# Patient Record
Sex: MALE | Race: WHITE | URBAN - METROPOLITAN AREA
[De-identification: names, ages, dates, MRNs, and addresses within clinical notes are randomized per-mention and may not be internally consistent; named-entity substitution may affect disease eponyms.]

---

## 2021-01-14 DIAGNOSIS — Z23 NEED FOR VACCINATION: ICD-10-CM

## 2024-03-05 ENCOUNTER — HOSPITAL ENCOUNTER (INPATIENT)
Facility: MEDICAL CENTER | Age: 84
LOS: 2 days | DRG: 378 | End: 2024-03-07
Attending: EMERGENCY MEDICINE | Admitting: HOSPITALIST
Payer: MEDICARE

## 2024-03-05 ENCOUNTER — APPOINTMENT (OUTPATIENT)
Dept: RADIOLOGY | Facility: MEDICAL CENTER | Age: 84
DRG: 378 | End: 2024-03-05
Attending: EMERGENCY MEDICINE
Payer: MEDICARE

## 2024-03-05 DIAGNOSIS — R79.89 ELEVATED TROPONIN: ICD-10-CM

## 2024-03-05 DIAGNOSIS — D64.9 SYMPTOMATIC ANEMIA: ICD-10-CM

## 2024-03-05 DIAGNOSIS — I95.1 ORTHOSTASIS: ICD-10-CM

## 2024-03-05 DIAGNOSIS — K92.1 TARRY STOOL: ICD-10-CM

## 2024-03-05 DIAGNOSIS — K92.2 UPPER GI BLEED: ICD-10-CM

## 2024-03-05 DIAGNOSIS — N17.9 AKI (ACUTE KIDNEY INJURY) (HCC): ICD-10-CM

## 2024-03-05 PROBLEM — D62 ACUTE BLOOD LOSS ANEMIA: Status: ACTIVE | Noted: 2024-03-05

## 2024-03-05 LAB
ABO + RH BLD: NORMAL
ABO GROUP BLD: NORMAL
ALBUMIN SERPL BCP-MCNC: 3.8 G/DL (ref 3.2–4.9)
ALBUMIN/GLOB SERPL: 1.8 G/DL
ALP SERPL-CCNC: 66 U/L (ref 30–99)
ALT SERPL-CCNC: 12 U/L (ref 2–50)
ANION GAP SERPL CALC-SCNC: 13 MMOL/L (ref 7–16)
AST SERPL-CCNC: 20 U/L (ref 12–45)
BARCODED ABORH UBTYP: 5100
BARCODED ABORH UBTYP: 9500
BARCODED PRD CODE UBPRD: NORMAL
BARCODED PRD CODE UBPRD: NORMAL
BARCODED UNIT NUM UBUNT: NORMAL
BARCODED UNIT NUM UBUNT: NORMAL
BASOPHILS # BLD AUTO: 0.4 % (ref 0–1.8)
BASOPHILS # BLD: 0.04 K/UL (ref 0–0.12)
BILIRUB SERPL-MCNC: <0.2 MG/DL (ref 0.1–1.5)
BLD GP AB SCN SERPL QL: NORMAL
BUN SERPL-MCNC: 45 MG/DL (ref 8–22)
CALCIUM ALBUM COR SERPL-MCNC: 8.4 MG/DL (ref 8.5–10.5)
CALCIUM SERPL-MCNC: 8.2 MG/DL (ref 8.5–10.5)
CHLORIDE SERPL-SCNC: 111 MMOL/L (ref 96–112)
CO2 SERPL-SCNC: 19 MMOL/L (ref 20–33)
COMPONENT R 8504R: NORMAL
COMPONENT R 8504R: NORMAL
CREAT SERPL-MCNC: 1.59 MG/DL (ref 0.5–1.4)
EKG IMPRESSION: NORMAL
EOSINOPHIL # BLD AUTO: 0.02 K/UL (ref 0–0.51)
EOSINOPHIL NFR BLD: 0.2 % (ref 0–6.9)
ERYTHROCYTE [DISTWIDTH] IN BLOOD BY AUTOMATED COUNT: 51.1 FL (ref 35.9–50)
GFR SERPLBLD CREATININE-BSD FMLA CKD-EPI: 43 ML/MIN/1.73 M 2
GLOBULIN SER CALC-MCNC: 2.1 G/DL (ref 1.9–3.5)
GLUCOSE SERPL-MCNC: 115 MG/DL (ref 65–99)
HCT VFR BLD AUTO: 19 % (ref 42–52)
HGB BLD-MCNC: 5.7 G/DL (ref 14–18)
HGB BLD-MCNC: 6.4 G/DL (ref 14–18)
HGB BLD-MCNC: 8.3 G/DL (ref 14–18)
IMM GRANULOCYTES # BLD AUTO: 0.09 K/UL (ref 0–0.11)
IMM GRANULOCYTES NFR BLD AUTO: 0.9 % (ref 0–0.9)
IRON SATN MFR SERPL: 6 % (ref 15–55)
IRON SERPL-MCNC: 21 UG/DL (ref 50–180)
LYMPHOCYTES # BLD AUTO: 2.19 K/UL (ref 1–4.8)
LYMPHOCYTES NFR BLD: 21.9 % (ref 22–41)
MCH RBC QN AUTO: 26.8 PG (ref 27–33)
MCHC RBC AUTO-ENTMCNC: 30 G/DL (ref 32.3–36.5)
MCV RBC AUTO: 89.2 FL (ref 81.4–97.8)
MONOCYTES # BLD AUTO: 0.65 K/UL (ref 0–0.85)
MONOCYTES NFR BLD AUTO: 6.5 % (ref 0–13.4)
NEUTROPHILS # BLD AUTO: 6.99 K/UL (ref 1.82–7.42)
NEUTROPHILS NFR BLD: 70.1 % (ref 44–72)
NRBC # BLD AUTO: 0.02 K/UL
NRBC BLD-RTO: 0.2 /100 WBC (ref 0–0.2)
PLATELET # BLD AUTO: 221 K/UL (ref 164–446)
PMV BLD AUTO: 10.7 FL (ref 9–12.9)
POTASSIUM SERPL-SCNC: 4 MMOL/L (ref 3.6–5.5)
PRODUCT TYPE UPROD: NORMAL
PRODUCT TYPE UPROD: NORMAL
PROT SERPL-MCNC: 5.9 G/DL (ref 6–8.2)
RBC # BLD AUTO: 2.13 M/UL (ref 4.7–6.1)
RH BLD: NORMAL
SODIUM SERPL-SCNC: 143 MMOL/L (ref 135–145)
TIBC SERPL-MCNC: 335 UG/DL (ref 250–450)
TROPONIN T SERPL-MCNC: 117 NG/L (ref 6–19)
TROPONIN T SERPL-MCNC: 130 NG/L (ref 6–19)
UIBC SERPL-MCNC: 314 UG/DL (ref 110–370)
UNIT STATUS USTAT: NORMAL
UNIT STATUS USTAT: NORMAL
WBC # BLD AUTO: 10 K/UL (ref 4.8–10.8)

## 2024-03-05 PROCEDURE — 80053 COMPREHEN METABOLIC PANEL: CPT

## 2024-03-05 PROCEDURE — 96374 THER/PROPH/DIAG INJ IV PUSH: CPT

## 2024-03-05 PROCEDURE — 700111 HCHG RX REV CODE 636 W/ 250 OVERRIDE (IP): Performed by: HOSPITALIST

## 2024-03-05 PROCEDURE — 700101 HCHG RX REV CODE 250: Performed by: INTERNAL MEDICINE

## 2024-03-05 PROCEDURE — 93005 ELECTROCARDIOGRAM TRACING: CPT | Performed by: EMERGENCY MEDICINE

## 2024-03-05 PROCEDURE — 770020 HCHG ROOM/CARE - TELE (206)

## 2024-03-05 PROCEDURE — 99223 1ST HOSP IP/OBS HIGH 75: CPT | Mod: AI | Performed by: HOSPITALIST

## 2024-03-05 PROCEDURE — 36415 COLL VENOUS BLD VENIPUNCTURE: CPT

## 2024-03-05 PROCEDURE — 70450 CT HEAD/BRAIN W/O DYE: CPT

## 2024-03-05 PROCEDURE — 86901 BLOOD TYPING SEROLOGIC RH(D): CPT

## 2024-03-05 PROCEDURE — 700105 HCHG RX REV CODE 258: Performed by: EMERGENCY MEDICINE

## 2024-03-05 PROCEDURE — 86850 RBC ANTIBODY SCREEN: CPT

## 2024-03-05 PROCEDURE — 71045 X-RAY EXAM CHEST 1 VIEW: CPT

## 2024-03-05 PROCEDURE — P9016 RBC LEUKOCYTES REDUCED: HCPCS

## 2024-03-05 PROCEDURE — 30233N1 TRANSFUSION OF NONAUTOLOGOUS RED BLOOD CELLS INTO PERIPHERAL VEIN, PERCUTANEOUS APPROACH: ICD-10-PCS | Performed by: EMERGENCY MEDICINE

## 2024-03-05 PROCEDURE — 85025 COMPLETE CBC W/AUTO DIFF WBC: CPT

## 2024-03-05 PROCEDURE — C9113 INJ PANTOPRAZOLE SODIUM, VIA: HCPCS | Performed by: EMERGENCY MEDICINE

## 2024-03-05 PROCEDURE — C9113 INJ PANTOPRAZOLE SODIUM, VIA: HCPCS | Performed by: HOSPITALIST

## 2024-03-05 PROCEDURE — 83540 ASSAY OF IRON: CPT

## 2024-03-05 PROCEDURE — 72125 CT NECK SPINE W/O DYE: CPT

## 2024-03-05 PROCEDURE — 700111 HCHG RX REV CODE 636 W/ 250 OVERRIDE (IP): Performed by: EMERGENCY MEDICINE

## 2024-03-05 PROCEDURE — 83550 IRON BINDING TEST: CPT

## 2024-03-05 PROCEDURE — 99222 1ST HOSP IP/OBS MODERATE 55: CPT | Performed by: INTERNAL MEDICINE

## 2024-03-05 PROCEDURE — 86900 BLOOD TYPING SEROLOGIC ABO: CPT

## 2024-03-05 PROCEDURE — 36430 TRANSFUSION BLD/BLD COMPNT: CPT

## 2024-03-05 PROCEDURE — 99285 EMERGENCY DEPT VISIT HI MDM: CPT

## 2024-03-05 PROCEDURE — 85018 HEMOGLOBIN: CPT

## 2024-03-05 PROCEDURE — 86923 COMPATIBILITY TEST ELECTRIC: CPT

## 2024-03-05 PROCEDURE — 84484 ASSAY OF TROPONIN QUANT: CPT | Mod: 91

## 2024-03-05 RX ORDER — ONDANSETRON 2 MG/ML
4 INJECTION INTRAMUSCULAR; INTRAVENOUS EVERY 4 HOURS PRN
Status: DISCONTINUED | OUTPATIENT
Start: 2024-03-05 | End: 2024-03-07 | Stop reason: HOSPADM

## 2024-03-05 RX ORDER — MULTIVITAMIN
1 TABLET ORAL DAILY
COMMUNITY

## 2024-03-05 RX ORDER — PANTOPRAZOLE SODIUM 40 MG/10ML
80 INJECTION, POWDER, LYOPHILIZED, FOR SOLUTION INTRAVENOUS ONCE
Status: COMPLETED | OUTPATIENT
Start: 2024-03-05 | End: 2024-03-05

## 2024-03-05 RX ORDER — PANTOPRAZOLE SODIUM 40 MG/10ML
40 INJECTION, POWDER, LYOPHILIZED, FOR SOLUTION INTRAVENOUS 2 TIMES DAILY
Status: DISCONTINUED | OUTPATIENT
Start: 2024-03-05 | End: 2024-03-07 | Stop reason: HOSPADM

## 2024-03-05 RX ORDER — ACETAMINOPHEN 325 MG/1
650 TABLET ORAL EVERY 6 HOURS PRN
Status: DISCONTINUED | OUTPATIENT
Start: 2024-03-05 | End: 2024-03-07 | Stop reason: HOSPADM

## 2024-03-05 RX ORDER — ONDANSETRON 4 MG/1
4 TABLET, ORALLY DISINTEGRATING ORAL EVERY 4 HOURS PRN
Status: DISCONTINUED | OUTPATIENT
Start: 2024-03-05 | End: 2024-03-07 | Stop reason: HOSPADM

## 2024-03-05 RX ORDER — IBUPROFEN 200 MG
200 TABLET ORAL
Status: ON HOLD | COMMUNITY
End: 2024-03-07

## 2024-03-05 RX ORDER — SODIUM CHLORIDE 9 MG/ML
1000 INJECTION, SOLUTION INTRAVENOUS ONCE
Status: COMPLETED | OUTPATIENT
Start: 2024-03-05 | End: 2024-03-05

## 2024-03-05 RX ADMIN — POLYETHYLENE GLYCOL-3350 AND ELECTROLYTES 4 L: 236; 6.74; 5.86; 2.97; 22.74 POWDER, FOR SOLUTION ORAL at 15:00

## 2024-03-05 RX ADMIN — PANTOPRAZOLE SODIUM 80 MG: 40 INJECTION, POWDER, FOR SOLUTION INTRAVENOUS at 11:01

## 2024-03-05 RX ADMIN — PANTOPRAZOLE SODIUM 40 MG: 40 INJECTION, POWDER, LYOPHILIZED, FOR SOLUTION INTRAVENOUS at 17:26

## 2024-03-05 RX ADMIN — SODIUM CHLORIDE 1000 ML: 9 INJECTION, SOLUTION INTRAVENOUS at 09:35

## 2024-03-05 ASSESSMENT — COGNITIVE AND FUNCTIONAL STATUS - GENERAL
STANDING UP FROM CHAIR USING ARMS: A LITTLE
TOILETING: A LITTLE
TURNING FROM BACK TO SIDE WHILE IN FLAT BAD: A LITTLE
WALKING IN HOSPITAL ROOM: A LITTLE
MOVING FROM LYING ON BACK TO SITTING ON SIDE OF FLAT BED: A LITTLE
CLIMB 3 TO 5 STEPS WITH RAILING: A LITTLE
SUGGESTED CMS G CODE MODIFIER DAILY ACTIVITY: CJ
SUGGESTED CMS G CODE MODIFIER MOBILITY: CK
DAILY ACTIVITIY SCORE: 21
HELP NEEDED FOR BATHING: A LITTLE
MOBILITY SCORE: 18
MOVING TO AND FROM BED TO CHAIR: A LITTLE
DRESSING REGULAR LOWER BODY CLOTHING: A LITTLE

## 2024-03-05 ASSESSMENT — PAIN DESCRIPTION - PAIN TYPE: TYPE: ACUTE PAIN

## 2024-03-05 ASSESSMENT — ENCOUNTER SYMPTOMS
SHORTNESS OF BREATH: 0
DIZZINESS: 1

## 2024-03-05 NOTE — CONSULTS
Date of Consultation:  3/5/2024    Patient: : Dilan Calderón  MRN: [id number]    Referring Physician:  Dr Baldwin     GI:Greyson Serrato M.D.     Reason for Consultation: Iron deficiency anemia    History of Present Illness:   83-year-old male presenting with syncopal symptoms.  Workup revealed significant iron deficiency anemia.  Patient's hemoglobin at 5.  Being transfused PRBCs currently.  Patient endorses no obvious bloody show.  He thinks he had an endoscopy back in his 30s.  He has not had a colonoscopy in the past 30-40 years that he can recall.  Denies recent upper endoscopy.  No hematemesis.  No recent melena. Maybe dark stool or two but nothing tarry. Patient does take nonsteroidal anti-inflammatories.  Denies unintentional weight loss.      No past medical history on file.    No past surgical history on file.    No family history on file.    Social History     Socioeconomic History    Marital status: Single       Current Meds (name, sig, last dose):     Current Facility-Administered Medications:     pantoprazole    acetaminophen    ondansetron    ondansetron    Current Outpatient Medications:     ibuprofen, 200 mg, Oral, QDAY PRN, 3/3/2024 at [facility]    Multivitamin, 1 Tablet, Oral, DAILY, 3/3/2024 at [facility]      ROS  10 systems reviewed and are negative unless otherwise noted in history of present illness.    Physical Exam:  Vitals:    03/05/24 1115 03/05/24 1130 03/05/24 1145 03/05/24 1200   BP: 104/56 119/49 114/58 112/55   Pulse: 79 78 74 72   Resp: 19 19 17 15   Temp: 36.5 °C (97.7 °F) 36.6 °C (97.8 °F)     TempSrc: Temporal Temporal     SpO2: 99% 100% 100% 100%   Weight:       Height:           Physical Exam  Vitals reviewed.   Constitutional:       General: He is not in acute distress.     Appearance: He is ill-appearing. He is not toxic-appearing.   Eyes:      General: No scleral icterus.  Cardiovascular:      Rate and Rhythm: Normal rate and regular rhythm.   Pulmonary:      Breath sounds: Normal breath  sounds.   Abdominal:      Palpations: Abdomen is soft.   Neurological:      General: No focal deficit present.      Mental Status: He is alert.   Psychiatric:         Mood and Affect: Mood normal.         Judgment: Judgment normal.           Labs:  Recent Labs     03/05/24  0846   SODIUM 143   POTASSIUM 4.0   CHLORIDE 111   CO2 19*   BUN 45*   CREATININE 1.59*   CALCIUM 8.2*     Recent Labs     03/05/24  0846   ALTSGPT 12   ASTSGOT 20   ALKPHOSPHAT 66   TBILIRUBIN <0.2   GLUCOSE 115*     Recent Labs     03/05/24  0846   WBC 10.0   NEUTSPOLYS 70.10   LYMPHOCYTES 21.90*   MONOCYTES 6.50   EOSINOPHILS 0.20   BASOPHILS 0.40   ASTSGOT 20   ALTSGPT 12   ALKPHOSPHAT 66   TBILIRUBIN <0.2     Recent Labs     03/05/24  0846   RBC 2.13*   HEMOGLOBIN 5.7*   HEMATOCRIT 19.0*   PLATELETCT 221   IRON 21*   TOTIRONBC 335     Recent Results (from the past 24 hour(s))   CBC with Differential    Collection Time: 03/05/24  8:46 AM   Result Value Ref Range    WBC 10.0 4.8 - 10.8 K/uL    RBC 2.13 (L) 4.70 - 6.10 M/uL    Hemoglobin 5.7 (LL) 14.0 - 18.0 g/dL    Hematocrit 19.0 (L) 42.0 - 52.0 %    MCV 89.2 81.4 - 97.8 fL    MCH 26.8 (L) 27.0 - 33.0 pg    MCHC 30.0 (L) 32.3 - 36.5 g/dL    RDW 51.1 (H) 35.9 - 50.0 fL    Platelet Count 221 164 - 446 K/uL    MPV 10.7 9.0 - 12.9 fL    Neutrophils-Polys 70.10 44.00 - 72.00 %    Lymphocytes 21.90 (L) 22.00 - 41.00 %    Monocytes 6.50 0.00 - 13.40 %    Eosinophils 0.20 0.00 - 6.90 %    Basophils 0.40 0.00 - 1.80 %    Immature Granulocytes 0.90 0.00 - 0.90 %    Nucleated RBC 0.20 0.00 - 0.20 /100 WBC    Neutrophils (Absolute) 6.99 1.82 - 7.42 K/uL    Lymphs (Absolute) 2.19 1.00 - 4.80 K/uL    Monos (Absolute) 0.65 0.00 - 0.85 K/uL    Eos (Absolute) 0.02 0.00 - 0.51 K/uL    Baso (Absolute) 0.04 0.00 - 0.12 K/uL    Immature Granulocytes (abs) 0.09 0.00 - 0.11 K/uL    NRBC (Absolute) 0.02 K/uL   Complete Metabolic Panel (CMP)    Collection Time: 03/05/24  8:46 AM   Result Value Ref Range    Sodium 143  135 - 145 mmol/L    Potassium 4.0 3.6 - 5.5 mmol/L    Chloride 111 96 - 112 mmol/L    Co2 19 (L) 20 - 33 mmol/L    Anion Gap 13.0 7.0 - 16.0    Glucose 115 (H) 65 - 99 mg/dL    Bun 45 (H) 8 - 22 mg/dL    Creatinine 1.59 (H) 0.50 - 1.40 mg/dL    Calcium 8.2 (L) 8.5 - 10.5 mg/dL    Correct Calcium 8.4 (L) 8.5 - 10.5 mg/dL    AST(SGOT) 20 12 - 45 U/L    ALT(SGPT) 12 2 - 50 U/L    Alkaline Phosphatase 66 30 - 99 U/L    Total Bilirubin <0.2 0.1 - 1.5 mg/dL    Albumin 3.8 3.2 - 4.9 g/dL    Total Protein 5.9 (L) 6.0 - 8.2 g/dL    Globulin 2.1 1.9 - 3.5 g/dL    A-G Ratio 1.8 g/dL   Troponins NOW    Collection Time: 24  8:46 AM   Result Value Ref Range    Troponin T 117 (H) 6 - 19 ng/L   ESTIMATED GFR    Collection Time: 24  8:46 AM   Result Value Ref Range    GFR (CKD-EPI) 43 (A) >60 mL/min/1.73 m 2   ABO Rh Confirm    Collection Time: 24  8:46 AM   Result Value Ref Range    ABO Rh Confirm O POS    IRON/TOTAL IRON BIND    Collection Time: 24  8:46 AM   Result Value Ref Range    Iron 21 (L) 50 - 180 ug/dL    Total Iron Binding 335 250 - 450 ug/dL    Unsat Iron Binding 314 110 - 370 ug/dL    % Saturation 6 (L) 15 - 55 %   EKG    Collection Time: 24  9:17 AM   Result Value Ref Range    Report       [facility].    Test Date:  2024  Pt Name:    AMY KERR              Department: ER  MRN:        [id number]                      Room:       RD 09  Gender:     Male                         Technician: [id number]  :        [id number]                   Requested By:ER TRIAGE PROTOCOL  Order #:    [id number]                    Reading MD: MELISSA PACHECO MD    Measurements  Intervals                                Axis  Rate:       75                           P:          65  ME:         192                          QRS:        -2  QRSD:       79                           T:          58  QT:         426  QTc:        476    Interpretive Statements  Sinus  rhythm  Minimal ST depression, anterolateral leads  Borderline prolonged QT interval  No previous ECG available for comparison  Electronically Signed On 03- 09:24:59 PST by MELISSA PACHECO MD     COD - Adult (Type and Screen)    Collection Time: 03/05/24  9:50 AM   Result Value Ref Range    ABO Grouping Only O     Rh Grouping Only POS     Antibody Screen-Cod NEG     Component R       R99                 Red Cells, LR       [id number]   issued       03/05/24   10:58      Product Type R99     Dispense Status issued     Unit Number (Barcoded) [id number]     Product Code (Barcoded) H3108S60     Blood Type (Barcoded) 9500    Troponins in two (2) hours    Collection Time: 03/05/24 10:42 AM   Result Value Ref Range    Troponin T 130 (H) 6 - 19 ng/L         Imaging:  CT-CSPINE WITHOUT PLUS RECONS  Narrative: 3/5/2024 9:58 AM    HISTORY/REASON FOR EXAM: SYNCOPE; CHEST PAIN    TECHNIQUE/EXAM DESCRIPTION:  CT cervical spine without contrast, with reconstructions.    The study was performed on a helical multidetector CT scanner. Thin-section helical scanning was performed from the skull base through T1. Sagittal and coronal multiplanar reconstructions were generated from the axial images.    Low dose optimization technique was utilized for this CT exam including automated exposure control and adjustment of the mA and/or kV according to patient size.    COMPARISON:  None.    FINDINGS:  Prevertebral soft tissues are normal. Straightening of the cervical spine. No acute fracture or dislocation.    There is moderately advanced disc and scattered facet degeneration. Grade 1 degenerative anterolisthesis C3 on C4 and slight T1 on T2. There is multilevel spinal foraminal stenosis.  Impression: Degenerative changes without acute fracture or dislocation of the cervical spine.  CT-HEAD W/O  Narrative: 3/5/2024 9:58 AM    HISTORY/REASON FOR EXAM:  Syncope. R40.20  Unconsciousness/Coma, NOS  Head injury    TECHNIQUE/EXAM  DESCRIPTION AND NUMBER OF VIEWS:  CT of the head without contrast.    The study was performed on a helical multidetector CT scanner. Contiguous 2.5 mm axial sections were obtained from the skull base through the vertex.    Up to date radiation dose reduction adjustments have been utilized to meet ALARA standards for radiation dose reduction.    COMPARISON:  None available    FINDINGS:  Mild/moderate generalized volume loss.  Patchy hypodensities in cerebral white matter most likely represent mild chronic microvascular ischemic type changes.    No acute intracranial hemorrhage, major vascular territory infarct, mass effect, midline shift or hydrocephalus.    Paranasal sinuses and mastoids are clear.  No depressed calvarial fracture.  Impression: 1.  Cerebral atrophy and chronic microvascular ischemic type changes.  2.  No acute intracranial abnormality.  DX-CHEST-PORTABLE (1 VIEW)  Narrative: 3/5/2024 8:45 AM    HISTORY/REASON FOR EXAM:  Chest Pain.    TECHNIQUE/EXAM DESCRIPTION AND NUMBER OF VIEWS:  Single portable view of the chest.    COMPARISON: None    FINDINGS:    Cardiomediastinal silhouette is normal.    No focal consolidation, pleural effusion, pulmonary edema or pneumothorax.    Severe degenerative changes of the glenohumeral joints bilaterally.  Impression: No acute cardiopulmonary abnormality.        MDM Data Review:  -Records reviewed and summarized in current documentation  -I personally reviewed and interpreted the laboratory results  -I personally reviewed the radiology images  -I have personally reviewed medications    Hospital Problem List:  Active Hospital Problems    Diagnosis     Acute blood loss anemia [D62]     MARCEL (acute kidney injury) (HCC) [N17.9]     Upper GI bleed [K92.2]     Troponin level elevated [R79.89]        Impressions:  83-year-old male with iron deficiency anemia and syncopal events.  Questionable melena.He takes NSAIDs.      Peptic ulcer disease and malignancy are high on the  differential diagnosis.  I have recommended upper and lower endoscopy tomorrow.    The risk, benefits, and alternatives were discussed in detail. Risks include bowel perforation, procedure related bleeding event, infection, inability to safely complete the exam, sedation related complications. The patient, understanding the discussion, consents to proceed forward.        Recommendations:  Clear liquid diet  Bowel preparation this afternoon  Bidirectional endoscopy tomorrow a.m.

## 2024-03-05 NOTE — ASSESSMENT & PLAN NOTE
Baseline unknown though here in the emergency room his creatinine is elevated at 1.59  IV fluid hydration  Follow urine output  Basic metabolic panel ordered for the morning

## 2024-03-05 NOTE — ED NOTES
Consent for transfusion signed and placed into chart.   Blood tubing primed and hanging at bedside, ready to infuse.   COD sent to Lab and BB.   Bolus placed on pressure bag.

## 2024-03-05 NOTE — ED NOTES
Rate increased. Pt continues to rest in [facility] with stable vitals on 2L NC. Denies additional needs at this time. Watching TV.

## 2024-03-05 NOTE — PROGRESS NOTES
Med rec is complete per Patient at bedside.     Allergies reviewed.    Has patient had any outside antibiotics in the last 30 days? N    Any Anticoagulants (rivaroxaban, apixaban, edoxaban, dabigatran, warfarin, enoxaparin) taken in the last 14 days? N         Pharmacy/Pharmacies Pt utilizes : [facility] [phone]

## 2024-03-05 NOTE — H&P
Hospital Medicine History & Physical Note    Date of Service  3/5/2024    Primary Care Physician  No primary care provider on file.    Consultants  GI    Specialist Names: Dr. Serrato    Code Status  Full Code    Chief Complaint  Chief Complaint   Patient presents with    Syncope     PT bib ems from home where he lives alone, pt states has been having near syncopal/syncopal episodes for weeks. Can usually catch himself and guide himself to floor however has in the past woken up with new scratches on face from glasses. No new lacerations noted to head at this time. Old scratch notes to L side of head above ear. EMS gave 400NS. States + orthostatics.     Other     PT states mostly bothered by 'weird feeling of nerves from the back of  head down his body' Denies pain. But states feeling has been getting more intense the last few weeks.     Chest Pain     During 'dizzy episodes'       History of Presenting Illness  Dilan Calderón is a 83 y.o. male who presented 3/5/2024 with weakness and lightheadedness.  Mr. Calderón has no known medical conditions was in his usual state of good health until about a month ago he noticed that he was lightheaded in the mornings and had to be careful and then for the past week he has been so lightheaded in the morning he has to crawl around.  He did not make much of it until this morning he elected to come to the emergency room to get this looked into.  In the emergency room, he was found to have a hemoglobin of 5.7 and will be admitted for presumptive upper GI bleed requiring transfusions and GI consultation for EGD.  Upon questioning he has been dieting and therefore has lost a little bit of weight purposely.  He has had a couple episodes of black stools.  He takes ibuprofen every couple days.  He has never had upper GI bleed in the past.  He denies vomiting.  In the emergency room his troponin is elevated at 130 though he does not have any dynamic EKG changes and no chest pain.  The  emergency room physician Dr. Isabel has ordered 1 unit packed red blood cells and another 1 will be transfused afterwards.    I discussed the plan of care with bedside RN.    Review of Systems  Review of Systems   Constitutional:  Positive for malaise/fatigue.   Respiratory:  Negative for shortness of breath.    Cardiovascular:  Negative for chest pain.   Gastrointestinal:  Positive for melena.   Neurological:  Positive for dizziness.   All other systems reviewed and are negative.      Past Medical History  He denies any known medical conditions    Surgical History  Tonsillectomy as a kid and appendectomy 2004    Family History  family history is not on file.   Family history reviewed with patient. There is no family history that is pertinent to the chief complaint.     Social History   Lives alone, does not drink nor smoke    Allergies  No Known Allergies    Medications  Prior to Admission Medications   Prescriptions Last Dose Informant Patient Reported? Taking?   Multiple Vitamin (MULTIVITAMIN) Tab 3/3/2024 at [facility] Patient Yes Yes   Sig: Take 1 Tablet by mouth every day.   ibuprofen (MOTRIN) 200 MG Tab 3/3/2024 at [facility] Patient Yes Yes   Sig: Take 200 mg by mouth 1 time a day as needed for Mild Pain.      Facility-Administered Medications: None       Physical Exam  Temp:  [36.2 °C (97.1 °F)-36.6 °C (97.8 °F)] 36.6 °C (97.8 °F)  Pulse:  [] 78  Resp:  [16-20] 19  BP: (104-139)/(49-62) 119/49  SpO2:  [82 %-100 %] 100 %  Blood Pressure : 119/49   Temperature: 36.6 °C (97.8 °F)   Pulse: 78   Respiration: 19   Pulse Oximetry: 100 %       Physical Exam    Laboratory:  Recent Labs     03/05/24  0846   WBC 10.0   RBC 2.13*   HEMOGLOBIN 5.7*   HEMATOCRIT 19.0*   MCV 89.2   MCH 26.8*   MCHC 30.0*   RDW 51.1*   PLATELETCT 221   MPV 10.7     Recent Labs     03/05/24  0846   SODIUM 143   POTASSIUM 4.0   CHLORIDE 111   CO2 19*   GLUCOSE 115*   BUN 45*   CREATININE 1.59*   CALCIUM 8.2*     Recent Labs     03/05/24  0846  "  ALTSGPT 12   ASTSGOT 20   ALKPHOSPHAT 66   TBILIRUBIN <0.2   GLUCOSE 115*         No results for input(s): \"NTPROBNP\" in the last 72 hours.      Recent Labs     03/05/24  0846 03/05/24  1042   TROPONINT 117* 130*       Imaging:  CT-CSPINE WITHOUT PLUS RECONS   Final Result      Degenerative changes without acute fracture or dislocation of the cervical spine.      CT-HEAD W/O   Final Result      1.  Cerebral atrophy and chronic microvascular ischemic type changes.   2.  No acute intracranial abnormality.         DX-CHEST-PORTABLE (1 VIEW)   Final Result      No acute cardiopulmonary abnormality.          EKG interpreted by me sinus rhythm without dynamic ST or T wave changes    Assessment/Plan:  Justification for Admission Status  I anticipate this patient will require at least two midnights for appropriate medical management, necessitating inpatient admission because EGD and blood transfusions    Patient will need a Telemetry bed on MEDICAL service .  The need is secondary to elevated troponin and severe anemia.    * Upper GI bleed- (present on admission)  Assessment & Plan  With severe anemia in the setting of ibuprofen use  IV Protonix 40 mg twice daily  GI to consult for likely EGD    Acute blood loss anemia- (present on admission)  Assessment & Plan  Likely secondary to upper GI bleed  Hemoglobin in the emergency room is 5.7  He will be transfused 2 units packed red blood cells and will have serial hemoglobins every 6 hours with standing orders to transfuse for hemoglobin less than 7  Gastroenterology has consulted for likely EGD and possibly colonoscopy if indicated  He has been taking ibuprofen  IV Protonix  No indication for octreotide    Troponin level elevated- (present on admission)  Assessment & Plan  Troponin 130  Likely due to severe anemia he denies chest pain  EKG does not reveal any dynamic changes  No further troponins will be trended as this would not change our current management and is " certainly not a candidate for heart catheterization as he would not be able to tolerate dual antiplatelet therapy  Outpatient workup can be done as indicated    MARCEL (acute kidney injury) (HCC)- (present on admission)  Assessment & Plan  Baseline unknown though here in the emergency room his creatinine is elevated at 1.59  He will be given blood and either IV fluids or clear liquids at the discretion of gastroenterology  Follow urine output  Basic metabolic panel ordered for the morning        VTE prophylaxis: SCDs/TEDs

## 2024-03-05 NOTE — ED NOTES
BB contacted once more, station 84 down. Asked to sent to 94 instead.   BB technician states they will send to 94 instead.

## 2024-03-05 NOTE — ED NOTES
Transfusion complete.   Repeat Hgb collected and sent. Pt resting in [facility] watching TV

## 2024-03-05 NOTE — ED NOTES
Additional PIV started. Transfusion rate increased. Pt resting in gurney with stable vitals on 2L NC watching TV.

## 2024-03-05 NOTE — ASSESSMENT & PLAN NOTE
Troponin 130  Likely due to severe anemia     he denies chest pain    EKG does not reveal any dynamic changes    Outpatient workup can be done as indicated    Check an echocardiogram

## 2024-03-05 NOTE — ED NOTES
ERP notified about critical Trop and Hgb result. New orders placed.   ERP at bedside speaking with pt about POC

## 2024-03-05 NOTE — ASSESSMENT & PLAN NOTE
With severe anemia in the setting of ibuprofen use  IV Protonix 40 mg twice daily  GI --> EGD today

## 2024-03-05 NOTE — ED NOTES
Pt continues to rest in [facility] watching TV. Urinal and water at bedside if needed.

## 2024-03-05 NOTE — ED PROVIDER NOTES
ER Provider Note    Scribed for Hamilton Isabel M.D. by Tiffani Kruse. 3/5/2024   8:38 AM    Primary Care Provider: None noted    CHIEF COMPLAINT  Chief Complaint   Patient presents with    Syncope     PT bib ems from home where he lives alone, pt states has been having near syncopal/syncopal episodes for weeks. Can usually catch himself and guide himself to floor however has in the past woken up with new scratches on face from glasses. No new lacerations noted to head at this time. Old scratch notes to L side of head above ear. EMS gave 400NS. States + orthostatics.     Other     PT states mostly bothered by 'weird feeling of nerves from the back of  head down his body' Denies pain. But states feeling has been getting more intense the last few weeks.     Chest Pain     During 'dizzy episodes'     EXTERNAL RECORDS REVIEWED  Inpatient Notes: The patient was last here in 2004 for an appendectomy.    HPI/ROS  LIMITATION TO HISTORY   Select: : None  OUTSIDE HISTORIAN(S):  None noted    Dilan Calderón is a 83 y.o. male who presents to the ED for evaluation of multiple episodes of syncope over the last year. Per patient, he has had frequent episodes where any time he stands up from laying down he begins to have numbness and tingling at the base of head, radiating down the bilateral sides of his body. He notes this sensation causes him to have associated shortness of breath. He presents today as his symptoms have been worse in the past week, occurring every day. He reports two episodes of syncope twice, where he fell and sustained multiple lacerations to his head. He adds he has also had difficulty with his memory and tremors over the last year. He states he has been taking care of himself all all-fours due to his symptoms when standing. He notes he has had some tarry black stools and has been taking laxatives. The patient states he takes 1 Ibuprofen every 2-3 days and Testosterone. The patient lives at home alone. He has  "no significant medical history. He denies any history of bleeding ulcers.     PAST MEDICAL HISTORY  None noted    SURGICAL HISTORY  None noted    FAMILY HISTORY  None noted    SOCIAL HISTORY   None noted    CURRENT MEDICATIONS  None noted    ALLERGIES   None noted    PHYSICAL EXAM  /61   Pulse 79   Temp 36.2 °C (97.1 °F) (Temporal)   Resp 18   Ht 1.676 m (5' 6\")   Wt 66.7 kg (147 lb)   SpO2 98%   BMI 23.73 kg/m²    Nursing note and vitals reviewed.  Constitutional: Well-developed and well-nourished. No distress.   HENT: Head is normocephalic. Oropharynx is clear and moist without exudate or erythema.   Eyes: Pupils are equal, round, and reactive to light. Conjunctiva are normal.   Cardiovascular: Normal rate and regular rhythm. No murmur heard. Normal radial pulses.  Pulmonary/Chest: Breath sounds normal. No wheezes or rales.   Abdominal: Soft and non-tender. No distention    Musculoskeletal: Extremities exhibit normal range of motion without edema or tenderness.   Neurological: Awake, alert and oriented to person, place, and time. No focal deficits noted.  Skin: Skin is warm and dry. No rash. Pale  Psychiatric: Normal mood and affect. Appropriate for clinical situation    DIAGNOSTIC STUDIES    Labs:   Results for orders placed or performed during the hospital encounter of 03/05/24   CBC with Differential   Result Value Ref Range    WBC 10.0 4.8 - 10.8 K/uL    RBC 2.13 (L) 4.70 - 6.10 M/uL    Hemoglobin 5.7 (LL) 14.0 - 18.0 g/dL    Hematocrit 19.0 (L) 42.0 - 52.0 %    MCV 89.2 81.4 - 97.8 fL    MCH 26.8 (L) 27.0 - 33.0 pg    MCHC 30.0 (L) 32.3 - 36.5 g/dL    RDW 51.1 (H) 35.9 - 50.0 fL    Platelet Count 221 164 - 446 K/uL    MPV 10.7 9.0 - 12.9 fL    Neutrophils-Polys 70.10 44.00 - 72.00 %    Lymphocytes 21.90 (L) 22.00 - 41.00 %    Monocytes 6.50 0.00 - 13.40 %    Eosinophils 0.20 0.00 - 6.90 %    Basophils 0.40 0.00 - 1.80 %    Immature Granulocytes 0.90 0.00 - 0.90 %    Nucleated RBC 0.20 0.00 - 0.20 " /100 WBC    Neutrophils (Absolute) 6.99 1.82 - 7.42 K/uL    Lymphs (Absolute) 2.19 1.00 - 4.80 K/uL    Monos (Absolute) 0.65 0.00 - 0.85 K/uL    Eos (Absolute) 0.02 0.00 - 0.51 K/uL    Baso (Absolute) 0.04 0.00 - 0.12 K/uL    Immature Granulocytes (abs) 0.09 0.00 - 0.11 K/uL    NRBC (Absolute) 0.02 K/uL   Complete Metabolic Panel (CMP)   Result Value Ref Range    Sodium 143 135 - 145 mmol/L    Potassium 4.0 3.6 - 5.5 mmol/L    Chloride 111 96 - 112 mmol/L    Co2 19 (L) 20 - 33 mmol/L    Anion Gap 13.0 7.0 - 16.0    Glucose 115 (H) 65 - 99 mg/dL    Bun 45 (H) 8 - 22 mg/dL    Creatinine 1.59 (H) 0.50 - 1.40 mg/dL    Calcium 8.2 (L) 8.5 - 10.5 mg/dL    Correct Calcium 8.4 (L) 8.5 - 10.5 mg/dL    AST(SGOT) 20 12 - 45 U/L    ALT(SGPT) 12 2 - 50 U/L    Alkaline Phosphatase 66 30 - 99 U/L    Total Bilirubin <0.2 0.1 - 1.5 mg/dL    Albumin 3.8 3.2 - 4.9 g/dL    Total Protein 5.9 (L) 6.0 - 8.2 g/dL    Globulin 2.1 1.9 - 3.5 g/dL    A-G Ratio 1.8 g/dL   Troponins NOW   Result Value Ref Range    Troponin T 117 (H) 6 - 19 ng/L   Troponins in two (2) hours   Result Value Ref Range    Troponin T 130 (H) 6 - 19 ng/L   ESTIMATED GFR   Result Value Ref Range    GFR (CKD-EPI) 43 (A) >60 mL/min/1.73 m 2   COD - Adult (Type and Screen)   Result Value Ref Range    ABO Grouping Only O     Rh Grouping Only POS     Antibody Screen-Cod NEG     Component R       R99                 Red Cells, LR       [id number]   issued       03/05/24   10:58      Product Type R99     Dispense Status issued     Unit Number (Barcoded) [id number]     Product Code (Barcoded) F5834F83     Blood Type (Barcoded) 6753    ABO Rh Confirm   Result Value Ref Range    ABO Rh Confirm O POS    IRON/TOTAL IRON BIND   Result Value Ref Range    Iron 21 (L) 50 - 180 ug/dL    Total Iron Binding 335 250 - 450 ug/dL    Unsat Iron Binding 314 110 - 370 ug/dL    % Saturation 6 (L) 15 - 55 %   HGB   Result Value Ref Range    Hemoglobin 6.4 (L) 14.0 - 18.0 g/dL   EKG   Result  Value Ref Range    Report       [facility]    Test Date:  2024  Pt Name:    AMY KERR              Department: ER  MRN:        [id number]                      Room:       RD 09  Gender:     Male                         Technician: [id number]  :        [id number]                   Requested By:ER TRIAGE PROTOCOL  Order #:    [id number]                    Reading MD: MELISSA PACHECO MD    Measurements  Intervals                                Axis  Rate:       75                           P:          65  OK:         192                          QRS:        -2  QRSD:       79                           T:          58  QT:         426  QTc:        476    Interpretive Statements  Sinus rhythm  Minimal ST depression, anterolateral leads  Borderline prolonged QT interval  No previous ECG available for comparison  Electronically Signed On 2024 09:24:59 PST by MELISSA PACHECO MD         EKG:   I have independently interpreted this EKG as detailed above.     Radiology:   This attending emergency physician has independently interpreted the diagnostic imaging associated with this visit and is awaiting the final reading from the radiologist.   Preliminary interpretation is a follows: CT scan demonstrates no evidence of acute traumatic injury.    Radiologist interpretation:   CT-CSPINE WITHOUT PLUS RECONS   Final Result      Degenerative changes without acute fracture or dislocation of the cervical spine.      CT-HEAD W/O   Final Result      1.  Cerebral atrophy and chronic microvascular ischemic type changes.   2.  No acute intracranial abnormality.         DX-CHEST-PORTABLE (1 VIEW)   Final Result      No acute cardiopulmonary abnormality.           INITIAL ASSESSMENT AND PLAN    8:38 AM - Patient was evaluated at bedside for syncope. Ordered for CT-C Spine without, CT-Head without, Dx-chest, CBC with diff, CMP, Troponin and EKG to evaluate. Patient verbalizes understanding and  support with my plan of care.  Differential diagnoses include but not limited to: anemia, electrolyte abnormality, dehydration, intracranial hemarhage, cervical spine stenosis.     ED Observation Status? No; Patient does not meet criteria for ED Observation.      COURSE AND MEDICAL DECISION MAKING    9:40 AM - Hemoglobin is 5.7 and troponin 117. Will order for COD and plan for blood transfusion. Paged GI.    10:39 AM - The patient will be treated with Protonix 80 mg.     10:43 AM - I discussed the patient's case and the above findings with Dr. Serrato (GI) who will consult.    11:40 AM - I discussed the patient's case and the above findings with Dr. Baldwin (hospitalist) who will consult on the patient for hospitalization.      The patient was treated with Zofran for nausea.  Placed on a cardiac monitor to monitor for any arrhythmia associated with Zofran and QT prolongation.    I have explained to the patient the risks and benefits of transfusion of blood products.  This includes, as appropriate, the risk of mild allergic reaction, hemolytic reaction, transfusion-associated lung injury, febrile reactions, circulatory or iron overload, and infection.    We discussed possible alternatives and their risks, including directed donation, autologous transfusion, and no transfusion, including IV or oral iron supplementation, as appropriate.  I believe the patient understands the risks and benefits and was able to express understanding.    CRITICAL CARE  The very real possibilty of a deterioration of this patient's condition required the highest level of my preparedness for sudden, emergent intervention.  I provided critical care services, which included medication orders, frequent reevaluations of the patient's condition and response to treatment, ordering and reviewing test results, and discussing the case with various consultants.  The critical care time associated with the care of the patient was 35 minutes. Review  chart for interventions. This time is exclusive of any other billable procedures.     DISPOSITION AND DISCUSSIONS    I have discussed management of the patient with the following physicians and ADELAIDE's:  Dr. Serrato, GI, who will consult 10:45 AM     10:47 AM Dr. Baldwin, hospitalist, will consult for hospitalization.    DISPOSITION:  Patient will be hospitalized by Dr. Baldwin in critical condition.    FINAL DIAGNOSIS  1. Symptomatic anemia    2. Elevated troponin    3. MARCEL (acute kidney injury) (HCC)    4. Orthostasis    5. Tarry stool         ITiffani (Scribe), am scribing for, and in the presence of, Hamilton Isabel M.D..    Electronically signed by: Tiffani Kruse (Scribe), 3/5/2024    IHamilton M.D. personally performed the services described in this documentation, as scribed by Tiffani Kruse in my presence, and it is both accurate and complete.      The note accurately reflects work and decisions made by me.  Hamilton Isabel M.D.  3/5/2024  3:04 PM

## 2024-03-05 NOTE — ASSESSMENT & PLAN NOTE
Likely secondary to upper GI bleed  Hemoglobin in the emergency room is 5.7  He will be transfused 2 units packed red blood cells and will have serial hemoglobins every 6 hours with standing orders to transfuse for hemoglobin less than 7  Gastroenterology has consulted for likely EGD and possibly colonoscopy if indicated  He has been taking ibuprofen  IV Protonix    Avoid aspirin and NSAIDs

## 2024-03-06 ENCOUNTER — ANESTHESIA EVENT (OUTPATIENT)
Dept: SURGERY | Facility: MEDICAL CENTER | Age: 84
DRG: 378 | End: 2024-03-06
Payer: MEDICARE

## 2024-03-06 ENCOUNTER — ANESTHESIA (OUTPATIENT)
Dept: SURGERY | Facility: MEDICAL CENTER | Age: 84
DRG: 378 | End: 2024-03-06
Payer: MEDICARE

## 2024-03-06 LAB
ANION GAP SERPL CALC-SCNC: 13 MMOL/L (ref 7–16)
BUN SERPL-MCNC: 34 MG/DL (ref 8–22)
CALCIUM SERPL-MCNC: 8.7 MG/DL (ref 8.5–10.5)
CHLORIDE SERPL-SCNC: 109 MMOL/L (ref 96–112)
CO2 SERPL-SCNC: 19 MMOL/L (ref 20–33)
CREAT SERPL-MCNC: 1.19 MG/DL (ref 0.5–1.4)
GFR SERPLBLD CREATININE-BSD FMLA CKD-EPI: 61 ML/MIN/1.73 M 2
GLUCOSE SERPL-MCNC: 107 MG/DL (ref 65–99)
HGB BLD-MCNC: 7.4 G/DL (ref 14–18)
HGB BLD-MCNC: 7.8 G/DL (ref 14–18)
HGB BLD-MCNC: 8.4 G/DL (ref 14–18)
PATHOLOGY CONSULT NOTE: NORMAL
POTASSIUM SERPL-SCNC: 4 MMOL/L (ref 3.6–5.5)
SODIUM SERPL-SCNC: 141 MMOL/L (ref 135–145)

## 2024-03-06 PROCEDURE — 99233 SBSQ HOSP IP/OBS HIGH 50: CPT | Performed by: HOSPITALIST

## 2024-03-06 PROCEDURE — 160009 HCHG ANES TIME/MIN: Performed by: INTERNAL MEDICINE

## 2024-03-06 PROCEDURE — 160002 HCHG RECOVERY MINUTES (STAT): Performed by: INTERNAL MEDICINE

## 2024-03-06 PROCEDURE — 0DB48ZX EXCISION OF ESOPHAGOGASTRIC JUNCTION, VIA NATURAL OR ARTIFICIAL OPENING ENDOSCOPIC, DIAGNOSTIC: ICD-10-PCS | Performed by: INTERNAL MEDICINE

## 2024-03-06 PROCEDURE — 85018 HEMOGLOBIN: CPT | Mod: 91

## 2024-03-06 PROCEDURE — 0D748ZZ DILATION OF ESOPHAGOGASTRIC JUNCTION, VIA NATURAL OR ARTIFICIAL OPENING ENDOSCOPIC: ICD-10-PCS | Performed by: INTERNAL MEDICINE

## 2024-03-06 PROCEDURE — 80048 BASIC METABOLIC PNL TOTAL CA: CPT

## 2024-03-06 PROCEDURE — 88305 TISSUE EXAM BY PATHOLOGIST: CPT

## 2024-03-06 PROCEDURE — C9113 INJ PANTOPRAZOLE SODIUM, VIA: HCPCS | Performed by: HOSPITALIST

## 2024-03-06 PROCEDURE — 700111 HCHG RX REV CODE 636 W/ 250 OVERRIDE (IP): Performed by: ANESTHESIOLOGY

## 2024-03-06 PROCEDURE — C1726 CATH, BAL DIL, NON-VASCULAR: HCPCS | Performed by: INTERNAL MEDICINE

## 2024-03-06 PROCEDURE — 700111 HCHG RX REV CODE 636 W/ 250 OVERRIDE (IP): Performed by: HOSPITALIST

## 2024-03-06 PROCEDURE — 160035 HCHG PACU - 1ST 60 MINS PHASE I: Performed by: INTERNAL MEDICINE

## 2024-03-06 PROCEDURE — 700101 HCHG RX REV CODE 250: Performed by: ANESTHESIOLOGY

## 2024-03-06 PROCEDURE — 160203 HCHG ENDO MINUTES - 1ST 30 MINS LEVEL 4: Performed by: INTERNAL MEDICINE

## 2024-03-06 PROCEDURE — 43249 ESOPH EGD DILATION <30 MM: CPT | Performed by: INTERNAL MEDICINE

## 2024-03-06 PROCEDURE — 700105 HCHG RX REV CODE 258: Performed by: INTERNAL MEDICINE

## 2024-03-06 PROCEDURE — 700105 HCHG RX REV CODE 258: Performed by: HOSPITALIST

## 2024-03-06 PROCEDURE — 45378 DIAGNOSTIC COLONOSCOPY: CPT | Performed by: INTERNAL MEDICINE

## 2024-03-06 PROCEDURE — 770020 HCHG ROOM/CARE - TELE (206)

## 2024-03-06 PROCEDURE — 36415 COLL VENOUS BLD VENIPUNCTURE: CPT

## 2024-03-06 PROCEDURE — 160208 HCHG ENDO MINUTES - EA ADDL 1 MIN LEVEL 4: Performed by: INTERNAL MEDICINE

## 2024-03-06 PROCEDURE — 160048 HCHG OR STATISTICAL LEVEL 1-5: Performed by: INTERNAL MEDICINE

## 2024-03-06 PROCEDURE — 0DJD8ZZ INSPECTION OF LOWER INTESTINAL TRACT, VIA NATURAL OR ARTIFICIAL OPENING ENDOSCOPIC: ICD-10-PCS | Performed by: INTERNAL MEDICINE

## 2024-03-06 RX ORDER — ONDANSETRON 2 MG/ML
4 INJECTION INTRAMUSCULAR; INTRAVENOUS
Status: DISCONTINUED | OUTPATIENT
Start: 2024-03-06 | End: 2024-03-06 | Stop reason: HOSPADM

## 2024-03-06 RX ORDER — OXYCODONE HCL 5 MG/5 ML
10 SOLUTION, ORAL ORAL
Status: DISCONTINUED | OUTPATIENT
Start: 2024-03-06 | End: 2024-03-06 | Stop reason: HOSPADM

## 2024-03-06 RX ORDER — SODIUM CHLORIDE, SODIUM LACTATE, POTASSIUM CHLORIDE, CALCIUM CHLORIDE 600; 310; 30; 20 MG/100ML; MG/100ML; MG/100ML; MG/100ML
INJECTION, SOLUTION INTRAVENOUS CONTINUOUS
Status: DISCONTINUED | OUTPATIENT
Start: 2024-03-06 | End: 2024-03-06 | Stop reason: HOSPADM

## 2024-03-06 RX ORDER — PHENYLEPHRINE HCL IN 0.9% NACL 0.5 MG/5ML
SYRINGE (ML) INTRAVENOUS PRN
Status: DISCONTINUED | OUTPATIENT
Start: 2024-03-06 | End: 2024-03-06 | Stop reason: SURG

## 2024-03-06 RX ORDER — MEPERIDINE HYDROCHLORIDE 25 MG/ML
6.25 INJECTION INTRAMUSCULAR; INTRAVENOUS; SUBCUTANEOUS
Status: DISCONTINUED | OUTPATIENT
Start: 2024-03-06 | End: 2024-03-06 | Stop reason: HOSPADM

## 2024-03-06 RX ORDER — HALOPERIDOL 5 MG/ML
1 INJECTION INTRAMUSCULAR
Status: DISCONTINUED | OUTPATIENT
Start: 2024-03-06 | End: 2024-03-06 | Stop reason: HOSPADM

## 2024-03-06 RX ORDER — LIDOCAINE HYDROCHLORIDE 20 MG/ML
INJECTION, SOLUTION EPIDURAL; INFILTRATION; INTRACAUDAL; PERINEURAL PRN
Status: DISCONTINUED | OUTPATIENT
Start: 2024-03-06 | End: 2024-03-06 | Stop reason: SURG

## 2024-03-06 RX ORDER — DIPHENHYDRAMINE HYDROCHLORIDE 50 MG/ML
12.5 INJECTION INTRAMUSCULAR; INTRAVENOUS
Status: DISCONTINUED | OUTPATIENT
Start: 2024-03-06 | End: 2024-03-06 | Stop reason: HOSPADM

## 2024-03-06 RX ORDER — SODIUM CHLORIDE, SODIUM LACTATE, POTASSIUM CHLORIDE, CALCIUM CHLORIDE 600; 310; 30; 20 MG/100ML; MG/100ML; MG/100ML; MG/100ML
INJECTION, SOLUTION INTRAVENOUS CONTINUOUS
Status: ACTIVE | OUTPATIENT
Start: 2024-03-06 | End: 2024-03-06

## 2024-03-06 RX ORDER — SODIUM CHLORIDE 9 MG/ML
INJECTION, SOLUTION INTRAVENOUS CONTINUOUS
Status: DISCONTINUED | OUTPATIENT
Start: 2024-03-06 | End: 2024-03-07

## 2024-03-06 RX ORDER — DEXMEDETOMIDINE HYDROCHLORIDE 100 UG/ML
INJECTION, SOLUTION INTRAVENOUS PRN
Status: DISCONTINUED | OUTPATIENT
Start: 2024-03-06 | End: 2024-03-06 | Stop reason: SURG

## 2024-03-06 RX ORDER — OXYCODONE HCL 5 MG/5 ML
5 SOLUTION, ORAL ORAL
Status: DISCONTINUED | OUTPATIENT
Start: 2024-03-06 | End: 2024-03-06 | Stop reason: HOSPADM

## 2024-03-06 RX ADMIN — Medication 100 MCG: at 09:22

## 2024-03-06 RX ADMIN — PROPOFOL 100 MCG/KG/MIN: 10 INJECTION, EMULSION INTRAVENOUS at 09:07

## 2024-03-06 RX ADMIN — SODIUM CHLORIDE: 9 INJECTION, SOLUTION INTRAVENOUS at 22:47

## 2024-03-06 RX ADMIN — DEXMEDETOMIDINE HYDROCHLORIDE 20 MCG: 100 INJECTION, SOLUTION INTRAVENOUS at 09:07

## 2024-03-06 RX ADMIN — PANTOPRAZOLE SODIUM 40 MG: 40 INJECTION, POWDER, LYOPHILIZED, FOR SOLUTION INTRAVENOUS at 16:12

## 2024-03-06 RX ADMIN — PANTOPRAZOLE SODIUM 40 MG: 40 INJECTION, POWDER, LYOPHILIZED, FOR SOLUTION INTRAVENOUS at 04:35

## 2024-03-06 RX ADMIN — SODIUM CHLORIDE: 9 INJECTION, SOLUTION INTRAVENOUS at 07:46

## 2024-03-06 RX ADMIN — Medication 100 MCG: at 09:12

## 2024-03-06 RX ADMIN — LIDOCAINE HYDROCHLORIDE 60 MG: 20 INJECTION, SOLUTION EPIDURAL; INFILTRATION; INTRACAUDAL at 09:07

## 2024-03-06 RX ADMIN — SODIUM CHLORIDE, POTASSIUM CHLORIDE, SODIUM LACTATE AND CALCIUM CHLORIDE: 600; 310; 30; 20 INJECTION, SOLUTION INTRAVENOUS at 09:05

## 2024-03-06 ASSESSMENT — LIFESTYLE VARIABLES
ON A TYPICAL DAY WHEN YOU DRINK ALCOHOL HOW MANY DRINKS DO YOU HAVE: 0
EVER HAD A DRINK FIRST THING IN THE MORNING TO STEADY YOUR NERVES TO GET RID OF A HANGOVER: NO
CONSUMPTION TOTAL: NEGATIVE
HAVE PEOPLE ANNOYED YOU BY CRITICIZING YOUR DRINKING: NO
HAVE YOU EVER FELT YOU SHOULD CUT DOWN ON YOUR DRINKING: NO
EVER FELT BAD OR GUILTY ABOUT YOUR DRINKING: NO
AVERAGE NUMBER OF DAYS PER WEEK YOU HAVE A DRINK CONTAINING ALCOHOL: 0
HOW MANY TIMES IN THE PAST YEAR HAVE YOU HAD 5 OR MORE DRINKS IN A DAY: 0
TOTAL SCORE: 0
ALCOHOL_USE: NO
TOTAL SCORE: 0
TOTAL SCORE: 0
DOES PATIENT WANT TO STOP DRINKING: NO

## 2024-03-06 ASSESSMENT — ENCOUNTER SYMPTOMS
WEAKNESS: 1
CARDIOVASCULAR NEGATIVE: 1
EYES NEGATIVE: 1
MUSCULOSKELETAL NEGATIVE: 1
PSYCHIATRIC NEGATIVE: 1
RESPIRATORY NEGATIVE: 1

## 2024-03-06 ASSESSMENT — PATIENT HEALTH QUESTIONNAIRE - PHQ9
1. LITTLE INTEREST OR PLEASURE IN DOING THINGS: NOT AT ALL
2. FEELING DOWN, DEPRESSED, IRRITABLE, OR HOPELESS: NOT AT ALL
SUM OF ALL RESPONSES TO PHQ9 QUESTIONS 1 AND 2: 0

## 2024-03-06 ASSESSMENT — PAIN DESCRIPTION - PAIN TYPE
TYPE: ACUTE PAIN
TYPE: ACUTE PAIN
TYPE: SURGICAL PAIN

## 2024-03-06 ASSESSMENT — PAIN SCALES - GENERAL: PAIN_LEVEL: 0

## 2024-03-06 ASSESSMENT — FIBROSIS 4 INDEX: FIB4 SCORE: 2.12

## 2024-03-06 NOTE — ANESTHESIA PREPROCEDURE EVALUATION
Case: [id number] Date/Time: 03/06/24 0910    Procedures:       GASTROSCOPY (Esophagus)      COLONOSCOPY (Anus)    Anesthesia type: MAC    Pre-op diagnosis: Iron deficiency anemia    Location: [facility] ROOM 26 / SURGERY SAME DAY [facility]    Surgeons: Greyson Serrato M.D.          84yo male for EGD/colonoscopy, currently with GI bleed   Relevant Problems      (positive) MARCEL (acute kidney injury) (HCC)       Physical Exam    Airway   Mallampati: I  TM distance: >3 FB  Neck ROM: full       Cardiovascular - normal exam  Rhythm: regular  Rate: normal  (-) murmur     Dental - normal exam           Pulmonary - normal exam  Breath sounds clear to auscultation     Abdominal    Neurological - normal exam                   Anesthesia Plan    ASA 2       Plan - general       Airway plan will be natural airway          Induction: intravenous    Postoperative Plan: Postoperative administration of opioids is intended.    Pertinent diagnostic labs and testing reviewed    Informed Consent:    Anesthetic plan and risks discussed with patient.    Use of blood products discussed with: patient whom consented to blood products.

## 2024-03-06 NOTE — CARE PLAN
The patient is Watcher - Medium risk of patient condition declining or worsening    Shift Goals  Clinical Goals: Monitor H&H, VSS, NPO at midnight  Patient Goals: Feel better    Progress made toward(s) clinical / shift goals:    Problem: Knowledge Deficit - Standard  Goal: Patient and family/care givers will demonstrate understanding of plan of care, disease process/condition, diagnostic tests and medications  Outcome: Progressing  Note: POC discussed with patient, patient board updated.     Problem: Fall Risk  Goal: Patient will remain free from falls  Outcome: Progressing  Note: Call light and personal belongings within reach. Bed locked in the lowest position with bed alarm on. Patient reminded to call for assistance.       Patient is not progressing towards the following goals:

## 2024-03-06 NOTE — ANESTHESIA POSTPROCEDURE EVALUATION
Patient: Dilan Calderón    Procedure Summary       Date: 03/06/24 Room / Location: [facility] ROOM 26 / SURGERY SAME DAY [facility]    Anesthesia Start: 0905 Anesthesia Stop: 0940    Procedures:       GASTROSCOPY (Esophagus)      COLONOSCOPY (Anus)      GASTROSCOPY, WITH BALLOON DILATION (Esophagus) Diagnosis: (hemorrhoids, scattered diverticulosis)    Surgeons: Greyson Serrato M.D. Responsible Provider: Cindy Wilder M.D.    Anesthesia Type: general ASA Status: 2            Final Anesthesia Type: general  Last vitals  BP   Blood Pressure : 93/60    Temp   36.2 °C (97.2 °F)    Pulse   (!) 57   Resp   19    SpO2   99 %      Anesthesia Post Evaluation    Patient location during evaluation: PACU  Patient participation: complete - patient participated  Level of consciousness: awake and alert  Pain score: 0    Airway patency: patent  Anesthetic complications: no  Cardiovascular status: hemodynamically stable  Respiratory status: acceptable  Hydration status: euvolemic    PONV: none          No notable events documented.     Nurse Pain Score: 0 (NPRS)

## 2024-03-06 NOTE — PROGRESS NOTES
[facility] Medicine Daily Progress Note    Date of Service  3/6/2024    Chief Complaint  Dilan Calderón is a 83 y.o. male admitted 3/5/2024 with weakness    Hospital Course  Dilan Calderón is a 83 y.o. male who presented 3/5/2024 with weakness and lightheadedness.  Mr. Calderón has no known medical conditions was in his usual state of good health until about a month ago he noticed that he was lightheaded in the mornings and had to be careful and then for the past week he has been so lightheaded in the morning he has to crawl around.  He did not make much of it until this morning he elected to come to the emergency room to get this looked into.  In the emergency room, he was found to have a hemoglobin of 5.7 and will be admitted for presumptive upper GI bleed requiring transfusions and GI consultation for EGD.  Upon questioning he has been dieting and therefore has lost a little bit of weight purposely.  He has had a couple episodes of black stools.  He takes ibuprofen every couple days.  He has never had upper GI bleed in the past.  He denies vomiting.  In the emergency room his troponin is elevated at 130 though he does not have any dynamic EKG changes and no chest pain.  The emergency room physician Dr. Isabel has ordered 1 unit packed red blood cells and another 1 will be transfused afterwards.       Interval Problem Update  Patient is status post 2 unit of blood    Patient states that his strength has markedly improved    Patient is currently n.p.o. for EGD today    Patient remains on IV Protonix    Hemoglobin has dropped from 8.4--> 7.4    I have discussed this patient's plan of care and discharge plan at IDT rounds today with Case Management, Nursing, Nursing leadership, and other members of the IDT team.    Consultants/Specialty  GI    Code Status  Full Code    Disposition  The patient is not medically cleared for discharge to home or a post-acute facility.  Anticipate discharge to: home with close outpatient  follow-up    I have placed the appropriate orders for post-discharge needs.    Review of Systems  Review of Systems   Constitutional:  Positive for malaise/fatigue.   HENT: Negative.     Eyes: Negative.    Respiratory: Negative.     Cardiovascular: Negative.    Gastrointestinal:  Positive for melena.   Genitourinary: Negative.    Musculoskeletal: Negative.    Neurological:  Positive for weakness.   Psychiatric/Behavioral: Negative.          Physical Exam  Temp:  [36.3 °C (97.3 °F)-37.1 °C (98.8 °F)] 36.3 °C (97.3 °F)  Pulse:  [60-80] 60  Resp:  [12-20] 19  BP: (104-137)/(49-81) 137/63  SpO2:  [94 %-100 %] 99 %    Physical Exam  Constitutional:       General: He is not in acute distress.     Appearance: He is not ill-appearing, toxic-appearing or diaphoretic.   HENT:      Mouth/Throat:      Mouth: Mucous membranes are dry.   Eyes:      General: No scleral icterus.     Extraocular Movements: Extraocular movements intact.      Pupils: Pupils are equal, round, and reactive to light.   Cardiovascular:      Rate and Rhythm: Normal rate and regular rhythm.      Heart sounds: No murmur heard.  Pulmonary:      Effort: No respiratory distress.      Breath sounds: No stridor. No wheezing or rhonchi.   Abdominal:      General: There is no distension.      Palpations: There is no mass.      Tenderness: There is no abdominal tenderness. There is no guarding.      Hernia: No hernia is present.   Musculoskeletal:      Cervical back: Normal range of motion.      Right lower leg: No edema.      Left lower leg: No edema.   Skin:     Capillary Refill: Capillary refill takes 2 to 3 seconds.      Coloration: Skin is pale.      Findings: No lesion or rash.   Neurological:      General: No focal deficit present.      Mental Status: He is oriented to person, place, and time.      Cranial Nerves: No cranial nerve deficit.      Motor: No weakness.   Psychiatric:         Mood and Affect: Mood normal.         Behavior: Behavior normal.          Fluids    Intake/Output Summary (Last 24 hours) at 3/6/2024 0939  Last data filed at 3/5/2024 1730  Gross per 24 hour   Intake 1654.17 ml   Output 250 ml   Net 1404.17 ml       Laboratory  Recent Labs     03/05/24  0846 03/05/24  1319 03/05/24  1932 03/06/24  0003 03/06/24  0547   WBC 10.0  --   --   --   --    RBC 2.13*  --   --   --   --    HEMOGLOBIN 5.7*   < > 8.3* 8.4* 7.4*   HEMATOCRIT 19.0*  --   --   --   --    MCV 89.2  --   --   --   --    MCH 26.8*  --   --   --   --    MCHC 30.0*  --   --   --   --    RDW 51.1*  --   --   --   --    PLATELETCT 221  --   --   --   --    MPV 10.7  --   --   --   --     < > = values in this interval not displayed.     Recent Labs     03/05/24  0846 03/06/24  0003   SODIUM 143 141   POTASSIUM 4.0 4.0   CHLORIDE 111 109   CO2 19* 19*   GLUCOSE 115* 107*   BUN 45* 34*   CREATININE 1.59* 1.19   CALCIUM 8.2* 8.7                   Imaging  CT-CSPINE WITHOUT PLUS RECONS   Final Result      Degenerative changes without acute fracture or dislocation of the cervical spine.      CT-HEAD W/O   Final Result      1.  Cerebral atrophy and chronic microvascular ischemic type changes.   2.  No acute intracranial abnormality.         DX-CHEST-PORTABLE (1 VIEW)   Final Result      No acute cardiopulmonary abnormality.           Assessment/Plan  * Upper GI bleed- (present on admission)  Assessment & Plan  With severe anemia in the setting of ibuprofen use  IV Protonix 40 mg twice daily  GI --> EGD today    Troponin level elevated- (present on admission)  Assessment & Plan  Troponin 130  Likely due to severe anemia     he denies chest pain    EKG does not reveal any dynamic changes    Outpatient workup can be done as indicated    Check an echocardiogram    MARCEL (acute kidney injury) (HCC)- (present on admission)  Assessment & Plan  Baseline unknown though here in the emergency room his creatinine is elevated at 1.59  IV fluid hydration  Follow urine output  Basic metabolic panel ordered  for the morning    Acute blood loss anemia- (present on admission)  Assessment & Plan  Likely secondary to upper GI bleed  Hemoglobin in the emergency room is 5.7  He will be transfused 2 units packed red blood cells and will have serial hemoglobins every 6 hours with standing orders to transfuse for hemoglobin less than 7  Gastroenterology has consulted for likely EGD and possibly colonoscopy if indicated  He has been taking ibuprofen  IV Protonix    Avoid aspirin and NSAIDs         VTE prophylaxis:   SCDs/TEDs      I have performed a physical exam and reviewed and updated ROS and Plan today (3/6/2024). In review of yesterday's note (3/5/2024), there are no changes except as documented above.      Greater than 51 minutes spent prepping to see patient (e.g. review of tests) obtaining and/or reviewing separately obtained history. Performing a medically appropriate examination and/ evaluation.  Counseling and educating the patient/family/caregiver.  Ordering medications, tests, or procedures.  Referring and communicating with other health care professionals.  Documenting clinical information in EPIC.  Independently interpreting results and communicating results to patient/family/caregiver.  Care coordination.

## 2024-03-06 NOTE — OP REPORT
PreOp Diagnosis: Iron deficiency anemia      PostOp Diagnosis:   #1.  Esophageal stricture at the gastroesophageal junction  #2.  Patchy gastritis  #3.  Scattered sigmoid diverticulosis, minimal  #4.  Internal hemorrhoids      Procedure(s):  GASTROSCOPY - Wound Class: Clean Contaminated  COLONOSCOPY - Wound Class: Clean Contaminated  GASTROSCOPY, WITH BALLOON DILATION - Wound Class: Clean Contaminated    Surgeon(s):  Greyson Serrato M.D.    Anesthesiologist/Type of Anesthesia:  Anesthesiologist: Cindy Wilder M.D./MAC    Surgical Staff:  Endoscopy Technician: Kiran Walker; Adela Peters  Endoscopy Nurse: Mikki Ovalle RGALILEA; Ernst eMndez R.N.    Specimens removed if any:  ID Type Source Tests Collected by Time Destination   A : GE junction Tissue Esophagus PATHOLOGY SPECIMEN Greyson Serrato M.D. 3/6/2024  9:13 AM          CONSENT: The risks, benefits and alternatives of the procedure were discussed in detail. The risks include and are not limited to bleeding, infection, perforation, missed lesions, and sedations risks (cardiopulmonary compromise and allergic reaction to medications).    DESCRIPTION:   The patient presented to the operating room.  A time out was performed prior to beginning the procedure.   The patient was placed in the left lateral recumbent position.   Patient was sedated by anesthesia: Propofol.    OPERATIVE FINDINGS:    Esophagus: Normal throughout with the exception of luminal narrowing at the gastroesophageal junction.  Luminal diameter 15 mm.  Using TTS 18 to 20 mm balloon the stenosis was dilated to 18 mm.  Minimal mucosal disruption.  The stricture was biopsied.  Stomach: Minimal sliding-type hiatal hernia.  Otherwise normal cardia on retroflexion.  Fundus, corpus normal.  Minimal antral gastritis.  Duodenum: Normal to second portion    Patient repositioned.  Digital rectal examination normal.  Endoscope advanced to the cecum.  [place] prep score:   Right colon: 3; Transverse  colon: 3; Left Colon: 3. BPS score: 9.  Careful inspection ensued on withdrawal.  Withdrawal time 6 minutes.  Minimal sigmoid diverticulosis.  Internal hemorrhoids on retroflexion in the rectum.  Colonoscopy otherwise normal.      Blood loss: None    The patient tolerated the procedure well.      There were no immediate complications.    IMPRESSION:  #1.  Esophageal stricture at the gastroesophageal junction-  Dilated, biopsied  #2.  Patchy gastritis, mild  #3.  Scattered sigmoid diverticulosis, minimal  #4.  Internal hemorrhoids      RECOMMENDATIONS:  Await biopsies.  Antisecretory therapy (H2 RA versus low-dose PPI) x 8 weeks.  Advance diet return patient to the [facility] for continued care.  No further colonoscopy recommended given age.  Repeat upper endoscopy based on symptoms.

## 2024-03-06 NOTE — PROGRESS NOTES
Pt received from ED. Tele monitor in place. VSS. Pt oriented to room. Educated on use of the call light. Pt demonstrated use of the call light. Discussed POC. All questions answered. Second unit of PRBC running from ED.

## 2024-03-06 NOTE — CARE PLAN
Problem: Knowledge Deficit - Standard  Goal: Patient and family/care givers will demonstrate understanding of plan of care, disease process/condition, diagnostic tests and medications  Outcome: Progressing     Problem: Fall Risk  Goal: Patient will remain free from falls  Outcome: Progressing   The patient is Stable - Low risk of patient condition declining or worsening    Shift Goals  Clinical Goals: hemoglobin above 7  Patient Goals: rest    Progress made toward(s) clinical / shift goals:  Plan of care discussed with patient.     Patient is not progressing towards the following goals:

## 2024-03-06 NOTE — PROGRESS NOTES
Bedside report received. Patient A/Ox 4. VS -130's. Oxygen requirements 2.5L. Telemetry monitoring SR. No complaints of pain at this time. POC discussed with patient. Pt verbalizes understanding. Call light and belongings within reach. Bed locked and lowest position, alarm and fall precautions in place.    Bedside report received from off going RN/tech: ALBERT Paula assumed care of patient.     Fall Risk Score:    Fall risk interventions in place: Place yellow fall risk ID band on patient, Provide patient/family education based on risk assessment, Educate patient/family to call staff for assistance when getting out of bed, Place fall precaution signage outside patient door, Place patient in room close to nursing station, Utilize bed/chair fall alarm, and Bed alarm connected correctly  Bed type: Regular (Robinson Score less than 17 interventions in place)  Patient on cardiac monitor: Yes  IVF/IV medications: Not Applicable   Oxygen: How many liters 2.5L  Bedside sitter: Not Applicable   Isolation: Not applicable

## 2024-03-06 NOTE — OR NURSING
0945 Pt to PACU from OR. Report from anesthesia and OR RN. Sedated at this time, 4L mask O2, natura airway patent. Respirations even and unlabored. SBP somewhat low, pt laying on side cuff is on, will reassess when pt arouses, all other VSS.     0917 Dr. Serrato at , pt still sedated, will come back between cases if able to speak with pt.     1014 Report called back to Jose Grover RN.     1022 BP trending back up to baseline. Transport requested.     1032 Transfer orders received. Meets transfer criteria at this time. No pain. No nausea. Declines PO at this time. Back to baseline floor oxygen requirements. Pt transferred to T820 via rney with pt transport. O2 tank full. Glasses sent with patient.

## 2024-03-06 NOTE — HOSPITAL COURSE
Dilan Calderón is a 83 y.o. male who presented 3/5/2024 with weakness and lightheadedness.  Mr. Calderón has no known medical conditions was in his usual state of good health until about a month ago he noticed that he was lightheaded in the mornings and had to be careful and then for the past week he has been so lightheaded in the morning he has to crawl around.  He did not make much of it until this morning he elected to come to the emergency room to get this looked into.  In the emergency room, he was found to have a hemoglobin of 5.7 and will be admitted for presumptive upper GI bleed requiring transfusions and GI consultation for EGD.  Upon questioning he has been dieting and therefore has lost a little bit of weight purposely.  He has had a couple episodes of black stools.  He takes ibuprofen every couple days.  He has never had upper GI bleed in the past.  He denies vomiting.  In the emergency room his troponin is elevated at 130 though he does not have any dynamic EKG changes and no chest pain.  The emergency room physician Dr. Isabel has ordered 1 unit packed red blood cells and another 1 will be transfused afterwards.

## 2024-03-06 NOTE — ANESTHESIA TIME REPORT
Anesthesia Start and Stop Event Times       Date Time Event    3/6/2024 0858 Ready for Procedure     0905 Anesthesia Start     0940 Anesthesia Stop          Responsible Staff  03/06/24      Name Role Begin End    Cindy Wilder M.D. Anesth 0905 0940          Overtime Reason:  no overtime (within assigned shift)    Comments:

## 2024-03-06 NOTE — PROGRESS NOTES
Bedside report received from off going RN/tech: Talya, assumed care of patient.     Fall Risk Score: MODERATE RISK  Fall risk interventions in place: Place yellow fall risk ID band on patient, Provide patient/family education based on risk assessment, Educate patient/family to call staff for assistance when getting out of bed, Place fall precaution signage outside patient door, Place patient in room close to nursing station, and Utilize bed/chair fall alarm  Bed type: Regular (Robinson Score less than 17 interventions in place)  Patient on cardiac monitor: Yes  IVF/IV medications: Not Applicable   Oxygen: How many liters 2.5L  Bedside sitter: Not Applicable   Isolation: Not applicable

## 2024-03-07 VITALS
DIASTOLIC BLOOD PRESSURE: 76 MMHG | OXYGEN SATURATION: 93 % | RESPIRATION RATE: 17 BRPM | WEIGHT: 146.16 LBS | HEART RATE: 92 BPM | HEIGHT: 66 IN | BODY MASS INDEX: 23.49 KG/M2 | SYSTOLIC BLOOD PRESSURE: 134 MMHG | TEMPERATURE: 97.9 F

## 2024-03-07 PROBLEM — K92.2 UPPER GI BLEED: Status: RESOLVED | Noted: 2024-03-05 | Resolved: 2024-03-07

## 2024-03-07 PROBLEM — D62 ACUTE BLOOD LOSS ANEMIA: Status: RESOLVED | Noted: 2024-03-05 | Resolved: 2024-03-07

## 2024-03-07 PROBLEM — R79.89 TROPONIN LEVEL ELEVATED: Status: RESOLVED | Noted: 2024-03-05 | Resolved: 2024-03-07

## 2024-03-07 PROBLEM — N17.9 AKI (ACUTE KIDNEY INJURY) (HCC): Status: RESOLVED | Noted: 2024-03-05 | Resolved: 2024-03-07

## 2024-03-07 LAB
ANION GAP SERPL CALC-SCNC: 9 MMOL/L (ref 7–16)
BUN SERPL-MCNC: 19 MG/DL (ref 8–22)
CALCIUM SERPL-MCNC: 8.1 MG/DL (ref 8.5–10.5)
CHLORIDE SERPL-SCNC: 112 MMOL/L (ref 96–112)
CO2 SERPL-SCNC: 20 MMOL/L (ref 20–33)
CREAT SERPL-MCNC: 1.12 MG/DL (ref 0.5–1.4)
GFR SERPLBLD CREATININE-BSD FMLA CKD-EPI: 66 ML/MIN/1.73 M 2
GLUCOSE SERPL-MCNC: 94 MG/DL (ref 65–99)
POTASSIUM SERPL-SCNC: 3.5 MMOL/L (ref 3.6–5.5)
SODIUM SERPL-SCNC: 141 MMOL/L (ref 135–145)

## 2024-03-07 PROCEDURE — 99239 HOSP IP/OBS DSCHRG MGMT >30: CPT | Performed by: HOSPITALIST

## 2024-03-07 PROCEDURE — C9113 INJ PANTOPRAZOLE SODIUM, VIA: HCPCS | Performed by: HOSPITALIST

## 2024-03-07 PROCEDURE — 80048 BASIC METABOLIC PNL TOTAL CA: CPT

## 2024-03-07 PROCEDURE — 700111 HCHG RX REV CODE 636 W/ 250 OVERRIDE (IP): Performed by: HOSPITALIST

## 2024-03-07 PROCEDURE — 700102 HCHG RX REV CODE 250 W/ 637 OVERRIDE(OP): Mod: JZ | Performed by: HOSPITALIST

## 2024-03-07 PROCEDURE — A9270 NON-COVERED ITEM OR SERVICE: HCPCS | Mod: JZ | Performed by: HOSPITALIST

## 2024-03-07 RX ORDER — HYDROCORTISONE ACETATE 25 MG/1
25 SUPPOSITORY RECTAL EVERY 12 HOURS
Qty: 10 SUPPOSITORY | Refills: 0 | Status: SHIPPED | OUTPATIENT
Start: 2024-03-07 | End: 2024-03-12

## 2024-03-07 RX ORDER — OMEPRAZOLE 20 MG/1
20 CAPSULE, DELAYED RELEASE ORAL 2 TIMES DAILY
Qty: 60 CAPSULE | Refills: 2 | Status: SHIPPED | OUTPATIENT
Start: 2024-03-07

## 2024-03-07 RX ORDER — POTASSIUM CHLORIDE 20 MEQ/1
40 TABLET, EXTENDED RELEASE ORAL ONCE
Status: COMPLETED | OUTPATIENT
Start: 2024-03-07 | End: 2024-03-07

## 2024-03-07 RX ADMIN — PANTOPRAZOLE SODIUM 40 MG: 40 INJECTION, POWDER, LYOPHILIZED, FOR SOLUTION INTRAVENOUS at 04:21

## 2024-03-07 RX ADMIN — POTASSIUM CHLORIDE 40 MEQ: 1500 TABLET, EXTENDED RELEASE ORAL at 07:51

## 2024-03-07 ASSESSMENT — PAIN DESCRIPTION - PAIN TYPE: TYPE: ACUTE PAIN

## 2024-03-07 NOTE — CARE PLAN
The patient is Watcher - Medium risk of patient condition declining or worsening    Shift Goals  Clinical Goals: Monitor hgb, VSS and safety  Patient Goals: Go home soon    Progress made toward(s) clinical / shift goals:    Problem: Knowledge Deficit - Standard  Goal: Patient and family/care givers will demonstrate understanding of plan of care, disease process/condition, diagnostic tests and medications  Outcome: Progressing  Note: POC discussed with patient, all questions answered at this time. Patient eager to go home.     Problem: Fall Risk  Goal: Patient will remain free from falls  Outcome: Progressing  Note: Call light and personal belongings within reach. Bed locked in the lowest position with bed alarm on. Patient reminded to call for assistance. Proper positioning ensured when patient is getting out of bed.       Patient is not progressing towards the following goals:

## 2024-03-07 NOTE — DISCHARGE PLANNING
Case Management Discharge Planning    Admission Date: 3/5/2024  GMLOS: 2.6  ALOS: 2    6-Clicks ADL Score: 21  6-Clicks Mobility Score: 18      Anticipated Discharge Dispo: Discharge Disposition: Discharged to home/self care (01)    DME Needed: No    Action(s) Taken: Transport Arranged   Patient discussed in rounds. He has been medically cleared per MD, will need transport home. Home address verified with patient, cab voucher provided.      Escalations Completed: None    Medically Clear: Yes    Next Steps: CM will cont to assist with any discharge barriers as they arise.     Barriers to Discharge: Transportation    Is the patient up for discharge tomorrow: No

## 2024-03-07 NOTE — PROGRESS NOTES
Bedside report received. Patient A/Ox 4. VS SBP 's. Oxygen requirements 0.5L nasal cannula. Telemetry monitoring SB-SR. No complaints of pain at this time. POC discussed with patient. Pt verbalizes understanding. Call light and belongings within reach. Bed locked and lowest position, alarm and fall precautions in place.    Bedside report received from off going RN/tech: ALBERT Herring assumed care of patient.     Fall Risk Score: MODERATE RISK  Fall risk interventions in place: Place yellow fall risk ID band on patient, Provide patient/family education based on risk assessment, Educate patient/family to call staff for assistance when getting out of bed, Place fall precaution signage outside patient door, Place patient in room close to nursing station, Utilize bed/chair fall alarm, and Bed alarm connected correctly  Bed type: Regular (Robinson Score less than 17 interventions in place)  Patient on cardiac monitor: Yes  IVF/IV medications: Infusion per MAR (List Med(s)) NS at 75 ml/hr  Oxygen: How many liters 0.5L  Bedside sitter: Not Applicable   Isolation: Not applicable

## 2024-03-07 NOTE — PROGRESS NOTES
Bedside report received from off going RN/tech: Talya, assumed care of patient.     Fall Risk Score: LOW RISK  Fall risk interventions in place: Place yellow fall risk ID band on patient, Provide patient/family education based on risk assessment, Educate patient/family to call staff for assistance when getting out of bed, Place fall precaution signage outside patient door, Place patient in room close to nursing station, and Utilize bed/chair fall alarm  Bed type: Regular (Robinson Score less than 17 interventions in place)  Patient on cardiac monitor: Yes  IVF/IV medications: Not Applicable   Oxygen: Room Air  Bedside sitter: Not Applicable   Isolation: Not applicable

## 2024-03-08 NOTE — DISCHARGE SUMMARY
Discharge Summary    CHIEF COMPLAINT ON ADMISSION  Chief Complaint   Patient presents with    Syncope     PT bib ems from home where he lives alone, pt states has been having near syncopal/syncopal episodes for weeks. Can usually catch himself and guide himself to floor however has in the past woken up with new scratches on face from glasses. No new lacerations noted to head at this time. Old scratch notes to L side of head above ear. EMS gave 400NS. States + orthostatics.     Other     PT states mostly bothered by 'weird feeling of nerves from the back of  head down his body' Denies pain. But states feeling has been getting more intense the last few weeks.     Chest Pain     During 'dizzy episodes'       Reason for Admission  EMS     Admission Date  3/5/2024    CODE STATUS  Prior    HPI & HOSPITAL COURSE    Dilan Calderón is a 83 y.o. male who presented 3/5/2024 with weakness and lightheadedness.  Mr. Calderón has no known medical conditions was in his usual state of good health until about a month ago he noticed that he was lightheaded in the mornings and had to be careful and then for the past week he has been so lightheaded in the morning he has to crawl around.  He did not make much of it until this morning he elected to come to the emergency room to get this looked into.  In the emergency room, he was found to have a hemoglobin of 5.7 and will be admitted for presumptive upper GI bleed requiring transfusions and GI consultation for EGD.  Upon questioning he has been dieting and therefore has lost a little bit of weight purposely.  He has had a couple episodes of black stools.  He takes ibuprofen every couple days.  He has never had upper GI bleed in the past.  He denies vomiting.  In the emergency room his troponin is elevated at 130 though he does not have any dynamic EKG changes and no chest pain.  The emergency room physician Dr. Isabel has ordered 1 unit packed red blood cells and another 1 will be  transfused afterwards.       After the blood transfusions( 2 )==> patient hemoglobin was monitored and it was stable.  Patient underwent a upper and lower endoscopy did not show any active bleeding.  There is evidence of diverticulosis, internal hemorrhoids, and gastritis.  Patient was cleared for discharge home with follow-up with GI in outpatient setting.    Therefore, he is discharged in good and stable condition to home with close outpatient follow-up.    The patient met 2-midnight criteria for an inpatient stay at the time of discharge.    Discharge Date  3/7/2024    FOLLOW UP ITEMS POST DISCHARGE  Outpatient gi    DISCHARGE DIAGNOSES  Principal Problem (Resolved):    Upper GI bleed (POA: Yes)  Active Problems:    * No active hospital problems. *  Resolved Problems:    Acute blood loss anemia (POA: Yes)    MARCEL (acute kidney injury) (HCC) (POA: Yes)    Troponin level elevated (POA: Yes)      FOLLOW UP  No future appointments.  No follow-up provider specified.    MEDICATIONS ON DISCHARGE     Medication List        START taking these medications        Instructions   hydrocortisone 25 MG Supp  Commonly known as: Anusol-HC   Insert 1 Suppository into the rectum every 12 hours for 5 days.  Dose: 25 mg     omeprazole 20 MG delayed-release capsule  Commonly known as: PriLOSEC   Take 1 Capsule by mouth 2 times a day.  Dose: 20 mg            CONTINUE taking these medications        Instructions   Multivitamin Tabs   Take 1 Tablet by mouth every day.  Dose: 1 Tablet            STOP taking these medications      ibuprofen 200 MG Tabs  Commonly known as: Motrin              Allergies  No Known Allergies    DIET  No orders of the defined types were placed in this encounter.      ACTIVITY  As tolerated.  Weight bearing as tolerated    CONSULTATIONS  [facility]    PROCEDURES  [address] [place] [address]       Dilan Calderón   MRN: [id number], : 1940 , Sex: M   Admit: 3/5/2024, D/C: 3/7/2024          Greyson Serrato  M.D.  Physician  Gastroenterology     OP Report      Signed     Date of Service: 3/6/2024  9:05 AM          Case Time: Procedures: Surgeons:   3/6/2024  9:05 AM GASTROSCOPY   COLONOSCOPY   GASTROSCOPY, WITH BALLOON DILATION    Greyson Serrato M.D.                       Show:Clear all  [x]Written[x]Templated[x]Copied    Added by:  [x]Greyson Serrato M.D.      []Hover for details    PreOp Diagnosis: Iron deficiency anemia        PostOp Diagnosis:   #1.  Esophageal stricture at the gastroesophageal junction  #2.  Patchy gastritis  #3.  Scattered sigmoid diverticulosis, minimal  #4.  Internal hemorrhoids        Procedure(s):  GASTROSCOPY - Wound Class: Clean Contaminated  COLONOSCOPY - Wound Class: Clean Contaminated  GASTROSCOPY, WITH BALLOON DILATION - Wound Class: Clean Contaminated     Surgeon(s):  Greyson Serrato M.D.     Anesthesiologist/Type of Anesthesia:  Anesthesiologist: Cindy Wilder M.D./MAC     Surgical Staff:  Endoscopy Technician: Kiran Walker; Adela Peters  Endoscopy Nurse: Mikki Ovalle RGALILEA; Ernst Mendez RGALILEA     Specimens removed if any:  ID Type Source Tests Collected by Time Destination   A : GE junction Tissue Esophagus PATHOLOGY SPECIMEN Greyson Serrato M.D. 3/6/2024  9:13 AM              CONSENT: The risks, benefits and alternatives of the procedure were discussed in detail. The risks include and are not limited to bleeding, infection, perforation, missed lesions, and sedations risks (cardiopulmonary compromise and allergic reaction to medications).     DESCRIPTION:   The patient presented to the operating room.  A time out was performed prior to beginning the procedure.   The patient was placed in the left lateral recumbent position.   Patient was sedated by anesthesia: Propofol.     OPERATIVE FINDINGS:     Esophagus: Normal throughout with the exception of luminal narrowing at the gastroesophageal junction.  Luminal diameter 15 mm.  Using TTS 18 to 20 mm balloon the stenosis was  dilated to 18 mm.  Minimal mucosal disruption.  The stricture was biopsied.  Stomach: Minimal sliding-type hiatal hernia.  Otherwise normal cardia on retroflexion.  Fundus, corpus normal.  Minimal antral gastritis.  Duodenum: Normal to second portion     Patient repositioned.  Digital rectal examination normal.  Endoscope advanced to the cecum.  [place] prep score:   Right colon: 3; Transverse colon: 3; Left Colon: 3. BPS score: 9.  Careful inspection ensued on withdrawal.  Withdrawal time 6 minutes.  Minimal sigmoid diverticulosis.  Internal hemorrhoids on retroflexion in the rectum.  Colonoscopy otherwise normal.        Blood loss: None     The patient tolerated the procedure well.       There were no immediate complications.     IMPRESSION:  #1.  Esophageal stricture at the gastroesophageal junction-  Dilated, biopsied  #2.  Patchy gastritis, mild  #3.  Scattered sigmoid diverticulosis, minimal  #4.  Internal hemorrhoids        RECOMMENDATIONS:  Await biopsies.  Antisecretory therapy (H2 RA versus low-dose PPI) x 8 weeks.  Advance diet return patient to the hospital [facility] for continued care.  No further colonoscopy recommended given age.  Repeat upper endoscopy based on symptoms.                 LABORATORY  Lab Results   Component Value Date    SODIUM 141 03/07/2024    POTASSIUM 3.5 (L) 03/07/2024    CHLORIDE 112 03/07/2024    CO2 20 03/07/2024    GLUCOSE 94 03/07/2024    BUN 19 03/07/2024    CREATININE 1.12 03/07/2024    CREATININE 1.2 02/11/2008        Lab Results   Component Value Date    WBC 10.0 03/05/2024    HEMOGLOBIN 7.8 (L) 03/06/2024    HEMATOCRIT 19.0 (L) 03/05/2024    PLATELETCT 221 03/05/2024        Total time of the discharge process exceeds 38  minutes.

## 2024-05-02 ENCOUNTER — APPOINTMENT (OUTPATIENT)
Dept: RADIOLOGY | Facility: MEDICAL CENTER | Age: 84
DRG: 323 | End: 2024-05-02
Payer: MEDICARE

## 2024-05-02 ENCOUNTER — HOSPITAL ENCOUNTER (INPATIENT)
Facility: MEDICAL CENTER | Age: 84
LOS: 5 days | DRG: 323 | End: 2024-05-07
Attending: EMERGENCY MEDICINE | Admitting: INTERNAL MEDICINE
Payer: MEDICARE

## 2024-05-02 ENCOUNTER — APPOINTMENT (OUTPATIENT)
Dept: CARDIOLOGY | Facility: MEDICAL CENTER | Age: 84
DRG: 323 | End: 2024-05-02
Attending: INTERNAL MEDICINE
Payer: MEDICARE

## 2024-05-02 DIAGNOSIS — I21.3 ST ELEVATION MYOCARDIAL INFARCTION (STEMI), UNSPECIFIED ARTERY (HCC): ICD-10-CM

## 2024-05-02 DIAGNOSIS — I50.20 HEART FAILURE WITH REDUCED EJECTION FRACTION (HCC): ICD-10-CM

## 2024-05-02 DIAGNOSIS — Z95.5 STATUS POST INSERTION OF DRUG-ELUTING STENT INTO LEFT ANTERIOR DESCENDING ARTERY: ICD-10-CM

## 2024-05-02 DIAGNOSIS — I21.02 ST ELEVATION MYOCARDIAL INFARCTION INVOLVING LEFT ANTERIOR DESCENDING (LAD) CORONARY ARTERY (HCC): ICD-10-CM

## 2024-05-02 PROBLEM — I50.21 ACUTE SYSTOLIC HEART FAILURE (HCC): Status: ACTIVE | Noted: 2024-05-02

## 2024-05-02 PROBLEM — Z87.19 HISTORY OF GI BLEED: Status: ACTIVE | Noted: 2024-05-02

## 2024-05-02 LAB
ACT BLD: 239 SEC (ref 74–137)
ACT BLD: 255 SEC (ref 74–137)
ACT BLD: 287 SEC (ref 74–137)
ALBUMIN SERPL BCP-MCNC: 4.1 G/DL (ref 3.2–4.9)
ALBUMIN/GLOB SERPL: 1.3 G/DL
ALP SERPL-CCNC: 104 U/L (ref 30–99)
ALT SERPL-CCNC: 47 U/L (ref 2–50)
ANION GAP SERPL CALC-SCNC: 13 MMOL/L (ref 7–16)
ANISOCYTOSIS BLD QL SMEAR: ABNORMAL
APTT PPP: 27.9 SEC (ref 24.7–36)
AST SERPL-CCNC: 232 U/L (ref 12–45)
BASOPHILS # BLD AUTO: 0 % (ref 0–1.8)
BASOPHILS # BLD: 0 K/UL (ref 0–0.12)
BILIRUB SERPL-MCNC: 0.4 MG/DL (ref 0.1–1.5)
BUN BLD-MCNC: 22 MG/DL (ref 8–22)
BUN SERPL-MCNC: 21 MG/DL (ref 8–22)
CA-I BLD ISE-SCNC: 1.23 MMOL/L (ref 1.1–1.3)
CALCIUM ALBUM COR SERPL-MCNC: 9.3 MG/DL (ref 8.5–10.5)
CALCIUM SERPL-MCNC: 9.4 MG/DL (ref 8.5–10.5)
CHLORIDE BLD-SCNC: 105 MMOL/L (ref 96–112)
CHLORIDE SERPL-SCNC: 105 MMOL/L (ref 96–112)
CO2 BLD-SCNC: 25 MMOL/L (ref 20–33)
CO2 SERPL-SCNC: 22 MMOL/L (ref 20–33)
CREAT BLD-MCNC: 1.5 MG/DL (ref 0.5–1.4)
CREAT SERPL-MCNC: 1.35 MG/DL (ref 0.5–1.4)
EKG IMPRESSION: NORMAL
EKG IMPRESSION: NORMAL
EOSINOPHIL # BLD AUTO: 0 K/UL (ref 0–0.51)
EOSINOPHIL NFR BLD: 0 % (ref 0–6.9)
ERYTHROCYTE [DISTWIDTH] IN BLOOD BY AUTOMATED COUNT: 55.8 FL (ref 35.9–50)
GFR SERPLBLD CREATININE-BSD FMLA CKD-EPI: 53 ML/MIN/1.73 M 2
GLOBULIN SER CALC-MCNC: 3.2 G/DL (ref 1.9–3.5)
GLUCOSE BLD-MCNC: 138 MG/DL (ref 65–99)
GLUCOSE SERPL-MCNC: 136 MG/DL (ref 65–99)
HCT VFR BLD AUTO: 32.2 % (ref 42–52)
HGB BLD-MCNC: 9.6 G/DL (ref 14–18)
HYPOCHROMIA BLD QL SMEAR: ABNORMAL
INR PPP: 1.03 (ref 0.87–1.13)
LYMPHOCYTES # BLD AUTO: 2.21 K/UL (ref 1–4.8)
LYMPHOCYTES NFR BLD: 18.3 % (ref 22–41)
MANUAL DIFF BLD: NORMAL
MCH RBC QN AUTO: 24.3 PG (ref 27–33)
MCHC RBC AUTO-ENTMCNC: 29.8 G/DL (ref 32.3–36.5)
MCV RBC AUTO: 81.5 FL (ref 81.4–97.8)
MICROCYTES BLD QL SMEAR: ABNORMAL
MONOCYTES # BLD AUTO: 0.42 K/UL (ref 0–0.85)
MONOCYTES NFR BLD AUTO: 3.5 % (ref 0–13.4)
MORPHOLOGY BLD-IMP: NORMAL
NEUTROPHILS # BLD AUTO: 9.46 K/UL (ref 1.82–7.42)
NEUTROPHILS NFR BLD: 78.2 % (ref 44–72)
NRBC # BLD AUTO: 0 K/UL
NRBC BLD-RTO: 0 /100 WBC (ref 0–0.2)
OVALOCYTES BLD QL SMEAR: NORMAL
PLATELET # BLD AUTO: 241 K/UL (ref 164–446)
PLATELET BLD QL SMEAR: NORMAL
PMV BLD AUTO: 10.7 FL (ref 9–12.9)
POTASSIUM BLD-SCNC: 4.3 MMOL/L (ref 3.6–5.5)
POTASSIUM SERPL-SCNC: 4.6 MMOL/L (ref 3.6–5.5)
PROT SERPL-MCNC: 7.3 G/DL (ref 6–8.2)
PROTHROMBIN TIME: 13.7 SEC (ref 12–14.6)
RBC # BLD AUTO: 3.95 M/UL (ref 4.7–6.1)
RBC BLD AUTO: PRESENT
SODIUM BLD-SCNC: 142 MMOL/L (ref 135–145)
SODIUM SERPL-SCNC: 140 MMOL/L (ref 135–145)
TROPONIN T SERPL-MCNC: 1677 NG/L (ref 6–19)
WBC # BLD AUTO: 12.1 K/UL (ref 4.8–10.8)

## 2024-05-02 PROCEDURE — 92978 ENDOLUMINL IVUS OCT C 1ST: CPT | Mod: 26,LD | Performed by: INTERNAL MEDICINE

## 2024-05-02 PROCEDURE — B2151ZZ FLUOROSCOPY OF LEFT HEART USING LOW OSMOLAR CONTRAST: ICD-10-PCS | Performed by: INTERNAL MEDICINE

## 2024-05-02 PROCEDURE — 02F03ZZ FRAGMENTATION IN CORONARY ARTERY, ONE ARTERY, PERCUTANEOUS APPROACH: ICD-10-PCS | Performed by: INTERNAL MEDICINE

## 2024-05-02 PROCEDURE — 4A023N7 MEASUREMENT OF CARDIAC SAMPLING AND PRESSURE, LEFT HEART, PERCUTANEOUS APPROACH: ICD-10-PCS | Performed by: INTERNAL MEDICINE

## 2024-05-02 PROCEDURE — 99152 MOD SED SAME PHYS/QHP 5/>YRS: CPT | Performed by: INTERNAL MEDICINE

## 2024-05-02 PROCEDURE — 027035Z DILATION OF CORONARY ARTERY, ONE ARTERY WITH TWO DRUG-ELUTING INTRALUMINAL DEVICES, PERCUTANEOUS APPROACH: ICD-10-PCS | Performed by: INTERNAL MEDICINE

## 2024-05-02 PROCEDURE — 99291 CRITICAL CARE FIRST HOUR: CPT | Performed by: INTERNAL MEDICINE

## 2024-05-02 PROCEDURE — 92972 PERQ TRLUML CORONRY LITHOTRP: CPT | Mod: LD | Performed by: INTERNAL MEDICINE

## 2024-05-02 PROCEDURE — 92929 PR PRQ TRLUML CORONARY STENT W/ANGIO ADDL ART/BRNCH: CPT | Mod: LD | Performed by: INTERNAL MEDICINE

## 2024-05-02 PROCEDURE — 93010 ELECTROCARDIOGRAM REPORT: CPT | Performed by: INTERNAL MEDICINE

## 2024-05-02 PROCEDURE — 92941 PRQ TRLML REVSC TOT OCCL AMI: CPT | Mod: LD | Performed by: INTERNAL MEDICINE

## 2024-05-02 PROCEDURE — 93458 L HRT ARTERY/VENTRICLE ANGIO: CPT | Mod: 26,59 | Performed by: INTERNAL MEDICINE

## 2024-05-02 RX ORDER — ONDANSETRON 4 MG/1
4 TABLET, ORALLY DISINTEGRATING ORAL EVERY 4 HOURS PRN
Status: DISCONTINUED | OUTPATIENT
Start: 2024-05-02 | End: 2024-05-07 | Stop reason: HOSPADM

## 2024-05-02 RX ORDER — OMEPRAZOLE 20 MG/1
20 CAPSULE, DELAYED RELEASE ORAL 2 TIMES DAILY
Status: DISCONTINUED | OUTPATIENT
Start: 2024-05-02 | End: 2024-05-02

## 2024-05-02 RX ORDER — CLOPIDOGREL BISULFATE 75 MG/1
75 TABLET ORAL DAILY
Status: DISCONTINUED | OUTPATIENT
Start: 2024-05-03 | End: 2024-05-07 | Stop reason: HOSPADM

## 2024-05-02 RX ORDER — SODIUM CHLORIDE 9 MG/ML
1.5 INJECTION, SOLUTION INTRAVENOUS CONTINUOUS
Status: ACTIVE | OUTPATIENT
Start: 2024-05-02 | End: 2024-05-02

## 2024-05-02 RX ORDER — ASPIRIN 81 MG/1
81 TABLET, CHEWABLE ORAL DAILY
Status: DISCONTINUED | OUTPATIENT
Start: 2024-05-03 | End: 2024-05-02

## 2024-05-02 RX ORDER — NITROGLYCERIN 0.4 MG/1
0.4 TABLET SUBLINGUAL
Status: DISCONTINUED | OUTPATIENT
Start: 2024-05-02 | End: 2024-05-07 | Stop reason: HOSPADM

## 2024-05-02 RX ORDER — MIDAZOLAM HYDROCHLORIDE 1 MG/ML
INJECTION INTRAMUSCULAR; INTRAVENOUS
Status: COMPLETED
Start: 2024-05-02 | End: 2024-05-02

## 2024-05-02 RX ORDER — POLYETHYLENE GLYCOL 3350 17 G/17G
1 POWDER, FOR SOLUTION ORAL
Status: DISCONTINUED | OUTPATIENT
Start: 2024-05-02 | End: 2024-05-04

## 2024-05-02 RX ORDER — ATORVASTATIN CALCIUM 80 MG/1
80 TABLET, FILM COATED ORAL EVERY EVENING
Status: DISCONTINUED | OUTPATIENT
Start: 2024-05-02 | End: 2024-05-07 | Stop reason: HOSPADM

## 2024-05-02 RX ORDER — CLOPIDOGREL 300 MG/1
TABLET, FILM COATED ORAL
Status: COMPLETED
Start: 2024-05-02 | End: 2024-05-02

## 2024-05-02 RX ORDER — CARVEDILOL 3.12 MG/1
3.12 TABLET ORAL 2 TIMES DAILY WITH MEALS
Status: DISCONTINUED | OUTPATIENT
Start: 2024-05-02 | End: 2024-05-06

## 2024-05-02 RX ORDER — CLOPIDOGREL 300 MG/1
600 TABLET, FILM COATED ORAL ONCE
Status: DISCONTINUED | OUTPATIENT
Start: 2024-05-02 | End: 2024-05-02

## 2024-05-02 RX ORDER — HEPARIN SODIUM 1000 [USP'U]/ML
INJECTION, SOLUTION INTRAVENOUS; SUBCUTANEOUS
Status: COMPLETED
Start: 2024-05-02 | End: 2024-05-02

## 2024-05-02 RX ORDER — OMEPRAZOLE 20 MG/1
20 CAPSULE, DELAYED RELEASE ORAL 2 TIMES DAILY
Status: DISCONTINUED | OUTPATIENT
Start: 2024-05-02 | End: 2024-05-07 | Stop reason: HOSPADM

## 2024-05-02 RX ORDER — AMOXICILLIN 250 MG
2 CAPSULE ORAL EVERY EVENING
Status: DISCONTINUED | OUTPATIENT
Start: 2024-05-02 | End: 2024-05-04

## 2024-05-02 RX ORDER — LIDOCAINE HYDROCHLORIDE 20 MG/ML
INJECTION, SOLUTION INFILTRATION; PERINEURAL
Status: COMPLETED
Start: 2024-05-02 | End: 2024-05-02

## 2024-05-02 RX ORDER — ONDANSETRON 2 MG/ML
4 INJECTION INTRAMUSCULAR; INTRAVENOUS EVERY 4 HOURS PRN
Status: DISCONTINUED | OUTPATIENT
Start: 2024-05-02 | End: 2024-05-07 | Stop reason: HOSPADM

## 2024-05-02 RX ORDER — PHENYLEPHRINE HCL IN 0.9% NACL 1 MG/10 ML
SYRINGE (ML) INTRAVENOUS
Status: COMPLETED
Start: 2024-05-02 | End: 2024-05-02

## 2024-05-02 RX ORDER — ASPIRIN 81 MG/1
81 TABLET ORAL DAILY
Status: DISCONTINUED | OUTPATIENT
Start: 2024-05-03 | End: 2024-05-07 | Stop reason: HOSPADM

## 2024-05-02 RX ORDER — HEPARIN SODIUM 200 [USP'U]/100ML
INJECTION, SOLUTION INTRAVENOUS
Status: COMPLETED
Start: 2024-05-02 | End: 2024-05-02

## 2024-05-02 RX ORDER — ACETAMINOPHEN 325 MG/1
650 TABLET ORAL EVERY 6 HOURS PRN
Status: DISCONTINUED | OUTPATIENT
Start: 2024-05-02 | End: 2024-05-07 | Stop reason: HOSPADM

## 2024-05-02 RX ORDER — CLOPIDOGREL BISULFATE 75 MG/1
75 TABLET ORAL DAILY
Status: DISCONTINUED | OUTPATIENT
Start: 2024-05-03 | End: 2024-05-02

## 2024-05-02 RX ORDER — VERAPAMIL HYDROCHLORIDE 2.5 MG/ML
INJECTION, SOLUTION INTRAVENOUS
Status: COMPLETED
Start: 2024-05-02 | End: 2024-05-02

## 2024-05-02 RX ADMIN — LIDOCAINE HYDROCHLORIDE: 20 INJECTION, SOLUTION INFILTRATION; PERINEURAL at 09:27

## 2024-05-02 RX ADMIN — NITROGLYCERIN 10 ML: 20 INJECTION INTRAVENOUS at 09:27

## 2024-05-02 RX ADMIN — ATORVASTATIN CALCIUM 80 MG: 80 TABLET, FILM COATED ORAL at 17:30

## 2024-05-02 RX ADMIN — CLOPIDOGREL BISULFATE 600 MG: 300 TABLET, FILM COATED ORAL at 10:01

## 2024-05-02 RX ADMIN — VERAPAMIL HYDROCHLORIDE 2.5 MG: 2.5 INJECTION, SOLUTION INTRAVENOUS at 09:27

## 2024-05-02 RX ADMIN — HEPARIN SODIUM 2000 UNITS: 200 INJECTION, SOLUTION INTRAVENOUS at 08:51

## 2024-05-02 RX ADMIN — CARVEDILOL 3.12 MG: 3.12 TABLET, FILM COATED ORAL at 12:55

## 2024-05-02 RX ADMIN — IOHEXOL 120 ML: 350 INJECTION, SOLUTION INTRAVENOUS at 09:56

## 2024-05-02 RX ADMIN — MIDAZOLAM HYDROCHLORIDE 2 MG: 2 INJECTION, SOLUTION INTRAMUSCULAR; INTRAVENOUS at 09:36

## 2024-05-02 RX ADMIN — HEPARIN SODIUM: 1000 INJECTION INTRAVENOUS; SUBCUTANEOUS at 09:36

## 2024-05-02 RX ADMIN — OMEPRAZOLE 20 MG: 20 CAPSULE, DELAYED RELEASE ORAL at 17:30

## 2024-05-02 RX ADMIN — FENTANYL CITRATE 100 MCG: 50 INJECTION, SOLUTION INTRAMUSCULAR; INTRAVENOUS at 09:36

## 2024-05-02 RX ADMIN — CARVEDILOL 3.12 MG: 3.12 TABLET, FILM COATED ORAL at 17:30

## 2024-05-02 RX ADMIN — HEPARIN SODIUM: 1000 INJECTION INTRAVENOUS; SUBCUTANEOUS at 09:26

## 2024-05-02 ASSESSMENT — ENCOUNTER SYMPTOMS
HEADACHES: 0
NAUSEA: 0
SHORTNESS OF BREATH: 1
CHEST TIGHTNESS: 1
DIZZINESS: 0
ABDOMINAL PAIN: 0
PALPITATIONS: 0

## 2024-05-02 ASSESSMENT — PAIN DESCRIPTION - PAIN TYPE
TYPE: ACUTE PAIN

## 2024-05-02 ASSESSMENT — COPD QUESTIONNAIRES
DURING THE PAST 4 WEEKS HOW MUCH DID YOU FEEL SHORT OF BREATH: NONE/LITTLE OF THE TIME
HAVE YOU SMOKED AT LEAST 100 CIGARETTES IN YOUR ENTIRE LIFE: YES
DO YOU EVER COUGH UP ANY MUCUS OR PHLEGM?: NO/ONLY WITH OCCASIONAL COLDS OR INFECTIONS
COPD SCREENING SCORE: 4

## 2024-05-02 ASSESSMENT — FIBROSIS 4 INDEX: FIB4 SCORE: 1.94

## 2024-05-02 NOTE — ASSESSMENT & PLAN NOTE
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

## 2024-05-02 NOTE — CONSULTS
Cardiology Initial Consultation    Date of Service  5/2/2024    Referring Physician  Hamilton Isabel MD    Reason for Consultation  STEMI    History of Presenting Illness  Dilan Calderón is a 83 y.o. male with a past medical history of hospitalization for GI bleed 3/4/2024 presenting with syncope, near syncope with a Hgb 5.7 requiring transfusions with EGD and colonoscopy showing esophageal stricture, gastritis, diverticulosis and hemorrhoids who was brought in by ambulance today 5/2/2024 with 1 week history of intermittent substernal chest discomfort with left arm radiation and shortness of breath ultimately leading him to contact EMS for evaluation.    In transit the patient was stable.  In the ER /94.  HR 82.  Currently having 2/10 mild chest pain but no shortness of breath.  EKG in the field showed sinus rhythm, rate 81 with anterior ST elevation.  He has no prior history of heart disease.  No hypertension, dyslipidemia, diabetes mellitus and only remote tobacco use.  Takes over-the-counter medications as needed.  Since discharge from the hospital has been fully functional and ambulatory able to drive and do necessary errands and household chores.  No hematochezia or melena which he had prior to his GI bleed.    Review of Systems  Review of Systems   Respiratory:  Positive for chest tightness and shortness of breath.    Cardiovascular:  Negative for palpitations.   Gastrointestinal:  Negative for abdominal pain and nausea.   Neurological:  Negative for dizziness and headaches.       Past Medical History   has no past medical history on file.    Surgical History   has a past surgical history that includes pr upper gi endoscopy,diagnosis (N/A, 3/6/2024); pr colonoscopy,diagnostic (N/A, 3/6/2024); and pr upper gi endoscopy,w/dilat,gastric out (N/A, 3/6/2024).    Family History  family history is not on file.    Social History       Medications  Prior to Admission Medications   Prescriptions Last Dose Informant  Patient Reported? Taking?   Multiple Vitamin (MULTIVITAMIN) Tab  Patient Yes No   Sig: Take 1 Tablet by mouth every day.   omeprazole (PRILOSEC) 20 MG delayed-release capsule   No No   Sig: Take 1 Capsule by mouth 2 times a day.      Facility-Administered Medications: None       Allergies  No Known Allergies    Vital signs in last 24 hours       Physical Exam  Physical Exam  Constitutional:       General: He is not in acute distress.     Comments: Elderly.  Thin.  NAD.   HENT:      Head: Normocephalic.   Eyes:      General: No scleral icterus.     Conjunctiva/sclera: Conjunctivae normal.      Pupils: Pupils are equal, round, and reactive to light.   Neck:      Comments: Normal jugular venous pressure.  Cardiovascular:      Rate and Rhythm: Normal rate and regular rhythm.      Pulses:           Carotid pulses are 2+ on the right side and 2+ on the left side.       Radial pulses are 2+ on the right side and 2+ on the left side.        Posterior tibial pulses are 2+ on the right side and 2+ on the left side.      Heart sounds: S1 normal and S2 normal. No murmur heard.     No friction rub. No gallop.   Pulmonary:      Effort: Pulmonary effort is normal.      Breath sounds: Normal breath sounds. No wheezing, rhonchi or rales.   Abdominal:      General: Bowel sounds are normal.      Palpations: Abdomen is soft.      Tenderness: There is no abdominal tenderness.   Musculoskeletal:      Right lower leg: No edema.      Left lower leg: No edema.   Skin:     General: Skin is warm and dry.      Nails: There is no clubbing.   Neurological:      Mental Status: He is alert and oriented to person, place, and time.   Psychiatric:         Behavior: Behavior normal.         Lab Review  Lab Results   Component Value Date/Time    WBC 10.0 03/05/2024 08:46 AM    RBC 2.13 (L) 03/05/2024 08:46 AM    HEMOGLOBIN 7.8 (L) 03/06/2024 09:20 PM    HEMATOCRIT 19.0 (L) 03/05/2024 08:46 AM    MCV 89.2 03/05/2024 08:46 AM    MCH 26.8 (L) 03/05/2024  "08:46 AM    MCHC 30.0 (L) 03/05/2024 08:46 AM    MPV 10.7 03/05/2024 08:46 AM      Lab Results   Component Value Date/Time    SODIUM 141 03/07/2024 04:38 AM    POTASSIUM 3.5 (L) 03/07/2024 04:38 AM    CHLORIDE 112 03/07/2024 04:38 AM    CO2 20 03/07/2024 04:38 AM    GLUCOSE 94 03/07/2024 04:38 AM    BUN 19 03/07/2024 04:38 AM    CREATININE 1.12 03/07/2024 04:38 AM    CREATININE 1.2 02/11/2008 09:00 AM      Lab Results   Component Value Date/Time    ASTSGOT 20 03/05/2024 08:46 AM    ALTSGPT 12 03/05/2024 08:46 AM     Lab Results   Component Value Date/Time    CHOLSTRLTOT 253 (H) 02/11/2008 09:00 AM     (H) 02/11/2008 09:00 AM    HDL 63 (H) 02/11/2008 09:00 AM    TRIGLYCERIDE 158 (H) 02/11/2008 09:00 AM    TROPONINT 130 (H) 03/05/2024 10:42 AM       No results for input(s): \"NTPROBNP\" in the last 72 hours.    Cardiac Imaging and Procedures Review  EKG:  My personal interpretation of the EKG dated 5/2/2024 is sinus with anterior injury current    EGD and colonoscopy 3/6/2024  #1.  Esophageal stricture at the gastroesophageal junction  #2.  Patchy gastritis  #3.  Scattered sigmoid diverticulosis, minimal  #4.  Internal hemorrhoids     Imaging  Chest X-Ray:  5/2/2024 (personally reviewed images)  Clear without active process  No widened mediastnum     Assessment  Anterior STEMI  H/O GI bleed requiring transfusions    Recommendation Discussion  The patient presents with an acute ST elevation anterior myocardial infarction with recent GI bleed requiring blood transfusions with no identifiable treatable lesion but has had no clinical recurrent bleeding or associated similar symptoms of lightheadedness or dizziness.  Attempted i-STAT Hgb was unsuccessful  Due to the patient's current clinical status we will proceed with emergent coronary angiography  I had a concise comprehensive discussion with the patient concerning his serious cardiac condition, the recommendation for an emergent angiogram explaining the reason for " the procedure, the procedure itself and the potential risks including recurrent bleeding of which he expressed understanding wish to proceed.  Discussed treatment plan with Hamilton Isabel MD, ER physician, bedside RN, Cath Lab staff and interventional cardiologist Napoleon Johns MD    Thank you for allowing me to participate in the care of this patient.    Please contact me with any questions.    Yuri Rodarte M.D.   Cardiologist, [facility]  [phone]    The patient is critically ill requiring my attendance at the bedside directing and coordinating care, consulting with the bedside RN, ER MD, the on-call interventional cardiologist and the pharmacist which included placing STAT orders and medication administration, reviewing data, direct continuing patient evaluation and monitoring,and arranging for an emergent cardiac catheterization; providing 30 minutes of continuous, non-procedural critical care time. If untreated the condition would have a high likelihood of progressing to permanent disability or death.  Date of Service:   CPT: 38294 (30-74 minutes)

## 2024-05-02 NOTE — ED PROVIDER NOTES
"  ER Provider Note    Scribed for Hamilton Isabel M.D. by Tiffani Kruse. 5/2/2024   8:46 AM    Primary Care Provider: None noted    CHIEF COMPLAINT  STEMI    EXTERNAL RECORDS REVIEWED  The patient was last here and admitted for an upper GI bleed 3/5/24.    HPI/ROS  Dilan Calderón is a 83 y.o. male who presents to the ED via as a code STEMI for evaluation of chest pain radiating to his left arm. The patient adds he has had shortness of breath for the last week.  He states his pain is now mild and was more severe previously. He adds he took aspirin at home.     PAST MEDICAL HISTORY  None noted    SURGICAL HISTORY  Past Surgical History:   Procedure Laterality Date    NE UPPER GI ENDOSCOPY,DIAGNOSIS N/A 3/6/2024    Procedure: GASTROSCOPY;  Surgeon: Greyson Serrato M.D.;  Location: SURGERY SAME DAY [facility];  Service: Gastroenterology    NE COLONOSCOPY,DIAGNOSTIC N/A 3/6/2024    Procedure: COLONOSCOPY;  Surgeon: Greyson Serrato M.D.;  Location: SURGERY SAME DAY [facility];  Service: Gastroenterology    NE UPPER GI ENDOSCOPY,W/DILAT,GASTRIC OUT N/A 3/6/2024    Procedure: GASTROSCOPY, WITH BALLOON DILATION;  Surgeon: Greyson Serrato M.D.;  Location: SURGERY SAME DAY [facility];  Service: Gastroenterology       FAMILY HISTORY  None noted    SOCIAL HISTORY   None noted    CURRENT MEDICATIONS  Previous Medications    MULTIPLE VITAMIN (MULTIVITAMIN) TAB    Take 1 Tablet by mouth every day.    OMEPRAZOLE (PRILOSEC) 20 MG DELAYED-RELEASE CAPSULE    Take 1 Capsule by mouth 2 times a day.       ALLERGIES  None noted     PHYSICAL EXAM  Ht 1.676 m (5' 6\")   Wt 72.6 kg (160 lb 0.9 oz)   BMI 25.83 kg/m²    Nursing note and vitals reviewed.  Nursing note and vitals reviewed.  Constitutional: Well-developed and well-nourished. Mild distress.   HENT: Head is normocephalic and atraumatic. Oropharynx is clear and moist without exudate or erythema.   Eyes: Pupils are equal, round, and reactive to light. Conjunctiva are normal. "   Cardiovascular: Normal rate and regular rhythm. No murmur heard. Normal radial pulses.   Pulmonary/Chest: Breath sounds normal. No wheezes or rales. No chest wall tenderness.   Abdominal: Soft and non-tender. No distention   Musculoskeletal: Extremities exhibit normal range of motion without edema or tenderness. No calf tenderness or palpable cords.   Neurological: Awake, alert and oriented to person, place, and time. No focal deficits noted.  Skin: Skin is warm and dry. No rash.   Psychiatric: Normal mood and affect. Appropriate for clinical situation    DIAGNOSTIC STUDIES    Labs:   Results for orders placed or performed during the hospital encounter of 05/02/24   CBC WITH DIFFERENTIAL   Result Value Ref Range    WBC 12.1 (H) 4.8 - 10.8 K/uL    RBC 3.95 (L) 4.70 - 6.10 M/uL    Hemoglobin 9.6 (L) 14.0 - 18.0 g/dL    Hematocrit 32.2 (L) 42.0 - 52.0 %    MCV 81.5 81.4 - 97.8 fL    MCH 24.3 (L) 27.0 - 33.0 pg    MCHC 29.8 (L) 32.3 - 36.5 g/dL    RDW 55.8 (H) 35.9 - 50.0 fL    Platelet Count 241 164 - 446 K/uL    MPV 10.7 9.0 - 12.9 fL    Neutrophils-Polys 78.20 (H) 44.00 - 72.00 %    Lymphocytes 18.30 (L) 22.00 - 41.00 %    Monocytes 3.50 0.00 - 13.40 %    Eosinophils 0.00 0.00 - 6.90 %    Basophils 0.00 0.00 - 1.80 %    Nucleated RBC 0.00 0.00 - 0.20 /100 WBC    Neutrophils (Absolute) 9.46 (H) 1.82 - 7.42 K/uL    Lymphs (Absolute) 2.21 1.00 - 4.80 K/uL    Monos (Absolute) 0.42 0.00 - 0.85 K/uL    Eos (Absolute) 0.00 0.00 - 0.51 K/uL    Baso (Absolute) 0.00 0.00 - 0.12 K/uL    NRBC (Absolute) 0.00 K/uL    Hypochromia 1+     Anisocytosis 1+     Microcytosis 1+    Complete Metabolic Panel (CMP)   Result Value Ref Range    Sodium 140 135 - 145 mmol/L    Potassium 4.6 3.6 - 5.5 mmol/L    Chloride 105 96 - 112 mmol/L    Co2 22 20 - 33 mmol/L    Anion Gap 13.0 7.0 - 16.0    Glucose 136 (H) 65 - 99 mg/dL    Bun 21 8 - 22 mg/dL    Creatinine 1.35 0.50 - 1.40 mg/dL    Calcium 9.4 8.5 - 10.5 mg/dL    Correct Calcium 9.3 8.5 -  10.5 mg/dL    AST(SGOT) 232 (H) 12 - 45 U/L    ALT(SGPT) 47 2 - 50 U/L    Alkaline Phosphatase 104 (H) 30 - 99 U/L    Total Bilirubin 0.4 0.1 - 1.5 mg/dL    Albumin 4.1 3.2 - 4.9 g/dL    Total Protein 7.3 6.0 - 8.2 g/dL    Globulin 3.2 1.9 - 3.5 g/dL    A-G Ratio 1.3 g/dL   Troponin - STAT Once   Result Value Ref Range    Troponin T 1677 (H) 6 - 19 ng/L   APTT   Result Value Ref Range    APTT 27.9 24.7 - 36.0 sec   PT/INR   Result Value Ref Range    PT 13.7 12.0 - 14.6 sec    INR 1.03 0.87 - 1.13   ESTIMATED GFR   Result Value Ref Range    GFR (CKD-EPI) 53 (A) >60 mL/min/1.73 m 2   DIFFERENTIAL MANUAL   Result Value Ref Range    Manual Diff Status PERFORMED    PERIPHERAL SMEAR REVIEW   Result Value Ref Range    Peripheral Smear Review see below    PLATELET ESTIMATE   Result Value Ref Range    Plt Estimation Normal    MORPHOLOGY   Result Value Ref Range    RBC Morphology Present     Ovalocytes 1+    EKG   Result Value Ref Range    Report       [facility].    Test Date:  2024  Pt Name:    AMY KERR              Department: ER  MRN:        [id number]                      Room:       TRAUMA - EXAM 1  Gender:     Male                         Technician:  :        [id number]                   Requested By:MELISSA PACHECO  Order #:    [id number]                    Reading MD:    Measurements  Intervals                                Axis  Rate:       89                           P:          73  DE:         175                          QRS:        -31  QRSD:       82                           T:          89  QT:         368  QTc:        448    Interpretive Statements  Sinus rhythm  Paired ventricular premature complexes  Left axis deviation  Anterior infarct, acute (LAD)  Compared to ECG 2024 09:17:04  Ventricular premature complex(es) now present  Left-axis deviation now present  Myocardial infarct finding now present  ST (T wave) deviation no longer present         EKG:    I have independently interpreted this EKG as detailed above.     Radiology:   This attending emergency physician has independently interpreted the diagnostic imaging associated with this visit and is awaiting the final reading from the radiologist.  Chest x-ray shows no evidence of heart failure  Preliminary interpretation is a follows:     Radiologist interpretation:   DX-CHEST-LIMITED (1 VIEW)   Final Result      No acute cardiopulmonary disease.      CL-LEFT HEART CATHETERIZATION WITH POSSIBLE INTERVENTION    (Results Pending)   EC-ECHOCARDIOGRAM COMPLETE W/O CONT    (Results Pending)        INITIAL ASSESSMENT AND PLAN    8:46 AM - Patient was evaluated in the trauma bay as a code STEMI. Ordered for EKG, PT/INR, APTT, Troponin, CMP and Dx-chest to evaluate. Dr. Rodarte, cardiology is at the bedside and will take him to the cath lab.      COURSE AND MEDICAL DECISION MAKING    Patient presents today with an ST segment elevation myocardial infarction.  He is being taken emergently to the cardiac catheterization suite for percutaneous intervention.  DISPOSITION AND DISCUSSIONS    I have discussed management of the patient with the following physicians and ADELAIDE's:  Dr. Rodarte, cardiology    DISPOSITION:  Patient will be hospitalized by Dr. Rodarte, cardiology in guarded condition.    FINAL DIAGNOSIS  1. ST elevation myocardial infarction (STEMI), unspecified artery (HCC)    2. Status post insertion of drug-eluting stent into left anterior descending artery         Tiffani ROMERO ([place]), am scribing for, and in the presence of, Hamilton Isabel M.D..    Electronically signed by: Tiffani Kruse (Emersonibgeronimo), 5/2/2024    Hamilton ROMERO M.D. personally performed the services described in this documentation, as scribed by Tiffani Kruse in my presence, and it is both accurate and complete.      The note accurately reflects work and decisions made by me.  Hamilton Isabel M.D.  5/2/2024  2:35 PM

## 2024-05-02 NOTE — ASSESSMENT & PLAN NOTE
LVEF 45% in cardiac catheterization laboratory  Echocardiogram  Begin carvedilol, 3.125 mg twice daily

## 2024-05-02 NOTE — H&P
PULMONARY AND CRITICAL CARE MEDICINE HISTORY AND PHYSICAL EXAMINATION    Date of Admission:  5/2/2024    Admitting Physician:  Delmar Oreilly MD    Reason for Admission: Acute anterior STEMI    Chief Complaint: Acute anterior STEMI    History of Present Illness:    I was kindly asked to see and evaluate Dilan Calderón, a 83 y.o. male for evaluation and management of the above problem.    This charming gentleman has a history of recent gastrointestinal bleeding due to patchy gastritis.  He presented to hospital this morning with chest pain and was diagnosed with an acute anterior STEMI.  He was emergently taken to the cardiac catheterization laboratory and underwent PCI with a DOUGIE.    Medications Prior to Admission:    No current facility-administered medications on file prior to encounter.     Current Outpatient Medications on File Prior to Encounter   Medication Sig Dispense Refill    omeprazole (PRILOSEC) 20 MG delayed-release capsule Take 1 Capsule by mouth 2 times a day. 60 Capsule 2    Multiple Vitamin (MULTIVITAMIN) Tab Take 1 Tablet by mouth every day.         Current Medications:      Current Facility-Administered Medications:     NS infusion, 1.5 mL/kg/hr, Intravenous, Continuous, Napoleon Johns M.D.    nitroglycerin (Nitrostat) tablet 0.4 mg, 0.4 mg, Sublingual, Q5 MIN PRN, Napoleon Johns M.D.    [START ON 5/3/2024] aspirin EC tablet 81 mg, 81 mg, Oral, DAILY, Napoleon Johns M.D.    [START ON 5/3/2024] clopidogrel (Plavix) tablet 75 mg, 75 mg, Oral, DAILY, Napoleon Johns M.D.    Respiratory Therapy Consult, , Nebulization, Continuous RT, Delmar Oreilly M.D.    acetaminophen (Tylenol) tablet 650 mg, 650 mg, Oral, Q6HRS PRN, Delmar Oreilly M.D.    senna-docusate (Pericolace Or Senokot S) 8.6-50 MG per tablet 2 Tablet, 2 Tablet, Oral, Q EVENING **AND** polyethylene glycol/lytes (Miralax) Packet 1 Packet, 1 Packet, Oral, QDAY PRN, Delmar Oreilly M.D.    ondansetron (Zofran)  syringe/vial injection 4 mg, 4 mg, Intravenous, Q4HRS PRN, Delmar Oreilly M.D.    ondansetron (Zofran ODT) dispertab 4 mg, 4 mg, Oral, Q4HRS PRN, Delmar Oreilly M.D.    carvedilol (Coreg) tablet 3.125 mg, 3.125 mg, Oral, BID WITH MEALS, Delmar Oreilly M.D.    atorvastatin (Lipitor) tablet 80 mg, 80 mg, Oral, Q EVENING, Delmar Oreilly M.D.    omeprazole (PriLOSEC) capsule 20 mg, 20 mg, Oral, BID, Delmar Oreilly M.D.    Allergies:    Patient has no known allergies.    Past Surgical History:    Past Surgical History:   Procedure Laterality Date    [place] UPPER GI ENDOSCOPY,DIAGNOSIS N/A 3/6/2024    Procedure: GASTROSCOPY;  Surgeon: Greyson Serrato M.D.;  Location: SURGERY SAME DAY [facility];  Service: Gastroenterology    [place] COLONOSCOPY,DIAGNOSTIC N/A 3/6/2024    Procedure: COLONOSCOPY;  Surgeon: Greyson Serrato M.D.;  Location: SURGERY SAME DAY [facility];  Service: Gastroenterology    [place] UPPER GI ENDOSCOPY,W/DILAT,GASTRIC OUT N/A 3/6/2024    Procedure: GASTROSCOPY, WITH BALLOON DILATION;  Surgeon: Greyson Serrato M.D.;  Location: SURGERY SAME DAY [facility];  Service: Gastroenterology       Past Medical History:    No past medical history on file.    Social History:    Social History     Socioeconomic History    Marital status: Single     Spouse name: Not on file    Number of children: Not on file    Years of education: Not on file    Highest education level: Not on file   Occupational History    Not on file   Tobacco Use    Smoking status: Not on file    Smokeless tobacco: Not on file   Substance and Sexual Activity    Alcohol use: Not on file    Drug use: Not on file    Sexual activity: Not on file   Other Topics Concern    Not on file   Social History Narrative    Not on file     Social Determinants of Health     Financial Resource Strain: Not on file   Food Insecurity: Not on file   Transportation Needs: Not on file   Physical Activity: Not on file   Stress: Not on file   Social  "Connections: Not on file   Intimate Partner Violence: Not on file   Housing Stability: Not on file       Family History:    No family history on file.    Review of System:    Review of Systems   Unable to perform ROS: Acuity of condition       Physical Examination:    BP (!) 145/82   Pulse 89   Resp 20   Ht 1.676 m (5' 6\")   Wt 72.6 kg (160 lb 0.9 oz)   SpO2 93%   BMI 25.83 kg/m²   Physical Exam  Constitutional:       Appearance: He is not diaphoretic.   HENT:      Head: Normocephalic.   Cardiovascular:      Comments: Sinus rhythm  Pulmonary:      Breath sounds: No wheezing or rales.   Abdominal:      General: There is no distension.      Tenderness: There is no abdominal tenderness.   Musculoskeletal:      Right lower leg: No edema.      Left lower leg: No edema.   Skin:     General: Skin is warm.      Capillary Refill: Capillary refill takes less than 2 seconds.   Neurological:      General: No focal deficit present.      Mental Status: He is oriented to person, place, and time.      Cranial Nerves: No cranial nerve deficit.         Laboratory Data:        Recent Labs     05/02/24  0842   WBC 12.1*   RBC 3.95*   HEMOGLOBIN 9.6*   HEMATOCRIT 32.2*   MCV 81.5   MCH 24.3*   MCHC 29.8*   RDW 55.8*   PLATELETCT 241   MPV 10.7     Recent Labs     05/02/24  0842   SODIUM 140   POTASSIUM 4.6   CHLORIDE 105   CO2 22   GLUCOSE 136*   BUN 21   CREATININE 1.35   CALCIUM 9.4                   Imaging:    I personally viewed all the available CXR and CT scan images as well as reviewed the radiology interpretation reports.    DX-CHEST-LIMITED (1 VIEW)   Final Result      No acute cardiopulmonary disease.      CL-LEFT HEART CATHETERIZATION WITH POSSIBLE INTERVENTION    (Results Pending)   EC-ECHOCARDIOGRAM COMPLETE W/O CONT    (Results Pending)       Assessment and Plan:    * ST elevation myocardial infarction involving left anterior descending (LAD) coronary artery (HCC)  Assessment & Plan  Acute anterior STEMI  S/P " emergent PCI with DOUGIE to the culprit LAD on 5/2  Significant CAD found involving OM 2, OM 3 and distal RCA - consideration for PCI down the road  Aspirin and clopidogrel  Begin carvedilol, 3.125 mg twice daily  Begin atorvastatin, 80 mg daily  Check of lipid panel and glycohemoglobin    Acute systolic heart failure (HCC)  Assessment & Plan  LVEF 45% in cardiac catheterization laboratory  Echocardiogram  Begin carvedilol, 3.125 mg twice daily    History of GI bleed  Assessment & Plan  History of gastrointestinal bleeding on 3/4/2024  EGD revealed esophageal stricture with patchy gastritis  Continue PPI twice daily        High risk of deterioration and worsening vital organ dysfunction and death without the above critical care interventions.    I have assessed and reassessed his blood pressure, hemodynamics and cardiovascular status.  This gentleman presents with an acute anterior STEMI.  He has multivessel CAD.  He is at high risk for worsening cardiovascular system dysfunction.    The patient is critically ill.  Critical care time = 35 minutes in directly providing and coordinating critical care and extensive data review.  No time overlap and excludes procedures.    Discussed with Dr. Johns, RN    Delmar Oreilly MD  Pulmonary and Critical Care Medicine

## 2024-05-02 NOTE — PROGRESS NOTES
Pt to T 627 by bed with Cath lab RN on [place]. Pt on 2L NC, no complaints of pain. Pt Aox4, all belongings accounted for.

## 2024-05-02 NOTE — CARE PLAN
The patient is Watcher - Medium risk of patient condition declining or worsening    Shift Goals  Clinical Goals: Hemodynamic stability  Patient Goals: Get Better  Family Goals: DHARA    Progress made toward(s) clinical / shift goals:    Problem: Knowledge Deficit - Standard  Goal: Patient and family/care givers will demonstrate understanding of plan of care, disease process/condition, diagnostic tests and medications  Description: Target End Date:  1-3 days or as soon as patient condition allows    Document in Patient Education    1.  Patient and family/caregiver oriented to unit, equipment, visitation policy and means for communicating concern  2.  Complete/review Learning Assessment  3.  Assess knowledge level of disease process/condition, treatment plan, diagnostic tests and medications  4.  Explain disease process/condition, treatment plan, diagnostic tests and medications  Outcome: Progressing     Problem: Pain - Standard  Goal: Alleviation of pain or a reduction in pain to the patient’s comfort goal  Description: Target End Date:  Prior to discharge or change in level of care    Document on Vitals flowsheet    1.  Document pain using the appropriate pain scale per order or unit policy  2.  Educate and implement non-pharmacologic comfort measures (i.e. relaxation, distraction, massage, cold/heat therapy, etc.)  3.  Pain management medications as ordered  4.  Reassess pain after pain med administration per policy  5.  If opiods administered assess patient's response to pain medication is appropriate per POSS sedation scale  6.  Follow pain management plan developed in collaboration with patient and interdisciplinary team (including palliative care or pain specialists if applicable)  Outcome: Progressing       Patient is not progressing towards the following goals:

## 2024-05-02 NOTE — PROGRESS NOTES
4 Eyes Skin Assessment Completed by Beronica RN and ALBERT Ayala.    Head WDL  Ears WDL  Nose WDL  Mouth WDL  Neck WDL  Breast/Chest WDL  Shoulder Blades WDL  Spine WDL  (R) Arm/Elbow/Hand WDL  (L) Arm/Elbow/Hand WDL  Abdomen WDL  Groin WDL  Scrotum/Coccyx/Buttocks WDL  (R) Leg WDL  (L) Leg WDL  (R) Heel/Foot/Toe WDL  (L) Heel/Foot/Toe WDL          Devices In Places ECG, Blood Pressure Cuff, and Pulse Ox      Interventions In Place Q2 Turns, Low Air Loss Mattress, Heels Loaded W/Pillows, and Pressure Redistribution Mattress    Possible Skin Injury No    Pictures Uploaded Into Epic N/A  Wound Consult Placed N/A  RN Wound Prevention Protocol Ordered Yes

## 2024-05-02 NOTE — PROGRESS NOTES
STEMI Pre Alert    TOA 0838    Approx. 1 week of intermittent chest pain with L arm radiation.  Worsened around 7529-3297.     Recent admission in March for GIB     PTA Medications: 324 mg ASA

## 2024-05-02 NOTE — PROCEDURES
Cardiac Catheterization Procedure    DATE: 5/2/2024    : Napoleon Johns MD, MPH    PROCEDURES PERFORMED:  Left heart catheterization  Coronary angiography  Emergent percutaneous coronary intervention of proximal/mid LAD  IVUS  Intravascular lithotripsy-shockwave  Moderate conscious sedation    INDICATIONS:  Anterior STEMI    CONSENT:  The complete alternatives, risks, and benefits of the procedure were explained to the patient.  Emergent procedure.    MEDICATIONS:  Lidocaine  Fentanyl  Midazolam  Nitroglycerin  Verapamil  Heparin  Plavix 600 mg    MODERATE CONSCIOUS SEDATION:  I personally supervised the administration of moderate conscious sedation by the nursing staff for 57 minutes.  Start time: 9:01 AM  End time: 9:58 AM    CONTRAST: Omnipaque 120 cc    RADIATION (Air Kerma): 538 mGy    FLUOROSCOPY TIME: 20.7 minutes    ESTIMATED BLOOD LOSS: < 50 cc    COMPLICATIONS: None apparent    PROCEDURE OVERVIEW:  The patient was brought to the cardiac catheterization laboratory in the fasting state. The skin over the right wrist was prepped and draped in the usual sterile fashion. Lidocaine infiltration was used to anesthetize the tissue over the right radial artery. Using the micropuncture technique, a 6-[place] Glidesheath was inserted in the right radial artery. A 6 [place] JR4 diagnostic catheter was then advanced over a standard J-wire into the left ventricular cavity where it was gently aspirated, flushed, and then withdrawn across the aortic valve with sequential pressures measured. This catheter was then used to engage the ostium of the right coronary artery and cineangiograms were obtained in multiple projections for complete evaluation of the right coronary system. This catheter was then exchanged over J-wire to a 6 [place] EBU 3.5 guide catheter which was used to engage the ostium of the left coronary artery and cineangiograms were obtained in multiple projections for complete evaluation of the left  coronary system. Following completion of coronary angiography, we proceeded with PCI of the occluded LAD. See below for more details.     HEMODYNAMICS:  Aortic pressure: 129 /84 mmHg  LVEDP: 25 mmHg  No significant aortic gradient on pullback    LEFT VENTRICULOGRAPHY   Estimated LVEF= 45%.  Anterior wall akinesis     CORONARY ANGIOGRAPHY:  The left main coronary artery: Short normal-appearing vessel that bifurcates to LAD and left circumflex  The left anterior descending coronary artery: Large-caliber occluded vessel at its proximal portion with right to left collaterals status post successful PCI as detailed below.  Transapical vessel  The left circumflex coronary artery: Large-caliber vessel that gives rise to diminutive OM1, large caliber bifurcating OM 2 that has severe ostial stenosis in both branches, and a large bifurcating OM 3 that has severe proximal stenosis in 1 of its branches.  The right coronary artery: Large-caliber dominant vessel with diffuse nonobstructive disease throughout its proximal and midportion, and subsequent severe 90% stenosis throughout the distal RCA into the ostial/proximal right PDA.    PERCUTANEOUS CORONARY INTERVENTION of proximal/mid LAD:  Pre: 100% stenosis (heavily calcified proximal/mid LAD) and GAMAL 0 flow  Post: 0% residual stenosis and GAMAL III flow.   Guide Catheter(s): 6 [place]an EBU 3.5  Guidewire(s):  50  Anticoagulation:  Heparin to maintain ACT > 250  Antiplatelet(s): Aspirin, Plavix   Pre-dilation balloon(s): 2 x 12 mm, 3 x 15 mm, 3 x 15 mm NC  IVUS or OCT used: IVUS guided vascularization  Intracoronary Atherectomy / Lithotripsy: 3 x 12 mm shockwave balloon catheter given at least 180 degrees of heavily calcified anatomy necessitating plaque modification before optimal PCI.  Stent: Overlapping Synergy 3 x 28 mm and 3 x 32 mm Dereck  Post-dilation balloon(s): 3.5 x 20 mm NC     No dissection, signs of no-reflow, distal embolization or perforation post  PCI.    Closing: At completion of the procedure the relevant equipment was removed from the body and hemostasis achieved by Radial band for the right radial arteriotomy site.    The patient left the cath lab with mild chest pain, hemodynamically stable and neurologically intact.      IMPRESSION:  1.  Occluded proximal LAD (heavily calcified vessel) status post successful IVUS guided intravascular lithotripsy and PCI deploying overlapping Synergy 3 x 28 mm and 3 x 32 mm DOUGIE, postdilated to ~ 3.5-3.7 mm.  2.  Ischemic cardiomyopathy with estimated LVEF 45% with anterior wall akinesis  3.  Significant elevated resting LVEDP 25 mmHg with no significant transaortic gradient on pullback    RECOMMENDATIONS:  Dual antiplatelet therapy for at least 12 months  Weigh risk versus benefits of further PCI to his remaining severe disease in the circumflex and RCA, especially given recent GI bleed with no clear identifiable source necessitating blood transfusions.  HI statin / PCSK9-I: Target LDL < 55 and TG < 150  TTE with echo enhancing agent  GDMT and lifestyle modifications for secondary ASCVD prevention  Cardiac Rehab referral    NOTIFICATION:  No family available for update    ICU and cardiology providers updated.    Napoleon Johns MD, MPH [facility]  Interventional Cardiologist  [facility]   of Clinical Internal Medicine - [facility] [place]

## 2024-05-02 NOTE — ASSESSMENT & PLAN NOTE
History of gastrointestinal bleeding on 3/4/2024  EGD revealed esophageal stricture with patchy gastritis  Continue PPI twice daily

## 2024-05-02 NOTE — DISCHARGE PLANNING
Case Management Discharge Planning    Admission Date: 5/2/2024  GMLOS:    ALOS: 0    6-Clicks ADL Score:    6-Clicks Mobility Score:        Anticipated Discharge Dispo: Discharge Disposition: Discharged to home/self care (01)  Discharge Address: [place] [address]    DME Needed: No    Action(s) Taken: DC Assessment Complete (See below)    LSW met with pt at bedside to complete DC assessment. LSW Introduced self and department roles. Pt A&Ox4 and able to verify the information on the face sheet. Patient was recently admitted to [facility] at the beginning of March and was Dc'd home without any additional needs. Pt lives Alone in a single-story house that has three steps to enter. [place] [address]. Pt stated he does not have a PCP. Prior to this hospitalization pt was independent at home with ADLs and most IADLs. Pt does not use any DME at baseline. Pt reported that he does not have any family or friends for support. Pt denies any SA or MH concerns. Pt does not have an advance directive.     Escalations Completed: None    Medically Clear: No    Next Steps: f/u with pt and medical team to discuss dc needs and barriers.     Barriers to Discharge: Medical clearance    Is the patient up for discharge tomorrow: No      Care Transition Team Assessment    Information Source  Orientation Level: Oriented X4  Information Given By: Patient  Who is responsible for making decisions for patient? : Patient    Readmission Evaluation  Is this a readmission?: No    Elopement Risk  Legal Hold: No  Ambulatory or Self Mobile in Wheelchair: No-Not an Elopement Risk  Elopement Risk: Not at Risk for Elopement    Interdisciplinary Discharge Planning  Lives with - Patient's Self Care Capacity: Alone and Able to Care For Self  Housing / Facility: 1 Story House    Discharge Preparedness  What is your plan after discharge?: Uncertain - pending medical team collaboration  What are your discharge supports?:   (States none)  Prior Functional Level: Ambulatory, Independent with Activities of Daily Living, Independent with Medication Management    Functional Assesment  Prior Functional Level: Ambulatory, Independent with Activities of Daily Living, Independent with Medication Management    Finances  Financial Barriers to Discharge: No  Prescription Coverage: Yes    Vision / Hearing Impairment  Right Eye Vision: Impaired, Wears Glasses  Left Eye Vision: Impaired, Wears Glasses    Advance Directive  Advance Directive?: None    Psychological Assessment  History of Substance Abuse: None  History of Psychiatric Problems: No  Non-compliant with Treatment: No    Discharge Risks or Barriers  Patient risk factors: Lives alone and no community support, Lack of outside supports, No PCP    Anticipated Discharge Information  Discharge Disposition: Discharged to home/self care (01)  Discharge Address: [address] [place] [address] [place] [address]

## 2024-05-03 PROBLEM — R74.01 ELEVATED AST (SGOT): Status: ACTIVE | Noted: 2024-05-03

## 2024-05-03 PROBLEM — D64.9 ANEMIA: Status: ACTIVE | Noted: 2024-05-03

## 2024-05-03 PROBLEM — I21.3 STEMI (ST ELEVATION MYOCARDIAL INFARCTION) (HCC): Status: ACTIVE | Noted: 2024-05-03

## 2024-05-03 LAB
ANION GAP SERPL CALC-SCNC: 15 MMOL/L (ref 7–16)
BUN SERPL-MCNC: 24 MG/DL (ref 8–22)
CALCIUM SERPL-MCNC: 9.6 MG/DL (ref 8.5–10.5)
CHLORIDE SERPL-SCNC: 104 MMOL/L (ref 96–112)
CHOLEST SERPL-MCNC: 172 MG/DL (ref 100–199)
CO2 SERPL-SCNC: 22 MMOL/L (ref 20–33)
CREAT SERPL-MCNC: 1.37 MG/DL (ref 0.5–1.4)
EKG IMPRESSION: NORMAL
ERYTHROCYTE [DISTWIDTH] IN BLOOD BY AUTOMATED COUNT: 54.1 FL (ref 35.9–50)
EST. AVERAGE GLUCOSE BLD GHB EST-MCNC: 120 MG/DL
FERRITIN SERPL-MCNC: 45.4 NG/ML (ref 22–322)
GFR SERPLBLD CREATININE-BSD FMLA CKD-EPI: 52 ML/MIN/1.73 M 2
GLUCOSE SERPL-MCNC: 120 MG/DL (ref 65–99)
HBA1C MFR BLD: 5.8 % (ref 4–5.6)
HCT VFR BLD AUTO: 31.2 % (ref 42–52)
HDLC SERPL-MCNC: 57 MG/DL
HGB BLD-MCNC: 9.4 G/DL (ref 14–18)
HGB RETIC QN AUTO: 27.3 PG/CELL (ref 29–35)
IMM RETICS NFR: 29.3 % (ref 2.6–16.1)
IRON SATN MFR SERPL: 11 % (ref 15–55)
IRON SERPL-MCNC: 35 UG/DL (ref 50–180)
LDLC SERPL CALC-MCNC: 96 MG/DL
MAGNESIUM SERPL-MCNC: 2.1 MG/DL (ref 1.5–2.5)
MCH RBC QN AUTO: 23.7 PG (ref 27–33)
MCHC RBC AUTO-ENTMCNC: 30.1 G/DL (ref 32.3–36.5)
MCV RBC AUTO: 78.6 FL (ref 81.4–97.8)
PHOSPHATE SERPL-MCNC: 3.5 MG/DL (ref 2.5–4.5)
PLATELET # BLD AUTO: 238 K/UL (ref 164–446)
PMV BLD AUTO: 10.8 FL (ref 9–12.9)
POTASSIUM SERPL-SCNC: 4.1 MMOL/L (ref 3.6–5.5)
RBC # BLD AUTO: 3.97 M/UL (ref 4.7–6.1)
RETICS # AUTO: 0.04 M/UL (ref 0.04–0.12)
RETICS/RBC NFR: 1.4 % (ref 0.8–2.6)
SODIUM SERPL-SCNC: 141 MMOL/L (ref 135–145)
TIBC SERPL-MCNC: 329 UG/DL (ref 250–450)
TRIGL SERPL-MCNC: 93 MG/DL (ref 0–149)
TROPONIN T SERPL-MCNC: 5695 NG/L (ref 6–19)
UIBC SERPL-MCNC: 294 UG/DL (ref 110–370)
WBC # BLD AUTO: 12.4 K/UL (ref 4.8–10.8)

## 2024-05-03 PROCEDURE — 99222 1ST HOSP IP/OBS MODERATE 55: CPT | Performed by: HOSPITALIST

## 2024-05-03 PROCEDURE — 93010 ELECTROCARDIOGRAM REPORT: CPT | Performed by: INTERNAL MEDICINE

## 2024-05-03 RX ADMIN — OMEPRAZOLE 20 MG: 20 CAPSULE, DELAYED RELEASE ORAL at 16:26

## 2024-05-03 RX ADMIN — ATORVASTATIN CALCIUM 80 MG: 80 TABLET, FILM COATED ORAL at 16:26

## 2024-05-03 RX ADMIN — OMEPRAZOLE 20 MG: 20 CAPSULE, DELAYED RELEASE ORAL at 06:30

## 2024-05-03 RX ADMIN — ASPIRIN 81 MG: 81 TABLET, COATED ORAL at 06:30

## 2024-05-03 RX ADMIN — CLOPIDOGREL BISULFATE 75 MG: 75 TABLET ORAL at 06:30

## 2024-05-03 RX ADMIN — CARVEDILOL 3.12 MG: 3.12 TABLET, FILM COATED ORAL at 16:26

## 2024-05-03 ASSESSMENT — COGNITIVE AND FUNCTIONAL STATUS - GENERAL
PERSONAL GROOMING: A LITTLE
HELP NEEDED FOR BATHING: A LOT
STANDING UP FROM CHAIR USING ARMS: A LITTLE
CLIMB 3 TO 5 STEPS WITH RAILING: A LITTLE
SUGGESTED CMS G CODE MODIFIER MOBILITY: CK
EATING MEALS: A LITTLE
SUGGESTED CMS G CODE MODIFIER DAILY ACTIVITY: CK
MOVING FROM LYING ON BACK TO SITTING ON SIDE OF FLAT BED: A LITTLE
DAILY ACTIVITIY SCORE: 16
CLIMB 3 TO 5 STEPS WITH RAILING: A LITTLE
SUGGESTED CMS G CODE MODIFIER MOBILITY: CK
STANDING UP FROM CHAIR USING ARMS: A LOT
MOBILITY SCORE: 17
DRESSING REGULAR LOWER BODY CLOTHING: A LITTLE
MOBILITY SCORE: 19
WALKING IN HOSPITAL ROOM: A LITTLE
MOVING TO AND FROM BED TO CHAIR: A LOT
TOILETING: A LOT
MOVING FROM LYING ON BACK TO SITTING ON SIDE OF FLAT BED: A LITTLE
WALKING IN HOSPITAL ROOM: A LITTLE
DRESSING REGULAR UPPER BODY CLOTHING: A LITTLE
MOVING TO AND FROM BED TO CHAIR: A LITTLE

## 2024-05-03 ASSESSMENT — ENCOUNTER SYMPTOMS
DIZZINESS: 0
SHORTNESS OF BREATH: 0
CHILLS: 0
NAUSEA: 0
FEVER: 0
PALPITATIONS: 0
COUGH: 0
BACK PAIN: 0
DIARRHEA: 0
CHEST TIGHTNESS: 0
HEADACHES: 0
ABDOMINAL PAIN: 0
VOMITING: 0
LOSS OF CONSCIOUSNESS: 0

## 2024-05-03 ASSESSMENT — PAIN DESCRIPTION - PAIN TYPE
TYPE: ACUTE PAIN

## 2024-05-03 ASSESSMENT — GAIT ASSESSMENTS
GAIT LEVEL OF ASSIST: STANDBY ASSIST
DISTANCE (FEET): 30

## 2024-05-03 ASSESSMENT — FIBROSIS 4 INDEX: FIB4 SCORE: 11.52

## 2024-05-03 NOTE — PROGRESS NOTES
Talked to Dr. Chow regarding pts critical Troponin levels no new orders at this time.  Continue to monitor.

## 2024-05-03 NOTE — PROGRESS NOTES
Updated MD Jaeger regarding patient falling in bathroom and hitting head. Patient is alert and oriented x 4 after the event, pupils are equal, reactive, brisk and 3mm. Bilateral strength and movement noted in all extremities.     Intervention: Head CT w/o

## 2024-05-03 NOTE — DIETARY
Nutrition Services: Heart Healthy Diet Education Consult   Day 1 of admit.  Dilan Calderón is a 83 y.o. male with admitting DX of STEMI (ST elevation myocardial infarction) (HCC) [I21.3]    RD able to visit pt at bedside to provide heart healthy diet education. RD discussed saturated fat vs unsaturated fat, increasing soluble fiber intake, and lowering sodium intake. RD provided handout reinforcing topics discussed. Pt demonstrated readiness and evidence of learning. RD able to answer all questions to patient's satisfaction.     No other education needs identified at this time. Consider referral to outpatient nutrition services for continuation of education as indicated or per pt preferences.     Please re-consult RD as indicated.

## 2024-05-03 NOTE — ASSESSMENT & PLAN NOTE
S/p DOUGIE x 2 to LAD  BB  DAPT: ASA and plavix  Statin  Cards following case discussed   TTE pending  LDL 96  A1c 5.8  Tele

## 2024-05-03 NOTE — ASSESSMENT & PLAN NOTE
ICM  TTE pending  GDMT titration limited by soft pressures  -coreg 3.125  -add ARB as pressures allow

## 2024-05-03 NOTE — PROGRESS NOTES
Report given to T7 RN, patient transferred in wheelchair to T711 with CCT. All belongings with patient, patient has no complaints or questions at this time.

## 2024-05-03 NOTE — THERAPY
"Physical Therapy   Initial Evaluation     Patient Name: Dilan Calderón  Age:  83 y.o., Sex:  male  Medical Record #: [id number]  Today's Date: 5/3/2024     Precautions  Precautions: Fall Risk;Cardiac Precautions (See Comments)  Comments: Acute anterior STEMI s/p L heart catheterization, PCI with DOUGIE prox/mid LAD. LVEF: 45%. not yet steady on BB (Coreg)    Assessment    Pt is an 83-y.o. male who present to acute with complaints of SOB and medical dx of acute anterior STEMI s/p L heart catheterization, PCI with DOUGIE to proximal-mid LAD, and IVUS by Dr. Mathew. Pt has PMHx of GI bleeding due to patchy gastritis.     Pt presents to acute physical therapy for cardiac rehab education with decreased activity tolerance and balance deficits which are limiting the pt from mobilizing at his prior level of function. Pt mobilized as detailed below. Additional time needed for patient education on activity modification, return to activity guidelines, RPE scale, Talk Test, EF meaning, BP significance, and cardiac risk factors. Pt was receptive to education and demonstrated understanding but would likely benefit from reinforcement. Pt's BP dropped from sitting to standing but was asymptomatic. After walking, pt reported SOB and feeling \"a little unsteady\". Deferred further ambulation due to BP response and SOB symptoms. Recommend outpatient cardiac rehab. Anticipate pt will progress well with further mobility and may be discharged home when pt can safely ascend/descend 3 steps. Will follow.      Plan    Physical Therapy Initial Treatment Plan   Treatment Plan : Bed Mobility, Equipment, Gait Training, Neuro Re-Education / Balance, Self Care / Home Evaluation, Stair Training, Therapeutic Activities, Therapeutic Exercise  Treatment Frequency: 4 Times per Week  Duration: Until Therapy Goals Met    DC Equipment Recommendations: Unable to determine at this time  Discharge Recommendations: Other - (Cardiac rehab. Anticipate pt will progress " "well while in acute with further mobility. Pt may progress to discharge home if pt is able to safely ascend/descend 3 steps.)       Subjective    RN cleared pt for visit. Pt received in bed. Pt agreeable to PT.      Objective       05/03/24 0845   Cosignature   Documentation Review Approved with modifications made by preceptor in flowsheet   Charge Group   PT Evaluation PT Evaluation Mod   PT Self Care / Home Evaluation (Units) 1   Total Time Spent   PT Total Time Yes   PT Evaluation Time Spent (Mins) 25   PT Self Care/Home Evaluation Time Spent (Mins) 10   PT Total Time Spent (Calculated) 35    Services   Is patient using  services for this encounter? No   Initial Contact Note    Initial Contact Note Order Received and Verified, Physical Therapy Evaluation in Progress with Full Report to Follow.   Precautions   Precautions Fall Risk;Cardiac Precautions (See Comments)   Comments Acute anterior STEMI s/p L heart catheterization, PCI with DOUGIE prox/mid LAD. LVEF: 45%. not yet steady on BB (Coreg)   Vitals   Pulse 78   Patient BP Position Supine   Blood Pressure  112/59   Pulse Oximetry 94 %   O2 (LPM) 0   O2 Delivery Device Room air w/o2 available   Vitals Comments BP, HR, SpO2 respectively: 112/59mmHg, 78bpm, 94% supine at rest; 117/57mmHg, 86 bpm, 93% EOB at rest; 94/54mmHg, 103bpm, 90% standing at rest; 90/55mmHg, 93bpm, 90% standing after walking in room. Pt asymptomatic initially upon standing. Pt reported mild SOB after walking.   Pain 0 - 10 Group   Therapist Pain Assessment Post Activity Pain Same as Prior to Activity;Nurse Notified   Prior Living Situation   Prior Services None   Housing / Facility 1 Story House   Steps Into Home 3   Steps In Home 0   Rail None   Equipment Owned None   Lives with - Patient's Self Care Capacity Alone and Able to Care For Self   Comments Pt reports that he still drives but \"only when he needs to\". Pt is retired and lives alone. Pt reports independence with " "IADLs incluing grocery shopping and yard work.   Prior Level of Functional Mobility   Bed Mobility Independent   Transfer Status Independent   Ambulation Independent   Ambulation Distance community   Assistive Devices Used None   Stairs Independent   History of Falls   History of Falls Yes   Date of Last Fall 05/02/24  (Pt had a fall in the bathroom while admitted. Pt reported having 2 prior falls in past year due to \"losing balance\".)   Cognition    Cognition / Consciousness WDL   Level of Consciousness Alert   Comments pleasant, cooperative, receptive to education   Gait Analysis   Gait Level Of Assist Standby Assist   Assistive Device None   Distance (Feet) 30   # of Times Distance was Traveled 2   Deviation   (Decreased ryann; forward flexed posture)   # of Stairs Climbed 0   Weight Bearing Status no restrictions   Vision Deficits Impacting Mobility NT   Comments Pt reported SOB after ambulation within room. Deferred further ambulation due to BP response and pt symptomatic for SOB.   Functional Mobility   Sit to Stand Supervised   Bed, Chair, Wheelchair Transfer Supervised   Toilet Transfers Supervised   Transfer Method Stand Step   Mobility supine > EOB > stand > ambulation > toilet > ambulation within room > chair   Comments No AD used.   6 Clicks Assessment - How much HELP from from another person do you currently need... (If the patient hasn't done an activity recently, how much help from another person do you think he/she would need if he/she tried?)   Turning from your back to your side while in a flat bed without using bedrails? 4   Moving from lying on your back to sitting on the side of a flat bed without using bedrails? 3   Moving to and from a bed to a chair (including a wheelchair)? 3   Standing up from a chair using your arms (e.g., wheelchair, or bedside chair)? 3   Walking in hospital room? 3   Climbing 3-5 steps with a railing? 3   6 clicks Mobility Score 19   Short Term Goals    Short Term " Goal # 1 Pt will ambulate 450 feet without SOB or AD with supervision within 6 visits to progress return to community ambulator.   Short Term Goal # 2 Pt will ascend/descend 3 steps with SBA within 6 visits to progress return to prior level of living.   Short Term Goal # 3 Pt will verbalize understanding of cardiac rehab precautions via teach-back without cues within 6 visits to progress understanding and comprehension of cardiac precautions.   Education Group   Education Provided Cardiac Precautions;Role of Physical Therapist   Cardiac Precautions Patient Response Patient;Acceptance;Explanation;Handout;Verbal Demonstration   Role of Physical Therapist Patient Response Patient;Acceptance;Explanation;Verbal Demonstration   Additional Comments Pt educated on activity modification, return to activity guidelines, cardiac risk factors, EF, BP significance, RPE scale, and Talk Test.   Physical Therapy Initial Treatment Plan    Treatment Plan  Bed Mobility;Equipment;Gait Training;Neuro Re-Education / Balance;Self Care / Home Evaluation;Stair Training;Therapeutic Activities;Therapeutic Exercise   Treatment Frequency 4 Times per Week   Duration Until Therapy Goals Met   Problem List    Problems Impaired Ambulation;Impaired Balance;Decreased Activity Tolerance   Anticipated Discharge Equipment and Recommendations   DC Equipment Recommendations Unable to determine at this time   Discharge Recommendations Other -  (Cardiac rehab. Anticipate pt will progress well while in acute with further mobility. Pt may progress to discharge home if pt is able to safely ascend/descend 3 steps.)   Interdisciplinary Plan of Care Collaboration   IDT Collaboration with  Nursing   Patient Position at End of Therapy Seated;Chair Alarm On;Call Light within Reach;Tray Table within Reach;Phone within Reach   Collaboration Comments RN updated.   Session Information   Date / Session Number  5/3 - 1(1/4, 5/9)

## 2024-05-03 NOTE — CARE PLAN
The patient is Watcher - Medium risk of patient condition declining or worsening    Shift Goals  Clinical Goals: hemodynamic stability  Patient Goals: comfort  Family Goals: DHARA    Progress made toward(s) clinical / shift goals:  VS being monitored and MAP above 65. Patient has appropriate safety measures in place.       Problem: Knowledge Deficit - Standard  Goal: Patient and family/care givers will demonstrate understanding of plan of care, disease process/condition, diagnostic tests and medications  5/3/2024 0237 by Sandra Galvan, R.N.  Outcome: Progressing  5/3/2024 0235 by Sandra Galvan R.N.  Outcome: Progressing     Problem: Pain - Standard  Goal: Alleviation of pain or a reduction in pain to the patient’s comfort goal  5/3/2024 0237 by Sandra Galvan, R.N.  Outcome: Progressing  5/3/2024 0235 by Sandra Galvan, R.N.  Outcome: Progressing     Problem: Fall Risk  Goal: Patient will remain free from falls  5/3/2024 0237 by Sandra Galvan, R.N.  Outcome: Progressing  5/3/2024 0235 by Sandra Galvan, R.N.  Outcome: Progressing

## 2024-05-03 NOTE — PROGRESS NOTES
Cardiology Follow Up Progress Note    Date of Service  5/3/2024    Attending Physician  Cole Marrero M.D.    NITZA Calderón is a 83 y.o. male with a past medical history of hospitalization for GI bleed 3/4/2024 presenting with syncope, near syncope with a Hgb 5.7 requiring transfusions with EGD and colonoscopy showing esophageal stricture, gastritis, diverticulosis and hemorrhoids who was brought in by ambulance today 5/2/2024 with 1 week history of intermittent substernal chest discomfort with left arm radiation and shortness of breath ultimately leading him to contact EMS for evaluation.  Presented with anterior STEMI underwent successful LAD PCI.    Interim Events  5/3: /63.  HR 71.  Sinus.  No further chest pain.  No shortness of breath.  Tolerated PCI yesterday.    Review of Systems  Review of Systems   Respiratory:  Negative for chest tightness and shortness of breath.    Cardiovascular:  Negative for palpitations.   Gastrointestinal:  Negative for abdominal pain and nausea.   Neurological:  Negative for dizziness and headaches.       Vital signs in last 24 hours  Pulse:  [] 83  Resp:  [18-45] 20  BP: ()/(54-99) 111/62  SpO2:  [86 %-99 %] 96 %    Physical Exam  Physical Exam  Constitutional:       General: He is not in acute distress.     Comments: Elderly.  Thin.  NAD.   Neck:      Comments: Normal jugular venous pressure.  Cardiovascular:      Rate and Rhythm: Normal rate and regular rhythm.      Pulses:           Radial pulses are 2+ on the right side.      Heart sounds: S1 normal and S2 normal. No murmur heard.     No friction rub. No gallop.   Pulmonary:      Effort: Pulmonary effort is normal.      Breath sounds: Normal breath sounds. No wheezing, rhonchi or rales.   Musculoskeletal:      Right lower leg: No edema.      Left lower leg: No edema.   Skin:     General: Skin is warm and dry.      Nails: There is no clubbing.   Neurological:      Mental Status: He is alert and  "oriented to person, place, and time.   Psychiatric:         Behavior: Behavior normal.         Lab Review  Lab Results   Component Value Date/Time    WBC 12.4 (H) 05/03/2024 04:00 AM    RBC 3.97 (L) 05/03/2024 04:00 AM    HEMOGLOBIN 9.4 (L) 05/03/2024 04:00 AM    HEMATOCRIT 31.2 (L) 05/03/2024 04:00 AM    MCV 78.6 (L) 05/03/2024 04:00 AM    MCH 23.7 (L) 05/03/2024 04:00 AM    MCHC 30.1 (L) 05/03/2024 04:00 AM    MPV 10.8 05/03/2024 04:00 AM      Lab Results   Component Value Date/Time    SODIUM 141 05/03/2024 04:00 AM    POTASSIUM 4.1 05/03/2024 04:00 AM    CHLORIDE 104 05/03/2024 04:00 AM    CO2 22 05/03/2024 04:00 AM    GLUCOSE 120 (H) 05/03/2024 04:00 AM    BUN 24 (H) 05/03/2024 04:00 AM    CREATININE 1.37 05/03/2024 04:00 AM    CREATININE 1.2 02/11/2008 09:00 AM      Lab Results   Component Value Date/Time    ASTSGOT 232 (H) 05/02/2024 08:42 AM    ALTSGPT 47 05/02/2024 08:42 AM     Lab Results   Component Value Date/Time    CHOLSTRLTOT 172 05/03/2024 04:00 AM     (H) 02/11/2008 09:00 AM    HDL 63 (H) 02/11/2008 09:00 AM    TRIGLYCERIDE 93 05/03/2024 04:00 AM    TROPONINT 1677 (H) 05/02/2024 08:42 AM       No results for input(s): \"NTPROBNP\" in the last 72 hours.    Cardiac Imaging and Procedures Review  EKG:  My personal interpretation of the EKG dated 5/2/2024 is sinus with anterior injury current    Rhythm: My personal interpretation the rhythm dated 5/3/2024 is sinus rhythm     EGD and colonoscopy 3/6/2024  #1.  Esophageal stricture at the gastroesophageal junction  #2.  Patchy gastritis  #3.  Scattered sigmoid diverticulosis, minimal  #4.  Internal hemorrhoids    CARDIAC CATHETERIZATION 5/2/2024  LEFT VENTRICULOGRAPHY   Estimated LVEF= 45%.  Anterior wall akinesis   CORONARY ANGIOGRAPHY:  The left main coronary artery: Short normal-appearing vessel that bifurcates to LAD and left circumflex  The left anterior descending coronary artery: Large-caliber occluded vessel at its proximal portion with right " to left collaterals status post successful PCI as detailed below.  Transapical vessel  The left circumflex coronary artery: Large-caliber vessel that gives rise to diminutive OM1, large caliber bifurcating OM 2 that has severe ostial stenosis in both branches, and a large bifurcating OM 3 that has severe proximal stenosis in 1 of its branches.  The right coronary artery: Large-caliber dominant vessel with diffuse nonobstructive disease throughout its proximal and midportion, and subsequent severe 90% stenosis throughout the distal RCA into the ostial/proximal right PDA.  PCI proximal LAD overlapping Synergy 3 x 28 mm and 3 x 32 mm Dereck    Imaging  Chest X-Ray:  5/2/2024 (personally reviewed images)  Clear without active process  No widened mediastnum      Assessment  Anterior STEMI, delayed presentation  H/O GI bleed requiring transfusions     Recommendation Discussion  Clinically stable post anterior STEMI, post PCI  No post MI complications with recurrent chest pain, heart failure or arrhythmias  Echocardiogram pending  Continue DAPT, aspirin, atorvastatin, carvedilol  Ambulate and shower as needed  Continue telemetry monitoring  Social situation with limited support, lives alone, limited transportation options; should get  to assess for any support notes available  Follow-up EKG, troponin level.  Reviewed cardiac condition with patient and treatment plan     Thank you for allowing me to participate in the care of this patient.     Please contact me with any questions.     Yuri Rodarte M.D.   Cardiologist, [facility]  [phone]

## 2024-05-03 NOTE — CONSULTS
Hospital Medicine Consultation    Date of Service  5/3/2024    Referring Physician  Parish Baldwin M.D.    Consulting Physician  Adrian Beyer D.O.    Reason for Consultation  STEMI    History of Presenting Illness  83 y.o. male who presented 5/2/2024 with a histor of recent GI bleed in March of this year.  Presented with Hgb of 5.7.  was transfused and had an EGD and colonoscopy done showing esophageal stricture and gastritis from above and hemorrhoids from below.  Presented back with chest pain and EKG changes consistent with an ant STEMI.  Taken to the cath lab on presentation where he was found to have an occluded LAD and 2 Dereck were placed.    Review of Systems  Review of Systems   Constitutional:  Negative for chills and fever.   Respiratory:  Negative for cough and shortness of breath.    Cardiovascular:  Negative for chest pain, palpitations and leg swelling.   Gastrointestinal:  Negative for abdominal pain, diarrhea, nausea and vomiting.   Genitourinary:  Negative for dysuria and urgency.   Musculoskeletal:  Negative for back pain.   Skin:  Negative for rash.   Neurological:  Negative for dizziness, loss of consciousness and headaches.       Past Medical History   has no past medical history on file.    Surgical History   has a past surgical history that includes pr upper gi endoscopy,diagnosis (N/A, 3/6/2024); pr colonoscopy,diagnostic (N/A, 3/6/2024); and pr upper gi endoscopy,w/dilat,gastric out (N/A, 3/6/2024).    Family History  family history is not on file.    Social History       Medications  Prior to Admission Medications   Prescriptions Last Dose Informant Patient Reported? Taking?   Multiple Vitamin (MULTIVITAMIN) Tab  Patient Yes No   Sig: Take 1 Tablet by mouth every day.   omeprazole (PRILOSEC) 20 MG delayed-release capsule   No No   Sig: Take 1 Capsule by mouth 2 times a day.      Facility-Administered Medications: None       Allergies  No Known Allergies    Physical Exam  Pulse:   [] 72  Resp:  [18-45] 20  BP: ()/(54-99) 97/58  SpO2:  [86 %-99 %] 98 %    Physical Exam  Constitutional:       General: He is not in acute distress.     Appearance: He is well-developed. He is not diaphoretic.   HENT:      Head: Normocephalic and atraumatic.   Eyes:      Conjunctiva/sclera: Conjunctivae normal.   Neck:      Vascular: No JVD.   Cardiovascular:      Rate and Rhythm: Normal rate.      Heart sounds: Murmur heard.      No gallop.   Pulmonary:      Effort: Pulmonary effort is normal. No respiratory distress.      Breath sounds: No stridor. No wheezing or rales.   Abdominal:      Palpations: Abdomen is soft.      Tenderness: There is no abdominal tenderness. There is no guarding or rebound.   Musculoskeletal:      Right lower leg: No edema.      Left lower leg: No edema.   Skin:     General: Skin is warm and dry.      Findings: No rash.   Neurological:      Mental Status: He is oriented to person, place, and time.   Psychiatric:         Thought Content: Thought content normal.         Fluids      Laboratory  Recent Labs     05/02/24  0842 05/03/24  0400   WBC 12.1* 12.4*   RBC 3.95* 3.97*   HEMOGLOBIN 9.6* 9.4*   HEMATOCRIT 32.2* 31.2*   MCV 81.5 78.6*   MCH 24.3* 23.7*   MCHC 29.8* 30.1*   RDW 55.8* 54.1*   PLATELETCT 241 238   MPV 10.7 10.8     Recent Labs     05/02/24  0842 05/03/24  0400   SODIUM 140 141   POTASSIUM 4.6 4.1   CHLORIDE 105 104   CO2 22 22   GLUCOSE 136* 120*   BUN 21 24*   CREATININE 1.35 1.37   CALCIUM 9.4 9.6     Recent Labs     05/02/24  0842   APTT 27.9   INR 1.03          Recent Labs     05/03/24  0400   TRIGLYCERIDE 93   HDL 57   LDL 96        Imaging  CT-HEAD W/O   Final Result         1.  No acute intracranial abnormality is identified, there are nonspecific white matter changes, commonly associated with small vessel ischemic disease.  Associated mild cerebral atrophy is noted.   2.  Atherosclerosis.         DX-CHEST-LIMITED (1 VIEW)   Final Result      No acute  cardiopulmonary disease.      CL-LEFT HEART CATHETERIZATION WITH POSSIBLE INTERVENTION    (Results Pending)   EC-ECHOCARDIOGRAM COMPLETE W/O CONT    (Results Pending)       Assessment/Plan  * ST elevation myocardial infarction involving left anterior descending (LAD) coronary artery (HCC)  Assessment & Plan  S/p DOUGIE x 2 to LAD  BB  DAPT: ASA and plavix  Statin  Cards following  TTE pending  LDL 96  A1c 5.8  Tele    Anemia  Assessment & Plan  Microcytic/hypochromic  Fe deficient on lab in March but never replaced  Check Fe, TIBC, %sat, Ferritin  Replace IV if appropriate    Acute systolic heart failure (HCC)  Assessment & Plan  ICM  TTE pending  GDMT titration limited by soft pressures  -coreg 3.125  -add ARB as pressures allow      History of GI bleed  Assessment & Plan  Here in March  Required 4 units PRBCs  EGD showing esophagitis  No issues since  PPI  Follow H&H

## 2024-05-03 NOTE — ASSESSMENT & PLAN NOTE
his hemoglobin down to 7.6 from  9.4 this AM, has history of G bleed in the past since he has ischemic heart disease will give 1 unit of RBC will cont to monitor H&H every 8 hrs  Denies any blood in the stool

## 2024-05-03 NOTE — PROGRESS NOTES
Report received from day shift RN, assumed patient care. Patient is calmly resting in bed, no signs of distress, even and unlabored breathing noted. Pt on room air. A&Ox4. Patient has call light within reach, fall precautions in place. Patient states no needs at this time. Hourly rounding initiated.

## 2024-05-04 ENCOUNTER — APPOINTMENT (OUTPATIENT)
Dept: CARDIOLOGY | Facility: MEDICAL CENTER | Age: 84
DRG: 323 | End: 2024-05-04
Attending: INTERNAL MEDICINE
Payer: MEDICARE

## 2024-05-04 PROBLEM — N17.9 AKI (ACUTE KIDNEY INJURY) (HCC): Status: ACTIVE | Noted: 2024-05-04

## 2024-05-04 LAB
ALBUMIN SERPL BCP-MCNC: 3.9 G/DL (ref 3.2–4.9)
ALBUMIN/GLOB SERPL: 1.3 G/DL
ALP SERPL-CCNC: 87 U/L (ref 30–99)
ALT SERPL-CCNC: 49 U/L (ref 2–50)
ANION GAP SERPL CALC-SCNC: 13 MMOL/L (ref 7–16)
AST SERPL-CCNC: 169 U/L (ref 12–45)
BILIRUB SERPL-MCNC: 0.5 MG/DL (ref 0.1–1.5)
BUN SERPL-MCNC: 38 MG/DL (ref 8–22)
CALCIUM ALBUM COR SERPL-MCNC: 9.3 MG/DL (ref 8.5–10.5)
CALCIUM SERPL-MCNC: 9.2 MG/DL (ref 8.5–10.5)
CHLORIDE SERPL-SCNC: 104 MMOL/L (ref 96–112)
CO2 SERPL-SCNC: 22 MMOL/L (ref 20–33)
CREAT SERPL-MCNC: 1.53 MG/DL (ref 0.5–1.4)
GFR SERPLBLD CREATININE-BSD FMLA CKD-EPI: 45 ML/MIN/1.73 M 2
GLOBULIN SER CALC-MCNC: 3 G/DL (ref 1.9–3.5)
GLUCOSE SERPL-MCNC: 108 MG/DL (ref 65–99)
LV EJECT FRACT  99904: 38
LV EJECT FRACT MOD 2C 99903: 39.48
LV EJECT FRACT MOD 4C 99902: 37.53
LV EJECT FRACT MOD BP 99901: 36.67
MAGNESIUM SERPL-MCNC: 2.2 MG/DL (ref 1.5–2.5)
PHOSPHATE SERPL-MCNC: 3.6 MG/DL (ref 2.5–4.5)
POTASSIUM SERPL-SCNC: 4 MMOL/L (ref 3.6–5.5)
PROT SERPL-MCNC: 6.9 G/DL (ref 6–8.2)
SODIUM SERPL-SCNC: 139 MMOL/L (ref 135–145)

## 2024-05-04 PROCEDURE — 99233 SBSQ HOSP IP/OBS HIGH 50: CPT | Performed by: INTERNAL MEDICINE

## 2024-05-04 PROCEDURE — 93306 TTE W/DOPPLER COMPLETE: CPT | Mod: 26 | Performed by: INTERNAL MEDICINE

## 2024-05-04 RX ORDER — SODIUM CHLORIDE 9 MG/ML
INJECTION, SOLUTION INTRAVENOUS CONTINUOUS
Status: DISCONTINUED | OUTPATIENT
Start: 2024-05-04 | End: 2024-05-05

## 2024-05-04 RX ORDER — LOPERAMIDE HYDROCHLORIDE 2 MG/1
2 CAPSULE ORAL 4 TIMES DAILY PRN
Status: DISCONTINUED | OUTPATIENT
Start: 2024-05-04 | End: 2024-05-04

## 2024-05-04 RX ADMIN — CLOPIDOGREL BISULFATE 75 MG: 75 TABLET ORAL at 04:41

## 2024-05-04 RX ADMIN — ASPIRIN 81 MG: 81 TABLET, COATED ORAL at 04:41

## 2024-05-04 RX ADMIN — CARVEDILOL 3.12 MG: 3.12 TABLET, FILM COATED ORAL at 09:10

## 2024-05-04 RX ADMIN — LOPERAMIDE HYDROCHLORIDE 2 MG: 2 CAPSULE ORAL at 09:10

## 2024-05-04 RX ADMIN — SODIUM CHLORIDE: 9 INJECTION, SOLUTION INTRAVENOUS at 20:35

## 2024-05-04 RX ADMIN — OMEPRAZOLE 20 MG: 20 CAPSULE, DELAYED RELEASE ORAL at 04:41

## 2024-05-04 RX ADMIN — SODIUM CHLORIDE: 9 INJECTION, SOLUTION INTRAVENOUS at 12:12

## 2024-05-04 RX ADMIN — OMEPRAZOLE 20 MG: 20 CAPSULE, DELAYED RELEASE ORAL at 16:56

## 2024-05-04 RX ADMIN — ATORVASTATIN CALCIUM 80 MG: 80 TABLET, FILM COATED ORAL at 16:56

## 2024-05-04 ASSESSMENT — ENCOUNTER SYMPTOMS
CHILLS: 0
DIZZINESS: 0
BACK PAIN: 0
CHEST TIGHTNESS: 0
COUGH: 0
SHORTNESS OF BREATH: 0
LOSS OF CONSCIOUSNESS: 0
VOMITING: 0
ABDOMINAL PAIN: 0
FEVER: 0
DIARRHEA: 0
NAUSEA: 0
HEADACHES: 0
PALPITATIONS: 0

## 2024-05-04 ASSESSMENT — PAIN DESCRIPTION - PAIN TYPE: TYPE: CHRONIC PAIN

## 2024-05-04 ASSESSMENT — FIBROSIS 4 INDEX: FIB4 SCORE: 8.22

## 2024-05-04 NOTE — PROGRESS NOTES
Received bedside report from RN, pt care assumed, VSS, pt assessment complete. Pt AAOx4, with no complaint of pain at this time. No signs of acute distress noted at this time. Plan of care discussed with pt and verbalizes no questions. Pt denies any additional needs at this time. Bed locked/in lowest position, bed alarm on, pt educated on fall risk and verbalized understanding, call light within reach, hourly rounding initiated.

## 2024-05-04 NOTE — PROGRESS NOTES
Cardiology Follow Up Progress Note    Date of Service  5/4/2024    Attending Physician  Cole Marrero M.D.    NITZA Calderón is a 83 y.o. male with a past medical history of hospitalization for GI bleed 3/4/2024 presenting with syncope, near syncope with a Hgb 5.7 requiring transfusions with EGD and colonoscopy showing esophageal stricture, gastritis, diverticulosis and hemorrhoids who was brought in by ambulance today 5/2/2024 with 1 week history of intermittent substernal chest discomfort with left arm radiation and shortness of breath ultimately leading him to contact EMS for evaluation.  Presented with anterior STEMI underwent successful LAD PCI.    Interim Events  5/3: /63.  HR 71.  Sinus.  No further chest pain.  No shortness of breath.  Tolerated PCI yesterday.  5/4: /66.  HR 86.  Sinus.  No chest pain or shortness of breath.    Review of Systems  Review of Systems   Respiratory:  Negative for chest tightness and shortness of breath.    Cardiovascular:  Negative for palpitations.   Gastrointestinal:  Negative for abdominal pain and nausea.   Neurological:  Negative for dizziness and headaches.       Vital signs in last 24 hours  Temp:  [36.3 °C (97.3 °F)-36.7 °C (98.1 °F)] 36.7 °C (98.1 °F)  Pulse:  [61-90] 86  Resp:  [16-18] 16  BP: ()/(50-71) 107/66  SpO2:  [93 %-100 %] 95 %    Physical Exam  Physical Exam  Constitutional:       General: He is not in acute distress.     Comments: Elderly.  Thin.  NAD.   Neck:      Comments: Normal jugular venous pressure.  Cardiovascular:      Rate and Rhythm: Normal rate and regular rhythm.      Pulses:           Radial pulses are 2+ on the right side.      Heart sounds: S1 normal and S2 normal. No murmur heard.     No friction rub. No gallop.   Pulmonary:      Effort: Pulmonary effort is normal.      Breath sounds: Normal breath sounds. No wheezing, rhonchi or rales.   Musculoskeletal:      Right lower leg: No edema.      Left lower leg: No  "edema.   Skin:     General: Skin is warm and dry.      Nails: There is no clubbing.   Neurological:      Mental Status: He is alert and oriented to person, place, and time.   Psychiatric:         Behavior: Behavior normal.         Lab Review  Lab Results   Component Value Date/Time    WBC 12.4 (H) 05/03/2024 04:00 AM    RBC 3.97 (L) 05/03/2024 04:00 AM    HEMOGLOBIN 9.4 (L) 05/03/2024 04:00 AM    HEMATOCRIT 31.2 (L) 05/03/2024 04:00 AM    MCV 78.6 (L) 05/03/2024 04:00 AM    MCH 23.7 (L) 05/03/2024 04:00 AM    MCHC 30.1 (L) 05/03/2024 04:00 AM    MPV 10.8 05/03/2024 04:00 AM      Lab Results   Component Value Date/Time    SODIUM 139 05/04/2024 12:12 AM    POTASSIUM 4.0 05/04/2024 12:12 AM    CHLORIDE 104 05/04/2024 12:12 AM    CO2 22 05/04/2024 12:12 AM    GLUCOSE 108 (H) 05/04/2024 12:12 AM    BUN 38 (H) 05/04/2024 12:12 AM    CREATININE 1.53 (H) 05/04/2024 12:12 AM    CREATININE 1.2 02/11/2008 09:00 AM      Lab Results   Component Value Date/Time    ASTSGOT 169 (H) 05/04/2024 12:12 AM    ALTSGPT 49 05/04/2024 12:12 AM     Lab Results   Component Value Date/Time    CHOLSTRLTOT 172 05/03/2024 04:00 AM    LDL 96 05/03/2024 04:00 AM    HDL 57 05/03/2024 04:00 AM    TRIGLYCERIDE 93 05/03/2024 04:00 AM    TROPONINT 5695 (H) 05/03/2024 02:17 PM       No results for input(s): \"NTPROBNP\" in the last 72 hours.    Cardiac Imaging and Procedures Review  EKG:  My personal interpretation of the EKG dated 5/2/2024 is sinus with anterior injury current    Rhythm: My personal interpretation the rhythm dated 5/3/2024 is sinus rhythm    Rhythm: My personal interpretation the rhythm dated 5/4/2024 sinus rhythm.     EGD and colonoscopy 3/6/2024  #1.  Esophageal stricture at the gastroesophageal junction  #2.  Patchy gastritis  #3.  Scattered sigmoid diverticulosis, minimal  #4.  Internal hemorrhoids    CARDIAC CATHETERIZATION 5/2/2024  LEFT VENTRICULOGRAPHY   Estimated LVEF= 45%.  Anterior wall akinesis   CORONARY ANGIOGRAPHY:  The " left main coronary artery: Short normal-appearing vessel that bifurcates to LAD and left circumflex  The left anterior descending coronary artery: Large-caliber occluded vessel at its proximal portion with right to left collaterals status post successful PCI as detailed below.  Transapical vessel  The left circumflex coronary artery: Large-caliber vessel that gives rise to diminutive OM1, large caliber bifurcating OM 2 that has severe ostial stenosis in both branches, and a large bifurcating OM 3 that has severe proximal stenosis in 1 of its branches.  The right coronary artery: Large-caliber dominant vessel with diffuse nonobstructive disease throughout its proximal and midportion, and subsequent severe 90% stenosis throughout the distal RCA into the ostial/proximal right PDA.  PCI proximal LAD overlapping Synergy 3 x 28 mm and 3 x 32 mm Dereck    Imaging  Chest X-Ray:  5/2/2024 (personally reviewed images)  Clear without active process  No widened mediastnum      Assessment  Anterior STEMI, delayed presentation  PCI proximal LAD 3.0 528 mm, 3.0 x 32 mm Dereck 5/2/2024  H/O GI bleed requiring transfusions     Recommendation Discussion  Clinically stable post anterior STEMI, post PCI  No post MI complications with recurrent chest pain, heart failure or arrhythmias  Echocardiogram still pending, done this morning.  Renal function worse probably due to JUSTINA superimposed on CKD, will start IV fluids and follow-up BMP in the a.m.  Continue DAPT, aspirin, atorvastatin.  Hold carvedilol due to marginal, low blood pressure and avoid hypotension that may worsen renal function.  Ambulate and shower as needed  Reviewed follow-up EKG which shows persistent ST elevation and troponin levels increasing to 5600 suggesting incomplete reperfusion despite successful PCI  Reviewed cardiac condition with patient and treatment plan     Thank you for allowing me to participate in the care of this patient.     Please contact me with any  questions.     Yuri Rodarte M.D.   Cardiologist, [facility]  [phone]

## 2024-05-04 NOTE — CARE PLAN
The patient is Stable - Low risk of patient condition declining or worsening    Shift Goals  Clinical Goals: mobility, fall safety  Patient Goals: comfort, rest  Family Goals: DHARA    Progress made toward(s) clinical / shift goals:    Problem: Knowledge Deficit - Standard  Goal: Patient and family/care givers will demonstrate understanding of plan of care, disease process/condition, diagnostic tests and medications  5/3/2024 2211 by Ada Campos R.N.  Outcome: Progressing  Note: Pt updated on POC at HS. No questions or concerns.  5/3/2024 2211 by Ada Campos R.N.  Outcome: Progressing     Problem: Fall Risk  Goal: Patient will remain free from falls  Outcome: Progressing  Note: Fall interventions in place. Pt calls for assistance appropriately.           Patient is not progressing towards the following goals:

## 2024-05-04 NOTE — PROGRESS NOTES
Hospital Medicine Daily Progress Note    Date of Service  5/4/2024    Chief Complaint  Dilan Calderón is a 83 y.o. male admitted 5/2/2024 with STEMI    Hospital Course  This is a 83 y.o. male who presented 5/2/2024 with a histor of recent GI bleed in March of this year.  Presented with Hgb of 5.7.  was transfused and had an EGD and colonoscopy done showing esophageal stricture and gastritis from above and hemorrhoids from below.  Presented back with chest pain and EKG changes consistent with an ant STEMI.  Taken to the cath lab on presentation where he was found to have an occluded LAD and 2 Dereck were placed.   He was transferred out of ICU overnight.    Interval Problem Update  Patient seen and examined, afebrile no nausea or vomiting, cardiology following, and case discussed appreciate rec.  MARCEL started on gentle IV fluid     I have discussed this patient's plan of care and discharge plan at IDT rounds today with Case Management, Nursing, Nursing leadership, and other members of the IDT team.    Consultants/Specialty  cardiology    Code Status  Full Code    Disposition  The patient is not medically cleared for discharge to home or a post-acute facility.      I have placed the appropriate orders for post-discharge needs.    Review of Systems  Review of Systems   Constitutional:  Negative for chills and fever.   Respiratory:  Negative for cough and shortness of breath.    Cardiovascular:  Negative for chest pain, palpitations and leg swelling.   Gastrointestinal:  Negative for abdominal pain, diarrhea, nausea and vomiting.   Genitourinary:  Negative for dysuria and urgency.   Musculoskeletal:  Negative for back pain.   Skin:  Negative for rash.   Neurological:  Negative for dizziness, loss of consciousness and headaches.        Physical Exam  Temp:  [36.3 °C (97.3 °F)-36.7 °C (98.1 °F)] 36.6 °C (97.9 °F)  Pulse:  [72-90] 77  Resp:  [16-18] 18  BP: ()/(50-71) 104/64  SpO2:  [93 %-97 %] 96 %    Physical  Exam  Constitutional:       General: He is not in acute distress.     Appearance: He is well-developed. He is not diaphoretic.   HENT:      Head: Normocephalic and atraumatic.   Eyes:      Conjunctiva/sclera: Conjunctivae normal.   Neck:      Vascular: No JVD.   Cardiovascular:      Rate and Rhythm: Normal rate.      Heart sounds: Murmur heard.      No gallop.   Pulmonary:      Effort: Pulmonary effort is normal. No respiratory distress.      Breath sounds: No stridor. No wheezing or rales.   Abdominal:      Palpations: Abdomen is soft.      Tenderness: There is no abdominal tenderness. There is no guarding or rebound.   Musculoskeletal:      Right lower leg: No edema.      Left lower leg: No edema.   Skin:     General: Skin is warm and dry.      Findings: No rash.   Neurological:      Mental Status: He is oriented to person, place, and time.   Psychiatric:         Thought Content: Thought content normal.         Fluids    Intake/Output Summary (Last 24 hours) at 5/4/2024 1334  Last data filed at 5/3/2024 2000  Gross per 24 hour   Intake 240 ml   Output 0 ml   Net 240 ml       Laboratory  Recent Labs     05/02/24  0842 05/03/24  0400   WBC 12.1* 12.4*   RBC 3.95* 3.97*   HEMOGLOBIN 9.6* 9.4*   HEMATOCRIT 32.2* 31.2*   MCV 81.5 78.6*   MCH 24.3* 23.7*   MCHC 29.8* 30.1*   RDW 55.8* 54.1*   PLATELETCT 241 238   MPV 10.7 10.8     Recent Labs     05/02/24  0842 05/03/24  0400 05/04/24  0012   SODIUM 140 141 139   POTASSIUM 4.6 4.1 4.0   CHLORIDE 105 104 104   CO2 22 22 22   GLUCOSE 136* 120* 108*   BUN 21 24* 38*   CREATININE 1.35 1.37 1.53*   CALCIUM 9.4 9.6 9.2     Recent Labs     05/02/24  0842   APTT 27.9   INR 1.03         Recent Labs     05/03/24  0400   TRIGLYCERIDE 93   HDL 57   LDL 96       Imaging  EC-ECHOCARDIOGRAM COMPLETE W/O CONT   Final Result      CT-HEAD W/O   Final Result         1.  No acute intracranial abnormality is identified, there are nonspecific white matter changes, commonly associated with  small vessel ischemic disease.  Associated mild cerebral atrophy is noted.   2.  Atherosclerosis.         DX-CHEST-LIMITED (1 VIEW)   Final Result      No acute cardiopulmonary disease.      CL-LEFT HEART CATHETERIZATION WITH POSSIBLE INTERVENTION    (Results Pending)        Assessment/Plan  * ST elevation myocardial infarction involving left anterior descending (LAD) coronary artery (HCC)  Assessment & Plan  S/p DOUGIE x 2 to LAD  BB  DAPT: ASA and plavix  Statin  Cards following case discussed   TTE pending  LDL 96  A1c 5.8  Tele    MARCEL (acute kidney injury) ([facility])  Assessment & Plan  Started on gentle hydration     Anemia  Assessment & Plan  Microcytic/hypochromic  Fe deficient on lab in March but never replaced  Check Fe, TIBC, %sat, Ferritin  Replace IV if appropriate    Acute systolic heart failure (HCC)  Assessment & Plan  ICM  TTE pending  GDMT titration limited by soft pressures  -coreg 3.125  -add ARB as pressures allow      History of GI bleed  Assessment & Plan  Here in March  Required 4 units PRBCs  EGD showing esophagitis  No issues since  PPI  Follow H&H         VTE prophylaxis: scd    Greater than 51 minutes spent prepping to see patient (e.g. review of tests) obtaining and/or reviewing separately obtained history. Performing a medically appropriate examination and/ evaluation.  Counseling and educating the patient/family/caregiver.  Ordering medications, tests, or procedures.  Referring and communicating with other health care professionals.  Documenting clinical information in EPIC.  Independently interpreting results and communicating results to patient/family/caregiver.  Care coordination.      I have performed a physical exam and reviewed and updated ROS and Plan today (5/4/2024). In review of yesterday's note (5/3/2024), there are no changes except as documented above.

## 2024-05-05 LAB
ANION GAP SERPL CALC-SCNC: 13 MMOL/L (ref 7–16)
BUN SERPL-MCNC: 32 MG/DL (ref 8–22)
CALCIUM SERPL-MCNC: 8.2 MG/DL (ref 8.5–10.5)
CHLORIDE SERPL-SCNC: 108 MMOL/L (ref 96–112)
CO2 SERPL-SCNC: 19 MMOL/L (ref 20–33)
CREAT SERPL-MCNC: 1.24 MG/DL (ref 0.5–1.4)
ERYTHROCYTE [DISTWIDTH] IN BLOOD BY AUTOMATED COUNT: 56.9 FL (ref 35.9–50)
GFR SERPLBLD CREATININE-BSD FMLA CKD-EPI: 58 ML/MIN/1.73 M 2
GLUCOSE SERPL-MCNC: 95 MG/DL (ref 65–99)
HCT VFR BLD AUTO: 25.3 % (ref 42–52)
HGB BLD-MCNC: 7.6 G/DL (ref 14–18)
MAGNESIUM SERPL-MCNC: 2 MG/DL (ref 1.5–2.5)
MCH RBC QN AUTO: 24.8 PG (ref 27–33)
MCHC RBC AUTO-ENTMCNC: 30 G/DL (ref 32.3–36.5)
MCV RBC AUTO: 82.4 FL (ref 81.4–97.8)
PHOSPHATE SERPL-MCNC: 2.7 MG/DL (ref 2.5–4.5)
PLATELET # BLD AUTO: 190 K/UL (ref 164–446)
PMV BLD AUTO: 11.9 FL (ref 9–12.9)
POTASSIUM SERPL-SCNC: 3.9 MMOL/L (ref 3.6–5.5)
RBC # BLD AUTO: 3.07 M/UL (ref 4.7–6.1)
SODIUM SERPL-SCNC: 140 MMOL/L (ref 135–145)
T4 FREE SERPL-MCNC: 0.6 NG/DL (ref 0.93–1.7)
TSH SERPL DL<=0.005 MIU/L-ACNC: 42.6 UIU/ML (ref 0.38–5.33)
WBC # BLD AUTO: 8 K/UL (ref 4.8–10.8)

## 2024-05-05 PROCEDURE — 99233 SBSQ HOSP IP/OBS HIGH 50: CPT | Performed by: INTERNAL MEDICINE

## 2024-05-05 RX ADMIN — ATORVASTATIN CALCIUM 80 MG: 80 TABLET, FILM COATED ORAL at 17:13

## 2024-05-05 RX ADMIN — OMEPRAZOLE 20 MG: 20 CAPSULE, DELAYED RELEASE ORAL at 17:13

## 2024-05-05 RX ADMIN — CLOPIDOGREL BISULFATE 75 MG: 75 TABLET ORAL at 04:50

## 2024-05-05 RX ADMIN — ASPIRIN 81 MG: 81 TABLET, COATED ORAL at 04:50

## 2024-05-05 RX ADMIN — SODIUM CHLORIDE: 9 INJECTION, SOLUTION INTRAVENOUS at 04:52

## 2024-05-05 RX ADMIN — OMEPRAZOLE 20 MG: 20 CAPSULE, DELAYED RELEASE ORAL at 04:50

## 2024-05-05 ASSESSMENT — ENCOUNTER SYMPTOMS
DIZZINESS: 0
PALPITATIONS: 0
CHILLS: 0
SHORTNESS OF BREATH: 0
HEADACHES: 0
CHEST TIGHTNESS: 0
VOMITING: 0
COUGH: 0
ABDOMINAL PAIN: 0
NAUSEA: 0
BACK PAIN: 0
DIARRHEA: 0
FEVER: 0
LOSS OF CONSCIOUSNESS: 0

## 2024-05-05 ASSESSMENT — FIBROSIS 4 INDEX: FIB4 SCORE: 8.22

## 2024-05-05 NOTE — CARE PLAN
The patient is Stable - Low risk of patient condition declining or worsening    Shift Goals  Clinical Goals: maintain safety  Patient Goals: comfort  Family Goals: DHARA    Progress made toward(s) clinical / shift goals:    Problem: Pain - Standard  Goal: Alleviation of pain or a reduction in pain to the patient’s comfort goal  Outcome: Progressing  Note: Patient remains pain free thus far during the shift. Pt agreeable to inform RN if having pain.     Problem: Fall Risk  Goal: Patient will remain free from falls  Outcome: Progressing  Note: Patient remains free of falls. Bed alarm on. Patient using call bell to ask for assistance when mobilizing.       Patient is not progressing towards the following goals:

## 2024-05-05 NOTE — PROGRESS NOTES
Cardiology Follow Up Progress Note    Date of Service  5/5/2024    Attending Physician  Cole Marrero M.D.    NITZA Calderón is a 83 y.o. male with a past medical history of hospitalization for GI bleed 3/4/2024 presenting with syncope, near syncope with a Hgb 5.7 requiring transfusions with EGD and colonoscopy showing esophageal stricture, gastritis, diverticulosis and hemorrhoids who was brought in by ambulance today 5/2/2024 with 1 week history of intermittent substernal chest discomfort with left arm radiation and shortness of breath ultimately leading him to contact EMS for evaluation.  Presented with anterior STEMI underwent successful LAD PCI.    Interim Events  5/3: /63.  HR 71.  Sinus.  No further chest pain.  No shortness of breath.  Tolerated PCI yesterday.  5/4: /66.  HR 86.  Sinus.  No chest pain or shortness of breath.  5/5: /72.  HR 92.  Sinus.  Dyspneic with walking, making bed today.  No chest pain.    Review of Systems  Review of Systems   Respiratory:  Negative for chest tightness and shortness of breath.    Cardiovascular:  Negative for palpitations.   Gastrointestinal:  Negative for abdominal pain and nausea.   Neurological:  Negative for dizziness and headaches.       Vital signs in last 24 hours  Temp:  [36.1 °C (97 °F)-37.1 °C (98.8 °F)] 36.7 °C (98.1 °F)  Pulse:  [77-92] 92  Resp:  [16-20] 18  BP: ()/(64-85) 111/72  SpO2:  [92 %-96 %] 93 %    Physical Exam  Physical Exam  Constitutional:       General: He is not in acute distress.     Comments: Elderly.  Thin.  Slightly tachypneic   Neck:      Comments: Normal jugular venous pressure.  Cardiovascular:      Rate and Rhythm: Normal rate and regular rhythm.      Pulses:           Radial pulses are 2+ on the right side.      Heart sounds: S1 normal and S2 normal. No murmur heard.     No friction rub. No gallop.   Pulmonary:      Effort: Pulmonary effort is normal.      Breath sounds: Normal breath sounds. No  "wheezing, rhonchi or rales.   Musculoskeletal:      Right lower leg: No edema.      Left lower leg: No edema.   Skin:     General: Skin is warm and dry.      Nails: There is no clubbing.   Neurological:      Mental Status: He is alert and oriented to person, place, and time.   Psychiatric:         Behavior: Behavior normal.         Lab Review  Lab Results   Component Value Date/Time    WBC 8.0 05/05/2024 04:41 AM    RBC 3.07 (L) 05/05/2024 04:41 AM    HEMOGLOBIN 7.6 (L) 05/05/2024 04:41 AM    HEMATOCRIT 25.3 (L) 05/05/2024 04:41 AM    MCV 82.4 05/05/2024 04:41 AM    MCH 24.8 (L) 05/05/2024 04:41 AM    MCHC 30.0 (L) 05/05/2024 04:41 AM    MPV 11.9 05/05/2024 04:41 AM      Lab Results   Component Value Date/Time    SODIUM 140 05/05/2024 04:41 AM    POTASSIUM 3.9 05/05/2024 04:41 AM    CHLORIDE 108 05/05/2024 04:41 AM    CO2 19 (L) 05/05/2024 04:41 AM    GLUCOSE 95 05/05/2024 04:41 AM    BUN 32 (H) 05/05/2024 04:41 AM    CREATININE 1.24 05/05/2024 04:41 AM    CREATININE 1.2 02/11/2008 09:00 AM      Lab Results   Component Value Date/Time    ASTSGOT 169 (H) 05/04/2024 12:12 AM    ALTSGPT 49 05/04/2024 12:12 AM     Lab Results   Component Value Date/Time    CHOLSTRLTOT 172 05/03/2024 04:00 AM    LDL 96 05/03/2024 04:00 AM    HDL 57 05/03/2024 04:00 AM    TRIGLYCERIDE 93 05/03/2024 04:00 AM    TROPONINT 5695 (H) 05/03/2024 02:17 PM       No results for input(s): \"NTPROBNP\" in the last 72 hours.    Cardiac Imaging and Procedures Review  EKG:  My personal interpretation of the EKG dated 5/2/2024 is sinus with anterior injury current    Rhythm: My personal interpretation the rhythm dated 5/3/2024 is sinus rhythm    Rhythm: My personal interpretation the rhythm dated 5/4/2024 sinus rhythm.    Rhythm: My personal interpretation the rhythm dated 5/5/2024 is sinus rhythm     EGD and colonoscopy 3/6/2024  #1.  Esophageal stricture at the gastroesophageal junction  #2.  Patchy gastritis  #3.  Scattered sigmoid diverticulosis, " minimal  #4.  Internal hemorrhoids    CARDIAC CATHETERIZATION 5/2/2024  LEFT VENTRICULOGRAPHY   Estimated LVEF= 45%.  Anterior wall akinesis   CORONARY ANGIOGRAPHY:  The left main coronary artery: Short normal-appearing vessel that bifurcates to LAD and left circumflex  The left anterior descending coronary artery: Large-caliber occluded vessel at its proximal portion with right to left collaterals status post successful PCI as detailed below.  Transapical vessel  The left circumflex coronary artery: Large-caliber vessel that gives rise to diminutive OM1, large caliber bifurcating OM 2 that has severe ostial stenosis in both branches, and a large bifurcating OM 3 that has severe proximal stenosis in 1 of its branches.  The right coronary artery: Large-caliber dominant vessel with diffuse nonobstructive disease throughout its proximal and midportion, and subsequent severe 90% stenosis throughout the distal RCA into the ostial/proximal right PDA.  PCI proximal LAD overlapping Synergy 3 x 28 mm and 3 x 32 mm Dereck    Imaging  Chest X-Ray:  5/2/2024 (personally reviewed images)  Clear without active process  No widened mediastnum      Assessment  Anterior STEMI, delayed presentation  PCI proximal LAD 3.0 528 mm, 3.0 x 32 mm Dereck 5/2/2024  H/O GI bleed requiring transfusions 3/2024     Recommendation Discussion  Clinically stable post anterior STEMI, post PCI  Has exertional dyspnea with activity, slightly tachypneic on exam without symptoms at rest  RA O2 sat 93%  Echocardiogram showed LVEF 38%  Renal function improved with IV fluids but possibly a little volume overloaded  Will check H&H to rule out recurrent anemia  Discontinue IV fluids  May need cautious diuresis  Continue DAPT, aspirin, atorvastatin.  Hold carvedilol due to marginal, low blood pressure and avoid hypotension that may worsen renal function.  Reviewed cardiac condition with patient and treatment plan, hold on discharge for now.     Thank you for  allowing me to participate in the care of this patient.     Please contact me with any questions.     Yuri Rodarte M.D.   Cardiologist, [facility]  [phone]

## 2024-05-05 NOTE — PROGRESS NOTES
Bedside report received from NOC RN. Assumed care of pt. Pt awake, laying in bed. A/Ox4, VSS. No concerns, complaints or distress. Pt educated to call before getting out of bed. POC reviewed and white board updated. Tele box on. SR 77 on the monitor. Call light in reach. Bed locked in lowest position with 2 upper bed rails up. Bed alarm on.

## 2024-05-05 NOTE — PROGRESS NOTES
Hospital Medicine Daily Progress Note    Date of Service  5/5/2024    Chief Complaint  Dilan Calderón is a 83 y.o. male admitted 5/2/2024 with STEMI    Hospital Course  This is a 83 y.o. male who presented 5/2/2024 with a histor of recent GI bleed in March of this year.  Presented with Hgb of 5.7.  was transfused and had an EGD and colonoscopy done showing esophageal stricture and gastritis from above and hemorrhoids from below.  Presented back with chest pain and EKG changes consistent with an ant STEMI.  Taken to the cath lab on presentation where he was found to have an occluded LAD and 2 Dereck were placed.   He was transferred out of ICU overnight.    Interval Problem Update  Patient seen and examined, afebrile no nausea or vomiting, cardiology following, and case discussed appreciate rec.  MARCEL started on gentle IV fluid   5/5: Patient resting in bed, afebrile, no nausea or vomiting. Feels SOB. MARCEL has improved.  Cardiology following case discussed appreciate rec.   I have discussed this patient's plan of care and discharge plan at IDT rounds today with Case Management, Nursing, Nursing leadership, and other members of the IDT team.    Consultants/Specialty  cardiology    Code Status  Full Code    Disposition  Medically Cleared  I have placed the appropriate orders for post-discharge needs.    Review of Systems  Review of Systems   Constitutional:  Negative for chills and fever.   Respiratory:  Negative for cough and shortness of breath.    Cardiovascular:  Negative for chest pain, palpitations and leg swelling.   Gastrointestinal:  Negative for abdominal pain, diarrhea, nausea and vomiting.   Genitourinary:  Negative for dysuria and urgency.   Musculoskeletal:  Negative for back pain.   Skin:  Negative for rash.   Neurological:  Negative for dizziness, loss of consciousness and headaches.        Physical Exam  Temp:  [36.1 °C (97 °F)-37.1 °C (98.8 °F)] 36.7 °C (98.1 °F)  Pulse:  [79-92] 79  Resp:  [16-20]  18  BP: ()/(65-85) 101/65  SpO2:  [92 %-95 %] 95 %    Physical Exam  Constitutional:       General: He is not in acute distress.     Appearance: He is well-developed. He is not diaphoretic.   HENT:      Head: Normocephalic and atraumatic.   Eyes:      Conjunctiva/sclera: Conjunctivae normal.   Neck:      Vascular: No JVD.   Cardiovascular:      Rate and Rhythm: Normal rate.      Heart sounds: Murmur heard.      No gallop.   Pulmonary:      Effort: Pulmonary effort is normal. No respiratory distress.      Breath sounds: No stridor. No wheezing or rales.   Abdominal:      Palpations: Abdomen is soft.      Tenderness: There is no abdominal tenderness. There is no guarding or rebound.   Musculoskeletal:      Right lower leg: No edema.      Left lower leg: No edema.   Skin:     General: Skin is warm and dry.      Findings: No rash.   Neurological:      Mental Status: He is oriented to person, place, and time.   Psychiatric:         Thought Content: Thought content normal.         Fluids    Intake/Output Summary (Last 24 hours) at 5/5/2024 1352  Last data filed at 5/5/2024 0800  Gross per 24 hour   Intake 710 ml   Output 0 ml   Net 710 ml       Laboratory  Recent Labs     05/03/24  0400 05/05/24  0441   WBC 12.4* 8.0   RBC 3.97* 3.07*   HEMOGLOBIN 9.4* 7.6*   HEMATOCRIT 31.2* 25.3*   MCV 78.6* 82.4   MCH 23.7* 24.8*   MCHC 30.1* 30.0*   RDW 54.1* 56.9*   PLATELETCT 238 190   MPV 10.8 11.9     Recent Labs     05/03/24  0400 05/04/24  0012 05/05/24  0441   SODIUM 141 139 140   POTASSIUM 4.1 4.0 3.9   CHLORIDE 104 104 108   CO2 22 22 19*   GLUCOSE 120* 108* 95   BUN 24* 38* 32*   CREATININE 1.37 1.53* 1.24   CALCIUM 9.6 9.2 8.2*               Recent Labs     05/03/24  0400   TRIGLYCERIDE 93   HDL 57   LDL 96       Imaging  EC-ECHOCARDIOGRAM COMPLETE W/O CONT   Final Result      CT-HEAD W/O   Final Result         1.  No acute intracranial abnormality is identified, there are nonspecific white matter changes, commonly  associated with small vessel ischemic disease.  Associated mild cerebral atrophy is noted.   2.  Atherosclerosis.         DX-CHEST-LIMITED (1 VIEW)   Final Result      No acute cardiopulmonary disease.      CL-LEFT HEART CATHETERIZATION WITH POSSIBLE INTERVENTION    (Results Pending)        Assessment/Plan  * ST elevation myocardial infarction involving left anterior descending (LAD) coronary artery (HCC)  Assessment & Plan  S/p DOUGIE x 2 to LAD  BB  DAPT: ASA and plavix  Statin  Cards following case discussed   TTE pending  LDL 96  A1c 5.8  Tele    MARCEL (acute kidney injury) ([facility])  Assessment & Plan  Started on gentle hydration     Anemia  Assessment & Plan  Microcytic/hypochromic  Fe deficient on lab in March but never replaced  Check Fe, TIBC, %sat, Ferritin  Replace IV if appropriate    Acute systolic heart failure (HCC)  Assessment & Plan  ICM  TTE pending  GDMT titration limited by soft pressures  -coreg 3.125  -add ARB as pressures allow      History of GI bleed  Assessment & Plan  Here in March  Required 4 units PRBCs  EGD showing esophagitis  No issues since  PPI  Follow H&H       Greater than 51 minutes spent prepping to see patient (e.g. review of tests) obtaining and/or reviewing separately obtained history. Performing a medically appropriate examination and/ evaluation.  Counseling and educating the patient/family/caregiver.  Ordering medications, tests, or procedures.  Referring and communicating with other health care professionals.  Documenting clinical information in EPIC.  Independently interpreting results and communicating results to patient/family/caregiver.  Care coordination.     VTE prophylaxis: scd        I have performed a physical exam and reviewed and updated ROS and Plan today (5/5/2024). In review of yesterday's note (5/4/2024), there are no changes except as documented above.

## 2024-05-06 LAB
ABO GROUP BLD: NORMAL
ANION GAP SERPL CALC-SCNC: 13 MMOL/L (ref 7–16)
BARCODED ABORH UBTYP: 5100
BARCODED PRD CODE UBPRD: NORMAL
BARCODED UNIT NUM UBUNT: NORMAL
BLD GP AB SCN SERPL QL: NORMAL
BUN SERPL-MCNC: 27 MG/DL (ref 8–22)
CALCIUM SERPL-MCNC: 8.3 MG/DL (ref 8.5–10.5)
CHLORIDE SERPL-SCNC: 109 MMOL/L (ref 96–112)
CO2 SERPL-SCNC: 18 MMOL/L (ref 20–33)
COMPONENT R 8504R: NORMAL
CREAT SERPL-MCNC: 1.33 MG/DL (ref 0.5–1.4)
GFR SERPLBLD CREATININE-BSD FMLA CKD-EPI: 54 ML/MIN/1.73 M 2
GLUCOSE SERPL-MCNC: 95 MG/DL (ref 65–99)
HCT VFR BLD AUTO: 26.1 % (ref 42–52)
HCT VFR BLD AUTO: 29.8 % (ref 42–52)
HGB BLD-MCNC: 7.6 G/DL (ref 14–18)
HGB BLD-MCNC: 9.1 G/DL (ref 14–18)
MAGNESIUM SERPL-MCNC: 2.1 MG/DL (ref 1.5–2.5)
NT-PROBNP SERPL IA-MCNC: 7929 PG/ML (ref 0–125)
PHOSPHATE SERPL-MCNC: 2.9 MG/DL (ref 2.5–4.5)
POTASSIUM SERPL-SCNC: 3.8 MMOL/L (ref 3.6–5.5)
PRODUCT TYPE UPROD: NORMAL
RH BLD: NORMAL
SODIUM SERPL-SCNC: 140 MMOL/L (ref 135–145)
UNIT STATUS USTAT: NORMAL

## 2024-05-06 PROCEDURE — 99233 SBSQ HOSP IP/OBS HIGH 50: CPT | Mod: FS | Performed by: INTERNAL MEDICINE

## 2024-05-06 PROCEDURE — 99233 SBSQ HOSP IP/OBS HIGH 50: CPT | Performed by: INTERNAL MEDICINE

## 2024-05-06 PROCEDURE — 30233N1 TRANSFUSION OF NONAUTOLOGOUS RED BLOOD CELLS INTO PERIPHERAL VEIN, PERCUTANEOUS APPROACH: ICD-10-PCS | Performed by: INTERNAL MEDICINE

## 2024-05-06 RX ORDER — FUROSEMIDE 10 MG/ML
40 INJECTION INTRAMUSCULAR; INTRAVENOUS ONCE
Qty: 4 ML | Refills: 0 | Status: COMPLETED | OUTPATIENT
Start: 2024-05-06 | End: 2024-05-06

## 2024-05-06 RX ORDER — MAGNESIUM SULFATE HEPTAHYDRATE 40 MG/ML
2 INJECTION, SOLUTION INTRAVENOUS ONCE
Status: COMPLETED | OUTPATIENT
Start: 2024-05-06 | End: 2024-05-06

## 2024-05-06 RX ORDER — METOPROLOL SUCCINATE 25 MG/1
25 TABLET, EXTENDED RELEASE ORAL
Status: DISCONTINUED | OUTPATIENT
Start: 2024-05-06 | End: 2024-05-07 | Stop reason: HOSPADM

## 2024-05-06 RX ORDER — DAPAGLIFLOZIN 10 MG/1
10 TABLET, FILM COATED ORAL DAILY
Status: DISCONTINUED | OUTPATIENT
Start: 2024-05-06 | End: 2024-05-07 | Stop reason: HOSPADM

## 2024-05-06 RX ADMIN — OMEPRAZOLE 20 MG: 20 CAPSULE, DELAYED RELEASE ORAL at 04:26

## 2024-05-06 RX ADMIN — ASPIRIN 81 MG: 81 TABLET, COATED ORAL at 04:26

## 2024-05-06 RX ADMIN — FUROSEMIDE 40 MG: 10 INJECTION, SOLUTION INTRAMUSCULAR; INTRAVENOUS at 11:58

## 2024-05-06 RX ADMIN — DAPAGLIFLOZIN 10 MG: 10 TABLET, FILM COATED ORAL at 11:58

## 2024-05-06 RX ADMIN — OMEPRAZOLE 20 MG: 20 CAPSULE, DELAYED RELEASE ORAL at 16:08

## 2024-05-06 RX ADMIN — ATORVASTATIN CALCIUM 80 MG: 80 TABLET, FILM COATED ORAL at 16:08

## 2024-05-06 RX ADMIN — CLOPIDOGREL BISULFATE 75 MG: 75 TABLET ORAL at 04:26

## 2024-05-06 RX ADMIN — MAGNESIUM SULFATE HEPTAHYDRATE 2 G: 2 INJECTION, SOLUTION INTRAVENOUS at 04:31

## 2024-05-06 RX ADMIN — METOPROLOL SUCCINATE 25 MG: 25 TABLET, FILM COATED, EXTENDED RELEASE ORAL at 08:58

## 2024-05-06 ASSESSMENT — ENCOUNTER SYMPTOMS
MYALGIAS: 0
EYES NEGATIVE: 1
NAUSEA: 0
COUGH: 0
DIZZINESS: 0
LIGHT-HEADEDNESS: 0
ENDOCRINE NEGATIVE: 1
BACK PAIN: 0
FEVER: 0
FATIGUE: 1
ABDOMINAL DISTENTION: 0
CONSTIPATION: 0
SHORTNESS OF BREATH: 0
DIARRHEA: 0
ABDOMINAL PAIN: 0
SHORTNESS OF BREATH: 1
PALPITATIONS: 0
PSYCHIATRIC NEGATIVE: 1
CHEST TIGHTNESS: 0
HEADACHES: 0
BRUISES/BLEEDS EASILY: 0
CHILLS: 0
VOMITING: 0
DIAPHORESIS: 0
LOSS OF CONSCIOUSNESS: 0
ARTHRALGIAS: 0
COLOR CHANGE: 0

## 2024-05-06 ASSESSMENT — FIBROSIS 4 INDEX: FIB4 SCORE: 10.29

## 2024-05-06 ASSESSMENT — PATIENT HEALTH QUESTIONNAIRE - PHQ9
1. LITTLE INTEREST OR PLEASURE IN DOING THINGS: NOT AT ALL
SUM OF ALL RESPONSES TO PHQ9 QUESTIONS 1 AND 2: 0
2. FEELING DOWN, DEPRESSED, IRRITABLE, OR HOPELESS: NOT AT ALL

## 2024-05-06 NOTE — CARE PLAN
The patient is Stable - Low risk of patient condition declining or worsening    Shift Goals  Clinical Goals: moitor vs, labs  Patient Goals: rest, sleep  Family Goals: DHARA    Progress made toward(s) clinical / shift goals:    Problem: Knowledge Deficit - Standard  Goal: Patient and family/care givers will demonstrate understanding of plan of care, disease process/condition, diagnostic tests and medications  Outcome: Progressing  Note: Patient updated re POC. No questions or concerns.      Problem: Fall Risk  Goal: Patient will remain free from falls  Outcome: Progressing  Note: Patient uses call bell appropriately and remains free from falls.       Patient is not progressing towards the following goals:

## 2024-05-06 NOTE — CARE PLAN
The patient is Stable - Low risk of patient condition declining or worsening    Shift Goals  Clinical Goals: monitor VS, labs  Patient Goals: rest, comfort  Family Goals: DHARA    Progress made toward(s) clinical / shift goals:    Problem: Psychosocial  Goal: Patient's level of anxiety will decrease  Outcome: Progressing   1.  Collaborate with patient and family/caregiver to identify triggers and develop strategies to cope with anxiety  2.  Implement stimuli reduction, calming techniques  3.  Pharmacologic management per provider order  4.  Encourage patient/family/care giver participation  5.  Collaborate with interdisciplinary team including Psychologist or Behavioral Health Team as needed  Problem: Hemodynamics  Goal: Patient's hemodynamics, fluid balance and neurologic status will be stable or improve  Outcome: Progressing   Monitor vital signs, pulse oximetry and cardiac monitor per provider order and/or policy 2. Maintain blood pressure per provider order 3. Hemodynamic monitoring per provider order 4. Manage IV fluids and IV infusions 5. Monitor intake and output 6. Daily weights per unit policy or provider order 7. Assess peripheral pulses and capillary refill 8. Assess color and body temperature 9. Position patient for maximum circulation/cardiac output 10. Monitor for signs/symptoms of excessive bleeding 11. Assess mental status, restlessness and changes in level of consciousness 12. Monitor temperature and report fever or hypothermia to provider immediately. Consideration of targeted temperature management.     Patient is not progressing towards the following goals:

## 2024-05-06 NOTE — PROGRESS NOTES
Monitor Summary    Sinus Rhythm  70-83 bpm  (R) trip PAC, (R) PVC  0.15/0.09/0.36    Had 9 seconds of PSVT @ 0230 up to 160 bpm

## 2024-05-06 NOTE — PROGRESS NOTES
Cardiology Follow Up Progress Note    Date of Service  5/6/2024    Attending Physician  Rose Rodriugez M.D.    Chief Complaint   STEMI    HPI  Dilan Calderón is a 83 y.o. male with a history of GI bleeding 3/4/2024 with prior EGD/colonoscopy that demonstrated esophageal stricture, gastritis, diverticulosis and hemorrhoids admitted 5/2/2024 with STEMI underwent successful PCI DOUGIE to the LAD.    Interim Events  5/6/2024: Sinus rhythm on telemetry with 9 seconds of paroxysmal SVT earlier this morning and was given 2 g of magnesium sulfate.  He was asymptomatic and sleeping during this episode.  No other cardiac events overnight.  Vital signs stable.  SpO2 90 to 97% on room air.  He reports he is doing better today, but does have significant fatigue while exerting himself.  He did attempt to ambulate the hallways yesterday and had some significant shortness of breath.  No chest pain or palpitations.  No orthopnea or PND.  No lower extremity edema.  No dizziness or lightheadedness.  No syncope or presyncope.    Review of Systems  Review of Systems   Constitutional:  Positive for fatigue. Negative for chills, diaphoresis and fever.   HENT: Negative.     Eyes: Negative.    Respiratory:  Positive for shortness of breath. Negative for cough and chest tightness.    Cardiovascular:  Negative for chest pain, palpitations and leg swelling.   Gastrointestinal:  Negative for abdominal distention, abdominal pain, constipation, diarrhea, nausea and vomiting.   Endocrine: Negative.    Genitourinary:  Negative for decreased urine volume, difficulty urinating, dysuria, frequency and urgency.   Musculoskeletal:  Negative for arthralgias and myalgias.   Skin:  Negative for color change.   Neurological:  Negative for dizziness, syncope and light-headedness.   Hematological:  Does not bruise/bleed easily.   Psychiatric/Behavioral: Negative.         Vital signs in last 24 hours  Temp:  [36.5 °C (97.7 °F)-36.7 °C (98.1 °F)] 36.6 °C (97.9  °F)  Pulse:  [70-89] 70  Resp:  [16-18] 16  BP: (105-120)/(64-77) 105/64  SpO2:  [92 %-95 %] 95 %    Physical Exam  Physical Exam  Vitals and nursing note reviewed.   Constitutional:       General: He is not in acute distress.     Appearance: Normal appearance. He is not toxic-appearing.   HENT:      Head: Normocephalic and atraumatic.      Right Ear: External ear normal.      Left Ear: External ear normal.      Nose: Nose normal.      Mouth/Throat:      Mouth: Mucous membranes are moist.      Pharynx: Oropharynx is clear.   Eyes:      General: No scleral icterus.     Extraocular Movements: Extraocular movements intact.      Conjunctiva/sclera: Conjunctivae normal.      Pupils: Pupils are equal, round, and reactive to light.   Neck:      Comments: Mildly elevated JVP.  Cardiovascular:      Rate and Rhythm: Normal rate and regular rhythm.      Pulses: Normal pulses.      Heart sounds: Murmur heard.      No friction rub. No gallop.      Comments: Right radial site is well healed without any signs of oozing, infection or hematoma.   Pulmonary:      Effort: Pulmonary effort is normal.      Breath sounds: Normal breath sounds.   Abdominal:      General: Abdomen is flat. Bowel sounds are normal. There is no distension.      Palpations: Abdomen is soft.      Tenderness: There is no abdominal tenderness.   Musculoskeletal:         General: Normal range of motion.      Cervical back: Normal range of motion and neck supple.      Right lower leg: No edema.      Left lower leg: No edema.   Skin:     General: Skin is warm and dry.      Capillary Refill: Capillary refill takes less than 2 seconds.      Coloration: Skin is not jaundiced.   Neurological:      General: No focal deficit present.      Mental Status: He is alert and oriented to person, place, and time. Mental status is at baseline.   Psychiatric:         Mood and Affect: Mood normal.         Behavior: Behavior normal.         Judgment: Judgment normal.         Lab  "Review  Lab Results   Component Value Date/Time    WBC 8.0 05/05/2024 04:41 AM    RBC 3.07 (L) 05/05/2024 04:41 AM    HEMOGLOBIN 7.6 (L) 05/05/2024 04:41 AM    HEMATOCRIT 25.3 (L) 05/05/2024 04:41 AM    MCV 82.4 05/05/2024 04:41 AM    MCH 24.8 (L) 05/05/2024 04:41 AM    MCHC 30.0 (L) 05/05/2024 04:41 AM    MPV 11.9 05/05/2024 04:41 AM      Lab Results   Component Value Date/Time    SODIUM 140 05/06/2024 02:33 AM    POTASSIUM 3.8 05/06/2024 02:33 AM    CHLORIDE 109 05/06/2024 02:33 AM    CO2 18 (L) 05/06/2024 02:33 AM    GLUCOSE 95 05/06/2024 02:33 AM    BUN 27 (H) 05/06/2024 02:33 AM    CREATININE 1.33 05/06/2024 02:33 AM    CREATININE 1.2 02/11/2008 09:00 AM      Lab Results   Component Value Date/Time    ASTSGOT 169 (H) 05/04/2024 12:12 AM    ALTSGPT 49 05/04/2024 12:12 AM     Lab Results   Component Value Date/Time    CHOLSTRLTOT 172 05/03/2024 04:00 AM    LDL 96 05/03/2024 04:00 AM    HDL 57 05/03/2024 04:00 AM    TRIGLYCERIDE 93 05/03/2024 04:00 AM    TROPONINT 5695 (H) 05/03/2024 02:17 PM       No results for input(s): \"NTPROBNP\" in the last 72 hours.    Cardiac Imaging and Procedures Review  EKG:  My personal interpretation of the EKG dated 5/3/2024 is sinus rhythm with anterior infarct    Echocardiogram: 5/4/2024  CONCLUSIONS  Moderately reduced left ventricular systolic function.  The left ventricular ejection fraction is visually estimated to be 35-  40%.  Hypokinesis anteroapical hypokinesis.  The right ventricle is normal in size and systolic function.  No prior study is available for comparison.     Cardiac Catheterization: 5/2/2024  HEMODYNAMICS:  Aortic pressure: 129 /84 mmHg  LVEDP: 25 mmHg  No significant aortic gradient on pullback     LEFT VENTRICULOGRAPHY   Estimated LVEF= 45%.  Anterior wall akinesis      CORONARY ANGIOGRAPHY:  The left main coronary artery: Short normal-appearing vessel that bifurcates to LAD and left circumflex  The left anterior descending coronary artery: Large-caliber " occluded vessel at its proximal portion with right to left collaterals status post successful PCI as detailed below.  Transapical vessel  The left circumflex coronary artery: Large-caliber vessel that gives rise to diminutive OM1, large caliber bifurcating OM 2 that has severe ostial stenosis in both branches, and a large bifurcating OM 3 that has severe proximal stenosis in 1 of its branches.  The right coronary artery: Large-caliber dominant vessel with diffuse nonobstructive disease throughout its proximal and midportion, and subsequent severe 90% stenosis throughout the distal RCA into the ostial/proximal right PDA.  IMPRESSION:  1.  Occluded proximal LAD (heavily calcified vessel) status post successful IVUS guided intravascular lithotripsy and PCI deploying overlapping Synergy 3 x 28 mm and 3 x 32 mm DOUGIE, postdilated to ~ 3.5-3.7 mm.  2.  Ischemic cardiomyopathy with estimated LVEF 45% with anterior wall akinesis  3.  Significant elevated resting LVEDP 25 mmHg with no significant transaortic gradient on pullback     RECOMMENDATIONS:  Dual antiplatelet therapy for at least 12 months  Weigh risk versus benefits of further PCI to his remaining severe disease in the circumflex and RCA, especially given recent GI bleed with no clear identifiable source necessitating blood transfusions.  HI statin / PCSK9-I: Target LDL < 55 and TG < 150  TTE with echo enhancing agent  GDMT and lifestyle modifications for secondary ASCVD prevention  Cardiac Rehab referral    Imaging  Chest X-Ray: 5/2/2024  FINDINGS:  Lungs:  No pulmonary infiltrates or consolidations are noted.  Pleura:  There is no pleural effusion or pneumothorax.  Heart and mediastinum:  The heart silhouette is within normal limits.  Bones:  Normal  IMPRESSION:  No acute cardiopulmonary disease.    Assessment/Plan  #Anterior STEMI with delayed presentation s/p PCI with overlapping DOUGIE proximal LAD  #History of GI bleed requiring blood transfusions  3/2024  #Ischemic cardiomyopathy LVEF 38%  #MARCEL  #Prediabetes    Recommendations:  -He reports significant fatigue while exerting himself, but this has improved over the past few days.  He also had some shortness of breath while attempting to ambulate the hallways yesterday.  -He appears mildly volume overloaded on exam.  -Start Lasix 20 mg IV daily.  If renal function remains stable, recommend p.o. Lasix 20mg every 48 hours.  -Stop carvedilol and start metoprolol XL 25 mg daily.  -Consider the addition of losartan tomorrow if blood pressure remains stable.  -Start Farxiga 10 mg daily.  Renal function remains stable, will consider starting Aldactone tomorrow.  -Hemoglobin at 7.6 this morning.  Recommend blood transfusion.  Hospitalist is aware.  -Strict I's and O's and daily standing weights.  -Mildly elevated LVEDP on cath and symptoms may be consistent with volume overload.  Will proceed with NT proBNP testing to further assess.  Recommend daily BMP and CMP while inpatient.  -ICD not currently indicated given LVEF of > 35% and less than 3 months of GDMT.  -Pneumonia and flu vaccine per pharmacy.  -Meds to beds on discharge.  -Heart failure education.  -Heart failure follow-up within 7 days of discharge.    Cardiology will continue to follow.    Please contact me with any questions.    Thank you for allowing me to participate in the care of Dilan Calderón .    Karma Bernstein PA-C, Cardiology  [facility], [facility]  [address] [address]  Phone: [phone]  Fax: [phone]    PLEASE NOTE: This note was created using voice recognition software. I have made every reasonable attempt to correct obvious errors, but I expect that there are errors of grammar and possibly content that I did not discover before finalizing the note.     I personally spent a total of 20 minutes which includes face-to-face time and non-face-to-face  time spent on preparing to see the patient, reviewing hospital notes and tests, obtaining history from the patient, performing a medically appropriate exam, counseling and educating the patient, ordering medications/tests/procedures/referrals as clinically indicated, and documenting information in the electronic medical record.

## 2024-05-06 NOTE — PROGRESS NOTES
Hospital Medicine Daily Progress Note    Date of Service  5/6/2024    Chief Complaint  Dilan Calderón is a 83 y.o. male admitted 5/2/2024 with STEMI    Hospital Course  This is a 83 y.o. male who presented 5/2/2024 with a histor of recent GI bleed in March of this year.  Presented with Hgb of 5.7.  was transfused and had an EGD and colonoscopy done showing esophageal stricture and gastritis from above and hemorrhoids from below.  Presented back with chest pain and EKG changes consistent with an ant STEMI.  Taken to the cath lab on presentation where he was found to have an occluded LAD and 2 Dereck were placed.   He was transferred out of ICU overnight.    Interval Problem Update  Patient seen and examined, afebrile no nausea or vomiting, cardiology following, and case discussed appreciate rec.  MARCEL started on gentle IV fluid   5/5: Patient resting in bed, afebrile, no nausea or vomiting. Feels SOB. MARCEL has improved.  Cardiology following case discussed appreciate rec.   5/6: Patient seen and examined, afebrile, his hemoglobin down to 7.6 from  9.4 this AM, has history of G bleed in the past since he has ischemic heart disease will give 1 unit of RBC will cont to monitor H&H every 8 hrs  Denies any blood in the stool   Cardiology following appreciate rec.   I have discussed this patient's plan of care and discharge plan at IDT rounds today with Case Management, Nursing, Nursing leadership, and other members of the IDT team.    Consultants/Specialty  cardiology    Code Status  Full Code    Disposition  The patient is not medically cleared for discharge to home or a post-acute facility.      I have placed the appropriate orders for post-discharge needs.    Review of Systems  Review of Systems   Constitutional:  Negative for chills and fever.   Respiratory:  Negative for cough and shortness of breath.    Cardiovascular:  Negative for chest pain, palpitations and leg swelling.   Gastrointestinal:  Negative for abdominal  pain, diarrhea, nausea and vomiting.   Genitourinary:  Negative for dysuria and urgency.   Musculoskeletal:  Negative for back pain.   Skin:  Negative for rash.   Neurological:  Negative for dizziness, loss of consciousness and headaches.        Physical Exam  Temp:  [36.5 °C (97.7 °F)-36.7 °C (98.1 °F)] 36.7 °C (98.1 °F)  Pulse:  [70-89] 70  Resp:  [16-18] 16  BP: (105-120)/(64-77) 105/64  SpO2:  [92 %-97 %] 97 %    Physical Exam  Constitutional:       General: He is not in acute distress.     Appearance: He is well-developed. He is not diaphoretic.   HENT:      Head: Normocephalic and atraumatic.   Eyes:      Conjunctiva/sclera: Conjunctivae normal.   Neck:      Vascular: No JVD.   Cardiovascular:      Rate and Rhythm: Normal rate.      Heart sounds: Murmur heard.      No gallop.   Pulmonary:      Effort: Pulmonary effort is normal. No respiratory distress.      Breath sounds: No stridor. No wheezing or rales.   Abdominal:      Palpations: Abdomen is soft.      Tenderness: There is no abdominal tenderness. There is no guarding or rebound.   Musculoskeletal:      Right lower leg: No edema.      Left lower leg: No edema.   Skin:     General: Skin is warm and dry.      Findings: No rash.   Neurological:      Mental Status: He is oriented to person, place, and time.   Psychiatric:         Thought Content: Thought content normal.         Fluids    Intake/Output Summary (Last 24 hours) at 5/6/2024 1429  Last data filed at 5/6/2024 1300  Gross per 24 hour   Intake 240 ml   Output 900 ml   Net -660 ml       Laboratory  Recent Labs     05/05/24  0441   WBC 8.0   RBC 3.07*   HEMOGLOBIN 7.6*   HEMATOCRIT 25.3*   MCV 82.4   MCH 24.8*   MCHC 30.0*   RDW 56.9*   PLATELETCT 190   MPV 11.9     Recent Labs     05/04/24  0012 05/05/24  0441 05/06/24  0233   SODIUM 139 140 140   POTASSIUM 4.0 3.9 3.8   CHLORIDE 104 108 109   CO2 22 19* 18*   GLUCOSE 108* 95 95   BUN 38* 32* 27*   CREATININE 1.53* 1.24 1.33   CALCIUM 9.2 8.2* 8.3*                        Imaging  EC-ECHOCARDIOGRAM COMPLETE W/O CONT   Final Result      CT-HEAD W/O   Final Result         1.  No acute intracranial abnormality is identified, there are nonspecific white matter changes, commonly associated with small vessel ischemic disease.  Associated mild cerebral atrophy is noted.   2.  Atherosclerosis.         DX-CHEST-LIMITED (1 VIEW)   Final Result      No acute cardiopulmonary disease.      CL-LEFT HEART CATHETERIZATION WITH POSSIBLE INTERVENTION    (Results Pending)        Assessment/Plan  * ST elevation myocardial infarction involving left anterior descending (LAD) coronary artery ([facility])  Assessment & Plan  S/p DOUGIE x 2 to LAD  BB  DAPT: ASA and plavix  Statin  Cards following case discussed   TTE pending  LDL 96  A1c 5.8  Tele    Anemia  Assessment & Plan   his hemoglobin down to 7.6 from  9.4 this AM, has history of G bleed in the past since he has ischemic heart disease will give 1 unit of RBC will cont to monitor H&H every 8 hrs  Denies any blood in the stool     MARCEL (acute kidney injury) ([facility])  Assessment & Plan  Started on gentle hydration     Acute systolic heart failure ([facility])  Assessment & Plan  ICM  TTE pending  GDMT titration limited by soft pressures  -coreg 3.125  -add ARB as pressures allow      History of GI bleed  Assessment & Plan  Here in March  Required 4 units PRBCs  EGD showing esophagitis  No issues since  PPI  Follow H&H    Greater than 51 minutes spent prepping to see patient (e.g. review of tests) obtaining and/or reviewing separately obtained history. Performing a medically appropriate examination and/ evaluation.  Counseling and educating the patient/family/caregiver.  Ordering medications, tests, or procedures.  Referring and communicating with other health care professionals.  Documenting clinical information in EPIC.  Independently interpreting results and communicating results to patient/family/caregiver.  Care coordination.     VTE prophylaxis:  scd    I have performed a physical exam and reviewed and updated ROS and Plan today (5/6/2024). In review of yesterday's note (5/5/2024), there are no changes except as documented above.

## 2024-05-06 NOTE — CARE PLAN
The patient is Stable - Low risk of patient condition declining or worsening    Shift Goals  Clinical Goals: monitor VS, labs  Patient Goals: rest, comfort  Family Goals: DHARA    Progress made toward(s) clinical / shift goals:    Problem: Psychosocial  Goal: Patient's ability to verbalize feelings about condition will improve  Outcome: Progressing   1. Discuss coping with medical condition and its effects 2. Encourage patient participation in care 3. Encourage acknowledgement of body changes and accompanying emotions.   Problem: Hemodynamics  Goal: Patient's hemodynamics, fluid balance and neurologic status will be stable or improve  Outcome: Progressing   1. Assess and monitor rate, rhythm, depth and effort of respiration 2. Breath sounds assessed qshift and/or as needed 3. Assess O2 saturation, administer/titrate oxygen as ordered 4. Position patient for maximum ventilatory efficiency 5. Turn, cough, and deep breath with splinting to improve effectiveness 6. Collaborate with RT to administer medication/treatments per order 7. Encourage use of incentive spirometer and encourage patient to cough after use and utilize splinting techniques if applicable 8. Airway suctioning 9. Monitor sputum production for changes in color, consistency and frequency 10. Perform frequent oral hygiene 11. Alternate physical activity with rest periods     Problem: Care Map:  Day Before Discharge Optimal Outcome for the Heart Failure Patient  Goal: Day Before Discharge:  Optimal Care of the heart failure patient  Outcome: Progressing       Start Heart Failure education booklet, provide writing utensils and notepad for questions    For patient's with heart failure exacerbation, identify precipitant (diet, med compliance, etc.) and direct education towards lifestyle changes to prevent exacerbations.    Instruct patient to write down weights on symptom tracker daily    Provide and explain the Stoplight Tool    Daily weight documented. Use  stand up scale if patient able. Compare to previous weight    Assess and document 2 hour post diuretic output    Document intake and output per shift. Communicate with care team if volume status is improving, stalled or worsening.    Document ambulation tolerance (functional assessment) in ADL flowsheet daily    Medications transitioned from IV guided therapy to oral therapy (as applicable)    Schedule HF follow up appointment within 7 days of discharge (date, time, location on AVS), unless patient is on hospice or transferring to SNF/LTACH    ACE-I, ARB or ARNI prescribed on discharge if LVSD (EF = 40%). Refer to HF/ AMI Discharge Checklist . Or if contraindicated, provider documentated why neither ACE-I, ARB or ARNI was not prescribed.    Evidence based beta blocker prescribed at discharge if LVSD (EF = 40%)(Metoprolol Succinate or Toprol XL , carvedilol, bisprolol). Refer to HF/ AMI Discharge Checklist. Or if contraindicated, provider documented why not prescribed.    Aldosterone receptor antagonist prescribed at discharge for EF = 35%. Refer to HF/AMI Discharge checklist. Or if contraindicated, provider documented why not prescribed.    Discuss with physician need for restart of home medications and any new medications    Reassess for home care needs (O2, prior auths)    If patient has opted into Meds to Beds Program, communicate with pharmacy on anticipated discharge date    Educate patient on HF topics (Medication, weight, worsening signs and symptoms, low salt diet, activity) and document in Education tab      Patient is not progressing towards the following goals:

## 2024-05-06 NOTE — PROGRESS NOTES
Bedside report received from NOC RN. Assumed care of pt. Pt awake, laying in bed. A/Ox4, VSS. No concerns, complaints or distress. Pt educated to call before getting out of bed. POC reviewed and white board updated. Tele box on. SR80  on the monitor. Call light in reach. Bed locked in lowest position with 2 upper bed rails up. Bed alarm on.

## 2024-05-06 NOTE — THERAPY
Physical Therapy   Daily Treatment     Patient Name: Dilan Calderón  Age:  83 y.o., Sex:  male  Medical Record #: [id number]  Today's Date: 5/6/2024     Precautions  Precautions: Fall Risk;Cardiac Precautions (See Comments)  Comments: 38% EF (5/4/24), [id number], on BB, low H/H, so ed only today, about to get blood    Assessment    Today, pt with low h/h, about to have blood transfusion. Pt reports that his cardiac education handout was lost, so new copy is given and reviewed. Pt with issues with fatigue per nsg and pt. PT will follow with OOB when medically appropriate.     Plan    Treatment Plan Status: Continue Current Treatment Plan  Type of Treatment: Bed Mobility, Equipment, Gait Training, Neuro Re-Education / Balance, Self Care / Home Evaluation, Stair Training, Therapeutic Activities, Therapeutic Exercise  Treatment Frequency: 4 Times per Week  Treatment Duration: Until Therapy Goals Met    DC Equipment Recommendations: Unable to determine at this time  Discharge Recommendations: Recommend post-acute placement for additional physical therapy services prior to discharge home      Objective       05/06/24 1507   Charge Group   Charges  Yes   PT Self Care / Home Evaluation (Units) 1   Total Time Spent   PT Total Time Yes   PT Self Care/Home Evaluation Time Spent (Mins) 15   PT Total Time Spent (Calculated) 15   Precautions   Precautions Fall Risk;Cardiac Precautions (See Comments)   Comments 38% EF (5/4/24), [id number], on BB, low H/H, so ed only today, about to get blood   Short Term Goals    Short Term Goal # 1 Pt will ambulate 450 feet without SOB or AD with supervision within 6 visits to progress return to community ambulator.   Goal Outcome # 1 goal not met   Short Term Goal # 2 Pt will ascend/descend 3 steps with SBA within 6 visits to progress return to prior level of living.   Goal Outcome # 2 Goal not met   Short Term Goal # 3 Pt will verbalize understanding of cardiac rehab precautions via  teach-back without cues within 6 visits to progress understanding and comprehension of cardiac precautions.   Goal Outcome # 3 Goal not met   Education Group   Cardiac Precautions Patient Response Patient;Acceptance;Explanation;Handout;Verbal Demonstration   Additional Comments pt reports that his cardiac handout was lost when he changed rooms. New copy given and reviewed. Pt attentive, asking good questions.   Physical Therapy Treatment Plan   Physical Therapy Treatment Plan Continue Current Treatment Plan   Anticipated Discharge Equipment and Recommendations   DC Equipment Recommendations Unable to determine at this time   Discharge Recommendations Recommend post-acute placement for additional physical therapy services prior to discharge home   Interdisciplinary Plan of Care Collaboration   IDT Collaboration with  Nursing   Patient Position at End of Therapy In Bed;Call Light within Reach;Tray Table within Reach;Phone within Reach;Bed Alarm On   Collaboration Comments nsg updated   Session Information   Date / Session Number  5/6-2 (2/4, 5/9)

## 2024-05-06 NOTE — PROGRESS NOTES
Received bedside report from RN, pt care assumed, VSS, pt assessment complete. Pt AAOx4, with no complaint of pain at this time. No signs of acute distress noted. Plan of care discussed with pt and verbalizes no questions. Pt denies any additional needs at this time. Bed locked/in lowest position, bed alarm on, pt educated on fall risk and verbalized understanding, call light within reach, hourly rounding initiated.

## 2024-05-07 ENCOUNTER — PHARMACY VISIT (OUTPATIENT)
Dept: PHARMACY | Facility: MEDICAL CENTER | Age: 84
End: 2024-05-07
Payer: COMMERCIAL

## 2024-05-07 VITALS
RESPIRATION RATE: 16 BRPM | OXYGEN SATURATION: 96 % | DIASTOLIC BLOOD PRESSURE: 69 MMHG | HEART RATE: 76 BPM | BODY MASS INDEX: 21.58 KG/M2 | HEIGHT: 66 IN | TEMPERATURE: 97.9 F | WEIGHT: 134.26 LBS | SYSTOLIC BLOOD PRESSURE: 122 MMHG

## 2024-05-07 PROBLEM — I25.5 ISCHEMIC CARDIOMYOPATHY: Status: ACTIVE | Noted: 2024-05-07

## 2024-05-07 PROBLEM — N18.30 CKD (CHRONIC KIDNEY DISEASE) STAGE 3, GFR 30-59 ML/MIN: Status: ACTIVE | Noted: 2024-05-04

## 2024-05-07 PROBLEM — I50.20 HEART FAILURE WITH REDUCED EJECTION FRACTION (HCC): Status: ACTIVE | Noted: 2024-05-02

## 2024-05-07 PROBLEM — D63.8 ANEMIA, CHRONIC DISEASE: Status: ACTIVE | Noted: 2024-05-03

## 2024-05-07 LAB
ALBUMIN SERPL BCP-MCNC: 3.5 G/DL (ref 3.2–4.9)
ALBUMIN/GLOB SERPL: 1.5 G/DL
ALP SERPL-CCNC: 81 U/L (ref 30–99)
ALT SERPL-CCNC: 22 U/L (ref 2–50)
ANION GAP SERPL CALC-SCNC: 13 MMOL/L (ref 7–16)
AST SERPL-CCNC: 37 U/L (ref 12–45)
BILIRUB SERPL-MCNC: 0.5 MG/DL (ref 0.1–1.5)
BUN SERPL-MCNC: 26 MG/DL (ref 8–22)
CALCIUM ALBUM COR SERPL-MCNC: 8.7 MG/DL (ref 8.5–10.5)
CALCIUM SERPL-MCNC: 8.3 MG/DL (ref 8.5–10.5)
CHLORIDE SERPL-SCNC: 107 MMOL/L (ref 96–112)
CO2 SERPL-SCNC: 20 MMOL/L (ref 20–33)
CREAT SERPL-MCNC: 1.48 MG/DL (ref 0.5–1.4)
ERYTHROCYTE [DISTWIDTH] IN BLOOD BY AUTOMATED COUNT: 54.1 FL (ref 35.9–50)
GFR SERPLBLD CREATININE-BSD FMLA CKD-EPI: 47 ML/MIN/1.73 M 2
GLOBULIN SER CALC-MCNC: 2.4 G/DL (ref 1.9–3.5)
GLUCOSE SERPL-MCNC: 98 MG/DL (ref 65–99)
HCT VFR BLD AUTO: 28.2 % (ref 42–52)
HCT VFR BLD AUTO: 28.9 % (ref 42–52)
HCT VFR BLD AUTO: 31.3 % (ref 42–52)
HGB BLD-MCNC: 8.8 G/DL (ref 14–18)
HGB BLD-MCNC: 8.9 G/DL (ref 14–18)
HGB BLD-MCNC: 9.3 G/DL (ref 14–18)
MAGNESIUM SERPL-MCNC: 2.4 MG/DL (ref 1.5–2.5)
MCH RBC QN AUTO: 25.3 PG (ref 27–33)
MCHC RBC AUTO-ENTMCNC: 31.6 G/DL (ref 32.3–36.5)
MCV RBC AUTO: 80.1 FL (ref 81.4–97.8)
PHOSPHATE SERPL-MCNC: 2.9 MG/DL (ref 2.5–4.5)
PLATELET # BLD AUTO: 192 K/UL (ref 164–446)
PMV BLD AUTO: 12.4 FL (ref 9–12.9)
POTASSIUM SERPL-SCNC: 3.8 MMOL/L (ref 3.6–5.5)
PROT SERPL-MCNC: 5.9 G/DL (ref 6–8.2)
RBC # BLD AUTO: 3.52 M/UL (ref 4.7–6.1)
SODIUM SERPL-SCNC: 140 MMOL/L (ref 135–145)
WBC # BLD AUTO: 7.9 K/UL (ref 4.8–10.8)

## 2024-05-07 PROCEDURE — 99239 HOSP IP/OBS DSCHRG MGMT >30: CPT | Performed by: FAMILY MEDICINE

## 2024-05-07 PROCEDURE — RXMED WILLOW AMBULATORY MEDICATION CHARGE: Performed by: FAMILY MEDICINE

## 2024-05-07 RX ORDER — CLOPIDOGREL BISULFATE 75 MG/1
75 TABLET ORAL DAILY
Qty: 30 TABLET | Refills: 2 | Status: SHIPPED | OUTPATIENT
Start: 2024-05-08

## 2024-05-07 RX ORDER — METOPROLOL SUCCINATE 25 MG/1
25 TABLET, EXTENDED RELEASE ORAL DAILY
Qty: 30 TABLET | Refills: 2 | Status: SHIPPED | OUTPATIENT
Start: 2024-05-08

## 2024-05-07 RX ORDER — ATORVASTATIN CALCIUM 80 MG/1
80 TABLET, FILM COATED ORAL EVERY EVENING
Qty: 30 TABLET | Refills: 2 | Status: SHIPPED | OUTPATIENT
Start: 2024-05-07

## 2024-05-07 RX ORDER — DAPAGLIFLOZIN 10 MG/1
10 TABLET, FILM COATED ORAL DAILY
Qty: 30 TABLET | Refills: 2 | Status: SHIPPED | OUTPATIENT
Start: 2024-05-08

## 2024-05-07 RX ORDER — ASPIRIN 81 MG/1
81 TABLET ORAL DAILY
Qty: 100 TABLET | Refills: 0 | Status: SHIPPED | OUTPATIENT
Start: 2024-05-08

## 2024-05-07 RX ORDER — FUROSEMIDE 20 MG/1
20 TABLET ORAL
Qty: 30 TABLET | Refills: 2 | Status: SHIPPED | OUTPATIENT
Start: 2024-05-07

## 2024-05-07 RX ADMIN — METOPROLOL SUCCINATE 25 MG: 25 TABLET, FILM COATED, EXTENDED RELEASE ORAL at 04:43

## 2024-05-07 RX ADMIN — ASPIRIN 81 MG: 81 TABLET, COATED ORAL at 04:43

## 2024-05-07 RX ADMIN — CLOPIDOGREL BISULFATE 75 MG: 75 TABLET ORAL at 04:43

## 2024-05-07 RX ADMIN — DAPAGLIFLOZIN 10 MG: 10 TABLET, FILM COATED ORAL at 04:43

## 2024-05-07 RX ADMIN — OMEPRAZOLE 20 MG: 20 CAPSULE, DELAYED RELEASE ORAL at 04:43

## 2024-05-07 ASSESSMENT — ENCOUNTER SYMPTOMS
DIARRHEA: 0
EYES NEGATIVE: 1
NAUSEA: 0
VOMITING: 0
COLOR CHANGE: 0
FATIGUE: 0
CONSTIPATION: 0
LIGHT-HEADEDNESS: 0
DIZZINESS: 0
FEVER: 0
CHEST TIGHTNESS: 0
WEAKNESS: 1
MYALGIAS: 0
ENDOCRINE NEGATIVE: 1
SHORTNESS OF BREATH: 0
ABDOMINAL PAIN: 0
BRUISES/BLEEDS EASILY: 0
COUGH: 0
PALPITATIONS: 0
CHILLS: 0
ABDOMINAL DISTENTION: 0
PSYCHIATRIC NEGATIVE: 1
ARTHRALGIAS: 0
DIAPHORESIS: 0

## 2024-05-07 ASSESSMENT — COGNITIVE AND FUNCTIONAL STATUS - GENERAL
MOBILITY SCORE: 23
DAILY ACTIVITIY SCORE: 24
SUGGESTED CMS G CODE MODIFIER DAILY ACTIVITY: CH
CLIMB 3 TO 5 STEPS WITH RAILING: A LITTLE
MOBILITY SCORE: 24
SUGGESTED CMS G CODE MODIFIER MOBILITY: CH
SUGGESTED CMS G CODE MODIFIER MOBILITY: CI

## 2024-05-07 ASSESSMENT — GAIT ASSESSMENTS
DISTANCE (FEET): 300
GAIT LEVEL OF ASSIST: SUPERVISED

## 2024-05-07 ASSESSMENT — PAIN DESCRIPTION - PAIN TYPE: TYPE: ACUTE PAIN

## 2024-05-07 ASSESSMENT — PATIENT HEALTH QUESTIONNAIRE - PHQ9
2. FEELING DOWN, DEPRESSED, IRRITABLE, OR HOPELESS: NOT AT ALL
SUM OF ALL RESPONSES TO PHQ9 QUESTIONS 1 AND 2: 0
1. LITTLE INTEREST OR PLEASURE IN DOING THINGS: NOT AT ALL

## 2024-05-07 ASSESSMENT — FIBROSIS 4 INDEX: FIB4 SCORE: 3.33

## 2024-05-07 NOTE — PROGRESS NOTES
Cardiology Follow Up Progress Note    Date of Service  5/7/2024    Attending Physician  Rose Rodriguez M.D.    Chief Complaint   STEMI    HPI  Dilan Calderón is a 83 y.o. male with a history of GI bleeding 3/4/2024 with prior EGD/colonoscopy that demonstrated esophageal stricture, gastritis, diverticulosis and hemorrhoids admitted 5/2/2024 with STEMI underwent successful PCI DOUGIE to the LAD.    Interim Events  5/7/2024: No cardiac events overnight.  Sinus rhythm on telemetry.  Vital signs stable.  SpO2 95 to 97% room air.  He reports he is doing better today.  He does have some weakness, but he attributes this to inactivity. No chest pain or palpitations. No shortness of breath, dyspnea on exertion, orthopnea or PND. No lower extremity edema. No dizziness or lightheadedness. No syncope or presyncope.      5/6/2024: Sinus rhythm on telemetry with 9 seconds of paroxysmal SVT earlier this morning and was given 2 g of magnesium sulfate.  He was asymptomatic and sleeping during this episode.  No other cardiac events overnight.  Vital signs stable.  SpO2 90 to 97% on room air.  He reports he is doing better today, but does have significant fatigue while exerting himself.  He did attempt to ambulate the hallways yesterday and had some significant shortness of breath.  No chest pain or palpitations.  No orthopnea or PND.  No lower extremity edema.  No dizziness or lightheadedness.  No syncope or presyncope.    Review of Systems  Review of Systems   Constitutional:  Negative for chills, diaphoresis, fatigue and fever.   HENT: Negative.     Eyes: Negative.    Respiratory:  Negative for cough, chest tightness and shortness of breath.    Cardiovascular:  Negative for chest pain, palpitations and leg swelling.   Gastrointestinal:  Negative for abdominal distention, abdominal pain, constipation, diarrhea, nausea and vomiting.   Endocrine: Negative.    Genitourinary:  Negative for decreased urine volume, difficulty urinating, dysuria,  frequency and urgency.   Musculoskeletal:  Negative for arthralgias and myalgias.   Skin:  Negative for color change.   Neurological:  Positive for weakness. Negative for dizziness, syncope and light-headedness.   Hematological:  Does not bruise/bleed easily.   Psychiatric/Behavioral: Negative.         Vital signs in last 24 hours  Temp:  [36.2 °C (97.2 °F)-37 °C (98.6 °F)] 36.6 °C (97.9 °F)  Pulse:  [67-81] 76  Resp:  [16-20] 16  BP: ()/(57-72) 122/69  SpO2:  [92 %-100 %] 96 %    Physical Exam  Physical Exam  Vitals and nursing note reviewed.   Constitutional:       General: He is not in acute distress.     Appearance: Normal appearance. He is not toxic-appearing.   HENT:      Head: Normocephalic and atraumatic.      Right Ear: External ear normal.      Left Ear: External ear normal.      Nose: Nose normal.      Mouth/Throat:      Mouth: Mucous membranes are moist.      Pharynx: Oropharynx is clear.   Eyes:      General: No scleral icterus.     Extraocular Movements: Extraocular movements intact.      Conjunctiva/sclera: Conjunctivae normal.      Pupils: Pupils are equal, round, and reactive to light.   Neck:      Vascular: No JVD.   Cardiovascular:      Rate and Rhythm: Normal rate and regular rhythm.      Pulses: Normal pulses.      Heart sounds: Murmur heard.      No friction rub. No gallop.      Comments: Right radial site is well healed without any signs of oozing, infection or hematoma.   Pulmonary:      Effort: Pulmonary effort is normal.      Breath sounds: Normal breath sounds.   Abdominal:      General: Abdomen is flat. Bowel sounds are normal. There is no distension.      Palpations: Abdomen is soft.      Tenderness: There is no abdominal tenderness.   Musculoskeletal:         General: Normal range of motion.      Cervical back: Normal range of motion and neck supple.      Right lower leg: No edema.      Left lower leg: No edema.   Skin:     General: Skin is warm and dry.      Capillary Refill:  Capillary refill takes less than 2 seconds.      Coloration: Skin is not jaundiced.   Neurological:      General: No focal deficit present.      Mental Status: He is alert and oriented to person, place, and time. Mental status is at baseline.   Psychiatric:         Mood and Affect: Mood normal.         Behavior: Behavior normal.         Judgment: Judgment normal.         Lab Review  Lab Results   Component Value Date/Time    WBC 7.9 05/07/2024 02:14 AM    RBC 3.52 (L) 05/07/2024 02:14 AM    HEMOGLOBIN 8.8 (L) 05/07/2024 06:36 AM    HEMATOCRIT 28.9 (L) 05/07/2024 06:36 AM    MCV 80.1 (L) 05/07/2024 02:14 AM    MCH 25.3 (L) 05/07/2024 02:14 AM    MCHC 31.6 (L) 05/07/2024 02:14 AM    MPV 12.4 05/07/2024 02:14 AM      Lab Results   Component Value Date/Time    SODIUM 140 05/07/2024 02:14 AM    POTASSIUM 3.8 05/07/2024 02:14 AM    CHLORIDE 107 05/07/2024 02:14 AM    CO2 20 05/07/2024 02:14 AM    GLUCOSE 98 05/07/2024 02:14 AM    BUN 26 (H) 05/07/2024 02:14 AM    CREATININE 1.48 (H) 05/07/2024 02:14 AM    CREATININE 1.2 02/11/2008 09:00 AM      Lab Results   Component Value Date/Time    ASTSGOT 37 05/07/2024 02:14 AM    ALTSGPT 22 05/07/2024 02:14 AM     Lab Results   Component Value Date/Time    CHOLSTRLTOT 172 05/03/2024 04:00 AM    LDL 96 05/03/2024 04:00 AM    HDL 57 05/03/2024 04:00 AM    TRIGLYCERIDE 93 05/03/2024 04:00 AM    TROPONINT 5695 (H) 05/03/2024 02:17 PM       Recent Labs     05/06/24  0233   NTPROBNP 7929*       Cardiac Imaging and Procedures Review  EKG:  My personal interpretation of the EKG dated 5/3/2024 is sinus rhythm with anterior infarct    Echocardiogram: 5/4/2024  CONCLUSIONS  Moderately reduced left ventricular systolic function.  The left ventricular ejection fraction is visually estimated to be 35-  40%.  Hypokinesis anteroapical hypokinesis.  The right ventricle is normal in size and systolic function.  No prior study is available for comparison.     Cardiac Catheterization:  5/2/2024  HEMODYNAMICS:  Aortic pressure: 129 /84 mmHg  LVEDP: 25 mmHg  No significant aortic gradient on pullback     LEFT VENTRICULOGRAPHY   Estimated LVEF= 45%.  Anterior wall akinesis      CORONARY ANGIOGRAPHY:  The left main coronary artery: Short normal-appearing vessel that bifurcates to LAD and left circumflex  The left anterior descending coronary artery: Large-caliber occluded vessel at its proximal portion with right to left collaterals status post successful PCI as detailed below.  Transapical vessel  The left circumflex coronary artery: Large-caliber vessel that gives rise to diminutive OM1, large caliber bifurcating OM 2 that has severe ostial stenosis in both branches, and a large bifurcating OM 3 that has severe proximal stenosis in 1 of its branches.  The right coronary artery: Large-caliber dominant vessel with diffuse nonobstructive disease throughout its proximal and midportion, and subsequent severe 90% stenosis throughout the distal RCA into the ostial/proximal right PDA.  IMPRESSION:  1.  Occluded proximal LAD (heavily calcified vessel) status post successful IVUS guided intravascular lithotripsy and PCI deploying overlapping Synergy 3 x 28 mm and 3 x 32 mm DOUGIE, postdilated to ~ 3.5-3.7 mm.  2.  Ischemic cardiomyopathy with estimated LVEF 45% with anterior wall akinesis  3.  Significant elevated resting LVEDP 25 mmHg with no significant transaortic gradient on pullback     RECOMMENDATIONS:  Dual antiplatelet therapy for at least 12 months  Weigh risk versus benefits of further PCI to his remaining severe disease in the circumflex and RCA, especially given recent GI bleed with no clear identifiable source necessitating blood transfusions.  HI statin / PCSK9-I: Target LDL < 55 and TG < 150  TTE with echo enhancing agent  GDMT and lifestyle modifications for secondary ASCVD prevention  Cardiac Rehab referral    Imaging  Chest X-Ray: 5/2/2024  FINDINGS:  Lungs:  No pulmonary infiltrates or  consolidations are noted.  Pleura:  There is no pleural effusion or pneumothorax.  Heart and mediastinum:  The heart silhouette is within normal limits.  Bones:  Normal  IMPRESSION:  No acute cardiopulmonary disease.    Assessment/Plan  #Anterior STEMI with delayed presentation s/p PCI with overlapping DOUGIE proximal LAD  #History of GI bleed requiring blood transfusions 3/2024  #Ischemic cardiomyopathy LVEF 38%  #MARCEL  #Prediabetes    Recommendations:  -He is doing much better today.  - Is/Os demonstrate a gross urine output of 1640. He appears euvolemic on exam.   -Transition to p.o. Lasix 20mg every 48 hours.  -Continue metoprolol XL 25 mg daily.  -His renal function, weakness, frailty and intermittent hypotension precludes the initiation of ACE/ARB/ARNI and MRA during this hospital admission. Can be reassessed in the outpatient setting.   -Continue Farxiga 10 mg daily.  Renal function remains stable.  -Hemoglobin has remained stable after transfusion.   -Strict I's and O's and daily standing weights.  -ICD not currently indicated given LVEF of > 35% and less than 3 months of GDMT.  -Pneumonia and flu vaccine per pharmacy.  -Meds to beds on discharge.  -Heart failure education.  -Heart failure follow-up within 7 days of discharge.    Cardiology will sign off.     Please contact me with any questions.    Thank you for allowing me to participate in the care of Dilan Calderón .    Karma Bernstein PA-C, Cardiology  [facility], [address]  [address] [address]  Phone: [phone]  Fax: [phone]    PLEASE NOTE: This note was created using voice recognition software. I have made every reasonable attempt to correct obvious errors, but I expect that there are errors of grammar and possibly content that I did not discover before finalizing the note.     I personally spent a total of 15 minutes which includes face-to-face time and  non-face-to-face time spent on preparing to see the patient, reviewing hospital notes and tests, obtaining history from the patient, performing a medically appropriate exam, counseling and educating the patient, ordering medications/tests/procedures/referrals as clinically indicated, and documenting information in the electronic medical record.

## 2024-05-07 NOTE — CARE PLAN
Problem: Knowledge Deficit - Standard  Goal: Patient and family/care givers will demonstrate understanding of plan of care, disease process/condition, diagnostic tests and medications  Description: Target End Date:  1-3 days or as soon as patient condition allows    Document in Patient Education    1.  Patient and family/caregiver oriented to unit, equipment, visitation policy and means for communicating concern  2.  Complete/review Learning Assessment  3.  Assess knowledge level of disease process/condition, treatment plan, diagnostic tests and medications  4.  Explain disease process/condition, treatment plan, diagnostic tests and medications  Outcome: Progressing     Problem: Pain - Standard  Goal: Alleviation of pain or a reduction in pain to the patient’s comfort goal  Description: Target End Date:  Prior to discharge or change in level of care    Document on Vitals flowsheet    1.  Document pain using the appropriate pain scale per order or unit policy  2.  Educate and implement non-pharmacologic comfort measures (i.e. relaxation, distraction, massage, cold/heat therapy, etc.)  3.  Pain management medications as ordered  4.  Reassess pain after pain med administration per policy  5.  If opiods administered assess patient's response to pain medication is appropriate per POSS sedation scale  6.  Follow pain management plan developed in collaboration with patient and interdisciplinary team (including palliative care or pain specialists if applicable)  Outcome: Progressing     Problem: Fall Risk  Goal: Patient will remain free from falls  Description: Target End Date:  Prior to discharge or change in level of care    Document interventions on the Snider Saurav Fall Risk Assessment    1.  Assess for fall risk factors  2.  Implement fall precautions  Outcome: Progressing     Problem: Psychosocial  Goal: Patient's level of anxiety will decrease  Description: Target End Date:  1-3 days or as soon as patient condition  allows    1.  Collaborate with patient and family/caregiver to identify triggers and develop strategies to cope with anxiety  2.  Implement stimuli reduction, calming techniques  3.  Pharmacologic management per provider order  4.  Encourage patient/family/care giver participation  5.  Collaborate with interdisciplinary team including Psychologist or Behavioral Health Team as needed  Outcome: Progressing  Goal: Patient's ability to verbalize feelings about condition will improve  Description: Target End Date:  Prior to discharge or change in level of care    1.  Discuss coping with medical condition and its effects  2.  Encourage patient participation in care  3.  Encourage acknowledgement of body changes and accompanying emotions  4.  Perform depression screening  Outcome: Progressing  Goal: Patient's ability to re-evaluate and adapt role responsibilities will improve  Description: Target End Date:  Prior to discharge or change in level of care    1.  Assess family support  2.  Encourage support system participation in care  3.  Encouraged verbalization of feelings regarding caregiver responsibilities  4.  Discuss changes in role and responsibilities caused by patient's condition  Outcome: Progressing  Goal: Patient and family will demonstrate ability to cope with life altering diagnosis and/or procedure  Description: Target End Date:  1-3 days or as soon as patient condition allows    1. Expect initial shock and disbelief following diagnosis of cancer and traumatizing procedures (disfiguring surgery, colostomy, amputation).  2.  Assess patient and family/caregiver for stage of grief currently being experienced. Explain process as appropriate.  3.  Provide open, nonjudgmental environment. Use therapeutic communication skills of Active-Listening, acknowledgment, and so on.  4.  Encourage verbalization of thoughts or concerns and accept expressions of sadness, anger, rejection. Acknowledge normality of these  feelings  5.  Be aware of mood swings, hostility, and other acting-out behavior. Set limits on inappropriate behavior, redirect negative thinking  6.  Be aware of debilitating depression. Ask patient direct questions about state of mind.  7.  Visit frequently and provide physical contact as appropriate, or provide frequent phone support as appropriate for setting. Arrange for care provider and support person to stay with patient as needed  8.  Reinforce teaching regarding disease process and treatments and provide information as appropriate about dying. Be honest; do not give false hope while providing emotional support  9.  Review past life experiences, role changes, and coping skills. Talk about things that interest the patient  Outcome: Progressing  Goal: Spiritual and cultural needs incorporated into hospitalization  Description: Target End Date:  End of day 1    1.  Encourage verbalization of feelings, concerns, expectations and needs  2.  Collaborate with Case Management/  3.  Collaborate with Pastoral Care to meet spiritual needs  Outcome: Progressing     Problem: Communication  Goal: The ability to communicate needs accurately and effectively will improve  Description: Target End Date:  End of day 1    1.  Assess ability to communicate and understand  2.  Provide augmentative or alternative methods of communication devices  3.  Use /language line as appropriate  4.  Collaborate with Speech Therapy as needed  Outcome: Progressing     Problem: Hemodynamics  Goal: Patient's hemodynamics, fluid balance and neurologic status will be stable or improve  Description: Target End Date:  Prior to discharge or change in level of care    Document on Assessment and I/O flowsheet templates    1.  Monitor vital signs, pulse oximetry and cardiac monitor per provider order and/or policy  2.  Maintain blood pressure per provider order  3.  Hemodynamic monitoring per provider order  4.  Manage IV  fluids and IV infusions  5.  Monitor intake and output  6.  Daily weights per unit policy or provider order  7.  Assess peripheral pulses and capillary refill  8.  Assess color and body temperature  9.  Position patient for maximum circulation/cardiac output  10. Monitor for signs/symptoms of excessive bleeding  11. Assess mental status, restlessness and changes in level of consciousness  12. Monitor temperature and report fever or hypothermia to provider immediately. Consideration of targeted temperature management.  Outcome: Progressing     Problem: Respiratory  Goal: Patient will achieve/maintain optimum respiratory ventilation and gas exchange  Description: Target End Date:  Prior to discharge or change in level of care    Document on Assessment flowsheet    1.  Assess and monitor rate, rhythm, depth and effort of respiration  2.  Breath sounds assessed qshift and/or as needed  3.  Assess O2 saturation, administer/titrate oxygen as ordered  4.  Position patient for maximum ventilatory efficiency  5.  Turn, cough, and deep breath with splinting to improve effectiveness  6.  Collaborate with RT to administer medication/treatments per order  7.  Encourage use of incentive spirometer and encourage patient to cough after use and utilize splinting techniques if applicable  8.  Airway suctioning  9.  Monitor sputum production for changes in color, consistency and frequency  10. Perform frequent oral hygiene  11. Alternate physical activity with rest periods  Outcome: Progressing   The patient is Stable - Low risk of patient condition declining or worsening    Shift Goals  Clinical Goals: monitor VS, labs  Patient Goals: rest, comfort  Family Goals: DHARA    Progress made toward(s) clinical / shift goals:  VSS, safety    Patient is not progressing towards the following goals:

## 2024-05-07 NOTE — DISCHARGE SUMMARY
Discharge Summary    CHIEF COMPLAINT ON ADMISSION  No chief complaint on file.      Reason for Admission  STEMI     Admission Date  5/2/2024    CODE STATUS  Full Code    HPI & HOSPITAL COURSE  This is a 83 y.o. male here with ST elevation myocardial infarction, cardiology was consulted case.  Patient underwent LHC - Occluded proximal LAD (heavily calcified vessel) status post successful IVUS guided intravascular lithotripsy and PCI deploying overlapping Synergy 3 x 28 mm and 3 x 32 mm DOUGIE, postdilated to ~ 3.5-3.7 mm on 5/2/2024.  He was initially admitted by critical care to the ICU.  He was placed on DAPT.  He was also noted to have heart failure with reduced ejection fraction, ischemic cardiomyopathy with echocardiogram showing EF of 38%.  He was placed on Toprol-XL and Farxiga.  He was diuresed with Lasix.  He was unable to be started on ACE I/ARB and Aldactone due to low blood pressures.  Patient was noted to have anemia, his hemoglobin trended down to 7.6, he was transfused 1 unit PRBC.  There were no signs of bleeding.  He was admitted for history of GI bleeding on March 2024, where he had EGD - Esophageal stricture at the gastroesophageal junction, dilated, biopsied, patchy gastritis, mild and Colonoscopy - scattered sigmoid diverticulosis, minimal, Internal hemorrhoids on 3/6/2024.  His hemoglobin was found to be stable after transfusion.  Again there were no signs of bloody stool, melena or dark stools.  Cardiology has cleared the patient for discharge.  Patient has been advised that if he notices any bloody stool, dark or black stools that he needs to come back to the emergency room right away.    Therefore, he is discharged in good and stable condition to home with close outpatient follow-up.    The patient met 2-midnight criteria for an inpatient stay at the time of discharge.    Discharge Date  5/7/2024    FOLLOW UP ITEMS POST DISCHARGE  Follow-up as below    DISCHARGE DIAGNOSES  Principal Problem:     ST elevation myocardial infarction involving left anterior descending (LAD) coronary artery (HCC) (POA: Yes)  Active Problems:    Heart failure with reduced ejection fraction (HCC) (POA: Yes)    History of GI bleed (POA: Yes)    Anemia, chronic disease (POA: Yes)    CKD (chronic kidney disease) stage 3, GFR 30-59 ml/min (POA: Yes)    STEMI (ST elevation myocardial infarction) (HCC) (POA: Yes)    Elevated AST (SGOT) (POA: Unknown)      Overview: ?ETOH       Pt states he is not a regular drinker      Repeat lab in am and work up if not coming down  Resolved Problems:    * No resolved hospital problems. *      FOLLOW UP  Future Appointments   Date Time Provider Department Center   5/13/2024  1:30 PM Minh Contreras M.D. [facility] [[email or web address]]   5/20/2024 10:45 AM CAROLINE Gonzalez CARCABHILASH None     [facility]  [address]  [address]  [facility] [id number] [phone]  Call  Your doctor has referred you for Cardiac Rehab which is important in your recovery. Please call to make an appointment.    [facility] ([facility]) - Primary Care and Family Medicine  [address] [facility] [address]  [phone]  Go in 1 week(s)      [facility]  [address] [facility] [address]  [phone]  Go in 1 week(s)  CHF/ Cardiology follow up      MEDICATIONS ON DISCHARGE     Medication List        START taking these medications        Instructions   Aspirin Low Dose 81 MG EC tablet  Start taking on: May 8, 2024  Generic drug: aspirin   Take 1 Tablet by mouth every day.  Dose: 81 mg     atorvastatin 80 MG tablet  Commonly known as: Lipitor   Take 1 Tablet by mouth every evening.  Dose: 80 mg     clopidogrel 75 MG Tabs  Start taking on: May 8, 2024  Commonly known as: Plavix   Take 1 Tablet by mouth every day.  Dose: 75 mg     Farxiga 10 MG Tabs  Start taking on: May 8, 2024  Generic drug: dapagliflozin propanediol   Take 1 Tablet by mouth every day.  Dose: 10 mg     furosemide 20  MG Tabs  Commonly known as: Lasix   Take 1 Tablet by mouth every 48 hours.  Dose: 20 mg     metoprolol SR 25 MG Tb24  Start taking on: May 8, 2024  Commonly known as: Toprol XL   Take 1 Tablet by mouth every day.  Dose: 25 mg            CONTINUE taking these medications        Instructions   omeprazole 20 MG delayed-release capsule  Commonly known as: PriLOSEC   Take 1 Capsule by mouth 2 times a day.  Dose: 20 mg            STOP taking these medications      Multivitamin Tabs              Allergies  No Known Allergies    DIET  Orders Placed This Encounter   Procedures    Diet Order Diet: Cardiac     Standing Status:   Standing     Number of Occurrences:   1     Order Specific Question:   Diet:     Answer:   Cardiac [6]       ACTIVITY  As tolerated.  Weight bearing as tolerated    CONSULTATIONS  Cardiology    PROCEDURES  LHC - Occluded proximal LAD (heavily calcified vessel) status post successful IVUS guided intravascular lithotripsy and PCI deploying overlapping Synergy 3 x 28 mm and 3 x 32 mm DOUGIE, postdilated to ~ 3.5-3.7 mm on 5/2/2024    LABORATORY  Lab Results   Component Value Date    SODIUM 140 05/07/2024    POTASSIUM 3.8 05/07/2024    CHLORIDE 107 05/07/2024    CO2 20 05/07/2024    GLUCOSE 98 05/07/2024    BUN 26 (H) 05/07/2024    CREATININE 1.48 (H) 05/07/2024    CREATININE 1.2 02/11/2008        Lab Results   Component Value Date    WBC 7.9 05/07/2024    HEMOGLOBIN 9.3 (L) 05/07/2024    HEMATOCRIT 31.3 (L) 05/07/2024    PLATELETCT 192 05/07/2024        Total time of the discharge process exceeds 40 minutes.

## 2024-05-07 NOTE — DISCHARGE PLANNING
Meds-to-Beds: Discharge prescription orders listed below delivered to patient in discharge lounge. RN notified. Patient counseled. Case management authorized approved services.      Current Outpatient Medications   Medication Sig Dispense Refill    [START ON 5/8/2024] aspirin 81 MG EC tablet Take 1 Tablet by mouth every day. 100 Tablet 0    atorvastatin (LIPITOR) 80 MG tablet Take 1 Tablet by mouth every evening. 30 Tablet 2    [START ON 5/8/2024] clopidogrel (PLAVIX) 75 MG Tab Take 1 Tablet by mouth every day. 30 Tablet 2    [START ON 5/8/2024] dapagliflozin propanediol (FARXIGA) 10 MG Tab Take 1 Tablet by mouth every day. 30 Tablet 2    [START ON 5/8/2024] metoprolol SR (TOPROL XL) 25 MG TABLET SR 24 HR Take 1 Tablet by mouth every day. 30 Tablet 2    furosemide (LASIX) 20 MG Tab Take 1 Tablet by mouth every 48 hours. 30 Tablet 2      Brandi Campos, PharmD     Private car

## 2024-05-07 NOTE — THERAPY
Physical Therapy   Daily Treatment     Patient Name: Dilan Calderón  Age:  83 y.o., Sex:  male  Medical Record #: [id number]  Today's Date: 5/7/2024     Precautions  Precautions: Fall Risk;Cardiac Precautions (See Comments)  Comments: 38% EF, 5/4/24    Assessment    Today, pt is alert, motivated, attentive to education. Pt shows SOB with ambulation x 300 feet, with stable pulse ox in 90s, HR increase from 74 at rest to 103 during ambulation. Education done re impact of reduced ejection fraction and importance of gradual progression of home walking program. Phase II CR was also introduced during education. See details below.     Plan    Treatment Plan Status: Continue Current Treatment Plan  Type of Treatment: Bed Mobility, Equipment, Gait Training, Neuro Re-Education / Balance, Self Care / Home Evaluation, Stair Training, Therapeutic Activities, Therapeutic Exercise  Treatment Frequency: 4 Times per Week  Treatment Duration: Until Therapy Goals Met    DC Equipment Recommendations: None  Discharge Recommendations: Other - (phase II CR)           Objective       05/07/24 0944   Charge Group   Charges  Yes   PT Therapeutic Activities (Units) 1   PT Self Care / Home Evaluation (Units) 1   Total Time Spent   PT Therapeutic Activities Time Spent (Mins) 15   PT Self Care/Home Evaluation Time Spent (Mins) 15   PT Total Time Spent (Calculated) 30   Precautions   Precautions Fall Risk;Cardiac Precautions (See Comments)   Comments 38% EF, 5/4/24   Vitals   Pulse 77  (103 during walking)   Blood Pressure  102/64  (105/64 sitting)   Pulse Oximetry 95 %  (during ambulation)   Vitals Comments SOB with ambulation, ed done re pacing self with walking, using RPE scale, talk test to monitor pacing.   Pain 0 - 10 Group   Therapist Pain Assessment 0;During Activity;Nurse Notified   Cognition    Level of Consciousness Alert   Active ROM Lower Body    Active ROM Lower Body  WDL   Strength Lower Body   Lower Body Strength  WDL   Comments  functional for transfers, ambulation   Balance   Sitting Balance (Static) Good   Sitting Balance (Dynamic) Good   Standing Balance (Static) Fair   Standing Balance (Dynamic) Fair   Weight Shift Sitting Good   Weight Shift Standing Good   Skilled Intervention Verbal Cuing   Comments with no AD   Bed Mobility    Supine to Sit Supervised   Sit to Supine Supervised  (pt asks for BTB)   Scooting Supervised   Rolling Supervised   Skilled Intervention Verbal Cuing   Gait Analysis   Gait Level Of Assist Supervised   Assistive Device None   Distance (Feet) 300   # of Times Distance was Traveled 1   Deviation   (SOB with ambulation, Ed done re pacing)   # of Stairs Climbed 5   Level of Assist with Stairs Supervised   Skilled Intervention Verbal Cuing;Sequencing   Functional Mobility   Sit to Stand Supervised   Bed, Chair, Wheelchair Transfer Supervised   Skilled Intervention Verbal Cuing   6 Clicks Assessment - How much HELP from from another person do you currently need... (If the patient hasn't done an activity recently, how much help from another person do you think he/she would need if he/she tried?)   Turning from your back to your side while in a flat bed without using bedrails? 4   Moving from lying on your back to sitting on the side of a flat bed without using bedrails? 4   Moving to and from a bed to a chair (including a wheelchair)? 4   Standing up from a chair using your arms (e.g., wheelchair, or bedside chair)? 4   Walking in hospital room? 4   Climbing 3-5 steps with a railing? 4   6 clicks Mobility Score 24   Activity Tolerance   Standing 7+   Short Term Goals    Short Term Goal # 1 Pt will ambulate 450 feet without SOB or AD with supervision within 6 visits to progress return to community ambulator.   Goal Outcome # 1 goal not met   Short Term Goal # 2 Pt will ascend/descend 3 steps with SBA within 6 visits to progress return to prior level of living.   Goal Outcome # 2 Goal met   Short Term Goal # 3 Pt will  verbalize understanding of cardiac rehab precautions via teach-back without cues within 6 visits to progress understanding and comprehension of cardiac precautions.   Goal Outcome # 3 Goal not met   Education Group   Cardiac Precautions Patient Response Patient;Acceptance;Explanation;Verbal Demonstration;Action Demonstration   Additional Comments Handout reviewed again with details discussed about impact of reduced ejection fraction and activity tolerance, need to progress walking program gradually and phase II CR introduced. Pt is attentive.   Physical Therapy Treatment Plan   Physical Therapy Treatment Plan Continue Current Treatment Plan   Anticipated Discharge Equipment and Recommendations   DC Equipment Recommendations None   Discharge Recommendations Other -  (phase II CR)   Interdisciplinary Plan of Care Collaboration   IDT Collaboration with  Nursing   Patient Position at End of Therapy In Bed;Call Light within Reach;Tray Table within Reach;Phone within Reach  (nsg in to discuss that she has turned off pt's alarm, pt happy)   Collaboration Comments pt is upset with his bed alarm, education done re rational and nsg updated.   Session Information   Date / Session Number  5/7-3 (3/4, 5/9)

## 2024-05-07 NOTE — CARE PLAN
The patient is Stable - Low risk of patient condition declining or worsening    Shift Goals  Clinical Goals: VSS, monitor Hgb, D/C  Patient Goals: comfort, safety  Family Goals: DHARA    Progress made toward(s) clinical / shift goals:      Problem: Knowledge Deficit - Standard  Goal: Patient and family/care givers will demonstrate understanding of plan of care, disease process/condition, diagnostic tests and medications  Outcome: Progressing     Problem: Hemodynamics  Goal: Patient's hemodynamics, fluid balance and neurologic status will be stable or improve  Outcome: Progressing     Problem: Discharge Barriers/Planning  Goal: Patient's continuum of care needs are met  Outcome: Progressing       Patient is not progressing towards the following goals:

## 2024-05-07 NOTE — DISCHARGE PLANNING
Pt has no Medicare D. Provided meds for discharge by [facility]. Pt states he will be able to pay for meds in the future.

## 2024-05-07 NOTE — DISCHARGE PLANNING
Case Management Discharge Planning    Admission Date: 5/2/2024  GMLOS: 4.6  ALOS: 5    6-Clicks ADL Score: 24  6-Clicks Mobility Score: 23      Anticipated Discharge Dispo: Discharge Disposition: Discharged to home/self care (01)  Discharge Address: [facility] [address] [place] [address]    DME Needed: No    Action(s) Taken: Updated Provider/Nurse on Discharge Plan    Escalations Completed: None    Medically Clear: Yes    Next Steps: Has orders for discharge, IMM discussed and pt signed. Has no ride home, provided taxi voucher.    Barriers to Discharge: None    Is the patient up for discharge tomorrow: No

## 2024-05-22 ENCOUNTER — TELEPHONE (OUTPATIENT)
Dept: CARDIOLOGY | Facility: MEDICAL CENTER | Age: 84
End: 2024-05-22
Payer: MEDICARE

## 2024-05-25 ENCOUNTER — TELEPHONE (OUTPATIENT)
Dept: CARDIOLOGY | Facility: MEDICAL CENTER | Age: 84
End: 2024-05-25
Payer: MEDICARE

## 2024-11-10 ENCOUNTER — APPOINTMENT (OUTPATIENT)
Dept: RADIOLOGY | Facility: MEDICAL CENTER | Age: 84
DRG: 228 | End: 2024-11-10
Attending: STUDENT IN AN ORGANIZED HEALTH CARE EDUCATION/TRAINING PROGRAM
Payer: MEDICARE

## 2024-11-10 ENCOUNTER — HOSPITAL ENCOUNTER (INPATIENT)
Facility: MEDICAL CENTER | Age: 84
LOS: 4 days | DRG: 228 | End: 2024-11-14
Attending: STUDENT IN AN ORGANIZED HEALTH CARE EDUCATION/TRAINING PROGRAM | Admitting: HOSPITALIST
Payer: MEDICARE

## 2024-11-10 DIAGNOSIS — I21.4 NSTEMI (NON-ST ELEVATED MYOCARDIAL INFARCTION) (HCC): ICD-10-CM

## 2024-11-10 DIAGNOSIS — I51.3 LV (LEFT VENTRICULAR) MURAL THROMBUS: ICD-10-CM

## 2024-11-10 DIAGNOSIS — I21.02 ST ELEVATION MYOCARDIAL INFARCTION INVOLVING LEFT ANTERIOR DESCENDING (LAD) CORONARY ARTERY (HCC): ICD-10-CM

## 2024-11-10 PROBLEM — I50.23 ACUTE ON CHRONIC SYSTOLIC HEART FAILURE (HCC): Status: ACTIVE | Noted: 2024-11-10

## 2024-11-10 PROBLEM — R55 SYNCOPE: Status: ACTIVE | Noted: 2024-11-10

## 2024-11-10 LAB
ALBUMIN SERPL BCP-MCNC: 4.1 G/DL (ref 3.2–4.9)
ALBUMIN/GLOB SERPL: 1.4 G/DL
ALP SERPL-CCNC: 115 U/L (ref 30–99)
ALT SERPL-CCNC: 56 U/L (ref 2–50)
ANION GAP SERPL CALC-SCNC: 13 MMOL/L (ref 7–16)
APTT PPP: 26.6 SEC (ref 24.7–36)
AST SERPL-CCNC: 61 U/L (ref 12–45)
BASOPHILS # BLD AUTO: 0.5 % (ref 0–1.8)
BASOPHILS # BLD: 0.04 K/UL (ref 0–0.12)
BILIRUB SERPL-MCNC: 0.2 MG/DL (ref 0.1–1.5)
BUN SERPL-MCNC: 26 MG/DL (ref 8–22)
CALCIUM ALBUM COR SERPL-MCNC: 9.5 MG/DL (ref 8.5–10.5)
CALCIUM SERPL-MCNC: 9.6 MG/DL (ref 8.5–10.5)
CHLORIDE SERPL-SCNC: 106 MMOL/L (ref 96–112)
CO2 SERPL-SCNC: 22 MMOL/L (ref 20–33)
CREAT SERPL-MCNC: 1.42 MG/DL (ref 0.5–1.4)
EKG IMPRESSION: NORMAL
EOSINOPHIL # BLD AUTO: 0.17 K/UL (ref 0–0.51)
EOSINOPHIL NFR BLD: 2 % (ref 0–6.9)
ERYTHROCYTE [DISTWIDTH] IN BLOOD BY AUTOMATED COUNT: 52.1 FL (ref 35.9–50)
GFR SERPLBLD CREATININE-BSD FMLA CKD-EPI: 49 ML/MIN/1.73 M 2
GLOBULIN SER CALC-MCNC: 3 G/DL (ref 1.9–3.5)
GLUCOSE SERPL-MCNC: 96 MG/DL (ref 65–99)
HCT VFR BLD AUTO: 39.8 % (ref 42–52)
HGB BLD-MCNC: 13.3 G/DL (ref 14–18)
IMM GRANULOCYTES # BLD AUTO: 0.03 K/UL (ref 0–0.11)
IMM GRANULOCYTES NFR BLD AUTO: 0.3 % (ref 0–0.9)
INR PPP: 0.99 (ref 0.87–1.13)
LYMPHOCYTES # BLD AUTO: 3.14 K/UL (ref 1–4.8)
LYMPHOCYTES NFR BLD: 36.4 % (ref 22–41)
MAGNESIUM SERPL-MCNC: 2.1 MG/DL (ref 1.5–2.5)
MCH RBC QN AUTO: 33.6 PG (ref 27–33)
MCHC RBC AUTO-ENTMCNC: 33.4 G/DL (ref 32.3–36.5)
MCV RBC AUTO: 100.5 FL (ref 81.4–97.8)
MONOCYTES # BLD AUTO: 0.66 K/UL (ref 0–0.85)
MONOCYTES NFR BLD AUTO: 7.6 % (ref 0–13.4)
NEUTROPHILS # BLD AUTO: 4.59 K/UL (ref 1.82–7.42)
NEUTROPHILS NFR BLD: 53.2 % (ref 44–72)
NRBC # BLD AUTO: 0 K/UL
NRBC BLD-RTO: 0 /100 WBC (ref 0–0.2)
PHOSPHATE SERPL-MCNC: 3.1 MG/DL (ref 2.5–4.5)
PLATELET # BLD AUTO: 178 K/UL (ref 164–446)
PMV BLD AUTO: 10.8 FL (ref 9–12.9)
POTASSIUM SERPL-SCNC: 3.9 MMOL/L (ref 3.6–5.5)
PROT SERPL-MCNC: 7.1 G/DL (ref 6–8.2)
PROTHROMBIN TIME: 13.1 SEC (ref 12–14.6)
RBC # BLD AUTO: 3.96 M/UL (ref 4.7–6.1)
SODIUM SERPL-SCNC: 141 MMOL/L (ref 135–145)
TROPONIN T SERPL-MCNC: 102 NG/L (ref 6–19)
TROPONIN T SERPL-MCNC: 78 NG/L (ref 6–19)
TROPONIN T SERPL-MCNC: 93 NG/L (ref 6–19)
UFH PPP CHRO-ACNC: 0.43 IU/ML
UFH PPP CHRO-ACNC: 0.57 IU/ML
UFH PPP CHRO-ACNC: <0.1 IU/ML
VIT B12 SERPL-MCNC: 416 PG/ML (ref 211–911)
WBC # BLD AUTO: 8.6 K/UL (ref 4.8–10.8)

## 2024-11-10 PROCEDURE — 85610 PROTHROMBIN TIME: CPT

## 2024-11-10 PROCEDURE — 72125 CT NECK SPINE W/O DYE: CPT

## 2024-11-10 PROCEDURE — 700102 HCHG RX REV CODE 250 W/ 637 OVERRIDE(OP): Performed by: STUDENT IN AN ORGANIZED HEALTH CARE EDUCATION/TRAINING PROGRAM

## 2024-11-10 PROCEDURE — 93005 ELECTROCARDIOGRAM TRACING: CPT | Performed by: STUDENT IN AN ORGANIZED HEALTH CARE EDUCATION/TRAINING PROGRAM

## 2024-11-10 PROCEDURE — 303747 HCHG EXTRA SUTURE

## 2024-11-10 PROCEDURE — 83735 ASSAY OF MAGNESIUM: CPT

## 2024-11-10 PROCEDURE — 36415 COLL VENOUS BLD VENIPUNCTURE: CPT

## 2024-11-10 PROCEDURE — 0HQ1XZZ REPAIR FACE SKIN, EXTERNAL APPROACH: ICD-10-PCS | Performed by: STUDENT IN AN ORGANIZED HEALTH CARE EDUCATION/TRAINING PROGRAM

## 2024-11-10 PROCEDURE — 93005 ELECTROCARDIOGRAM TRACING: CPT | Performed by: HOSPITALIST

## 2024-11-10 PROCEDURE — 99291 CRITICAL CARE FIRST HOUR: CPT | Performed by: HOSPITALIST

## 2024-11-10 PROCEDURE — 90471 IMMUNIZATION ADMIN: CPT

## 2024-11-10 PROCEDURE — A9270 NON-COVERED ITEM OR SERVICE: HCPCS | Performed by: HOSPITALIST

## 2024-11-10 PROCEDURE — 700111 HCHG RX REV CODE 636 W/ 250 OVERRIDE (IP): Performed by: STUDENT IN AN ORGANIZED HEALTH CARE EDUCATION/TRAINING PROGRAM

## 2024-11-10 PROCEDURE — 90715 TDAP VACCINE 7 YRS/> IM: CPT | Performed by: STUDENT IN AN ORGANIZED HEALTH CARE EDUCATION/TRAINING PROGRAM

## 2024-11-10 PROCEDURE — 700101 HCHG RX REV CODE 250: Performed by: STUDENT IN AN ORGANIZED HEALTH CARE EDUCATION/TRAINING PROGRAM

## 2024-11-10 PROCEDURE — 84100 ASSAY OF PHOSPHORUS: CPT

## 2024-11-10 PROCEDURE — 70450 CT HEAD/BRAIN W/O DYE: CPT

## 2024-11-10 PROCEDURE — 85730 THROMBOPLASTIN TIME PARTIAL: CPT

## 2024-11-10 PROCEDURE — 99285 EMERGENCY DEPT VISIT HI MDM: CPT

## 2024-11-10 PROCEDURE — 99223 1ST HOSP IP/OBS HIGH 75: CPT | Performed by: INTERNAL MEDICINE

## 2024-11-10 PROCEDURE — A9270 NON-COVERED ITEM OR SERVICE: HCPCS | Performed by: STUDENT IN AN ORGANIZED HEALTH CARE EDUCATION/TRAINING PROGRAM

## 2024-11-10 PROCEDURE — 304999 HCHG REPAIR-SIMPLE/INTERMED LEVEL 1

## 2024-11-10 PROCEDURE — 82607 VITAMIN B-12: CPT

## 2024-11-10 PROCEDURE — 770020 HCHG ROOM/CARE - TELE (206)

## 2024-11-10 PROCEDURE — 84484 ASSAY OF TROPONIN QUANT: CPT | Mod: 91

## 2024-11-10 PROCEDURE — 80053 COMPREHEN METABOLIC PANEL: CPT

## 2024-11-10 PROCEDURE — 71045 X-RAY EXAM CHEST 1 VIEW: CPT

## 2024-11-10 PROCEDURE — 85025 COMPLETE CBC W/AUTO DIFF WBC: CPT

## 2024-11-10 PROCEDURE — 304217 HCHG IRRIGATION SYSTEM

## 2024-11-10 PROCEDURE — 85520 HEPARIN ASSAY: CPT | Mod: 91

## 2024-11-10 PROCEDURE — 700102 HCHG RX REV CODE 250 W/ 637 OVERRIDE(OP): Performed by: HOSPITALIST

## 2024-11-10 PROCEDURE — 3E0234Z INTRODUCTION OF SERUM, TOXOID AND VACCINE INTO MUSCLE, PERCUTANEOUS APPROACH: ICD-10-PCS | Performed by: STUDENT IN AN ORGANIZED HEALTH CARE EDUCATION/TRAINING PROGRAM

## 2024-11-10 RX ORDER — ACETAMINOPHEN 500 MG
500-1000 TABLET ORAL 2 TIMES DAILY PRN
COMMUNITY

## 2024-11-10 RX ORDER — NITROGLYCERIN 0.4 MG/1
0.4 TABLET SUBLINGUAL
Status: DISCONTINUED | OUTPATIENT
Start: 2024-11-10 | End: 2024-11-14 | Stop reason: HOSPADM

## 2024-11-10 RX ORDER — HEPARIN SODIUM 1000 [USP'U]/ML
30 INJECTION, SOLUTION INTRAVENOUS; SUBCUTANEOUS PRN
Status: DISCONTINUED | OUTPATIENT
Start: 2024-11-10 | End: 2024-11-13

## 2024-11-10 RX ORDER — DAPAGLIFLOZIN 10 MG/1
10 TABLET, FILM COATED ORAL DAILY
Status: DISCONTINUED | OUTPATIENT
Start: 2024-11-10 | End: 2024-11-10

## 2024-11-10 RX ORDER — ASPIRIN 81 MG/1
81 TABLET, CHEWABLE ORAL DAILY
Status: DISCONTINUED | OUTPATIENT
Start: 2024-11-11 | End: 2024-11-14 | Stop reason: HOSPADM

## 2024-11-10 RX ORDER — LIDOCAINE HYDROCHLORIDE AND EPINEPHRINE 10; 10 MG/ML; UG/ML
20 INJECTION, SOLUTION INFILTRATION; PERINEURAL ONCE
Status: COMPLETED | OUTPATIENT
Start: 2024-11-10 | End: 2024-11-10

## 2024-11-10 RX ORDER — HEPARIN SODIUM 5000 [USP'U]/100ML
0-30 INJECTION, SOLUTION INTRAVENOUS CONTINUOUS
Status: DISCONTINUED | OUTPATIENT
Start: 2024-11-10 | End: 2024-11-13

## 2024-11-10 RX ORDER — CLOPIDOGREL BISULFATE 75 MG/1
75 TABLET ORAL DAILY
Status: DISCONTINUED | OUTPATIENT
Start: 2024-11-10 | End: 2024-11-14 | Stop reason: HOSPADM

## 2024-11-10 RX ORDER — ASPIRIN 81 MG/1
324 TABLET, CHEWABLE ORAL ONCE
Status: ACTIVE | OUTPATIENT
Start: 2024-11-10 | End: 2024-11-11

## 2024-11-10 RX ORDER — HEPARIN SODIUM 1000 [USP'U]/ML
60 INJECTION, SOLUTION INTRAVENOUS; SUBCUTANEOUS ONCE
Status: COMPLETED | OUTPATIENT
Start: 2024-11-10 | End: 2024-11-10

## 2024-11-10 RX ORDER — ACETAMINOPHEN 325 MG/1
650 TABLET ORAL EVERY 6 HOURS PRN
Status: DISCONTINUED | OUTPATIENT
Start: 2024-11-10 | End: 2024-11-14 | Stop reason: HOSPADM

## 2024-11-10 RX ORDER — HYDRALAZINE HYDROCHLORIDE 20 MG/ML
10 INJECTION INTRAMUSCULAR; INTRAVENOUS EVERY 4 HOURS PRN
Status: DISCONTINUED | OUTPATIENT
Start: 2024-11-10 | End: 2024-11-14 | Stop reason: HOSPADM

## 2024-11-10 RX ORDER — ATORVASTATIN CALCIUM 80 MG/1
80 TABLET, FILM COATED ORAL EVERY EVENING
Status: DISCONTINUED | OUTPATIENT
Start: 2024-11-10 | End: 2024-11-14 | Stop reason: HOSPADM

## 2024-11-10 RX ADMIN — HEPARIN SODIUM 3800 UNITS: 1000 INJECTION, SOLUTION INTRAVENOUS; SUBCUTANEOUS at 07:25

## 2024-11-10 RX ADMIN — ACETAMINOPHEN 650 MG: 325 TABLET ORAL at 19:45

## 2024-11-10 RX ADMIN — CLOPIDOGREL BISULFATE 75 MG: 75 TABLET ORAL at 07:21

## 2024-11-10 RX ADMIN — CLOSTRIDIUM TETANI TOXOID ANTIGEN (FORMALDEHYDE INACTIVATED), CORYNEBACTERIUM DIPHTHERIAE TOXOID ANTIGEN (FORMALDEHYDE INACTIVATED), BORDETELLA PERTUSSIS TOXOID ANTIGEN (GLUTARALDEHYDE INACTIVATED), BORDETELLA PERTUSSIS FILAMENTOUS HEMAGGLUTININ ANTIGEN (FORMALDEHYDE INACTIVATED), BORDETELLA PERTUSSIS PERTACTIN ANTIGEN, AND BORDETELLA PERTUSSIS FIMBRIAE 2/3 ANTIGEN 0.5 ML: 5; 2; 2.5; 5; 3; 5 INJECTION, SUSPENSION INTRAMUSCULAR at 05:06

## 2024-11-10 RX ADMIN — ATORVASTATIN CALCIUM 80 MG: 80 TABLET, FILM COATED ORAL at 17:12

## 2024-11-10 RX ADMIN — HEPARIN SODIUM 12 UNITS/KG/HR: 5000 INJECTION, SOLUTION INTRAVENOUS at 07:27

## 2024-11-10 RX ADMIN — LIDOCAINE HYDROCHLORIDE AND EPINEPHRINE 20 ML: 10; 10 INJECTION, SOLUTION INFILTRATION; PERINEURAL at 05:54

## 2024-11-10 SDOH — ECONOMIC STABILITY: TRANSPORTATION INSECURITY
IN THE PAST 12 MONTHS, HAS THE LACK OF TRANSPORTATION KEPT YOU FROM MEDICAL APPOINTMENTS OR FROM GETTING MEDICATIONS?: NO

## 2024-11-10 SDOH — ECONOMIC STABILITY: TRANSPORTATION INSECURITY
IN THE PAST 12 MONTHS, HAS LACK OF RELIABLE TRANSPORTATION KEPT YOU FROM MEDICAL APPOINTMENTS, MEETINGS, WORK OR FROM GETTING THINGS NEEDED FOR DAILY LIVING?: NO

## 2024-11-10 ASSESSMENT — SOCIAL DETERMINANTS OF HEALTH (SDOH)
IN THE PAST 12 MONTHS, HAS THE ELECTRIC, GAS, OIL, OR WATER COMPANY THREATENED TO SHUT OFF SERVICE IN YOUR HOME?: NO
WITHIN THE PAST 12 MONTHS, THE FOOD YOU BOUGHT JUST DIDN'T LAST AND YOU DIDN'T HAVE MONEY TO GET MORE: NEVER TRUE
WITHIN THE LAST YEAR, HAVE YOU BEEN KICKED, HIT, SLAPPED, OR OTHERWISE PHYSICALLY HURT BY YOUR PARTNER OR EX-PARTNER?: NO
WITHIN THE LAST YEAR, HAVE YOU BEEN HUMILIATED OR EMOTIONALLY ABUSED IN OTHER WAYS BY YOUR PARTNER OR EX-PARTNER?: NO
WITHIN THE PAST 12 MONTHS, YOU WORRIED THAT YOUR FOOD WOULD RUN OUT BEFORE YOU GOT THE MONEY TO BUY MORE: NEVER TRUE
WITHIN THE LAST YEAR, HAVE TO BEEN RAPED OR FORCED TO HAVE ANY KIND OF SEXUAL ACTIVITY BY YOUR PARTNER OR EX-PARTNER?: NO
WITHIN THE LAST YEAR, HAVE YOU BEEN AFRAID OF YOUR PARTNER OR EX-PARTNER?: NO

## 2024-11-10 ASSESSMENT — ENCOUNTER SYMPTOMS
POLYDIPSIA: 0
HEMOPTYSIS: 0
TINGLING: 0
SINUS PAIN: 0
DEPRESSION: 0
PALPITATIONS: 0
BLOOD IN STOOL: 0
SORE THROAT: 0
BLURRED VISION: 0
VOMITING: 0
STRIDOR: 0
ABDOMINAL PAIN: 0
NAUSEA: 1
BACK PAIN: 0
BRUISES/BLEEDS EASILY: 0
COUGH: 0
HEADACHES: 0
TREMORS: 0
CONSTIPATION: 0
WHEEZING: 0
FEVER: 0
HALLUCINATIONS: 0
EYE PAIN: 0
SHORTNESS OF BREATH: 1
MYALGIAS: 0
DIAPHORESIS: 0
FALLS: 0
FLANK PAIN: 0
CLAUDICATION: 0
CHILLS: 0
DIZZINESS: 1
PND: 0
SPUTUM PRODUCTION: 0
NECK PAIN: 0
DIARRHEA: 0
ORTHOPNEA: 0
WEAKNESS: 1
DOUBLE VISION: 0
PHOTOPHOBIA: 0
HEARTBURN: 0

## 2024-11-10 ASSESSMENT — PAIN DESCRIPTION - PAIN TYPE
TYPE: ACUTE PAIN

## 2024-11-10 ASSESSMENT — COGNITIVE AND FUNCTIONAL STATUS - GENERAL
DAILY ACTIVITIY SCORE: 24
SUGGESTED CMS G CODE MODIFIER DAILY ACTIVITY: CH
SUGGESTED CMS G CODE MODIFIER MOBILITY: CH
MOBILITY SCORE: 24

## 2024-11-10 ASSESSMENT — HEART SCORE
ECG: NON-SPECIFIC REPOLARIZATION DISTURBANCE
AGE: 65+
RISK FACTORS: >2 RISK FACTORS OR HX OF ATHEROSCLEROTIC DISEASE
TROPONIN: GREATER THAN OR EQUAL TO 3 TIMES NORMAL LIMIT
HEART SCORE: 7
HISTORY: SLIGHTLY SUSPICIOUS

## 2024-11-10 ASSESSMENT — LIFESTYLE VARIABLES
TOTAL SCORE: 0
CONSUMPTION TOTAL: NEGATIVE
TOTAL SCORE: 0
SUBSTANCE_ABUSE: 0
AVERAGE NUMBER OF DAYS PER WEEK YOU HAVE A DRINK CONTAINING ALCOHOL: 0
ON A TYPICAL DAY WHEN YOU DRINK ALCOHOL HOW MANY DRINKS DO YOU HAVE: 0
HOW MANY TIMES IN THE PAST YEAR HAVE YOU HAD 5 OR MORE DRINKS IN A DAY: 0
ALCOHOL_USE: NO
HAVE YOU EVER FELT YOU SHOULD CUT DOWN ON YOUR DRINKING: NO
EVER HAD A DRINK FIRST THING IN THE MORNING TO STEADY YOUR NERVES TO GET RID OF A HANGOVER: NO
HAVE PEOPLE ANNOYED YOU BY CRITICIZING YOUR DRINKING: NO
DOES PATIENT WANT TO STOP DRINKING: NO
TOTAL SCORE: 0
EVER FELT BAD OR GUILTY ABOUT YOUR DRINKING: NO

## 2024-11-10 ASSESSMENT — FIBROSIS 4 INDEX
FIB4 SCORE: 3.76
FIB4 SCORE: 3.37
FIB4 SCORE: 3.76

## 2024-11-10 ASSESSMENT — PATIENT HEALTH QUESTIONNAIRE - PHQ9
SUM OF ALL RESPONSES TO PHQ9 QUESTIONS 1 AND 2: 0
2. FEELING DOWN, DEPRESSED, IRRITABLE, OR HOPELESS: NOT AT ALL
1. LITTLE INTEREST OR PLEASURE IN DOING THINGS: NOT AT ALL

## 2024-11-10 NOTE — DISCHARGE INSTR - DIET
Heart-Healthy Nutrition Therapy    A heart-healthy diet is recommended to reduce your unhealthy blood cholesterol levels, manage high blood pressure, and lower your risk for heart disease.    To follow a heart-healthy diet,    Eat a balanced diet with whole grains, fruits and vegetables, and lean protein sources.  Achieve and maintain a healthy weight.  Choose heart-healthy unsaturated fats. Limit saturated fats, trans fats, and cholesterol intake. Eat more plant-based or vegetarian meals using beans and soy foods for protein.  Eat whole, unprocessed foods to limit the amount of sodium (salt) you eat.  Limit refined carbohydrates especially sugar, sweets and sugar-sweetened beverages.  If you drink alcohol, do so in moderation: one serving per day (women) and two servings per day (men).  One serving is equivalent to 12 ounces beer, 5 ounces wine, or 1.5 ounces distilled spirits    Tips for Choosing Heart-Healthy Fats    Choose lean protein and low-fat dairy foods to reduce saturated fat intake.    Saturated fat is usually found in animal-based protein and is associated with certain health risks. Saturated fat is the biggest contributor to raised low-density lipoprotein (LDL) cholesterol levels in the diet. Research shows that limiting saturated fat lowers unhealthy cholesterol levels. Eat no more than 5-6% of your total calories each day from saturated fat. Ask your registered dietitian nutritionist (RDN) to help you determine how much saturated fat is right for you.  There are many foods that do not contain large amounts of saturated fats. Swapping these foods to replace foods high in saturated fats will help you limit the saturated fat you eat and improve your cholesterol levels. You can also try eating more plant-based or vegetarian meals.        Avoid trans fats    Trans fats increase levels of LDL-cholesterol. Hydrogenated fat in processed foods is the main source of trans fats in foods.   Trans fats can be  found in stick margarine, shortening, processed sweets, baked goods, some fried foods, and packaged foods made with hydrogenated oils. Avoid foods with “partially hydrogenated oil” on the ingredient list such as: cookies, pastries, baked goods, biscuits, crackers, microwave popcorn, and frozen dinners.    Choose foods with heart healthy fats    Polyunsaturated and monounsaturated fat are unsaturated fats that may help lower your blood cholesterol level when used in place of saturated fat in your diet.  Ask your RDN about taking a dietary supplement with plant sterols and stanols to help lower your cholesterol level.  Research shows that substituting saturated fats with unsaturated fats is beneficial to cholesterol levels. Try these easy swaps:      Limit the amount of cholesterol you eat to less than 200 milligrams per day.    Cholesterol is a substance carried through the bloodstream via lipoproteins, which are known as “transporters” of fat. Some body functions need cholesterol to work properly, but too much cholesterol in the bloodstream can damage arteries and build up blood vessel linings (which can lead to heart attack and stroke). You should eat less than 200 milligrams cholesterol per day.  People respond differently to eating cholesterol. There is no test available right now that can figure out which people will respond more to dietary cholesterol and which will respond less. For individuals with high intake of dietary cholesterol, different types of increase (none, small, moderate, large) in LDL-cholesterol levels are all possible.    Food sources of cholesterol include egg yolks and organ meats such as liver, gizzards.  Limit egg yolks to two to four per week and avoid organ meats like liver and gizzards to control cholesterol intake.    Tips for Choosing Heart-Healthy Carbohydrates    Consume foods rich in viscous (soluble) fiber    Viscous, or soluble, fiber is found in the walls of plant cells. Viscous  fiber is found only in plant-based foods--animal-based foods like meat or dairy products do not contain fiber. In the stomach, viscous fibers absorb water and swell to form a thick, jelly-like mass. This helps to lower your unhealthy cholesterol  Rich sources of viscous fiber include asparagus, [place] sprouts, sweet potatoes, turnips, apricots, mangoes, oranges, legumes, barley, oats, and oat bran.  Eat at least 5 to 10 grams of viscous fiber each day. As you increase your fiber intake gradually, also increase the amount of water you drink. This will help prevent constipation.  If you have difficulty achieving this goal, ask your RDN about fiber laxatives. Choose fiber supplements made with viscous fibers such as psyllium seed husks or methylcellulose to help lower unhealthy cholesterol.    Limit refined carbohydrates    There are three types of carbohydrates: starches, sugar, and fiber. Some carbohydrates occur naturally in food, like the starches in rice or corn or the sugars in fruits and milk. Refined carbohydrates--foods with high amounts of simple sugars--can raise triglyceride levels. High triglyceride levels are associated with coronary heart disease.  Some examples of refined carbohydrate foods are table sugar, sweets, and beverages sweetened with added sugar.    Tips for Reducing Sodium (Salt)  Although sodium is important for your body to function, too much sodium can be harmful for people with high blood pressure.  As sodium and fluid buildup in your tissues and bloodstream, your blood pressure increases. High blood pressure may cause damage to other organs and increase your risk for a stroke.    Keep your salt intake to 2300 milligrams or less per day. Even if you take a pill for blood pressure or a water pill (diuretic) to remove fluid, it is still important to have less salt in your diet. Ask your RDN what amount of sodium is right for you.    Avoid processed foods.  Eat more fresh foods.  Fresh  "fruits and vegetables are naturally low in sodium, as well as frozen vegetables and fruits that have no added juices or sauces.  Fresh meats are lower in sodium than processed meats, such as sprague, sausage, and hotdogs.  Read the nutrition label or ask your  to help you find a fresh meat that is low in sodium.  Eat less salt--at the table and when cooking.  A single teaspoon of table salt has 2,300 mg of sodium.  Leave the salt out of recipes for pasta, casseroles, and soups.  Ask your RDN how to cook your favorite recipes without sodium  Be a smart .  Look for food packages that say “salt-free” or “sodium-free.” These items contain less than 5 milligrams of sodium per serving.  “Very low-sodium” products contain less than 35 milligrams of sodium per serving.  “Low-sodium” products contain less than 140 milligrams of sodium per serving.   Beware of “reduced salt” or \"reduced sodium\" products.  These items may still be high in sodium. Check the nutrition label.   Add flavors to your food without adding sodium.  Try lemon juice, lime juice, fruit juice or vinegar.    Dry or fresh herbs add flavor. Try basil, bay leaf, dill, rosemary, parsley, frandy, dry mustard, nutmeg, thyme, and paprika.  Pepper, red pepper flakes, and cayenne pepper can add spice to your meals without adding sodium. Hot sauce contains sodium, but if you use just a drop or two, it will not add up to much.  Buy a sodium-free seasoning blend or make your own at home.     Additional Lifestyle Tips    Achieve and maintain a healthy weight.  Talk with your RDN or your doctor about what is a healthy weight for you.  Set goals to reach and maintain that weight.   To lose weight, reduce your calorie intake along with increasing your physical activity. A weight loss of 10 to 15 pounds could reduce LDL-cholesterol by 5 milligrams per deciliter.  Participate in physical activity.    Talk with your health care team to find out what types of " physical activity are best for you. Set a plan to get about 30 minutes of exercise on most days.          Nutrition Counseling  Our expert team offers:   Medical Nutrition Therapy for Chronic Conditions   Weight Management   Diabetes Education and Management   Wellness Services   Body Composition Measurements   Gastrointestinal Health    [facility] are located at:  [address], [place] [address]  For more information and to schedule a consultation, please call [phone].  A physician referral may be required by your insurance for coverage.

## 2024-11-10 NOTE — CARE PLAN
The patient is Stable - Low risk of patient condition declining or worsening    Shift Goals  Clinical Goals: trend trops, monitor for chest pain  Patient Goals: get better  Family Goals: arlen    Progress made toward(s) clinical / shift goals:    Problem: Knowledge Deficit - Standard  Goal: Patient and family/care givers will demonstrate understanding of plan of care, disease process/condition, diagnostic tests and medications  Outcome: Progressing  Note: Discuss and review POC with patient/family. Re-educate as needed.       Patient is not progressing towards the following goals:

## 2024-11-10 NOTE — H&P
Hospital Medicine History & Physical Note    Date of Service  11/10/2024    Primary Care Physician  Pcp Pt States None    Consultants  Consult cardiology morning    Specialist Names:     Code Status  Full Code    Chief Complaint  Chief Complaint   Patient presents with    Syncope     Pt woke to floor in kitchen in pool of blood with LAC above Rt eyebrow.     Extremity Weakness     X 2 days with occasional chest pain radiating across upper chest to bilateral shoulders. Intermittent. Pt reports it is not the same as cardiac chest pain.        History of Presenting Illness  Dilan Calderón is a 84 y.o. male who presented 11/10/2024 with past medical history of coronary disease, systolic heart failure, history of GI bleed who presents to the hospital for syncopal event.  The patient was in the kitchen when he suddenly lost consciousness.  He does not remember how long he was on the floor but he woke up with a pool of blood and a cut to his eye.  He was feeling lightheaded and dizzy prior to this event.  For the past 10 days he has been complained of chest pain radiating to both arms.  He has also had shortness of breath on exertion.  Patient was evaluated on 5/2024 for a STEMI where he underwent a cardiac cath that found occluded LAD.  A DOUGIE was placed and he was recommended to take DAPT for the next year.  The patient only took those medications for 30 days and has not follow-up with cardiology.  He currently denies any bloody stools or bloody vomit.    EKG interpreted by me found normal sinus rhythm without ST segment changes  Chest x-ray interpreted by me found no acute pulmonary process        I discussed the plan of care with patient.    Review of Systems  Review of Systems   Constitutional:  Negative for chills, diaphoresis, fever and malaise/fatigue.   HENT:  Negative for congestion, ear discharge, ear pain, hearing loss, nosebleeds, sinus pain, sore throat and tinnitus.    Eyes:  Negative for blurred vision,  double vision, photophobia and pain.   Respiratory:  Positive for shortness of breath. Negative for cough, hemoptysis, sputum production, wheezing and stridor.    Cardiovascular:  Positive for chest pain. Negative for palpitations, orthopnea, claudication, leg swelling and PND.   Gastrointestinal:  Positive for nausea. Negative for abdominal pain, blood in stool, constipation, diarrhea, heartburn, melena and vomiting.   Genitourinary:  Negative for dysuria, flank pain, frequency, hematuria and urgency.   Musculoskeletal:  Negative for back pain, falls, joint pain, myalgias and neck pain.   Skin:  Negative for itching and rash.   Neurological:  Positive for dizziness and weakness. Negative for tingling, tremors and headaches.   Endo/Heme/Allergies:  Negative for environmental allergies and polydipsia. Does not bruise/bleed easily.   Psychiatric/Behavioral:  Negative for depression, hallucinations, substance abuse and suicidal ideas.        Past Medical History  Coronary disease    Surgical History   has a past surgical history that includes pr upper gi endoscopy,diagnosis (N/A, 3/6/2024); pr colonoscopy,diagnostic (N/A, 3/6/2024); and pr upper gi endoscopy,w/dilat,gastric out (N/A, 3/6/2024).     Family History  Family history reviewed with patient. There is no family history that is pertinent to the chief complaint.     Social History   reports that he has quit smoking. His smoking use included cigarettes. He has never used smokeless tobacco. He reports that he does not currently use alcohol. He reports that he does not use drugs.    Allergies  No Known Allergies    Medications  Prior to Admission Medications   Prescriptions Last Dose Informant Patient Reported? Taking?   aspirin 81 MG EC tablet   No No   Sig: Take 1 Tablet by mouth every day.   atorvastatin (LIPITOR) 80 MG tablet   No No   Sig: Take 1 Tablet by mouth every evening.   clopidogrel (PLAVIX) 75 MG Tab   No No   Sig: Take 1 Tablet by mouth every day.    dapagliflozin propanediol (FARXIGA) 10 MG Tab   No No   Sig: Take 1 Tablet by mouth every day.   furosemide (LASIX) 20 MG Tab   No No   Sig: Take 1 Tablet by mouth every 48 hours.   metoprolol SR (TOPROL XL) 25 MG TABLET SR 24 HR   No No   Sig: Take 1 Tablet by mouth every day.   omeprazole (PRILOSEC) 20 MG delayed-release capsule   No No   Sig: Take 1 Capsule by mouth 2 times a day.      Facility-Administered Medications: None       Physical Exam  Temp:  [36.6 °C (97.8 °F)] 36.6 °C (97.8 °F)  Pulse:  [62-66] 66  Resp:  [16-18] 18  BP: (119-169)/(66-77) 119/66  SpO2:  [95 %-98 %] 95 %  Blood Pressure : 119/66   Temperature: 36.6 °C (97.8 °F)   Pulse: 66   Respiration: 18   Pulse Oximetry: 95 %       Physical Exam  Vitals and nursing note reviewed.   Constitutional:       General: He is not in acute distress.     Appearance: Normal appearance. He is not ill-appearing, toxic-appearing or diaphoretic.   HENT:      Head: Normocephalic and atraumatic.      Nose: No congestion or rhinorrhea.      Mouth/Throat:      Pharynx: No posterior oropharyngeal erythema.   Eyes:      General: No scleral icterus.        Right eye: No discharge.   Cardiovascular:      Rate and Rhythm: Normal rate and regular rhythm.      Pulses: Normal pulses.      Heart sounds: Normal heart sounds. No murmur heard.     No friction rub. No gallop.   Pulmonary:      Effort: Pulmonary effort is normal. No respiratory distress.      Breath sounds: Normal breath sounds. No stridor. No wheezing, rhonchi or rales.   Abdominal:      General: There is no distension.      Tenderness: There is no abdominal tenderness.   Musculoskeletal:         General: No swelling, tenderness, deformity or signs of injury.      Cervical back: Normal range of motion.      Right lower leg: No edema.      Left lower leg: No edema.   Skin:     Coloration: Skin is not jaundiced or pale.      Findings: No bruising, erythema, lesion or rash.   Neurological:      General: No focal  "deficit present.      Mental Status: He is alert and oriented to person, place, and time.         Laboratory:  Recent Labs     11/10/24  0458   WBC 8.6   RBC 3.96*   HEMOGLOBIN 13.3*   HEMATOCRIT 39.8*   .5*   MCH 33.6*   MCHC 33.4   RDW 52.1*   PLATELETCT 178   MPV 10.8     Recent Labs     11/10/24  0458   SODIUM 141   POTASSIUM 3.9   CHLORIDE 106   CO2 22   GLUCOSE 96   BUN 26*   CREATININE 1.42*   CALCIUM 9.6     Recent Labs     11/10/24  0458   ALTSGPT 56*   ASTSGOT 61*   ALKPHOSPHAT 115*   TBILIRUBIN 0.2   GLUCOSE 96     Recent Labs     11/10/24  0458   INR 0.99     No results for input(s): \"NTPROBNP\" in the last 72 hours.      Recent Labs     11/10/24  0458   TROPONINT 102*       Imaging:  DX-CHEST-PORTABLE (1 VIEW)   Final Result   Impression:      1. No acute cardiopulmonary abnormalities identified.      2. Very severe bilateral shoulder arthritic changes.                     CT-CSPINE WITHOUT PLUS RECONS   Final Result   Impression:      1. No cervical spine fracture evident.      2. Severe degenerative changes.      3. Mild subluxations especially C3-4 unchanged from 3/5/2024 and likely degenerative.                     CT-HEAD W/O   Final Result   Impression:      1. No acute process in the brain evident.                  EC-ECHOCARDIOGRAM COMPLETE W/O CONT    (Results Pending)       X-Ray:  I have personally reviewed the images and compared with prior images.  EKG:  I have personally reviewed the images and compared with prior images.    Assessment/Plan:  Justification for Admission Status  I anticipate this patient will require at least two midnights for appropriate medical management, necessitating inpatient admission because NSTEMI    Patient will need a Telemetry bed on MEDICAL service .  The need is secondary to NSTEMI.    * NSTEMI (non-ST elevated myocardial infarction) (HCC)- (present on admission)  Assessment & Plan  Patient will be admitted to the telemetry unit with close cardiac " monitoring, serial EKG and troponin  Patient has been given full dose of aspirin and is started on IV heparin, monitor Xa  Continue atorvastatin  Hold metoprolol since patient has bradycardia  Check 2D echo, lipid panel, TSH and hemoglobin A1c  Nitro and morphine when necessary for chest pain  Cardiology has been consulted      Acute on chronic systolic heart failure (HCC)  Assessment & Plan  Monitor volume status  Strict ins and outs and daily weights  Hold metoprolol for now, not on ACE or Aldactone  Repeat cardiac echo    Syncope  Assessment & Plan  My risk for arrhythmias due to recent ACS event  Rule out cardiogenic causes  Continuous cardiac monitoring on telemetry  Check 2D echo  Check orthostatics      CKD (chronic kidney disease) stage 3, GFR 30-59 ml/min- (present on admission)  Assessment & Plan  Monitor BMP and assess response  Avoid IV contrast/nephrotoxins/NSAIDs  Dose adjust meds for decreased GFR      History of GI bleed- (present on admission)  Assessment & Plan  Patient's hemoglobin is currently stable and he denies any bloody stools.  Monitor for bloody stools since they can cause syncope        VTE prophylaxis: SCDs/TEDs      Patient is critically ill.   The patient continues to have: NSTEMI  The vital organ system that is affected is the: Cardiac  If untreated there is a high chance of deterioration into: Cardiac arrest, cardiogenic shock  And eventually death.   The critical care that I am providing today is: Heparin and medical management of NSTEMI  The critical that has been undertaken is medically complex.   There has been no overlap in critical care time.   Critical Care Time not including procedures: 60 minutes

## 2024-11-10 NOTE — DIETARY
Nutrition Services: Cardiac Education Consult   Day 0 of admit.  Dilan Calderón is an 84 y.o. male with admitting DX of NSTEMI (non-ST elevated myocardial infarction)    RD received consult for heart healthy diet education. RD included nutrition recommendations and tips in dietary discharge instructions that align with pt's current dx. Outpatient resources included to encourage follow-up with [facility] for continued support after discharge.     No other education needs identified at this time. Please consult RD as needed for supplemental education or at request of pt.

## 2024-11-10 NOTE — PROGRESS NOTES
Bedside report received from off going RN/tech: Galo, assumed care of patient.     Fall Risk Score: LOW RISK  Fall risk interventions in place: Place yellow fall risk ID band on patient, Provide patient/family education based on risk assessment, Educate patient/family to call staff for assistance when getting out of bed, and Place fall precaution signage outside patient door  Bed type: Regular (Robinson Score less than 17 interventions in place)  Patient on cardiac monitor: Yes  IVF/IV medications: Infusion per MAR (List Med(s)) hep started at 12 units/hr  Oxygen: Room Air  Bedside sitter: Not Applicable   Isolation: Not applicable

## 2024-11-10 NOTE — CONSULTS
Reason for Consult:  Asked by Dr Yuri Stuart M.D. to see this patient with syncope    CC:   Chief Complaint   Patient presents with    Syncope     Pt woke to floor in kitchen in pool of blood with LAC above Rt eyebrow.     Extremity Weakness     X 2 days with occasional chest pain radiating across upper chest to bilateral shoulders. Intermittent. Pt reports it is not the same as cardiac chest pain.        HPI:      84 year old man PMH GIB, CAD / MI s/p PCI DOUGIE p-mLAD, ICM, HFmrEF, CKD presents with syncope. Patient recalls recently orthostatic symptoms. Although has attempted to improve water hydration. No cardiac complaints preceding. Syncope two days ago. He woke up in small pool of blood. Suffered trauma to right eyebrow. Denies toxic social habits. Compliant with medications.       Medications / Drug list prior to admission:  No current facility-administered medications on file prior to encounter.     Current Outpatient Medications on File Prior to Encounter   Medication Sig Dispense Refill    Multiple Vitamins-Minerals (CENTRUM SILVER 50+MEN) Tab Take 1 Tablet by mouth every day.      acetaminophen (TYLENOL) 500 MG Tab Take 500-1,000 mg by mouth 2 times a day as needed.      NON SPECIFIED Take 1 Tablet by mouth every day. OTC allergy med         Current list of administered Medications:    Current Facility-Administered Medications:     aspirin (Asa) chewable tab 324 mg, 324 mg, Oral, Once, Eleazar Levi, D.O.    heparin infusion 25,000 units in 500 mL 0.45% NACL, 0-30 Units/kg/hr, Intravenous, Continuous, Eleazar AMarilou Dipshalomzio, D.O., Last Rate: 15.2 mL/hr at 11/10/24 0727, 12 Units/kg/hr at 11/10/24 0727    heparin injection 1,900 Units, 30 Units/kg, Intravenous, PRN, Eleazar Quezadaziusman, D.O.    acetaminophen (Tylenol) tablet 650 mg, 650 mg, Oral, Q6HRS PRN, Reji Correia M.D.    hydrALAZINE (Apresoline) injection 10 mg, 10 mg, Intravenous, Q4HRS PRN, Reji Correia M.D.    atorvastatin (Lipitor)  tablet 80 mg, 80 mg, Oral, Q EVENING, Reji Correia M.D.    clopidogrel (Plavix) tablet 75 mg, 75 mg, Oral, DAILY, Reji Correia M.D., 75 mg at 11/10/24 0721    nitroglycerin (Nitrostat) tablet 0.4 mg, 0.4 mg, Sublingual, Q5 MIN PRN, Reji Correia M.D.    [START ON 11/11/2024] aspirin (Asa) chewable tab 81 mg, 81 mg, Oral, DAILY, Yuri Stuart M.D.    History reviewed. No pertinent past medical history.    Past Surgical History:   Procedure Laterality Date    RI UPPER GI ENDOSCOPY,DIAGNOSIS N/A 3/6/2024    Procedure: GASTROSCOPY;  Surgeon: Greyson Serrato M.D.;  Location: SURGERY SAME DAY [facility];  Service: Gastroenterology    RI COLONOSCOPY,DIAGNOSTIC N/A 3/6/2024    Procedure: COLONOSCOPY;  Surgeon: Greyson Serrato M.D.;  Location: SURGERY SAME DAY [facility];  Service: Gastroenterology    RI UPPER GI ENDOSCOPY,W/DILAT,GASTRIC OUT N/A 3/6/2024    Procedure: GASTROSCOPY, WITH BALLOON DILATION;  Surgeon: Greyson Serrato M.D.;  Location: SURGERY SAME DAY [facility];  Service: Gastroenterology       History reviewed. No pertinent family history.  Patient family history was personally reviewed, no pertinent family history to current presentation    Social History     Socioeconomic History    Marital status: Single     Spouse name: Not on file    Number of children: Not on file    Years of education: Not on file    Highest education level: Not on file   Occupational History    Not on file   Tobacco Use    Smoking status: Former     Types: Cigarettes    Smokeless tobacco: Never   Vaping Use    Vaping status: Never Used   Substance and Sexual Activity    Alcohol use: Not Currently    Drug use: Never    Sexual activity: Not on file   Other Topics Concern    Not on file   Social History Narrative    Not on file     Social Drivers of Health     Financial Resource Strain: Not on file   Food Insecurity: No Food Insecurity (11/10/2024)    Hunger Vital Sign     Worried About Running Out of Food in the Last Year: Never true     Ran  "Out of Food in the Last Year: Never true   Transportation Needs: No Transportation Needs (11/10/2024)    PRAPARE - Transportation     Lack of Transportation (Medical): No     Lack of Transportation (Non-Medical): No   Physical Activity: Not on file   Stress: Not on file   Social Connections: Not on file   Intimate Partner Violence: Not At Risk (11/10/2024)    Humiliation, Afraid, Rape, and Kick questionnaire     Fear of Current or Ex-Partner: No     Emotionally Abused: No     Physically Abused: No     Sexually Abused: No   Housing Stability: Low Risk  (11/10/2024)    Housing Stability Vital Sign     Unable to Pay for Housing in the Last Year: No     Number of Times Moved in the Last Year: 0     Homeless in the Last Year: No       ALLERGIES:  No Known Allergies    Review of systems:  A complete review of symptoms was reviewed with patient. This is reviewed in H&P and PMH. ALL OTHERS reviewed and negative    Physical exam:  Patient Vitals for the past 24 hrs:   BP Temp Temp src Pulse Resp SpO2 Height Weight   11/10/24 0739 134/77 36.6 °C (97.9 °F) Temporal 63 16 96 % -- --   11/10/24 0702 -- -- -- -- -- -- -- 61.3 kg (135 lb 2.3 oz)   11/10/24 0650 134/78 -- Temporal 61 16 97 % -- --   11/10/24 0649 -- -- -- -- -- -- 1.702 m (5' 7\") 61.3 kg (135 lb 2.3 oz)   11/10/24 0607 (!) 142/102 -- -- 63 18 96 % -- --   11/10/24 0556 119/66 -- -- 66 18 95 % -- --   11/10/24 0452 (!) 169/77 -- -- 64 -- 98 % -- --   11/10/24 0451 (!) 169/77 36.6 °C (97.8 °F) Oral 62 16 98 % -- --   11/10/24 0444 -- -- -- -- -- -- 1.702 m (5' 7\") 63.5 kg (140 lb)     General: No acute distress.   EYES: no jaundice  HEENT: OP clear   Neck: No bruits No JVD.   CVS:  RRR. S1 + S2. No M/R/G. No edema.  Resp: CTAB. No wheezing or crackles/rhonchi.  Abdomen: Soft, NT, ND,  Skin: Grossly nothing acute no obvious rashes  Neurological: Alert, Moves all extremities, no cranial nerve defects on limited exam  Extremities: Pulse 2+ in b/l LE. No cyanosis. "     Data:  Laboratory studies personally reviewed by me:  Recent Results (from the past 24 hours)   EKG    Collection Time: 11/10/24  4:49 AM   Result Value Ref Range    Report       [facility] Emergency Dept.    Test Date:  2024-11-10  Pt Name:    AMY KERR              Department: ER  MRN:        [id number]                      Room:        13  Gender:     Male                         Technician: [id number]  :        1940                   Requested By:PETROS SAGASTUME  Order #:    [id number]                    Reading MD:    Measurements  Intervals                                Axis  Rate:       67                           P:          62  AL:         178                          QRS:        -42  QRSD:       119                          T:          80  QT:         441  QTc:        466    Interpretive Statements  Sinus rhythm  Left anterior fascicular block  Anteroseptal infarct, age indeterminate  Compared to ECG 2024 13:48:39  Left anterior fascicular block now present  Left-axis deviation no longer present  Myocardial infarct finding still present     CBC WITH DIFFERENTIAL    Collection Time: 11/10/24  4:58 AM   Result Value Ref Range    WBC 8.6 4.8 - 10.8 K/uL    RBC 3.96 (L) 4.70 - 6.10 M/uL    Hemoglobin 13.3 (L) 14.0 - 18.0 g/dL    Hematocrit 39.8 (L) 42.0 - 52.0 %    .5 (H) 81.4 - 97.8 fL    MCH 33.6 (H) 27.0 - 33.0 pg    MCHC 33.4 32.3 - 36.5 g/dL    RDW 52.1 (H) 35.9 - 50.0 fL    Platelet Count 178 164 - 446 K/uL    MPV 10.8 9.0 - 12.9 fL    Neutrophils-Polys 53.20 44.00 - 72.00 %    Lymphocytes 36.40 22.00 - 41.00 %    Monocytes 7.60 0.00 - 13.40 %    Eosinophils 2.00 0.00 - 6.90 %    Basophils 0.50 0.00 - 1.80 %    Immature Granulocytes 0.30 0.00 - 0.90 %    Nucleated RBC 0.00 0.00 - 0.20 /100 WBC    Neutrophils (Absolute) 4.59 1.82 - 7.42 K/uL    Lymphs (Absolute) 3.14 1.00 - 4.80 K/uL    Monos (Absolute) 0.66 0.00 - 0.85 K/uL    Eos (Absolute) 0.17 0.00 -  0.51 K/uL    Baso (Absolute) 0.04 0.00 - 0.12 K/uL    Immature Granulocytes (abs) 0.03 0.00 - 0.11 K/uL    NRBC (Absolute) 0.00 K/uL   CMP    Collection Time: 11/10/24  4:58 AM   Result Value Ref Range    Sodium 141 135 - 145 mmol/L    Potassium 3.9 3.6 - 5.5 mmol/L    Chloride 106 96 - 112 mmol/L    Co2 22 20 - 33 mmol/L    Anion Gap 13.0 7.0 - 16.0    Glucose 96 65 - 99 mg/dL    Bun 26 (H) 8 - 22 mg/dL    Creatinine 1.42 (H) 0.50 - 1.40 mg/dL    Calcium 9.6 8.5 - 10.5 mg/dL    Correct Calcium 9.5 8.5 - 10.5 mg/dL    AST(SGOT) 61 (H) 12 - 45 U/L    ALT(SGPT) 56 (H) 2 - 50 U/L    Alkaline Phosphatase 115 (H) 30 - 99 U/L    Total Bilirubin 0.2 0.1 - 1.5 mg/dL    Albumin 4.1 3.2 - 4.9 g/dL    Total Protein 7.1 6.0 - 8.2 g/dL    Globulin 3.0 1.9 - 3.5 g/dL    A-G Ratio 1.4 g/dL   TROPONIN    Collection Time: 11/10/24  4:58 AM   Result Value Ref Range    Troponin T 102 (H) 6 - 19 ng/L   PT/INR    Collection Time: 11/10/24  4:58 AM   Result Value Ref Range    PT 13.1 12.0 - 14.6 sec    INR 0.99 0.87 - 1.13   ESTIMATED GFR    Collection Time: 11/10/24  4:58 AM   Result Value Ref Range    GFR (CKD-EPI) 49 (A) >60 mL/min/1.73 m 2   MAGNESIUM    Collection Time: 11/10/24  4:58 AM   Result Value Ref Range    Magnesium 2.1 1.5 - 2.5 mg/dL   PHOSPHORUS    Collection Time: 11/10/24  4:58 AM   Result Value Ref Range    Phosphorus 3.1 2.5 - 4.5 mg/dL   VITAMIN B12    Collection Time: 11/10/24  4:58 AM   Result Value Ref Range    Vitamin B12 -True Cobalamin 416 211 - 911 pg/mL   APTT    Collection Time: 11/10/24  4:58 AM   Result Value Ref Range    APTT 26.6 24.7 - 36.0 sec   Heparin Anti-Xa    Collection Time: 11/10/24  4:58 AM   Result Value Ref Range    Heparin Xa (UFH) <0.10 IU/mL   TROPONIN    Collection Time: 11/10/24  8:58 AM   Result Value Ref Range    Troponin T 93 (H) 6 - 19 ng/L   EKG in four (4) hours    Collection Time: 11/10/24 10:18 AM   Result Value Ref Range    Report       [facility]    Test Date:   2024-11-10  Pt Name:    AMY CALDERÓN              Department: ER  MRN:        [id number]                      Room:       T821  Gender:     Male                         Technician: CASPER  :        1940                   Requested By:CALEB WALDRON  Order #:    [id number]                    Reading MD:    Measurements  Intervals                                Axis  Rate:       62                           P:          62  SD:         179                          QRS:        -50  QRSD:       105                          T:          0  QT:         524  QTc:        533    Interpretive Statements  Sinus rhythm  LAD, consider left anterior fascicular block  Probable anteroseptal infarct, recent  Prolonged QT interval  Artifact in lead(s) I,II,III,aVR,aVF,V1,V2,V3,V4,V6  Compared to ECG 11/10/2024 04:49:29  Prolonged QT interval now present  Myocardial infarct finding still present         EKG : personally reviewed by me  [   ]    All pertinent features of laboratory and imaging reviewed including primary images where applicable      Principal Problem:    NSTEMI (non-ST elevated myocardial infarction) (HCC) (POA: Yes)  Active Problems:    History of GI bleed (POA: Yes)    CKD (chronic kidney disease) stage 3, GFR 30-59 ml/min (POA: Yes)    Syncope (POA: Unknown)    Acute on chronic systolic heart failure (HCC) (POA: Unknown)  Resolved Problems:    * No resolved hospital problems. *      Assessment / Plan:  84 year old man PMH GIB, CAD / MI s/p PCI DOUGIE p-mLAD, ICM, HFmrEF, CKD presents with syncope. Consult for nstemi.    -unclear cause of syncope. Monitor on tele. Check orthostatics. Consider long term rhythm monitoring.   -check TTE  -continue home cardiac medications especially DAPT  -consider LHC for complete revascularization if LVEF not normalized    I personally discussed his case with Dr Stuart    It is my pleasure to participate in the care of Mr. Calderón.  Please do not hesitate to contact me with questions or  concerns.    Jose Alejandro Marroquin MD  Cardiologist [facility]    I spent 66 minutes with Dilanalcira Granadosxton, over fifty percent was spent counseling the patient on their condition, best management practices, reviewing test results, risks and benefits of treatment and coordinating care.

## 2024-11-10 NOTE — ED PROVIDER NOTES
CHIEF COMPLAINT  Chief Complaint   Patient presents with    Syncope     Pt woke to floor in kitchen in pool of blood with LAC above Rt eyebrow.     Extremity Weakness     X 2 days with occasional chest pain radiating across upper chest to bilateral shoulders. Intermittent. Pt reports it is not the same as cardiac chest pain.        LIMITATION TO HISTORY   Select: None   HPI    Dilan Calderón is a 84 y.o. male who presents to the Emergency Department patient presented for evaluation of a syncopal episode.  Patient stated that he was walking last night around 8 PM when he passed out.  Does not recall any antecedent dizziness lightheadedness no recent nausea vomiting or diarrhea.  He did strike his head at that time, initially did not want come to the hospital though had increasing generalized weakness prompting the visit to the ER.  He states occasionally over the past few days he has had chest pain radiating across his upper chest into his shoulders.  No current chest pain shortness of breath abdominal pain.  No headaches or other complaints.    OUTSIDE HISTORIAN(S):  Select: EMS reports giving aspirin and route    EXTERNAL RECORDS REVIEWED  Select: Other reviewed previous admission note patient was admitted for a ST elevation MI      PAST MEDICAL HISTORY  History reviewed. No pertinent past medical history.  .    SURGICAL HISTORY  Past Surgical History:   Procedure Laterality Date    FL UPPER GI ENDOSCOPY,DIAGNOSIS N/A 3/6/2024    Procedure: GASTROSCOPY;  Surgeon: Greyson Serrato M.D.;  Location: SURGERY SAME DAY [facility];  Service: Gastroenterology    FL COLONOSCOPY,DIAGNOSTIC N/A 3/6/2024    Procedure: COLONOSCOPY;  Surgeon: Greyson Serrato M.D.;  Location: SURGERY SAME DAY [facility];  Service: Gastroenterology    FL UPPER GI ENDOSCOPY,W/DILAT,GASTRIC OUT N/A 3/6/2024    Procedure: GASTROSCOPY, WITH BALLOON DILATION;  Surgeon: Greyson Serrato M.D.;  Location: SURGERY SAME DAY [facility];  Service: Gastroenterology  "        FAMILY HISTORY  History reviewed. No pertinent family history.       SOCIAL HISTORY  Social History     Socioeconomic History    Marital status: Single     Spouse name: Not on file    Number of children: Not on file    Years of education: Not on file    Highest education level: Not on file   Occupational History    Not on file   Tobacco Use    Smoking status: Former     Types: Cigarettes    Smokeless tobacco: Never   Vaping Use    Vaping status: Never Used   Substance and Sexual Activity    Alcohol use: Not Currently    Drug use: Never    Sexual activity: Not on file   Other Topics Concern    Not on file   Social History Narrative    Not on file     Social Drivers of Health     Financial Resource Strain: Not on file   Food Insecurity: Not on file   Transportation Needs: Not on file   Physical Activity: Not on file   Stress: Not on file   Social Connections: Not on file   Intimate Partner Violence: Not on file   Housing Stability: Not on file         CURRENT MEDICATIONS  No current facility-administered medications on file prior to encounter.     Current Outpatient Medications on File Prior to Encounter   Medication Sig Dispense Refill    aspirin 81 MG EC tablet Take 1 Tablet by mouth every day. 100 Tablet 0    atorvastatin (LIPITOR) 80 MG tablet Take 1 Tablet by mouth every evening. 30 Tablet 2    clopidogrel (PLAVIX) 75 MG Tab Take 1 Tablet by mouth every day. 30 Tablet 2    dapagliflozin propanediol (FARXIGA) 10 MG Tab Take 1 Tablet by mouth every day. 30 Tablet 2    metoprolol SR (TOPROL XL) 25 MG TABLET SR 24 HR Take 1 Tablet by mouth every day. 30 Tablet 2    furosemide (LASIX) 20 MG Tab Take 1 Tablet by mouth every 48 hours. 30 Tablet 2    omeprazole (PRILOSEC) 20 MG delayed-release capsule Take 1 Capsule by mouth 2 times a day. 60 Capsule 2           ALLERGIES  No Known Allergies    PHYSICAL EXAM  VITAL SIGNS:BP (!) 169/77   Pulse 62   Temp 36.6 °C (97.8 °F) (Oral)   Resp 16   Ht 1.702 m (5' 7\")  "  Wt 63.5 kg (140 lb)   SpO2 98%   BMI 21.93 kg/m²       VITALS - vital signs documented prior to this note have been reviewed and noted,  GENERAL - awake, alert, oriented, GCS 15, no apparent distress, non-toxic  appearing  HEENT -laceration above right eyebrow otherwise normocephalic, atraumatic, pupils equal, sclera anicteric, mucus  membranes moist  NECK - supple, no meningismus, full active range of motion, trachea midline  CARDIOVASCULAR - regular rate/rhythm, no murmurs/gallops/rubs  PULMONARY - no respiratory distress, speaking in full sentences, clear to  auscultation bilaterally, no wheezing/ronchi/rales, no accessory muscle use  GASTROINTESTINAL - soft, non-tender, non-distended, no rebound, guarding,  or peritonitis  GENITOURINARY - Deferred  NEUROLOGIC - Awake alert, normal mental status, speech fluid, cognition  normal, moves all extremities  MUSCULOSKELETAL - no obvious asymmetry or deformities present  EXTREMITIES - warm, well-perfused, no cyanosis or significant edema  DERMATOLOGIC - warm, dry, no rashes, no jaundice  PSYCHIATRIC - normal affect, normal insight, normal concentration    DIAGNOSTIC STUDIES / PROCEDURES  EKG  I have independently interpreted this EKG  Interpreted below by myself as EKG demonstrates a sinus rhythm at a rate of 67 with a left axis deviation, Q waves in V1 through V3, no STEMI pattern no acute ischemic changes interpretation is sinus rhythm with left anterior fascicular block and prior anterior septal infarct     Report   Date Value Ref Range Status   11/10/2024       [facility] Emergency Dept.    Test Date:  2024-11-10  Pt Name:    AMY KERR              Department: ER  MRN:        [id number]                      Room:        13  Gender:     Male                         Technician: [id number]  :        [id number]                   Requested By:PETROS SAGASTUME  Order #:    [id number]                    Reading MD:    Measurements  Intervals                                 Axis  Rate:       67                           P:          62  LA:         178                          QRS:        -42  QRSD:       119                          T:          80  QT:         441  QTc:        466    Interpretive Statements  Sinus rhythm  Left anterior fascicular block  Anteroseptal infarct, age indeterminate  Compared to ECG 05/03/2024 13:48:39  Left anterior fascicular block now present  Left-axis deviation no longer present  Myocardial infarct finding still present                LABS  Labs Reviewed   CBC WITH DIFFERENTIAL   COMP METABOLIC PANEL   TROPONIN   PROTHROMBIN TIME       Troponin is elevated, at 102  RADIOLOGY  I have independently interpreted the diagnostic imaging associated with this visit and am waiting the final reading from the radiologist.   My preliminary interpretation is as follows: CT head negative for bleed      Radiologist interpretation:   CT-HEAD W/O    (Results Pending)   CT-CSPINE WITHOUT PLUS RECONS    (Results Pending)   DX-CHEST-PORTABLE (1 VIEW)    (Results Pending)        COURSE & MEDICAL DECISION MAKING    ED COURSE:        INTERVENTIONS BY ME:  Medications   tetanus-dipth-acell pertussis (Adacel) inj 0.5 mL (has no administration in time range)   lidocaine-epinephrine 1 %-1:817930 1 %-1:973584 injection 20 mL (has no administration in time range)       PERFORMED BY - Eleazar Vargas DO  PROCEDURE - Laceration repair    PROCEDURE IN DETAIL - The skin was prepped and draped. 5 mL of 1%  lidocaine with epinephrine was used in local infiltration with good  anesthetic result. The wound was copiously irrigated with normal saline under  pressure. The wound was inspected for foreign body and for injury to deep  structures. No foreign body and no additional injuries were noted.     The wound was  closed with 4 simple interrupted sutures total wound length repaired 3 cm with good hemostasis and good approximation.  COMPLICATIONS - There were no  complications with the procedure.      CHEST PAIN:   HEART Score for Major Cardiac Events  HEART Score     History: Slightly suspicious  ECG: Non-specific repolarization disturbance  Age: 65+  Risk Factors: >2 risk factors or hx of atherosclerotic disease  Troponin: Greater than or equal to 3 times normal limit    Heart Score: 7    Total Score   0-3 Points = Low Score, risk of MACE 0.9-1.7%.  4-6 Points = Moderate Score, risk of MACE 12-16.6%  7-10 Points = High Score, risk of MACE 50-65%       Critical care    Critical Care Procedure Note    Total critical care time: Approximately 36 minutes    Upon my assess due to a high probability of clinically significant, life threatening deterioration, secondary to NSTEMI requiring heparin a the patient required my direct attention and intervention. This critical care time included obtaining a history; examining the patient; pulse oximetry; ordering and review of studies; arranging urgent treatment with development of a management plan; evaluation of patient's response to treatment; frequent reassessment; and, discussions with other providers.    was exclusive of separately billable procedures and treating other patients and teaching time.    INITIAL ASSESSMENT, COURSE AND PLAN  Care Narrative: Patient presented for evaluation of a syncopal episode as well as intermittent episodes of chest pain.  Differential include was not limited to vasovagal orthostatic hypotension arrhythmia electrolyte abnormality anemia atypical ACS among many other considerations.  Did fall strike his head, he is on antiplatelet therapy this was activated as a code TBI.  Subsequent CT head and cervical spine were negative for acute pathology.  EKG was obtained was without evidence of ischemia.  Troponin is elevated at 102 no current chest pain this evening, and no acute ischemic changes on his EKG.  He did receive aspirin prior to coming to the ER.  Given that the patient did not have any antecedent  symptoms preceding his syncope do believe he would warrant admission for observation.  As well as been having intermittent episodes of chest pain with his prior cardiac history believe he warrants observation for trending of his troponin's.  Patient is noncompliant with his antiplatelet therapy given the elevated troponin intermittent episodes of chest pain will initiate heparin drip.  Spoke with the hospitalist patient was admitted in stable condition.           ADDITIONAL PROBLEM LIST    DISPOSITION AND DISCUSSIONS  I have discussed management of the patient with the following physicians and ADELAIDE's: Hospitalist    Discussion of management with other QHP or appropriate source(s): None     Decision tools and prescription drugs considered including, but not limited to:  Heart score 7 .    FINAL DIAGNOSIS  1 syncope  2.  Chest pain  3.  NSTEMI  4.  Closed head injury  5.  Facial laceration         Electronically signed by: Eleazar Vargas DO ,4:59 AM 11/10/24

## 2024-11-10 NOTE — PROGRESS NOTES
4 Eyes Skin Assessment Completed by ALBERT adrian and Joselyn CARCAMO RN.    Head head laceration  Ears Redness and Non-Blanching at ears from glasses  Nose Redness and Non-Blanching right nostril  Mouth WDL  Neck WDL  Breast/Chest WDL  Shoulder Blades WDL  Spine WDL  (R) Arm/Elbow/Hand WDL  (L) Arm/Elbow/Hand bruise left arm  Abdomen WDL  Groin WDL  Scrotum/Coccyx/Buttocks Redness, Blanching, and Non-Blanching red spot on right side  (R) Leg Abrasion  (L) Leg smal scab anterior shin  (R) Heel/Foot/Toe WDL  (L) Heel/Foot/Toe Scab abrasion right 5th toe          Devices In Places Tele Box, glassess      Interventions In Place Pillows    Possible Skin Injury Yes    Pictures Uploaded Into Epic Yes  Wound Consult Placed Yes  RN Wound Prevention Protocol Ordered Yes

## 2024-11-10 NOTE — PROGRESS NOTES
Hospitalist progress note:  84 y M with hx CAD, stent to LAD 5/2024, HFrEF, presents for syncope.  Patient did not follow up after his STEMI in May, so did not take DAPT after 30 day supply ran out.  Denies chest pain, dyspnea. Reports some upper chest muscle contractures that feels different from prior STEMI pain, this is controlled with deep breathing.  Troponin trend stable.  On heparin drip for possible NSTEMI in setting of medical non compliance and stent placement 6 months ago.  Continue DAPT, statin,.  Echo pending.  Continue telemetry monitoring.  Cardiology following, appreciate their recommendations.

## 2024-11-10 NOTE — ED TRIAGE NOTES
"Chief Complaint   Patient presents with    Syncope     Pt woke to floor in kitchen in pool of blood with LAC above Rt eyebrow.     Extremity Weakness     X 2 days with occasional chest pain radiating across upper chest to bilateral shoulders. Intermittent. Pt reports it is not the same as cardiac chest pain.      BIB EMS. VS: 156/75, HR 84, SPO2 96% RA. 20 GA Lt AC. GCS 15.     TBI activation.   Pt alert, oriented. + CSM All extremities. Left pupil 4mm/ reactive.. Rt 3 mm reactive.     BP (!) 169/77   Pulse 62   Temp 36.6 °C (97.8 °F) (Oral)   Resp 16   Ht 1.702 m (5' 7\")   Wt 63.5 kg (140 lb)   SpO2 98%   BMI 21.93 kg/m²     "

## 2024-11-10 NOTE — ED NOTES
Medication history reviewed with patient.  Med rec is complete  Allergies reviewed.   Patient denies any outpatient antibiotics in the last 30 days  Anticoagulants taken in the last 14 days? None    Sabina Black, JaquanT

## 2024-11-11 ENCOUNTER — APPOINTMENT (OUTPATIENT)
Dept: CARDIOLOGY | Facility: MEDICAL CENTER | Age: 84
DRG: 228 | End: 2024-11-11
Payer: MEDICARE

## 2024-11-11 ENCOUNTER — APPOINTMENT (OUTPATIENT)
Dept: CARDIOLOGY | Facility: MEDICAL CENTER | Age: 84
DRG: 228 | End: 2024-11-11
Attending: STUDENT IN AN ORGANIZED HEALTH CARE EDUCATION/TRAINING PROGRAM
Payer: MEDICARE

## 2024-11-11 PROBLEM — I51.3 LV (LEFT VENTRICULAR) MURAL THROMBUS: Status: ACTIVE | Noted: 2024-11-11

## 2024-11-11 PROBLEM — I50.22 CHRONIC SYSTOLIC HEART FAILURE (HCC): Status: ACTIVE | Noted: 2024-11-10

## 2024-11-11 PROBLEM — I45.5 SINUS PAUSE: Status: ACTIVE | Noted: 2024-11-11

## 2024-11-11 LAB
ALBUMIN SERPL BCP-MCNC: 3.6 G/DL (ref 3.2–4.9)
ALBUMIN SERPL BCP-MCNC: 3.7 G/DL (ref 3.2–4.9)
ALBUMIN/GLOB SERPL: 1.4 G/DL
ALBUMIN/GLOB SERPL: 1.4 G/DL
ALP SERPL-CCNC: 101 U/L (ref 30–99)
ALP SERPL-CCNC: 106 U/L (ref 30–99)
ALT SERPL-CCNC: 35 U/L (ref 2–50)
ALT SERPL-CCNC: 40 U/L (ref 2–50)
ANION GAP SERPL CALC-SCNC: 15 MMOL/L (ref 7–16)
ANION GAP SERPL CALC-SCNC: 8 MMOL/L (ref 7–16)
AST SERPL-CCNC: 40 U/L (ref 12–45)
AST SERPL-CCNC: 42 U/L (ref 12–45)
BASOPHILS # BLD AUTO: 0 % (ref 0–1.8)
BASOPHILS # BLD AUTO: 0.9 % (ref 0–1.8)
BASOPHILS # BLD: 0 K/UL (ref 0–0.12)
BASOPHILS # BLD: 0.05 K/UL (ref 0–0.12)
BILIRUB SERPL-MCNC: 0.4 MG/DL (ref 0.1–1.5)
BILIRUB SERPL-MCNC: 0.4 MG/DL (ref 0.1–1.5)
BUN SERPL-MCNC: 23 MG/DL (ref 8–22)
BUN SERPL-MCNC: 23 MG/DL (ref 8–22)
BURR CELLS BLD QL SMEAR: NORMAL
CALCIUM ALBUM COR SERPL-MCNC: 9.2 MG/DL (ref 8.5–10.5)
CALCIUM ALBUM COR SERPL-MCNC: 9.3 MG/DL (ref 8.5–10.5)
CALCIUM SERPL-MCNC: 9 MG/DL (ref 8.5–10.5)
CALCIUM SERPL-MCNC: 9 MG/DL (ref 8.5–10.5)
CHLORIDE SERPL-SCNC: 107 MMOL/L (ref 96–112)
CHLORIDE SERPL-SCNC: 107 MMOL/L (ref 96–112)
CHOLEST SERPL-MCNC: 197 MG/DL (ref 100–199)
CO2 SERPL-SCNC: 17 MMOL/L (ref 20–33)
CO2 SERPL-SCNC: 24 MMOL/L (ref 20–33)
COMMENT NL1176: NORMAL
CREAT SERPL-MCNC: 1.5 MG/DL (ref 0.5–1.4)
CREAT SERPL-MCNC: 1.55 MG/DL (ref 0.5–1.4)
EKG IMPRESSION: NORMAL
EOSINOPHIL # BLD AUTO: 0.26 K/UL (ref 0–0.51)
EOSINOPHIL # BLD AUTO: 0.28 K/UL (ref 0–0.51)
EOSINOPHIL NFR BLD: 2.7 % (ref 0–6.9)
EOSINOPHIL NFR BLD: 4.5 % (ref 0–6.9)
ERYTHROCYTE [DISTWIDTH] IN BLOOD BY AUTOMATED COUNT: 50.8 FL (ref 35.9–50)
ERYTHROCYTE [DISTWIDTH] IN BLOOD BY AUTOMATED COUNT: 51.6 FL (ref 35.9–50)
GFR SERPLBLD CREATININE-BSD FMLA CKD-EPI: 44 ML/MIN/1.73 M 2
GFR SERPLBLD CREATININE-BSD FMLA CKD-EPI: 46 ML/MIN/1.73 M 2
GLOBULIN SER CALC-MCNC: 2.5 G/DL (ref 1.9–3.5)
GLOBULIN SER CALC-MCNC: 2.7 G/DL (ref 1.9–3.5)
GLUCOSE BLD STRIP.AUTO-MCNC: 104 MG/DL (ref 65–99)
GLUCOSE SERPL-MCNC: 138 MG/DL (ref 65–99)
GLUCOSE SERPL-MCNC: 98 MG/DL (ref 65–99)
HCT VFR BLD AUTO: 38.3 % (ref 42–52)
HCT VFR BLD AUTO: 38.9 % (ref 42–52)
HDLC SERPL-MCNC: 62 MG/DL
HGB BLD-MCNC: 12.6 G/DL (ref 14–18)
HGB BLD-MCNC: 12.9 G/DL (ref 14–18)
IMM GRANULOCYTES # BLD AUTO: 0.03 K/UL (ref 0–0.11)
IMM GRANULOCYTES NFR BLD AUTO: 0.5 % (ref 0–0.9)
LACTATE SERPL-SCNC: 2.8 MMOL/L (ref 0.5–2)
LDLC SERPL CALC-MCNC: 117 MG/DL
LV EJECT FRACT  99904: 45
LV EJECT FRACT MOD 2C 99903: 47.48
LV EJECT FRACT MOD 4C 99902: 50
LV EJECT FRACT MOD BP 99901: 43.78
LYMPHOCYTES # BLD AUTO: 1.89 K/UL (ref 1–4.8)
LYMPHOCYTES # BLD AUTO: 5.38 K/UL (ref 1–4.8)
LYMPHOCYTES NFR BLD: 33 % (ref 22–41)
LYMPHOCYTES NFR BLD: 52.2 % (ref 22–41)
MAGNESIUM SERPL-MCNC: 2.1 MG/DL (ref 1.5–2.5)
MANUAL DIFF BLD: NORMAL
MCH RBC QN AUTO: 32.6 PG (ref 27–33)
MCH RBC QN AUTO: 33.2 PG (ref 27–33)
MCHC RBC AUTO-ENTMCNC: 32.4 G/DL (ref 32.3–36.5)
MCHC RBC AUTO-ENTMCNC: 33.7 G/DL (ref 32.3–36.5)
MCV RBC AUTO: 100.8 FL (ref 81.4–97.8)
MCV RBC AUTO: 98.5 FL (ref 81.4–97.8)
MONOCYTES # BLD AUTO: 0.45 K/UL (ref 0–0.85)
MONOCYTES # BLD AUTO: 0.51 K/UL (ref 0–0.85)
MONOCYTES NFR BLD AUTO: 4.4 % (ref 0–13.4)
MONOCYTES NFR BLD AUTO: 8.9 % (ref 0–13.4)
MORPHOLOGY BLD-IMP: NORMAL
NEUTROPHILS # BLD AUTO: 2.98 K/UL (ref 1.82–7.42)
NEUTROPHILS # BLD AUTO: 4.19 K/UL (ref 1.82–7.42)
NEUTROPHILS NFR BLD: 40.7 % (ref 44–72)
NEUTROPHILS NFR BLD: 52.2 % (ref 44–72)
NRBC # BLD AUTO: 0 K/UL
NRBC # BLD AUTO: 0 K/UL
NRBC BLD-RTO: 0 /100 WBC (ref 0–0.2)
NRBC BLD-RTO: 0 /100 WBC (ref 0–0.2)
NT-PROBNP SERPL IA-MCNC: 3359 PG/ML (ref 0–125)
OVALOCYTES BLD QL SMEAR: NORMAL
PHOSPHATE SERPL-MCNC: 3.6 MG/DL (ref 2.5–4.5)
PLATELET # BLD AUTO: 159 K/UL (ref 164–446)
PLATELET # BLD AUTO: 173 K/UL (ref 164–446)
PLATELET BLD QL SMEAR: NORMAL
PMV BLD AUTO: 10.6 FL (ref 9–12.9)
PMV BLD AUTO: 11.1 FL (ref 9–12.9)
POIKILOCYTOSIS BLD QL SMEAR: NORMAL
POTASSIUM SERPL-SCNC: 4.1 MMOL/L (ref 3.6–5.5)
POTASSIUM SERPL-SCNC: 4.4 MMOL/L (ref 3.6–5.5)
PROT SERPL-MCNC: 6.1 G/DL (ref 6–8.2)
PROT SERPL-MCNC: 6.4 G/DL (ref 6–8.2)
RBC # BLD AUTO: 3.86 M/UL (ref 4.7–6.1)
RBC # BLD AUTO: 3.89 M/UL (ref 4.7–6.1)
RBC BLD AUTO: PRESENT
SMUDGE CELLS BLD QL SMEAR: NORMAL
SODIUM SERPL-SCNC: 139 MMOL/L (ref 135–145)
SODIUM SERPL-SCNC: 139 MMOL/L (ref 135–145)
TRIGL SERPL-MCNC: 88 MG/DL (ref 0–149)
TROPONIN T SERPL-MCNC: 67 NG/L (ref 6–19)
UFH PPP CHRO-ACNC: 0.49 IU/ML
WBC # BLD AUTO: 10.3 K/UL (ref 4.8–10.8)
WBC # BLD AUTO: 5.7 K/UL (ref 4.8–10.8)

## 2024-11-11 PROCEDURE — 770022 HCHG ROOM/CARE - ICU (200)

## 2024-11-11 PROCEDURE — 97535 SELF CARE MNGMENT TRAINING: CPT

## 2024-11-11 PROCEDURE — 99291 CRITICAL CARE FIRST HOUR: CPT | Performed by: INTERNAL MEDICINE

## 2024-11-11 PROCEDURE — 700111 HCHG RX REV CODE 636 W/ 250 OVERRIDE (IP): Performed by: STUDENT IN AN ORGANIZED HEALTH CARE EDUCATION/TRAINING PROGRAM

## 2024-11-11 PROCEDURE — A9270 NON-COVERED ITEM OR SERVICE: HCPCS | Performed by: STUDENT IN AN ORGANIZED HEALTH CARE EDUCATION/TRAINING PROGRAM

## 2024-11-11 PROCEDURE — 93005 ELECTROCARDIOGRAM TRACING: CPT | Performed by: NURSE PRACTITIONER

## 2024-11-11 PROCEDURE — 700111 HCHG RX REV CODE 636 W/ 250 OVERRIDE (IP): Mod: JZ | Performed by: INTERNAL MEDICINE

## 2024-11-11 PROCEDURE — 93306 TTE W/DOPPLER COMPLETE: CPT | Mod: 26 | Performed by: INTERNAL MEDICINE

## 2024-11-11 PROCEDURE — 5A12012 PERFORMANCE OF CARDIAC OUTPUT, SINGLE, MANUAL: ICD-10-PCS | Performed by: INTERNAL MEDICINE

## 2024-11-11 PROCEDURE — 80053 COMPREHEN METABOLIC PANEL: CPT

## 2024-11-11 PROCEDURE — 90471 IMMUNIZATION ADMIN: CPT

## 2024-11-11 PROCEDURE — 83735 ASSAY OF MAGNESIUM: CPT

## 2024-11-11 PROCEDURE — 85027 COMPLETE CBC AUTOMATED: CPT

## 2024-11-11 PROCEDURE — A9270 NON-COVERED ITEM OR SERVICE: HCPCS | Performed by: HOSPITALIST

## 2024-11-11 PROCEDURE — 90662 IIV NO PRSV INCREASED AG IM: CPT | Performed by: STUDENT IN AN ORGANIZED HEALTH CARE EDUCATION/TRAINING PROGRAM

## 2024-11-11 PROCEDURE — 84484 ASSAY OF TROPONIN QUANT: CPT

## 2024-11-11 PROCEDURE — 85007 BL SMEAR W/DIFF WBC COUNT: CPT

## 2024-11-11 PROCEDURE — 700105 HCHG RX REV CODE 258: Performed by: INTERNAL MEDICINE

## 2024-11-11 PROCEDURE — 80061 LIPID PANEL: CPT

## 2024-11-11 PROCEDURE — 85025 COMPLETE CBC W/AUTO DIFF WBC: CPT

## 2024-11-11 PROCEDURE — 3E0234Z INTRODUCTION OF SERUM, TOXOID AND VACCINE INTO MUSCLE, PERCUTANEOUS APPROACH: ICD-10-PCS | Performed by: STUDENT IN AN ORGANIZED HEALTH CARE EDUCATION/TRAINING PROGRAM

## 2024-11-11 PROCEDURE — 700117 HCHG RX CONTRAST REV CODE 255: Performed by: STUDENT IN AN ORGANIZED HEALTH CARE EDUCATION/TRAINING PROGRAM

## 2024-11-11 PROCEDURE — 90677 PCV20 VACCINE IM: CPT | Performed by: STUDENT IN AN ORGANIZED HEALTH CARE EDUCATION/TRAINING PROGRAM

## 2024-11-11 PROCEDURE — 83880 ASSAY OF NATRIURETIC PEPTIDE: CPT

## 2024-11-11 PROCEDURE — 700102 HCHG RX REV CODE 250 W/ 637 OVERRIDE(OP)

## 2024-11-11 PROCEDURE — 93306 TTE W/DOPPLER COMPLETE: CPT

## 2024-11-11 PROCEDURE — 3E02340 INTRODUCTION OF INFLUENZA VACCINE INTO MUSCLE, PERCUTANEOUS APPROACH: ICD-10-PCS | Performed by: STUDENT IN AN ORGANIZED HEALTH CARE EDUCATION/TRAINING PROGRAM

## 2024-11-11 PROCEDURE — 700102 HCHG RX REV CODE 250 W/ 637 OVERRIDE(OP): Performed by: STUDENT IN AN ORGANIZED HEALTH CARE EDUCATION/TRAINING PROGRAM

## 2024-11-11 PROCEDURE — 83605 ASSAY OF LACTIC ACID: CPT

## 2024-11-11 PROCEDURE — 84100 ASSAY OF PHOSPHORUS: CPT

## 2024-11-11 PROCEDURE — 85520 HEPARIN ASSAY: CPT

## 2024-11-11 PROCEDURE — 700105 HCHG RX REV CODE 258

## 2024-11-11 PROCEDURE — 93005 ELECTROCARDIOGRAM TRACING: CPT | Performed by: INTERNAL MEDICINE

## 2024-11-11 PROCEDURE — A9270 NON-COVERED ITEM OR SERVICE: HCPCS

## 2024-11-11 PROCEDURE — 82962 GLUCOSE BLOOD TEST: CPT

## 2024-11-11 PROCEDURE — 99291 CRITICAL CARE FIRST HOUR: CPT | Performed by: STUDENT IN AN ORGANIZED HEALTH CARE EDUCATION/TRAINING PROGRAM

## 2024-11-11 PROCEDURE — 700102 HCHG RX REV CODE 250 W/ 637 OVERRIDE(OP): Performed by: HOSPITALIST

## 2024-11-11 PROCEDURE — 99232 SBSQ HOSP IP/OBS MODERATE 35: CPT | Performed by: INTERNAL MEDICINE

## 2024-11-11 RX ORDER — CALCIUM CHLORIDE 100 MG/ML
1 INJECTION INTRAVENOUS; INTRAVENTRICULAR ONCE
Status: ACTIVE | OUTPATIENT
Start: 2024-11-11 | End: 2024-11-12

## 2024-11-11 RX ORDER — SODIUM CHLORIDE 9 MG/ML
500 INJECTION, SOLUTION INTRAVENOUS ONCE
Status: COMPLETED | OUTPATIENT
Start: 2024-11-11 | End: 2024-11-12

## 2024-11-11 RX ORDER — NOREPINEPHRINE BITARTRATE 0.03 MG/ML
0-1 INJECTION, SOLUTION INTRAVENOUS CONTINUOUS
Status: DISCONTINUED | OUTPATIENT
Start: 2024-11-11 | End: 2024-11-13

## 2024-11-11 RX ORDER — MORPHINE SULFATE 4 MG/ML
1 INJECTION INTRAVENOUS ONCE
Status: COMPLETED | OUTPATIENT
Start: 2024-11-11 | End: 2024-11-11

## 2024-11-11 RX ORDER — METOPROLOL SUCCINATE 25 MG/1
25 TABLET, EXTENDED RELEASE ORAL
Status: DISCONTINUED | OUTPATIENT
Start: 2024-11-11 | End: 2024-11-12

## 2024-11-11 RX ORDER — MAGNESIUM SULFATE HEPTAHYDRATE 40 MG/ML
2 INJECTION, SOLUTION INTRAVENOUS ONCE
Status: COMPLETED | OUTPATIENT
Start: 2024-11-11 | End: 2024-11-11

## 2024-11-11 RX ORDER — SODIUM CHLORIDE 9 MG/ML
INJECTION, SOLUTION INTRAVENOUS CONTINUOUS
Status: DISCONTINUED | OUTPATIENT
Start: 2024-11-11 | End: 2024-11-11

## 2024-11-11 RX ADMIN — MAGNESIUM SULFATE HEPTAHYDRATE 2 G: 2 INJECTION, SOLUTION INTRAVENOUS at 21:45

## 2024-11-11 RX ADMIN — CLOPIDOGREL BISULFATE 75 MG: 75 TABLET ORAL at 06:13

## 2024-11-11 RX ADMIN — METOPROLOL SUCCINATE 25 MG: 25 TABLET, EXTENDED RELEASE ORAL at 12:04

## 2024-11-11 RX ADMIN — PNEUMOCOCCAL 20-VALENT CONJUGATE VACCINE 0.5 ML
2.2; 2.2; 2.2; 2.2; 2.2; 2.2; 2.2; 2.2; 2.2; 2.2; 2.2; 2.2; 2.2; 2.2; 2.2; 2.2; 4.4; 2.2; 2.2; 2.2 INJECTION, SUSPENSION INTRAMUSCULAR at 16:40

## 2024-11-11 RX ADMIN — SODIUM CHLORIDE 500 ML: 9 INJECTION, SOLUTION INTRAVENOUS at 21:45

## 2024-11-11 RX ADMIN — ASPIRIN 81 MG: 81 TABLET, CHEWABLE ORAL at 06:13

## 2024-11-11 RX ADMIN — MORPHINE SULFATE 1 MG: 4 INJECTION, SOLUTION INTRAMUSCULAR; INTRAVENOUS at 21:45

## 2024-11-11 RX ADMIN — OMEPRAZOLE 20 MG: 20 CAPSULE, DELAYED RELEASE ORAL at 13:54

## 2024-11-11 RX ADMIN — HEPARIN SODIUM 12 UNITS/KG/HR: 5000 INJECTION, SOLUTION INTRAVENOUS at 16:32

## 2024-11-11 RX ADMIN — INFLUENZA A VIRUS A/VICTORIA/4897/2022 IVR-238 (H1N1) ANTIGEN (FORMALDEHYDE INACTIVATED), INFLUENZA A VIRUS A/CALIFORNIA/122/2022 SAN-022 (H3N2) ANTIGEN (FORMALDEHYDE INACTIVATED), AND INFLUENZA B VIRUS B/MICHIGAN/01/2021 ANTIGEN (FORMALDEHYDE INACTIVATED) 0.5 ML: 60; 60; 60 INJECTION, SUSPENSION INTRAMUSCULAR at 16:30

## 2024-11-11 RX ADMIN — HUMAN ALBUMIN MICROSPHERES AND PERFLUTREN 3 ML: 10; .22 INJECTION, SOLUTION INTRAVENOUS at 11:30

## 2024-11-11 RX ADMIN — SODIUM CHLORIDE: 9 INJECTION, SOLUTION INTRAVENOUS at 12:04

## 2024-11-11 RX ADMIN — ATORVASTATIN CALCIUM 80 MG: 80 TABLET, FILM COATED ORAL at 16:32

## 2024-11-11 ASSESSMENT — ENCOUNTER SYMPTOMS
NERVOUS/ANXIOUS: 0
SHORTNESS OF BREATH: 0
PALPITATIONS: 0
ABDOMINAL PAIN: 0
PALPITATIONS: 1
CHILLS: 0
DIZZINESS: 0
VOMITING: 0
MEMORY LOSS: 0
NAUSEA: 0
WHEEZING: 0
FEVER: 0
HEADACHES: 0
COUGH: 0
DIARRHEA: 0
BACK PAIN: 0

## 2024-11-11 ASSESSMENT — FIBROSIS 4 INDEX: FIB4 SCORE: 3.76

## 2024-11-11 ASSESSMENT — PAIN DESCRIPTION - PAIN TYPE
TYPE: ACUTE PAIN
TYPE: ACUTE PAIN

## 2024-11-11 ASSESSMENT — LIFESTYLE VARIABLES: SUBSTANCE_ABUSE: 0

## 2024-11-11 NOTE — PROGRESS NOTES
Bedside report received from off going RN/tech: Joellen assumed care of patient.     Fall Risk Score: LOW RISK  Fall risk interventions in place: Provide patient/family education based on risk assessment, Educate patient/family to call staff for assistance when getting out of bed, Place fall precaution signage outside patient door, Place patient in room close to nursing station, Utilize bed/chair fall alarm, and Bed alarm connected correctly  Bed type: Regular (Robinson Score less than 17 interventions in place)  Patient on cardiac monitor: Yes  IVF/IV medications: Infusion per MAR (List Med(s)) Heparin at 12u/kg/hr   Oxygen: Room Air  Bedside sitter: Not Applicable   Isolation: Not applicable

## 2024-11-11 NOTE — CARE PLAN
The patient is Watcher - Medium risk of patient condition declining or worsening    Shift Goals  Clinical Goals: Monitor labs, monitor for chest pain, heparin gtt  Patient Goals: feel better, go home  Family Goals: arlen    Progress made toward(s) clinical / shift goals:    Problem: Knowledge Deficit - Standard  Goal: Patient and family/care givers will demonstrate understanding of plan of care, disease process/condition, diagnostic tests and medications  Outcome: Progressing  Note: Discuss and review POC with patient/family. Re-educate as needed.       Problem: Pain - Standard  Goal: Alleviation of pain or a reduction in pain to the patient’s comfort goal  Outcome: Progressing  Note: Assess and monitor for pain. Provide pharmacological and non-pharmacological interventions as appropriate. Re-evaluate and continue to monitor.

## 2024-11-11 NOTE — DISCHARGE PLANNING
Case Management Discharge Planning    Admission Date: 11/10/2024  GMLOS: 4.1  ALOS: 1    6-Clicks ADL Score: 24  6-Clicks Mobility Score: 24      Anticipated Discharge Dispo: Discharge Disposition: Discharged to home/self care (01)  Discharge Address: [facility] [place] [address]  Discharge Contact Phone Number: [phone]    DME Needed: Pending hospital course     Action(s) Taken: DC Assessment Complete (See below). Chart reviewed and pt discussed in IDT rounds plan for left heart cath tomorrow.     Escalations Completed: None    Medically Clear: No    Next Steps: F/U with HCM needs.    Barriers to Discharge: Medical clearance and Pending Procedures    Is the patient up for discharge tomorrow: No

## 2024-11-11 NOTE — PROGRESS NOTES
[facility] Medicine Daily Progress Note    Date of Service  11/11/2024    Chief Complaint  Dilan Calderón is a 84 y.o. male admitted 11/10/2024 with syncope.    Hospital Course    Dilan Calderón is a 84 y.o. male who presented 11/10/2024 with past medical history of coronary disease, systolic heart failure, history of GI bleed who presents to the hospital for syncopal event.  The patient was in the kitchen when he suddenly lost consciousness.  He does not remember how long he was on the floor but he woke up with a pool of blood and a cut to his eye.  He was feeling lightheaded and dizzy prior to this event. Patient was evaluated 5/2024 for a STEMI where he underwent a cardiac cath that found occluded LAD.  A DOUGIE was placed and he was recommended to take DAPT for the next year.  The patient only took those medications for 30 days and has not followed-up with cardiology since.  Patient found to have elevated troponin, concern for NSTEMI, possible occlusion of prior stent. He is started on heparin drip. Cardiology consulted, plan for left heart cath. Echo shows LV EF 40% as well as new LV apical thrombus.    Interval Problem Update  No acute events overnight.  Patient feels well, denies chest pain. He is understanding of the importance of medical compliance and outpatient follow up.  Echo shows LV EF 40%, new LV thrombus finding.  Continue heparin drip for NSTEMI and LV thrombus.  On aspirin, plavix, statin, metoprolol.   Cr 1.55, slightly above baseline 1.3.  Troponin trend downward.  NPO at midnight, plan for left heart cath tomorrow per cardiology.  Recommend ziopatch on discharge.  Ultimately plan for discharge to home when medically cleared.      I have discussed this patient's plan of care and discharge plan at IDT rounds today with Case Management, Nursing, Nursing leadership, and other members of the IDT team.    Consultants/Specialty  cardiology    Code Status  Full Code    Disposition  Medically Cleared  I  have placed the appropriate orders for post-discharge needs.    Review of Systems  ROS     Physical Exam  Temp:  [36.4 °C (97.5 °F)-36.6 °C (97.9 °F)] 36.4 °C (97.5 °F)  Pulse:  [] 77  Resp:  [16] 16  BP: (117-156)/(77-86) 117/81  SpO2:  [93 %-96 %] 95 %    Physical Exam    Fluids    Intake/Output Summary (Last 24 hours) at 11/11/2024 1350  Last data filed at 11/11/2024 1114  Gross per 24 hour   Intake 720 ml   Output 810 ml   Net -90 ml        Laboratory  Recent Labs     11/10/24  0458 11/11/24  0420   WBC 8.6 5.7   RBC 3.96* 3.89*   HEMOGLOBIN 13.3* 12.9*   HEMATOCRIT 39.8* 38.3*   .5* 98.5*   MCH 33.6* 33.2*   MCHC 33.4 33.7   RDW 52.1* 50.8*   PLATELETCT 178 173   MPV 10.8 11.1     Recent Labs     11/10/24  0458 11/11/24  0420   SODIUM 141 139   POTASSIUM 3.9 4.4   CHLORIDE 106 107   CO2 22 24   GLUCOSE 96 98   BUN 26* 23*   CREATININE 1.42* 1.55*   CALCIUM 9.6 9.0     Recent Labs     11/10/24  0458   APTT 26.6   INR 0.99         Recent Labs     11/11/24 0420   TRIGLYCERIDE 88   HDL 62   *       Imaging  EC-ECHOCARDIOGRAM COMPLETE W/ CONT   Final Result      DX-CHEST-PORTABLE (1 VIEW)   Final Result   Impression:      1. No acute cardiopulmonary abnormalities identified.      2. Very severe bilateral shoulder arthritic changes.                     CT-CSPINE WITHOUT PLUS RECONS   Final Result   Impression:      1. No cervical spine fracture evident.      2. Severe degenerative changes.      3. Mild subluxations especially C3-4 unchanged from 3/5/2024 and likely degenerative.                     CT-HEAD W/O   Final Result   Impression:      1. No acute process in the brain evident.                  CL-LEFT HEART CATHETERIZATION WITH POSSIBLE INTERVENTION    (Results Pending)        Assessment/Plan  * NSTEMI (non-ST elevated myocardial infarction) (HCC)- (present on admission)  Assessment & Plan  High risk for in-stent thrombosis since he was not compliant with DAPT  Patient will be admitted to  the telemetry unit with close cardiac monitoring, serial EKG and troponin  Patient has been given full dose of aspirin and is started on IV heparin, monitor Xa  Continue atorvastatin, aspirin, plavix; hx of LAD stent in 5/2024  Echo shows LV EF 40%, new LV thrombus  Cardiology consulted, plan for LHC tomorrow, NPO at midnight      LV (left ventricular) mural thrombus  Assessment & Plan  Due to cardiomyopathy  Continue heparin drip  Will need anticoagulation    Chronic systolic heart failure (HCC)  Assessment & Plan  Monitor volume status  Strict ins and outs and daily weights  Metoprolol, not on ACE or Aldactone yet  Echo shows LV EF 40%, new LV thrombus  euvolemic    Syncope  Assessment & Plan  My risk for arrhythmias due to recent ACS event  Rule out cardiogenic causes  Continuous cardiac monitoring on telemetry  Check 2D echo  Check orthostatics      CKD (chronic kidney disease) stage 3, GFR 30-59 ml/min- (present on admission)  Assessment & Plan  Monitor BMP and assess response  Avoid IV contrast/nephrotoxins/NSAIDs  Dose adjust meds for decreased GFR      History of GI bleed- (present on admission)  Assessment & Plan  Patient's hemoglobin is currently stable and he denies any bloody stools.  Monitor for bloody stools since they can cause syncope         VTE prophylaxis: heparin drip    I have performed a physical exam and reviewed and updated ROS and Plan today (11/11/2024). In review of yesterday's note (11/10/2024), there are no changes except as documented above.      Patient is critically ill.   The patient continues to have: NSTEMI  The vital organ system that is affected is the: heart  If untreated there is a high chance of deterioration into: worsening NSTEMI, cardiac ischemia and eventually death.   The critical care that I am providing today is: managing NSTEMI with heparin drip requiring close therapeutic and toxicity monitoring  The critical that has been undertaken is medically complex.   There has  been no overlap in critical care time.   Critical Care Time not including procedures: 34 minutes

## 2024-11-11 NOTE — PROGRESS NOTES
Bedside report received from off going RN/tech: Galo, assumed care of patient.     Fall Risk Score: MODERATE RISK  Fall risk interventions in place: Place yellow fall risk ID band on patient, Provide patient/family education based on risk assessment, Educate patient/family to call staff for assistance when getting out of bed, and Place fall precaution signage outside patient door  Bed type: Regular (Robinson Score less than 17 interventions in place)  Patient on cardiac monitor: Yes  IVF/IV medications: Infusion per MAR (List Med(s)) heparin at 12units/kg/hour  Oxygen: Room Air  Bedside sitter: Not Applicable   Isolation: Not applicable

## 2024-11-11 NOTE — DISCHARGE PLANNING
"Care Transition Team Assessment  LMSW met with pt to conduct assessment and verify demographics. LMSW introduced role and assessment, pt agreeable. Pt provided all of the following information. Pt is AOX4. Pt was admitted on 11/10/24 for NSTEMI (non-ST elevated myocardial infarction) (HCC). Please see pt's H&P for prior medical Hx. Pt stated that he does not have a PCP. Pt verified address on file. Pt lives alone in a  house with 4 steps to enter. LMSW pt if there is any family he wanted to add on emergency contacts and pt stated no \"my closes relative is in [place], and I have been doing everything on my own\". Pt stated that he was independent with ADLS and IALDS prior to admission with no use of DME. Pt has Medicare for insurance. Pt stated that he will need assistance with a taxi upon DC.     Information Source  Information Given By: Patient  Who is responsible for making decisions for patient? : Patient    Readmission Evaluation  Is this a readmission?: No    Elopement Risk  Legal Hold: No  Ambulatory or Self Mobile in Wheelchair: Yes  Disoriented: No  Psychiatric Symptoms: None  History of Wandering: No  Elopement this Admit: No  Vocalizing Wanting to Leave: No  Displays Behaviors, Body Language Wanting to Leave: No-Not at Risk for Elopement  Elopement Risk: Not at Risk for Elopement    Interdisciplinary Discharge Planning  Lives with - Patient's Self Care Capacity: Alone and Able to Care For Self  Patient or legal guardian wants to designate a caregiver: No  Support Systems: None  Housing / Facility: [address]    Discharge Preparedness  What is your plan after discharge?: Home with help  Prior Functional Level: Ambulatory, Independent with Activities of Daily Living  Difficulity with ADLs: None  Difficulity with IADLs: None    Functional Assesment  Prior Functional Level: Ambulatory, Independent with Activities of Daily Living    Finances  Financial Barriers to Discharge: No  Prescription Coverage: " Yes    Vision / Hearing Impairment  Vision Impairment : Yes  Right Eye Vision: Impaired, Wears Glasses  Left Eye Vision: Impaired, Wears Glasses  Hearing Impairment : No    Advance Directive  Advance Directive?: None    Domestic Abuse  Have you ever been the victim of abuse or violence?: No  Possible Abuse/Neglect Reported to:: Not Applicable    Psychological Assessment  History of Substance Abuse: None  History of Psychiatric Problems: No  Non-compliant with Treatment: No  Newly Diagnosed Illness: No    Discharge Risks or Barriers  Discharge risks or barriers?: No    Anticipated Discharge Information  Discharge Disposition: Discharged to home/self care (01)  Discharge Address: [address] [place] [address] [place] [address]  Discharge Contact Phone Number: [phone]

## 2024-11-11 NOTE — PROGRESS NOTES
Monitor Summary  Rhythm: Sinus rhythm, sinus nany  Rate:53-73  Ectopy:PVC, PAC  .17/.07/.42

## 2024-11-11 NOTE — CARE PLAN
The patient is Stable - Low risk of patient condition declining or worsening    Shift Goals  Clinical Goals: hep gtt, monitor labs, VSS  Patient Goals: get home  Family Goals: arlen    Progress made toward(s) clinical / shift goals:    Problem: Knowledge Deficit - Standard  Goal: Patient and family/care givers will demonstrate understanding of plan of care, disease process/condition, diagnostic tests and medications  Outcome: Progressing  Note: Discuss and review POC with patient/family. Re-educate as needed. Patient will be NPO at midnight for LHC.      Problem: Pain - Standard  Goal: Alleviation of pain or a reduction in pain to the patient’s comfort goal  Outcome: Progressing  Note: Pt educated to pain scale and pain assessed. Pt medicated per MAR with PRN medication. Non-pharmacological pain management techniques encouraged.      Problem: Fall Risk  Goal: Patient will remain free from falls  Outcome: Progressing  Note: Fall risk assessment complete. Fall precautions implemented; bed alarm on, patient wearing non-slip socks, call light within reach, hourly rounding in progress, and tolieting needs assessed.        Patient is not progressing towards the following goals:

## 2024-11-11 NOTE — PROGRESS NOTES
"     Cardiology Follow-up Note    Name:   Dilan Calderón     YOB: 1940  Age:   84 y.o.  male   MRN:   [id number]        Attending Provider: Dr Yuri Stuart M.D.     Chief Complaint: Syncope (Pt woke to floor in kitchen in pool of blood with LAC above Rt eyebrow. ) and Extremity Weakness (X 2 days with occasional chest pain radiating across upper chest to bilateral shoulders. Intermittent. Pt reports it is not the same as cardiac chest pain. )       Reason for cardiology consult: Syncope    Outpatient Cardiologist: None    History of Present Illness  Dilan Calderón is 84 y.o. male with prior medical history significant for GIB (March 2024), CAD / MI s/p PCI DOUGIE p-mLAD (05/2024), ICM, HFrEF, CKD stage III who was admitted on 11/10/2024 with syncope.  Patient was symptomatic with lightheadedness and dizziness prior to syncope.  Then he woke up on the floor in a small pool of blood due to suffering trauma to right eyebrow.  Patient reports muscle tension like discomfort across both shoulders radiating down to both arms for about 3 to 4 days prior to admission, relieved mostly by deep breathing exercises.  This was associated with dizziness.  Last admission in May 2024 for NSTEMI when he received a cardiac stent to mid LAD.  Patient reports taking prescribed medications for 30 days only after which he decided to stop all his prescribed medications since he \"felt good.\" No follow-ups with cardiology since.    Interim Events 11/11/24   :  - Personal Telemetry interpretation: Sinus rhythm  - Overnight events: No Cardiac events   Patient denies chest pain, shortness of breath, edema, dizziness/lightheadedness, or palpitations.   - Vitals: 156/82, room air, heart rate 65  - Labs reviewed: Creatinine 1.55  - Weight: 134 pounds      Review of Systems   Constitutional:  Positive for malaise/fatigue. Negative for chills and fever.   Respiratory:  Negative for cough, shortness of breath and wheezing.  " "  Cardiovascular:  Negative for chest pain, palpitations and leg swelling.   Neurological:  Negative for dizziness.   Psychiatric/Behavioral:  Negative for memory loss and substance abuse. The patient is not nervous/anxious.         Medical History  History reviewed. No pertinent past medical history.      History reviewed. No pertinent family history.      Social History     Tobacco Use    Smoking status: Former     Types: Cigarettes    Smokeless tobacco: Never   Vaping Use    Vaping status: Never Used   Substance Use Topics    Alcohol use: Not Currently    Drug use: Never         No Known Allergies      Medications   Scheduled Medications   Medication Dose Frequency    atorvastatin  80 mg Q EVENING    clopidogrel  75 mg DAILY    aspirin  81 mg DAILY           Physical Exam    Body mass index is 21.06 kg/m².     BP (!) 156/82   Pulse 65   Temp 36.4 °C (97.5 °F) (Temporal)   Resp 16   Ht 1.702 m (5' 7\")   Wt 61 kg (134 lb 7.7 oz)   SpO2 96%      Vitals:    11/10/24 1951 11/11/24 0124 11/11/24 0424 11/11/24 0732   BP: (!) 148/82 138/81 128/81 (!) 156/82   Pulse: 76 62 67 65   Resp: 16 16 16 16   Temp: 36.5 °C (97.7 °F) 36.6 °C (97.9 °F) 36.4 °C (97.5 °F) 36.4 °C (97.5 °F)   TempSrc: Temporal Temporal Temporal Temporal   SpO2: 93% 96% 93% 96%   Weight:   61 kg (134 lb 7.7 oz)    Height:            Oxygen Therapy:  Pulse Oximetry: 96 %, O2 (LPM): 0, O2 Delivery Device: None - Room Air      Physical Exam  Constitutional:       Appearance: Normal appearance.   Cardiovascular:      Rate and Rhythm: Normal rate and regular rhythm.      Heart sounds: Murmur heard.   Pulmonary:      Breath sounds: No rales.   Abdominal:      General: There is no distension.      Palpations: Abdomen is soft.   Skin:     General: Skin is warm and dry.      Coloration: Skin is pale.      Comments: Ecchymosis right eyebrow   Neurological:      Mental Status: He is alert and oriented to person, place, and time.   Psychiatric:         Mood " "and Affect: Mood normal.         Behavior: Behavior normal.         Thought Content: Thought content normal.         Judgment: Judgment normal.               Labs (personally reviewed):     Estimated Creatinine Clearance: 31.7 mL/min (A) (by C-G formula based on SCr of 1.55 mg/dL (H)).    No results found for: \"BNPBTYPENAT\"  Recent Labs     11/10/24  0458 11/11/24  0420   CREATININE 1.42* 1.55*   BUN 26* 23*   POTASSIUM 3.9 4.4   SODIUM 141 139   CALCIUM 9.6 9.0   MAGNESIUM 2.1  --    CO2 22 24   ALBUMIN 4.1 3.7     Recent Labs     11/10/24  0458 11/11/24  0420   GLUCOSE 96 98     Recent Labs     11/10/24  0458 11/11/24  0420   ASTSGOT 61* 42   ALTSGPT 56* 40   ALKPHOSPHAT 115* 106*   INR 0.99  --      Recent Labs     11/10/24  0458 11/11/24  0420   WBC 8.6 5.7   HEMOGLOBIN 13.3* 12.9*   PLATELETCT 178 173     Recent Labs     11/10/24  0458 11/10/24  0858 11/10/24  1413   TROPONINT 102* 93* 78*     Lab Results   Component Value Date/Time    CHOLSTRLTOT 197 11/11/2024 04:20 AM     (H) 11/11/2024 04:20 AM    HDL 62 11/11/2024 04:20 AM    TRIGLYCERIDE 88 11/11/2024 04:20 AM       Imaging:  DX-CHEST-PORTABLE (1 VIEW)   Final Result   Impression:      1. No acute cardiopulmonary abnormalities identified.      2. Very severe bilateral shoulder arthritic changes.                     CT-CSPINE WITHOUT PLUS RECONS   Final Result   Impression:      1. No cervical spine fracture evident.      2. Severe degenerative changes.      3. Mild subluxations especially C3-4 unchanged from 3/5/2024 and likely degenerative.                     CT-HEAD W/O   Final Result   Impression:      1. No acute process in the brain evident.                  EC-ECHOCARDIOGRAM COMPLETE W/O CONT    (Results Pending)       Cardiac Imaging and Procedures Review      Echocardiogram 5/4/2024  Moderately reduced left ventricular systolic function.  The left ventricular ejection fraction is visually estimated to be 35-  40%.  Hypokinesis anteroapical " hypokinesis.  The right ventricle is normal in size and systolic function.  No prior study is available for comparison.       Cardiac Catheterization 5/2/2024 by Dr. Johns :  IMPRESSION:  1.  Occluded proximal LAD (heavily calcified vessel) status post successful IVUS guided intravascular lithotripsy and PCI deploying overlapping Synergy 3 x 28 mm and 3 x 32 mm DOUGIE, postdilated to ~ 3.5-3.7 mm.  2.  Ischemic cardiomyopathy with estimated LVEF 45% with anterior wall akinesis  3.  Significant elevated resting LVEDP 25 mmHg with no significant transaortic gradient on pullback     RECOMMENDATIONS:  Dual antiplatelet therapy for at least 12 months  Weigh risk versus benefits of further PCI to his remaining severe disease in the circumflex and RCA, especially given recent GI bleed with no clear identifiable source necessitating blood transfusions.  HI statin / PCSK9-I: Target LDL < 55 and TG < 150  TTE with echo enhancing agent  GDMT and lifestyle modifications for secondary ASCVD prevention  Cardiac Rehab referral      Principal Problem:    NSTEMI (non-ST elevated myocardial infarction) (HCC) (POA: Yes)  Active Problems:    History of GI bleed (POA: Yes)    CKD (chronic kidney disease) stage 3, GFR 30-59 ml/min (POA: Yes)    Syncope (POA: Unknown)    Acute on chronic systolic heart failure (HCC) (POA: Unknown)  Resolved Problems:    * No resolved hospital problems. *      Assessment and Medical Decision Making:    #.  Syncope  #.  Elevated troponin due to possible NSTEMI  #.  Coronary artery disease, S/P DOUGIE to p-mLAD (5/2024)  #.  Hypertension  #.  Hyperlipidemia  #.  Anemia  #.  HFrEF, EF 38%  #  Ischemic cardiomyopathy  #.  CKD  #.  Medication noncompliance        Recommendations:    Syncope   Positive orthostatics.  Dry on exam   Start gentle IV fluids    Elevated troponin due to possible NSTEMI  Prior left heart cath on May 2, 2024 showed occluded proximal LAD, s/p Synergy 3 x 28 mm and 3 x 32 mm DOUGIE    -ASA  -Clopidogrel  -Antiocoagulant: Heparin gtt  -HI Statin, atorvastatin 80 mg nightly for goal LDL less than 70.  -BB -start Toprol-XL 25 mg daily  -Recheck echocardiogram - pending, but last EF 38% back in May 2024  -Plan for left heart cath tomorrow morning  -Keep n.p.o. after midnight      HFrEF, ischemic cardiomyopathy  -ACE-I/ARB/ARNI: Hold off due to reduced kidney function  -Evidence Based Beta-blocker: Start metoprolol Toprol-XL 25 mg daily  -Aldosterone Antagonist: Consider starting tomorrow if blood pressure holds with new medication starts  -Diuretic: Appears dry, N/A  -SGLT2i: Hold off today   -Labs: Daily BMP  -Daily weights; Strict I+O’s:         No future appointments.     I personally discussed his case with  Dr Yuri Stuart M.D.    Please contact me with any questions.  Thank you for allowing us to participate in the care of Mr. Calderón.      Please see Dr. Rodarte  attestation for further details and MDM.     I, CAROLINE Adkins performed a portion of the service face-to-face with the same patient on the same date of service independently of Dr. Rodarte FOR 15 minutes preparing to see the patient, reviewing hospital notes and tests, obtaining history from the patient, performing a medically appropriate exam, counseling and educating the patient, ordering medications/tests/procedures/referrals as clinically indicated, and documenting information in the electronic medical record.    Please note this dictation was created using voice recognition software.  I have made every reasonable attempt to correct obvious errors, but there may be errors of grammar and possibly content that I did not discover before finalizing the note.    ISABEL Adkins.  [facility]  [phone]

## 2024-11-11 NOTE — THERAPY
"   11/11/24 0900   Interdisciplinary Plan of Care Collaboration   Collaboration Comments Pt found down after a syncopal event, sustaining a head laceration.  Hx of CAD, HF, GIB.  Found to have acute on chronic HF.  Hx of STEMI/heart cath in May, w/o f/u w/ cardio.  He reports no phase one cardiac rehab.  Hand out  given, phase one cardiac rehab education completed w/ pt expressing good understanding.  Included \"talk test\".  No further PT needs.       "

## 2024-11-12 ENCOUNTER — APPOINTMENT (OUTPATIENT)
Dept: CARDIOLOGY | Facility: MEDICAL CENTER | Age: 84
DRG: 228 | End: 2024-11-12
Attending: NURSE PRACTITIONER
Payer: MEDICARE

## 2024-11-12 ENCOUNTER — APPOINTMENT (OUTPATIENT)
Dept: CARDIOLOGY | Facility: MEDICAL CENTER | Age: 84
DRG: 228 | End: 2024-11-12
Payer: MEDICARE

## 2024-11-12 PROBLEM — S09.90XA CLOSED HEAD INJURY: Status: ACTIVE | Noted: 2024-11-12

## 2024-11-12 PROBLEM — E03.9 HYPOTHYROIDISM: Status: ACTIVE | Noted: 2024-11-12

## 2024-11-12 LAB
ACT BLD: 269 SEC (ref 74–137)
ANION GAP SERPL CALC-SCNC: 12 MMOL/L (ref 7–16)
BUN SERPL-MCNC: 21 MG/DL (ref 8–22)
CALCIUM SERPL-MCNC: 9.2 MG/DL (ref 8.5–10.5)
CHLORIDE SERPL-SCNC: 105 MMOL/L (ref 96–112)
CO2 SERPL-SCNC: 22 MMOL/L (ref 20–33)
CREAT SERPL-MCNC: 1.33 MG/DL (ref 0.5–1.4)
EKG IMPRESSION: NORMAL
ERYTHROCYTE [DISTWIDTH] IN BLOOD BY AUTOMATED COUNT: 51.7 FL (ref 35.9–50)
GFR SERPLBLD CREATININE-BSD FMLA CKD-EPI: 53 ML/MIN/1.73 M 2
GLUCOSE SERPL-MCNC: 102 MG/DL (ref 65–99)
HCT VFR BLD AUTO: 37.1 % (ref 42–52)
HGB BLD-MCNC: 12.7 G/DL (ref 14–18)
MCH RBC QN AUTO: 34 PG (ref 27–33)
MCHC RBC AUTO-ENTMCNC: 34.2 G/DL (ref 32.3–36.5)
MCV RBC AUTO: 99.5 FL (ref 81.4–97.8)
PLATELET # BLD AUTO: 162 K/UL (ref 164–446)
PMV BLD AUTO: 10.8 FL (ref 9–12.9)
POTASSIUM SERPL-SCNC: 4.3 MMOL/L (ref 3.6–5.5)
RBC # BLD AUTO: 3.73 M/UL (ref 4.7–6.1)
SODIUM SERPL-SCNC: 139 MMOL/L (ref 135–145)
T4 FREE SERPL-MCNC: 0.38 NG/DL (ref 0.93–1.7)
TSH SERPL-ACNC: 83.1 UIU/ML (ref 0.35–5.5)
UFH PPP CHRO-ACNC: 0.49 IU/ML
UFH PPP CHRO-ACNC: >1.1 IU/ML
WBC # BLD AUTO: 9.1 K/UL (ref 4.8–10.8)

## 2024-11-12 PROCEDURE — 700102 HCHG RX REV CODE 250 W/ 637 OVERRIDE(OP)

## 2024-11-12 PROCEDURE — A9270 NON-COVERED ITEM OR SERVICE: HCPCS

## 2024-11-12 PROCEDURE — 770022 HCHG ROOM/CARE - ICU (200)

## 2024-11-12 PROCEDURE — B2111ZZ FLUOROSCOPY OF MULTIPLE CORONARY ARTERIES USING LOW OSMOLAR CONTRAST: ICD-10-PCS | Performed by: INTERNAL MEDICINE

## 2024-11-12 PROCEDURE — 97602 WOUND(S) CARE NON-SELECTIVE: CPT

## 2024-11-12 PROCEDURE — 700102 HCHG RX REV CODE 250 W/ 637 OVERRIDE(OP): Performed by: INTERNAL MEDICINE

## 2024-11-12 PROCEDURE — 93005 ELECTROCARDIOGRAM TRACING: CPT | Performed by: INTERNAL MEDICINE

## 2024-11-12 PROCEDURE — 700117 HCHG RX CONTRAST REV CODE 255: Performed by: INTERNAL MEDICINE

## 2024-11-12 PROCEDURE — 85520 HEPARIN ASSAY: CPT

## 2024-11-12 PROCEDURE — 99291 CRITICAL CARE FIRST HOUR: CPT | Performed by: INTERNAL MEDICINE

## 2024-11-12 PROCEDURE — 700101 HCHG RX REV CODE 250

## 2024-11-12 PROCEDURE — A9270 NON-COVERED ITEM OR SERVICE: HCPCS | Performed by: HOSPITALIST

## 2024-11-12 PROCEDURE — 84443 ASSAY THYROID STIM HORMONE: CPT

## 2024-11-12 PROCEDURE — 700102 HCHG RX REV CODE 250 W/ 637 OVERRIDE(OP): Performed by: STUDENT IN AN ORGANIZED HEALTH CARE EDUCATION/TRAINING PROGRAM

## 2024-11-12 PROCEDURE — 99152 MOD SED SAME PHYS/QHP 5/>YRS: CPT | Performed by: INTERNAL MEDICINE

## 2024-11-12 PROCEDURE — 302096 CLEANSER BODY 4IN1 FOAM: Performed by: INTERNAL MEDICINE

## 2024-11-12 PROCEDURE — C1769 GUIDE WIRE: HCPCS

## 2024-11-12 PROCEDURE — 85347 COAGULATION TIME ACTIVATED: CPT

## 2024-11-12 PROCEDURE — A9270 NON-COVERED ITEM OR SERVICE: HCPCS | Performed by: STUDENT IN AN ORGANIZED HEALTH CARE EDUCATION/TRAINING PROGRAM

## 2024-11-12 PROCEDURE — 027034Z DILATION OF CORONARY ARTERY, ONE ARTERY WITH DRUG-ELUTING INTRALUMINAL DEVICE, PERCUTANEOUS APPROACH: ICD-10-PCS | Performed by: INTERNAL MEDICINE

## 2024-11-12 PROCEDURE — 700111 HCHG RX REV CODE 636 W/ 250 OVERRIDE (IP): Mod: JZ,JG

## 2024-11-12 PROCEDURE — 93454 CORONARY ARTERY ANGIO S&I: CPT | Mod: 59 | Performed by: INTERNAL MEDICINE

## 2024-11-12 PROCEDURE — 33274 TCAT INSJ/RPL PERM LDLS PM: CPT | Performed by: INTERNAL MEDICINE

## 2024-11-12 PROCEDURE — A9270 NON-COVERED ITEM OR SERVICE: HCPCS | Performed by: INTERNAL MEDICINE

## 2024-11-12 PROCEDURE — 306591 TRAY SUTURE REMOVAL DISP: Performed by: INTERNAL MEDICINE

## 2024-11-12 PROCEDURE — 02HK3NZ INSERTION OF INTRACARDIAC PACEMAKER INTO RIGHT VENTRICLE, PERCUTANEOUS APPROACH: ICD-10-PCS | Performed by: INTERNAL MEDICINE

## 2024-11-12 PROCEDURE — 85027 COMPLETE CBC AUTOMATED: CPT

## 2024-11-12 PROCEDURE — 84439 ASSAY OF FREE THYROXINE: CPT

## 2024-11-12 PROCEDURE — 92928 PRQ TCAT PLMT NTRAC ST 1 LES: CPT | Performed by: INTERNAL MEDICINE

## 2024-11-12 PROCEDURE — 99152 MOD SED SAME PHYS/QHP 5/>YRS: CPT

## 2024-11-12 PROCEDURE — 99153 MOD SED SAME PHYS/QHP EA: CPT

## 2024-11-12 PROCEDURE — 99232 SBSQ HOSP IP/OBS MODERATE 35: CPT | Performed by: INTERNAL MEDICINE

## 2024-11-12 PROCEDURE — 700102 HCHG RX REV CODE 250 W/ 637 OVERRIDE(OP): Performed by: HOSPITALIST

## 2024-11-12 PROCEDURE — 80048 BASIC METABOLIC PNL TOTAL CA: CPT

## 2024-11-12 RX ORDER — BUPIVACAINE HYDROCHLORIDE 2.5 MG/ML
INJECTION, SOLUTION EPIDURAL; INFILTRATION; INTRACAUDAL
Status: COMPLETED
Start: 2024-11-12 | End: 2024-11-12

## 2024-11-12 RX ORDER — ACETAMINOPHEN 325 MG/1
650 TABLET ORAL EVERY 4 HOURS PRN
Status: DISCONTINUED | OUTPATIENT
Start: 2024-11-12 | End: 2024-11-12

## 2024-11-12 RX ORDER — HEPARIN SODIUM 1000 [USP'U]/ML
INJECTION, SOLUTION INTRAVENOUS; SUBCUTANEOUS
Status: COMPLETED
Start: 2024-11-12 | End: 2024-11-12

## 2024-11-12 RX ORDER — CLOPIDOGREL 300 MG/1
TABLET, FILM COATED ORAL
Status: COMPLETED
Start: 2024-11-12 | End: 2024-11-12

## 2024-11-12 RX ORDER — LIDOCAINE HYDROCHLORIDE 20 MG/ML
INJECTION, SOLUTION INFILTRATION; PERINEURAL
Status: COMPLETED
Start: 2024-11-12 | End: 2024-11-12

## 2024-11-12 RX ORDER — MIDAZOLAM HYDROCHLORIDE 1 MG/ML
INJECTION INTRAMUSCULAR; INTRAVENOUS
Status: COMPLETED
Start: 2024-11-12 | End: 2024-11-12

## 2024-11-12 RX ORDER — ASPIRIN 81 MG/1
81 TABLET ORAL DAILY
Status: DISCONTINUED | OUTPATIENT
Start: 2024-11-12 | End: 2024-11-12

## 2024-11-12 RX ORDER — ONDANSETRON 2 MG/ML
4 INJECTION INTRAMUSCULAR; INTRAVENOUS EVERY 6 HOURS PRN
Status: DISCONTINUED | OUTPATIENT
Start: 2024-11-12 | End: 2024-11-14 | Stop reason: HOSPADM

## 2024-11-12 RX ORDER — VERAPAMIL HYDROCHLORIDE 2.5 MG/ML
INJECTION, SOLUTION INTRAVENOUS
Status: COMPLETED
Start: 2024-11-12 | End: 2024-11-12

## 2024-11-12 RX ORDER — CEFAZOLIN SODIUM 1 G/3ML
INJECTION, POWDER, FOR SOLUTION INTRAMUSCULAR; INTRAVENOUS
Status: COMPLETED
Start: 2024-11-12 | End: 2024-11-12

## 2024-11-12 RX ORDER — HEPARIN SODIUM 200 [USP'U]/100ML
INJECTION, SOLUTION INTRAVENOUS
Status: COMPLETED
Start: 2024-11-12 | End: 2024-11-12

## 2024-11-12 RX ORDER — CLOPIDOGREL BISULFATE 75 MG/1
300 TABLET ORAL ONCE
Status: DISCONTINUED | OUTPATIENT
Start: 2024-11-12 | End: 2024-11-12

## 2024-11-12 RX ORDER — LEVOTHYROXINE SODIUM 50 UG/1
50 TABLET ORAL
Status: DISCONTINUED | OUTPATIENT
Start: 2024-11-12 | End: 2024-11-14 | Stop reason: HOSPADM

## 2024-11-12 RX ADMIN — LEVOTHYROXINE SODIUM 50 MCG: 0.05 TABLET ORAL at 09:42

## 2024-11-12 RX ADMIN — CLOPIDOGREL BISULFATE 75 MG: 75 TABLET ORAL at 05:12

## 2024-11-12 RX ADMIN — HEPARIN SODIUM 2000 UNITS: 200 INJECTION, SOLUTION INTRAVENOUS at 15:23

## 2024-11-12 RX ADMIN — HEPARIN SODIUM: 1000 INJECTION, SOLUTION INTRAVENOUS; SUBCUTANEOUS at 15:22

## 2024-11-12 RX ADMIN — BUPIVACAINE HYDROCHLORIDE: 2.5 INJECTION, SOLUTION EPIDURAL; INFILTRATION; INTRACAUDAL at 13:49

## 2024-11-12 RX ADMIN — MIDAZOLAM HYDROCHLORIDE 2 MG: 1 INJECTION, SOLUTION INTRAMUSCULAR; INTRAVENOUS at 13:59

## 2024-11-12 RX ADMIN — ATORVASTATIN CALCIUM 80 MG: 80 TABLET, FILM COATED ORAL at 18:18

## 2024-11-12 RX ADMIN — VERAPAMIL HYDROCHLORIDE 2.5 MG: 2.5 INJECTION, SOLUTION INTRAVENOUS at 15:20

## 2024-11-12 RX ADMIN — CEFAZOLIN 2000 MG: 1 INJECTION, POWDER, FOR SOLUTION INTRAMUSCULAR; INTRAVENOUS at 13:48

## 2024-11-12 RX ADMIN — FENTANYL CITRATE 100 MCG: 50 INJECTION, SOLUTION INTRAMUSCULAR; INTRAVENOUS at 13:59

## 2024-11-12 RX ADMIN — OMEPRAZOLE 20 MG: 20 CAPSULE, DELAYED RELEASE ORAL at 05:12

## 2024-11-12 RX ADMIN — CLOPIDOGREL BISULFATE 300 MG: 300 TABLET, FILM COATED ORAL at 15:37

## 2024-11-12 RX ADMIN — IOHEXOL 70 ML: 350 INJECTION, SOLUTION INTRAVENOUS at 15:39

## 2024-11-12 RX ADMIN — MIDAZOLAM HYDROCHLORIDE 2 MG: 1 INJECTION, SOLUTION INTRAMUSCULAR; INTRAVENOUS at 14:22

## 2024-11-12 RX ADMIN — ASPIRIN 81 MG: 81 TABLET, CHEWABLE ORAL at 05:12

## 2024-11-12 RX ADMIN — FENTANYL CITRATE 25 MCG: 50 INJECTION, SOLUTION INTRAMUSCULAR; INTRAVENOUS at 15:34

## 2024-11-12 RX ADMIN — LIDOCAINE HYDROCHLORIDE: 20 INJECTION, SOLUTION INFILTRATION; PERINEURAL at 13:49

## 2024-11-12 RX ADMIN — NITROGLYCERIN 10 ML: 20 INJECTION INTRAVENOUS at 15:20

## 2024-11-12 ASSESSMENT — PAIN DESCRIPTION - PAIN TYPE
TYPE: ACUTE PAIN
TYPE: ACUTE PAIN;CHRONIC PAIN
TYPE: ACUTE PAIN

## 2024-11-12 ASSESSMENT — ENCOUNTER SYMPTOMS
DEPRESSION: 0
HEADACHES: 1
WEIGHT LOSS: 0
TROUBLE SWALLOWING: 0
SHORTNESS OF BREATH: 0
FEVER: 0
SENSORY CHANGE: 0
TINGLING: 0
FATIGUE: 0
SPEECH DIFFICULTY: 0
DIZZINESS: 0
SEIZURES: 0
MEMORY LOSS: 0
SORE THROAT: 0
ABDOMINAL PAIN: 0
HEADACHES: 0
WEAKNESS: 0
FOCAL WEAKNESS: 0
SPUTUM PRODUCTION: 0
NUMBNESS: 0
NERVOUS/ANXIOUS: 0
PALPITATIONS: 0
NAUSEA: 0
COUGH: 0
VOMITING: 0
ORTHOPNEA: 0
LIGHT-HEADEDNESS: 0
CLAUDICATION: 0
CHILLS: 0
WHEEZING: 0

## 2024-11-12 ASSESSMENT — FIBROSIS 4 INDEX: FIB4 SCORE: 3.42

## 2024-11-12 ASSESSMENT — LIFESTYLE VARIABLES: SUBSTANCE_ABUSE: 0

## 2024-11-12 NOTE — PROGRESS NOTES
Updated by Dr. Paulie hines recent Code blue / RRT for unresponsiveness and ~ 7.5 sec pause on the monitor.    Reviewed strips uploaded under Media tab, concerns for asystole (p wave present without escape rhythm), and high degree AV block concerns.    Now back to SR and HD stable. Minimal CP no STEMI    Patient with ant STEMI 5/2024 s/p LAD PCI and residual OM and RCA disease PCI deferred given GI bleed needing blood then; and patient stopped his medications 30 days after PCI.    Patient planned to get repeat CA/LHC tomorrow, and it seems that he will also benefit from a PPM.     Held his metoprolol. In CIC now. Keep NPO.    Call us at any time with questions or concerns.

## 2024-11-12 NOTE — PROGRESS NOTES
Pt in SR upon assumption of care. A&Ox4. Satting well on RA. 2057: Monitor check called on patient for subsequent pauses (2.6sec, 7.4 sec). Upon arrival to the room patient was not verbally responsive, but was tracking with his eyes and was very diaphoretic. Patient was also hypotensive. Patient was then placed on zoll. Pads placed on patient. Rapid response called.       Patient then converted to 2nd degree type 2 HB and 3rd degree HB. Patient's pulse became very weak/thready. Pt c/o chest pain and lightheadedness/dizziness. Code blue called.                 IVF and norepinephrine started for hypotension. EKGs performed at bedside and reviewed by MD. Patient transferred to CICU on 6L.

## 2024-11-12 NOTE — PROGRESS NOTES
4 Eyes Skin Assessment Completed by ALBERT Vicente and ALBERT Hall.    Head WDL  Ears Redness, non-blanching bileterally  Nose Redness, Discoloration, and Scab  Mouth WDL  Neck WDL  Breast/Chest WDL  Shoulder Blades WDL  Spine WDL  (R) Arm/Elbow/Hand WDL  (L) Arm/Elbow/Hand WDL  Abdomen WDL  Groin WDL  Scrotum/Coccyx/Buttocks Blanching, redness  (R) Leg WDL  (L) Leg Laceration wound;see pic  (R) Heel/Foot/Toe Redness, Blanching, and Boggy Left foot wound: See Pic  (L) Heel/Foot/Toe Redness, Blanching, and Boggy    Left ear    Right ear    Heel    Heel    Saccrum    Legs    Face Lac    Left foot    Knee        Devices In Places Blood Pressure Cuff, Pulse Ox, and SCD's      Interventions In Place Q2 Turns, Low Air Loss Mattress, Heels Loaded W/Pillows, and Pressure Redistribution Mattress    Possible Skin Injury Yes    Pictures Uploaded Into Epic Yes  Wound Consult Placed Yes  RN Wound Prevention Protocol Ordered Yes

## 2024-11-12 NOTE — PROGRESS NOTES
"[facility] ICU Progress Note      Admit Date: 11/10/2024    Resident(s): Ben Lui M.D.   Attending:  Dr. Cole Marrero    Patient ID:    Name:  Dilan Calderón     YOB: 1940  Age:  84 y.o.  male   MRN:  [id number]    Hospital Course (carried forward and updated):  From Dr. Apodaca's H&P dated 11/11 \"84 y.o. male with hx of CKD, CAD, ICM with LVEF 40-45% with hx of STEMI in 5/2024 s/p PCI DOUGIE to LAD on DAPT, but unfortunately patient only took 30 days of DAPT and decided not to follow-up with cardiology.  Patient now presented with multiple episodes of syncope. ECG without ST-T wave changes. Pt was started on heparin gtt, ASA/plavix and admitted to floor. LCH was planned tomorrow am. Around 9pm this evening, I was reported that patient had a very brief loss of consciousness.  CODE BLUE was called Rapid response team concerned of questionable loss of pulse which prompted them to start CPR right away.  But after few seconds of CPR patient woke up. SBP in 60/40s, rate in 50s to 60s.  Patient was alert oriented.  500 cc fluid bolus was given.  EKG stat showed slow A-fib.  Monitor strip was reviewed during the event which revealed a 7.5-second pause and 2.5-second pause.  After fluid resuscitation, pressure was up to 110/70s.  Patient complained of 3 out of 10 chest pain at the time     Repeat labs showed troponin 67.  Lactate 2.8.  Potassium 4.1 magnesium 2.1 creatinine 1.5 with bicarb of 17 LFTs are normal.      In addition, repeat TTE on 11/10 noted LVEF 40-45% with apical akinesis and new LV apical thrombus\"      Interval Events:  11/11 admitted to ICU started on heparin drip. Cardiology consulted and plan for LHC the following day   11/12 Doing well this morning, NSR on monitor. Plan to [facility]     Consultants:  Critical Care  Cardiology    Vitals Range last 24h:  Temp:  [36 °C (96.8 °F)-36.6 °C (97.9 °F)] 36 °C (96.8 °F)  Pulse:  [51-77] 63  Resp:  [12-20] 12  BP: ()/(43-82) " 144/72  SpO2:  [93 %-97 %] 93 %      Intake/Output Summary (Last 24 hours) at 11/12/2024 0720  Last data filed at 11/12/2024 0600  Gross per 24 hour   Intake 1729.15 ml   Output 1925 ml   Net -195.85 ml        Review of Systems   Constitutional:  Negative for chills and fever.   Respiratory:  Negative for cough and shortness of breath.    Cardiovascular:  Negative for chest pain, palpitations and leg swelling.   Gastrointestinal:  Negative for abdominal pain, nausea and vomiting.   Neurological:  Negative for sensory change, focal weakness and headaches.        PHYSICAL EXAM:  Vitals:    11/12/24 0300 11/12/24 0400 11/12/24 0500 11/12/24 0600   BP: 135/64 130/63 120/64 (!) 144/72   Pulse: (!) 52 (!) 58 (!) 51 63   Resp: 14 12 12 12   Temp:       TempSrc:       SpO2: 95% 94% 97% 93%   Weight:  61.3 kg (135 lb 2.3 oz)     Height:        Body mass index is 21.17 kg/m².    O2 therapy: Pulse Oximetry: 93 %, O2 (LPM): 10, O2 Delivery Device: None - Room Air         Physical Exam  Constitutional:       General: He is not in acute distress.     Appearance: Normal appearance. He is not ill-appearing.   HENT:      Head: Normocephalic.      Comments: Ecchymoses about right orbit with healing laceration right lateral brow ridge     Mouth/Throat:      Mouth: Mucous membranes are moist.      Pharynx: Oropharynx is clear.   Eyes:      Extraocular Movements: Extraocular movements intact.      Pupils: Pupils are equal, round, and reactive to light.   Cardiovascular:      Rate and Rhythm: Normal rate and regular rhythm.      Pulses: Normal pulses.      Heart sounds: No murmur heard.  Pulmonary:      Effort: Pulmonary effort is normal. No respiratory distress.      Breath sounds: Normal breath sounds. No wheezing or rales.   Abdominal:      General: Abdomen is flat. There is no distension.      Palpations: Abdomen is soft.      Tenderness: There is no abdominal tenderness.   Musculoskeletal:      Right lower leg: No edema.      Left  lower leg: No edema.   Skin:     General: Skin is warm and dry.      Capillary Refill: Capillary refill takes less than 2 seconds.   Neurological:      General: No focal deficit present.      Mental Status: He is alert and oriented to person, place, and time.   Psychiatric:         Mood and Affect: Mood normal.         Behavior: Behavior normal.         Thought Content: Thought content normal.         Judgment: Judgment normal.           Recent Labs     11/10/24  0458 11/11/24  0420 11/11/24  2128 11/12/24  0243   SODIUM 141 139 139 139   POTASSIUM 3.9 4.4 4.1 4.3   CHLORIDE 106 107 107 105   CO2 22 24 17* 22   BUN 26* 23* 23* 21   CREATININE 1.42* 1.55* 1.50* 1.33   MAGNESIUM 2.1  --  2.1  --    PHOSPHORUS 3.1  --  3.6  --    CALCIUM 9.6 9.0 9.0 9.2     Recent Labs     11/10/24  0458 11/11/24  0420 11/11/24  2128 11/12/24  0243   ALTSGPT 56* 40 35  --    ASTSGOT 61* 42 40  --    ALKPHOSPHAT 115* 106* 101*  --    TBILIRUBIN 0.2 0.4 0.4  --    GLUCOSE 96 98 138* 102*     Recent Labs     11/10/24  0458 11/11/24  0420 11/11/24  2128 11/12/24  0243   RBC 3.96* 3.89* 3.86* 3.73*   HEMOGLOBIN 13.3* 12.9* 12.6* 12.7*   HEMATOCRIT 39.8* 38.3* 38.9* 37.1*   PLATELETCT 178 173 159* 162*   PROTHROMBTM 13.1  --   --   --    APTT 26.6  --   --   --    INR 0.99  --   --   --      Recent Labs     11/10/24  0458 11/11/24  0420 11/11/24  2128 11/12/24  0243   WBC 8.6 5.7 10.3 9.1   NEUTSPOLYS 53.20 52.20 40.70*  --    LYMPHOCYTES 36.40 33.00 52.20*  --    MONOCYTES 7.60 8.90 4.40  --    EOSINOPHILS 2.00 4.50 2.70  --    BASOPHILS 0.50 0.90 0.00  --    ASTSGOT 61* 42 40  --    ALTSGPT 56* 40 35  --    ALKPHOSPHAT 115* 106* 101*  --    TBILIRUBIN 0.2 0.4 0.4  --        Meds:   omeprazole  20 mg      NORepinephrine  0-1 mcg/kg/min      calcium CHLORIDE  1 g      heparin  0-30 Units/kg/hr 12 Units/kg/hr (11/12/24 0705)    heparin  30 Units/kg      acetaminophen  650 mg      hydrALAZINE  10 mg      atorvastatin  80 mg      clopidogrel   75 mg      nitroglycerin  0.4 mg      aspirin  81 mg         Imaging:  EC-ECHOCARDIOGRAM COMPLETE W/ CONT   Final Result      DX-CHEST-PORTABLE (1 VIEW)   Final Result   Impression:      1. No acute cardiopulmonary abnormalities identified.      2. Very severe bilateral shoulder arthritic changes.                     CT-CSPINE WITHOUT PLUS RECONS   Final Result   Impression:      1. No cervical spine fracture evident.      2. Severe degenerative changes.      3. Mild subluxations especially C3-4 unchanged from 3/5/2024 and likely degenerative.                     CT-HEAD W/O   Final Result   Impression:      1. No acute process in the brain evident.                  CL-LEFT HEART CATHETERIZATION WITH POSSIBLE INTERVENTION    (Results Pending)       ASSESSEMENT and PLAN:    Sinus pause  Assessment & Plan  Multiple pauses leading to loss of consciousness.  Concern regarding his underlying coronary artery disease in the setting of noncompliance of his DAPT. Also with new apical LV thrombus  Initial pt's rhythm was slow afib but now back to NSR. No ST or T-wave changes  Troponin mildly elevated 100s, trending down   Continue heparin gtt  [facility] 11/12  Cardiology following, may need pacemaker evaluation   Keep K >4 and Mg >2    LV (left ventricular) mural thrombus  Assessment & Plan  New this admission  On heparin gtt  Will coordinate with cardiology plan for longer term anticoagulation     Hypothyroidism  Assessment & Plan  TSH 83.1 this admission in the setting of bradycardia/sinus pauses  -Start synthroid 50mcg daily 11/12    Ischemic cardiomyopathy- (present on admission)  Assessment & Plan  LVEF 40-45% with apical akinesis and LV thrombus  There is an immobile 1.1 x 1.2 cm LV apical thrombus    CKD (chronic kidney disease) stage 3, GFR 30-59 ml/min- (present on admission)  Assessment & Plan  Unclear baseline. ?cr 1.2-1.3?  Monitor UOP  Strict I/Os    ST elevation myocardial infarction involving left anterior descending  (LAD) coronary artery (HCC)- (present on admission)  Assessment & Plan  Hx of STEMI in 5/2024 with PCI to DOUGIE  But only on DAPT for 30 days       DISPO: Pending [facility]    CODE STATUS: Full code    Quality Measures:  Feeding: NPO pending procedure  Analgesia: None  Sedation: None  Thromboprophylaxis: Heparin drip  Head of bed: >30 degrees  Ulcer prophylaxis: Omeprazole oral  Glycemic control: NA  Bowel care: bowel regimen  Indwelling lines: PIV  Deescalation of antibiotics: None

## 2024-11-12 NOTE — OP REPORT
Electrophysiology Procedure Note  [facility]    PROCEDURE PERFORMED: Micra pacemaker implantation, moderate sedation administered by RN and supervised by physician    : Erica Olivia MD    ASSISTANT: none    ANESTHESIA: Moderate sedation,  start time 1345, stop time 230 PM  the moderate sedation document has been reviewed, signed and scanned into media     EBL: 30 cc    SPECIMENS: None    INDICATION: Mobitz II AV block    PRE PROCEDURE ECG: Sinus    POST PROCEDURE ECG: Sinus     COMPLICATIONS:  None     DESCRIPTION OF PROCEDURE:  After informed written consent, the patient was brought to the electrophysiology lab in the fasting, unsedated state. The patient was prepped and draped in the usual sterile fashion. The procedure was performed under moderate sedation with local anesthetic.     Using the right femoral vein, a 6 Angolan sheath was placed using modified Seldinger technique.  Next a stiff Amplatz wire was advanced to the high right atrium.  Next the 6 Angolan sheath was removed and a small incision was made over the insertion site using a #11 blade.  The site was dilated sequentially with a 20 Angolan dilator.  Next the Micra introducer system was placed over the Amplatz wire to the mid right atrium.  The Micra introducer was constantly flushed. Next we prepared the Micra delivery sheath and flushed it until all air bubbles were eliminated. The side arm on the Micra delivery sheath was hooked up to constant irrigation. Next the Micra delivery system was placed in the mid right atrium and the Micra-introducer sheath was brought back into the inferior vena cava. Next under fluoroscopy the Micra delivery system was advanced across the tricuspid valve and the device was advanced to the high right ventricular septum using clockwise rotation.  Initially we were very high up the LVOT as cofirmed by contrast and we lowered the position more to mid-septal. Location was confirmed with MAYS and SHELTON  views. Next the Micra pacemaker was delivered to the high right ventricular septum in the normal fashion. Testing showed adequate pacemaker function.  Next using a tug test on the tether unders cine fluoroscopy showed at least 2 of the tines attached to the trabeculae. Next the tether was flossed. Next the tether was cut and removed. Fluoroscopy showed stable location of Micra pacemaker. Following this, the introducer sheath and the delivery sheath were removed in its entirety. Hemostasis was obtained with a figure of 8 suture and Perclose. I personally supervised the administration of moderate sedation by the RN and observed the level of consciousness and physiologic status throughout the procedure.  Following recovery from sedation, the patient was transferred to a monitored bed in good condition.     IMPLANTED DEVICE INFORMATION:  Right ventricular pacemaker is a Medtronic model # [id number] , serial # [id number] ,R wave 5.1 millivolts, threshold 0.25 Volts, pacing impedance 840 Ohms.    DEVICE PROGRAMMING:  VDD     FLUOROSCOPY TIME: 4.1 min    IMPRESSIONS:  1. Successful Micra pacemaker implantation    RECOMMENDATIONS:  1. Bedrest for 6 hours after cath, can resume heparin gtt from my standpoint  2. Device interrogation prior to hospital discharge  3. Followup in device clinic

## 2024-11-12 NOTE — WOUND TEAM
Assisted Wound RN Willi with skin assessment and wound consult evaluation for pressure injury.  Additional staff necessary for turning/standing from chair, repositioning patient, assisting with dressing application and supplies and determining progress, assessment and staging of pressure injuries and/or complicated wound care.

## 2024-11-12 NOTE — WOUND TEAM
[facility]  Inpatient Services  Initial Wound and Skin Care Evaluation    Admission Date: 11/10/2024     Last order of IP CONSULT TO WOUND CARE was found on 11/11/2024 from Hospital Encounter on 11/10/2024     HPI, PMH, SH: Reviewed    Past Surgical History:   Procedure Laterality Date    GA UPPER GI ENDOSCOPY,DIAGNOSIS N/A 3/6/2024    Procedure: GASTROSCOPY;  Surgeon: Greyson Serrato M.D.;  Location: SURGERY SAME DAY [facility];  Service: Gastroenterology    GA COLONOSCOPY,DIAGNOSTIC N/A 3/6/2024    Procedure: COLONOSCOPY;  Surgeon: Greyson Serrato M.D.;  Location: SURGERY SAME DAY [facility];  Service: Gastroenterology    GA UPPER GI ENDOSCOPY,W/DILAT,GASTRIC OUT N/A 3/6/2024    Procedure: GASTROSCOPY, WITH BALLOON DILATION;  Surgeon: Greyson Serrato M.D.;  Location: SURGERY SAME DAY [facility];  Service: Gastroenterology     Social History     Tobacco Use    Smoking status: Former     Types: Cigarettes    Smokeless tobacco: Never   Substance Use Topics    Alcohol use: Not Currently     Chief Complaint   Patient presents with    Syncope     Pt woke to floor in kitchen in pool of blood with LAC above Rt eyebrow.     Extremity Weakness     X 2 days with occasional chest pain radiating across upper chest to bilateral shoulders. Intermittent. Pt reports it is not the same as cardiac chest pain.      Diagnosis: NSTEMI (non-ST elevated myocardial infarction) (HCC) [I21.4]  NSTEMI (non-ST elevation myocardial infarction) (HCC) [I21.4]    Unit where seen by Wound Team: T614/00     WOUND CONSULT RELATED TO:  Left lateral foot    WOUND TEAM PLAN OF CARE - Frequency of Follow-up:   Nursing to follow dressing orders written for wound care. Contact wound team if area fails to progress, deteriorates or with any questions/concerns if something comes up before next scheduled follow up (See below as to whether wound is following and frequency of wound follow up)   Not following, consult as needed  - any area    WOUND  HISTORY:   Pt is an 84yr old male with history of CAD, systolic heart failure, GI Bleed. Pt presented to hospital after syncopal event where he woke up on the floor of his kitchen in a pool of blood. Pt was found to have an NSTEMI. Pt had laceration to the left forehead and a wound to the left lateral toe which pt states has been there on and off for years related to his shoes.        WOUND ASSESSMENT/LDA          Wound 11/11/24 Partial Thickness Wound Foot Anterior Left chronic (Active)   Wound Image   11/12/24 1200   Site Assessment Pink;Red    Periwound Assessment Intact    Margins Attached edges    Closure None    Drainage Amount Scant    Drainage Description Serosanguineous    Treatments Cleansed;Site care;Offloading    Offloading/DME Other (comment)    Wound Cleansing Soap and Water    Periwound Protectant Mepitel    Dressing Status Open to Air    NEXT Weekly Photo (Inpatient Only) 11/19/24    Wound Team Following Not following    Non-staged Wound Description Partial thickness      Vascular:    ADRIANA:   No results found.    Lab Values:    Lab Results   Component Value Date/Time    WBC 9.1 11/12/2024 02:43 AM    RBC 3.73 (L) 11/12/2024 02:43 AM    HEMOGLOBIN 12.7 (L) 11/12/2024 02:43 AM    HEMATOCRIT 37.1 (L) 11/12/2024 02:43 AM    HBA1C 5.8 (H) 05/03/2024 04:00 AM         Culture Results show:  No results found for this or any previous visit (from the past 720 hours).    Pain Level/Medicated:  None, Tolerated without pain medication       INTERVENTIONS BY WOUND TEAM:  Chart and images reviewed. Discussed with bedside RN. All areas of concern (based on picture review, LDA review and discussion with bedside RN) have been thoroughly assessed. Documentation of areas based on significant findings. This RN in to assess patient. Performed standard wound care which includes appropriate positioning, dressing removal and non-selective debridement. Pictures and measurements obtained weekly if/when required.    Wound:  Left  lateral foot 5th toe POA   Preparation for Dressing removal: Open to air  Cleansed/Non-selectively Debrided with:  Moist warm washcloth  Anushka wound: Cleansed with Moist warm washcloth, Prepped with N/A  Primary Dressing:  Mepitel One  Secondary (Outer) Dressing: Adhesive foam PRN    Area Assessed:  Posterior Head  Area manually palpated, no wounds appreciated, no pain noted.   Laceration sutured to left forehead and bruising to left side of face    Area Assessed:  Bilateral Ears  Area intact, slight redness related to pts glasses, pt is on room air and does not wear O2 at home.     Area Assessed:  Bilateral Elbows & Arms  Area intact,     Area Assessed: Abdomen/Pannus  Area intact,     Area Assessed: Back  Area intact,     Area Assessed:  Sacrococcygeal area  Area intact, Offloading dressing changed    Area Assessed:  Bilateral I.T.s  Area intact,     Area Assessed:  BLE & Knees  Area intact,     Area Assessed:  Bilateral ankles  Area intact,     Area Assessed:  Bilateral heels  Area intact, Heel offloading dressings in use      Advanced Wound Care Discharge Planning  Number of Clinicians necessary to complete wound care: 1  Is patient requiring IV pain medications for dressing changes:  No   Length of time for dressing change 30 min. (This does not include chart review, pre-medication time, set up, clean up or time spent charting.)    Interdisciplinary consultation: Patient, Bedside RN, Celestina VALDIVIA (Wound RN).  Pressure injury and staging reviewed with N/A.    EVALUATION / RATIONALE FOR TREATMENT:     Date:  11/12/24  Wound Status:  Initial evaluation    Pt with small wound to his left lateral foot related to ill fitting shoes. Mepitel one applied as nonadherent contact layer. No advanced wound care needs identified. Preventative measure in place. Pt alert and oriented.          Goals: Steady decrease in wound area and depth weekly.    NURSING PLAN OF CARE ORDERS:  RN Prevention Protocol    NUTRITION RECOMMENDATIONS    Wound Team Recommendations:  N/A    DIET ORDERS (From admission to next 24h)       Start     Ordered    11/11/24 1526  Diet NPO Restrict to: Sips with Medications  AT MIDNIGHT      Question:  Diet NPO Restrict to:  Answer:  Sips with Medications    11/11/24 9614                    PREVENTATIVE INTERVENTIONS:    Q shift [place] - performed per nursing policy  Q shift pressure point assessments - performed per nursing policy    Surface/Positioning  ICU Low Airloss - Currently in Place  Reposition q 2 hours - Currently in Place    Offloading/Redistribution  Sacral offloading dressing (Silicone dressing) - Changed  Heel offloading dressing (Silicone dressing) - Applied this Visit  Float Heels off Bed with Pillows - Currently in Place           Containment/Moisture Prevention    Dri-layne pad - Currently in Place  Nelson Catheter - Currently in Place    Anticipated discharge plans:  TBD        Vac Discharge Needs:  Vac Discharge plan is purely a recommendation from wound team and not a requirement for discharge unless otherwise stated by physician.  Not Applicable Pt not on a wound vac

## 2024-11-12 NOTE — PROGRESS NOTES
Critical Care Progress Note    Date of admission  11/10/2024    Chief Complaint  84 y.o. male admitted 11/10/2024 with hx of CKD, CAD, ICM with LVEF 40-45% with hx of STEMI in 5/2024 s/p PCI DOUGIE to LAD on DAPT, but unfortunately patient only took 30 days of DAPT and decided not to follow-up with cardiology.  Patient now presented with multiple episodes of syncope. ECG without ST-T wave changes. Pt was started on heparin gtt, ASA/plavix and admitted to floor. LCH was planned tomorrow am. Around 9pm this evening, I was reported that patient had a very brief loss of consciousness.  CODE BLUE was called Rapid response team concerned of questionable loss of pulse which prompted them to start CPR right away.  But after few seconds of CPR patient woke up. SBP in 60/40s, rate in 50s to 60s.  Patient was alert oriented.  500 cc fluid bolus was given.  EKG stat showed slow A-fib.  Monitor strip was reviewed during the event which revealed a 7.5-second pause and 2.5-second pause.  After fluid resuscitation, pressure was up to 110/70s.  Patient complained of 3 out of 10 chest pain at the time     Repeat labs showed troponin 67.  Lactate 2.8.  Potassium 4.1 magnesium 2.1 creatinine 1.5 with bicarb of 17 LFTs are normal.      In addition, repeat TTE on 11/10 noted LVEF 40-45% with apical akinesis and new LV apical thrombus    Hospital Course  11/11 admitted to ICU post code blue and asystole.     Interval Problem Update  Reviewed last 24 hour events:  Neuro: aox4 no pain  HR: 50-70's  SBP: 110-140's  Tmax: afebrile  GI: NPO, BM this am  UOP: 850ml  Lines: peripheral IV  Resp: r/a   Vte: heparin gtt  PPI/H2:n/a  Antibx: none  Net negative 245ml  D/c all jordy agents  Check TSH and free T4 was low in May  Start synthroid     Review of Systems  Review of Systems   Constitutional:  Negative for fever, malaise/fatigue and weight loss.   HENT:  Negative for sore throat.    Respiratory:  Negative for cough, sputum production and  shortness of breath.    Cardiovascular:  Negative for chest pain, orthopnea, claudication and leg swelling.   Gastrointestinal:  Negative for abdominal pain, nausea and vomiting.   Neurological:  Positive for headaches. Negative for dizziness, tingling, seizures and weakness.   Psychiatric/Behavioral:  Negative for depression. The patient is not nervous/anxious.         Vital Signs for last 24 hours   Temp:  [36 °C (96.8 °F)-37.5 °C (99.5 °F)] 37.4 °C (99.4 °F)  Pulse:  [51-77] 74  Resp:  [12-26] 26  BP: ()/(43-83) 154/83  SpO2:  [93 %-97 %] 93 %    Hemodynamic parameters for last 24 hours       Respiratory Information for the last 24 hours       Physical Exam   Physical Exam  Vitals and nursing note reviewed.   Constitutional:       General: He is not in acute distress.     Appearance: Normal appearance. He is not ill-appearing.      Comments: No acute distress obvious bruising to right orbit   HENT:      Right Ear: Tympanic membrane normal.      Mouth/Throat:      Mouth: Mucous membranes are moist.   Eyes:      Pupils: Pupils are equal, round, and reactive to light.      Comments: Right upper orbit laceration with repair, bruising to this area   Cardiovascular:      Rate and Rhythm: Normal rate.      Heart sounds: No murmur heard.  Pulmonary:      Effort: No respiratory distress.      Breath sounds: No stridor. No wheezing or rhonchi.   Abdominal:      General: There is no distension.      Palpations: There is no mass.      Tenderness: There is no abdominal tenderness.      Hernia: No hernia is present.   Musculoskeletal:         General: No swelling or tenderness.   Skin:     Coloration: Skin is pale. Skin is not jaundiced.   Neurological:      General: No focal deficit present.      Mental Status: He is alert and oriented to person, place, and time.      Cranial Nerves: No cranial nerve deficit.      Sensory: No sensory deficit.      Motor: No weakness.      Coordination: Coordination normal.    Psychiatric:         Mood and Affect: Mood normal.         Medications  Current Facility-Administered Medications   Medication Dose Route Frequency Provider Last Rate Last Admin    levothyroxine (Synthroid) tablet 50 mcg  50 mcg Oral AM ES Cole Marrero M.D.   50 mcg at 11/12/24 0942    VERAPAMIL HCL 2.5 MG/ML IV SOLN             HEPARIN SODIUM (PORCINE) 1000 UNIT/ML INJ SOLN             LIDOCAINE HCL 2 % INJ SOLN             HEPARIN (PORCINE) IN NACL 2000-0.9 UNIT/L-% IV SOLN             NITROGLYCERIN 2 MG IV SOLN             BUPIVACAINE HCL (PF) 0.25 % INJ SOLN             FENTANYL CITRATE (PF) 0.05 MG/ML INJ SOLN (WRAPPED)             MIDAZOLAM HCL 1 MG/ML INJ SOLN (WRAPPER)             omeprazole (PriLOSEC) capsule 20 mg  20 mg Oral DAILY Marley King, A.P.R.N.   20 mg at 11/12/24 0512    norepinephrine (Levophed) 8 mg in 250 mL NS infusion (premix)  0-1 mcg/kg/min Intravenous Continuous Rober Apodaca D.O.        calcium CHLORIDE 10 % (Emergency Push Dose Syringe) injection 1 g  1 g Intravenous Once Rober Apodaca D.O.        heparin infusion 25,000 units in 500 mL 0.45% NACL  0-30 Units/kg/hr Intravenous Continuous Eleazar Levi D.O. 15.2 mL/hr at 11/12/24 0705 12 Units/kg/hr at 11/12/24 0705    heparin injection 1,900 Units  30 Units/kg Intravenous PRN Eleazar Levi D.OMarilou        acetaminophen (Tylenol) tablet 650 mg  650 mg Oral Q6HRS PRN Reji Correia M.D.   650 mg at 11/10/24 1945    hydrALAZINE (Apresoline) injection 10 mg  10 mg Intravenous Q4HRS PRN Reji Correia M.D.        atorvastatin (Lipitor) tablet 80 mg  80 mg Oral Q EVENING Reji Correia M.D.   80 mg at 11/11/24 1632    clopidogrel (Plavix) tablet 75 mg  75 mg Oral DAILY Reji Correia M.D.   75 mg at 11/12/24 0512    nitroglycerin (Nitrostat) tablet 0.4 mg  0.4 mg Sublingual Q5 MIN PRN Reji Correia M.D.        aspirin (Asa) chewable tab 81 mg  81 mg Oral DAILY Yuri Stuart M.D.   81 mg at 11/12/24 0584        Fluids    Intake/Output Summary (Last 24 hours) at 11/12/2024 1349  Last data filed at 11/12/2024 1300  Gross per 24 hour   Intake 1249.15 ml   Output 1575 ml   Net -325.85 ml       Laboratory          Recent Labs     11/10/24  0458 11/11/24  0420 11/11/24  2128 11/12/24  0243   SODIUM 141 139 139 139   POTASSIUM 3.9 4.4 4.1 4.3   CHLORIDE 106 107 107 105   CO2 22 24 17* 22   BUN 26* 23* 23* 21   CREATININE 1.42* 1.55* 1.50* 1.33   MAGNESIUM 2.1  --  2.1  --    PHOSPHORUS 3.1  --  3.6  --    CALCIUM 9.6 9.0 9.0 9.2     Recent Labs     11/10/24  0458 11/11/24  0420 11/11/24  2128 11/12/24  0243   ALTSGPT 56* 40 35  --    ASTSGOT 61* 42 40  --    ALKPHOSPHAT 115* 106* 101*  --    TBILIRUBIN 0.2 0.4 0.4  --    GLUCOSE 96 98 138* 102*     Recent Labs     11/10/24  0458 11/11/24  0420 11/11/24  2128 11/12/24  0243   WBC 8.6 5.7 10.3 9.1   NEUTSPOLYS 53.20 52.20 40.70*  --    LYMPHOCYTES 36.40 33.00 52.20*  --    MONOCYTES 7.60 8.90 4.40  --    EOSINOPHILS 2.00 4.50 2.70  --    BASOPHILS 0.50 0.90 0.00  --    ASTSGOT 61* 42 40  --    ALTSGPT 56* 40 35  --    ALKPHOSPHAT 115* 106* 101*  --    TBILIRUBIN 0.2 0.4 0.4  --      Recent Labs     11/10/24  0458 11/11/24  0420 11/11/24  2128 11/12/24  0243   RBC 3.96* 3.89* 3.86* 3.73*   HEMOGLOBIN 13.3* 12.9* 12.6* 12.7*   HEMATOCRIT 39.8* 38.3* 38.9* 37.1*   PLATELETCT 178 173 159* 162*   PROTHROMBTM 13.1  --   --   --    APTT 26.6  --   --   --    INR 0.99  --   --   --        Imaging  X-Ray:  I have personally reviewed the images and compared with prior images.  EKG:  I have personally reviewed the images and compared with prior images.  CT:    Reviewed  Echo:   Reviewed    Assessment/Plan  Closed head injury  Assessment & Plan  Syncope with right orbital trauma  CT head negative  Careful monitor while on dual antiplatelets and heparin gtt for delayed hemorrhage with patient degree of atrophy  Fall precautions     Hypothyroidism  Assessment & Plan  TSH 83.1 this  admission in the setting of bradycardia/sinus pauses  Start synthroid 50mcg daily 11/12 recheck in 2-3 months and adjust.     Sinus pause  Assessment & Plan  P waves with no ventricular escape or conduction.   Hx of anterior wall MI also given metoprolol and has findings of hypothyroid that is not been on treatment for.   Multiple pauses leading to loss of consciousness.    Stop all jordy agents.   Tele monitoring  Replace thyroid hormone  Cardiology and EP consulted      LV (left ventricular) mural thrombus  Assessment & Plan  Heparin gtt following Xa levels      Chronic systolic heart failure (HCC)  Assessment & Plan  Echo with EF 40-45% with no valvular dz, akinesis of apical region  Going for LH, start GDMT   On room air no acute heart failure  TSH high and low T4 start replacement therapy 11/12  Cardiology consulting    Ischemic cardiomyopathy- (present on admission)  Assessment & Plan  LVEF 40-45% with apical akinesis and LV thrombus  There is an immobile 1.1 x 1.2 cm LV apical thrombus      CKD (chronic kidney disease) stage 3, GFR 30-59 ml/min- (present on admission)  Assessment & Plan  Currently at baseline function  Avoid nephrotoxins  Strict I/Os    ST elevation myocardial infarction involving left anterior descending (LAD) coronary artery (HCC)- (present on admission)  Assessment & Plan  Hx of STEMI in 5/2024 with PCI to DOUGIE  But only on DAPT for 30 days          VTE:  Heparin  Ulcer: Not Indicated  Lines: None    I have performed a physical exam and reviewed and updated ROS and Plan today (11/12/2024). In review of yesterday's note (11/11/2024), there are no changes except as documented above.     Discussed patient condition and risk of morbidity and/or mortality with RN, RT, Pharmacy, Charge nurse / hot rounds, Patient, and cardiology    The patient remains critically ill sinus pause and asystolic arrest and close monitoring while on heparin gtt.  Critical care time = 49 minutes in directly providing  and coordinating critical care and extensive data review.  No time overlap and excludes procedures.

## 2024-11-12 NOTE — CONSULTS
Cardiology Initial Consultation    Date of Service  11/12/2024    Referring Physician  Cole Marrero M.D.    Reason for Consultation  Syncope; Pauses     History of Presenting Illness  Dilan Calderón is a 84 y.o. male with a past medical history significant for CAD with prior STEMI in [place] 2025 with LAD PCI and residual obstructive CAD; lost to follow up and medication noncompliance (pt stopped dapt one month post stenting), prior GI bleeding in march (he also reported syncope with that admission), ICM, HF mildly-mod reduced EF, CKD, who presented 11/10/2024 with syncopal event.  Reports was standing in the kitchen and woke up on the floor in a pool of blood.  He does not report significant prodrome prior to syncope.  Was not altered when he came to.  Additionally reports chest pain radiating to both arms x weeks leading up to admission.  After admission, echo revealed EF improvement to 45%, however with LV thrombis.  He was planned for cath and started on IV Heparin.  Last night, episode of syncope.  He does not recall syncope prior.  Received brief CPR due to unsure pulse, with quick regain of consciousness.  Tele captured 7.5 sec high grade block without ventricular escape  He was transferred to ICU.       Review of Systems  Review of Systems   Constitutional:  Negative for chills, fatigue and fever.   HENT:  Negative for nosebleeds, tinnitus and trouble swallowing.    Respiratory:  Negative for cough, shortness of breath and wheezing.    Cardiovascular:  Negative for chest pain, palpitations and leg swelling.   Neurological:  Positive for syncope (recent history of). Negative for dizziness, speech difficulty, weakness, light-headedness and numbness.       Past Medical History   has no past medical history on file.    Surgical History   has a past surgical history that includes pr upper gi endoscopy,diagnosis (N/A, 3/6/2024); pr colonoscopy,diagnostic (N/A, 3/6/2024); and pr upper gi  endoscopy,w/dilat,gastric out (N/A, 3/6/2024).    Family History  family history is not on file.    Social History   reports that he has quit smoking. His smoking use included cigarettes. He has never used smokeless tobacco. He reports that he does not currently use alcohol. He reports that he does not use drugs.    Medications  Prior to Admission Medications   Prescriptions Last Dose Informant Patient Reported? Taking?   Multiple Vitamins-Minerals (CENTRUM SILVER 50+MEN) Tab 11/9/2024 Morning Patient Yes Yes   Sig: Take 1 Tablet by mouth every day.   NON SPECIFIED 11/9/2024 Morning Patient Yes Yes   Sig: Take 1 Tablet by mouth every day. OTC allergy med   acetaminophen (TYLENOL) 500 MG Tab 11/9/2024 Morning Patient Yes Yes   Sig: Take 500-1,000 mg by mouth 2 times a day as needed.      Facility-Administered Medications: None       Allergies  No Known Allergies    Vital signs in last 24 hours  Temp:  [36 °C (96.8 °F)-37.5 °C (99.5 °F)] 37.5 °C (99.5 °F)  Pulse:  [51-77] 62  Resp:  [12-22] 18  BP: ()/(43-82) 162/74  SpO2:  [93 %-97 %] 94 %    Physical Exam  Physical Exam  Vitals and nursing note reviewed.   Constitutional:       Appearance: Normal appearance.   HENT:      Head: Normocephalic and atraumatic.      Comments: Facial Trauma Right eye      Mouth/Throat:      Mouth: Mucous membranes are moist.      Pharynx: Oropharynx is clear.   Eyes:      Extraocular Movements: Extraocular movements intact.      Conjunctiva/sclera: Conjunctivae normal.      Pupils: Pupils are equal, round, and reactive to light.   Cardiovascular:      Rate and Rhythm: Normal rate and regular rhythm.      Pulses: Normal pulses.      Heart sounds: Normal heart sounds. No murmur heard.     No friction rub. No gallop.   Pulmonary:      Effort: Pulmonary effort is normal.      Breath sounds: Normal breath sounds. No wheezing, rhonchi or rales.   Musculoskeletal:         General: Normal range of motion.      Cervical back: Normal range of  motion.      Right lower leg: No edema.      Left lower leg: No edema.   Skin:     General: Skin is warm and dry.      Capillary Refill: Capillary refill takes 2 to 3 seconds.   Neurological:      Mental Status: He is alert and oriented to person, place, and time.      Gait: Gait normal.   Psychiatric:         Mood and Affect: Mood normal.         Behavior: Behavior normal.         Thought Content: Thought content normal.         Judgment: Judgment normal.         Lab Review  Lab Results   Component Value Date/Time    WBC 9.1 11/12/2024 02:43 AM    RBC 3.73 (L) 11/12/2024 02:43 AM    HEMOGLOBIN 12.7 (L) 11/12/2024 02:43 AM    HEMATOCRIT 37.1 (L) 11/12/2024 02:43 AM    MCV 99.5 (H) 11/12/2024 02:43 AM    MCH 34.0 (H) 11/12/2024 02:43 AM    MCHC 34.2 11/12/2024 02:43 AM    MPV 10.8 11/12/2024 02:43 AM      Lab Results   Component Value Date/Time    SODIUM 139 11/12/2024 02:43 AM    POTASSIUM 4.3 11/12/2024 02:43 AM    CHLORIDE 105 11/12/2024 02:43 AM    CO2 22 11/12/2024 02:43 AM    GLUCOSE 102 (H) 11/12/2024 02:43 AM    BUN 21 11/12/2024 02:43 AM    CREATININE 1.33 11/12/2024 02:43 AM    CREATININE 1.2 02/11/2008 09:00 AM      Lab Results   Component Value Date/Time    ASTSGOT 40 11/11/2024 09:28 PM    ALTSGPT 35 11/11/2024 09:28 PM     Lab Results   Component Value Date/Time    CHOLSTRLTOT 197 11/11/2024 04:20 AM     (H) 11/11/2024 04:20 AM    HDL 62 11/11/2024 04:20 AM    TRIGLYCERIDE 88 11/11/2024 04:20 AM    TROPONINT 67 (H) 11/11/2024 09:28 PM       Recent Labs     11/11/24  2128   NTPROBNP 3359*       Cardiac Imaging and Procedures Review  EKG:  My personal interpretation of the EKG dated 11/12/24 is Sinus bradycardia     Echocardiogram:  11/11/24  CONCLUSIONS  Compared to the prior study on 5/4/2024: There is interval development   of LV apical thrombus  The ascending aorta diameter is 3.8 cm.  Mildly reduced LV systolic function, estimated LVEF 40-45% with apical   akinesis, suggestive of underlying  ischemic cardiomyopathy  There is an immobile 1.1 x 1.2 cm LV apical thrombus  Normal RV size and systolic function  No significant valvular abnormalities  Aortic valve sclerosis without stenosis  Trivial pericardial effusion  Normal IVC size    Cardiac Catheterization:  pending     Imaging    Assessment/Plan  Syncope   High Grade AV Block   CAD  ICM, EF 45% on recent echo   LV Thrombus   Medication non-adherence     - Admission for syncope and reported chest/arm pain.  Syncopal event yesterday at home prior to admission, one even while in the hospital last night.  Tele monitoring captured 7.5 second high grade AV Block without ventricular escape.   - He is planned for repeat cath today to assess CAD status given stenting this spring, has been on DAPT for several months with known residual obstructive CAD.   - Additionally echo this admission shows e/o LV thrombus, he has been started on Heparin gtt, will need to bridge to therapeutic warfarin post cath.   - Abnormal TSH/FT4 on assessment this AM.   - Reviewed case with Dr. Olivia this AM, recommendation for Micra in setting of recurrent syncope, paroxysmal high grade av block.  - I have discussed plan for leadless pacemaker with Mr Calderón to include risks, benefits, procedural details and he is agreeable to proceed.  Will plan to proceed later today post cath.  Micra can be placed without stopping Heparin gtt.      The risks, benefits, and alternatives to permanent leadless pacemaker placement were discussed in great detail.  Specific risks mentioned to the patient including bleeding, cardiac perforation with possible tamponade possibly requiring pericardiocentesis or open heart surgery.  In addition the possibility of lead dislodgment (2-3%), pneumothorax (3%), hemothorax, infection were discussed.  Also, mentioned were the risk of death, stroke, and myocardial infarction.  The patient verbalized understanding of the potential complications and wishes to proceed with  the procedure.    ISABEL Barrett.   [facility]  [phone]

## 2024-11-12 NOTE — CONSULTS
Critical Care Consultation    Date of consult: 11/11/2024    Referring Physician  Hosp medicine team    Reason for Consultation  Chest pain, NSTEMI, brief unresponsiveness    History of Presenting Illness  84 y.o. male with hx of CKD, CAD, ICM with LVEF 40-45% with hx of STEMI in 5/2024 s/p PCI DOUGIE to LAD on DAPT, but unfortunately patient only took 30 days of DAPT and decided not to follow-up with cardiology.  Patient now presented with multiple episodes of syncope. ECG without ST-T wave changes. Pt was started on heparin gtt, ASA/plavix and admitted to floor. [facility] was planned tomorrow am. Around 9pm this evening, I was reported that patient had a very brief loss of consciousness.  CODE BLUE was called Rapid response team concerned of questionable loss of pulse which prompted them to start CPR right away.  But after few seconds of CPR patient woke up. SBP in 60/40s, rate in 50s to 60s.  Patient was alert oriented.  500 cc fluid bolus was given.  EKG stat showed slow A-fib.  Monitor strip was reviewed during the event which revealed a 7.5-second pause and 2.5-second pause.  After fluid resuscitation, pressure was up to 110/70s.  Patient complained of 3 out of 10 chest pain at the time    Repeat labs showed troponin 67.  Lactate 2.8.  Potassium 4.1 magnesium 2.1 creatinine 1.5 with bicarb of 17 LFTs are normal.     In addition, repeat TTE on 11/10 noted LVEF 40-45% with apical akinesis and new LV apical thrombus    Code Status  Full Code    Review of Systems  Review of Systems   Constitutional:  Negative for fever and malaise/fatigue.   Respiratory:  Negative for shortness of breath.    Cardiovascular:  Positive for chest pain and palpitations. Negative for leg swelling.   Gastrointestinal:  Negative for abdominal pain, diarrhea, nausea and vomiting.   Musculoskeletal:  Negative for back pain.   Neurological:  Negative for headaches.   Psychiatric/Behavioral:  The patient is not nervous/anxious.    All other systems  reviewed and are negative.      Past Medical History   has no past medical history on file.    Surgical History   has a past surgical history that includes pr upper gi endoscopy,diagnosis (N/A, 3/6/2024); pr colonoscopy,diagnostic (N/A, 3/6/2024); and pr upper gi endoscopy,w/dilat,gastric out (N/A, 3/6/2024).    Family History  family history is not on file.    Social History   reports that he has quit smoking. His smoking use included cigarettes. He has never used smokeless tobacco. He reports that he does not currently use alcohol. He reports that he does not use drugs.    Medications  Home Medications       Reviewed by Amarjit Jimenez (Pharmacy Tech) on 11/10/24 at 0622  Med List Status: Complete     Medication Last Dose Status   acetaminophen (TYLENOL) 500 MG Tab 11/9/2024 Active   Multiple Vitamins-Minerals (CENTRUM SILVER 50+MEN) Tab 11/9/2024 Active   NON SPECIFIED 11/9/2024 Active                  Audit from Redirected Encounters    **Home medications have not yet been reviewed for this encounter**       Current Facility-Administered Medications   Medication Dose Route Frequency Provider Last Rate Last Admin    NS infusion   Intravenous Continuous Marley Kesslertina, A.P.R.N. 75 mL/hr at 11/11/24 1204 New Bag at 11/11/24 1204    metoprolol SR (Toprol XL) tablet 25 mg  25 mg Oral Q DAY Marley BONDS Vyshutina, A.P.R.N.   25 mg at 11/11/24 1204    omeprazole (PriLOSEC) capsule 20 mg  20 mg Oral DAILY Marley HEALYyshutina, A.P.R.N.   20 mg at 11/11/24 1354    norepinephrine (Levophed) 8 mg in 250 mL NS infusion (premix)  0-1 mcg/kg/min Intravenous Continuous TERRI QueenO.        magnesium sulfate IVPB premix 2 g  2 g Intravenous Once TERRI QueenO.        calcium CHLORIDE 10 % (Emergency Push Dose Syringe) injection 1 g  1 g Intravenous Once TERRI QueenO.        NS (Bolus) 0.9 % infusion 500 mL  500 mL Intravenous Once TERRI QueenOMarilou        heparin infusion 25,000 units in 500 mL 0.45% NACL  0-30  Units/kg/hr Intravenous Continuous Eleazar Levi D.O. 15.2 mL/hr at 11/11/24 1923 12 Units/kg/hr at 11/11/24 1923    heparin injection 1,900 Units  30 Units/kg Intravenous PRN TERRI GrantOMarilou        acetaminophen (Tylenol) tablet 650 mg  650 mg Oral Q6HRS PRN Reji Correia M.D.   650 mg at 11/10/24 1945    hydrALAZINE (Apresoline) injection 10 mg  10 mg Intravenous Q4HRS PRN Reji Correia M.D.        atorvastatin (Lipitor) tablet 80 mg  80 mg Oral Q EVENING Reji Correia M.D.   80 mg at 11/11/24 1632    clopidogrel (Plavix) tablet 75 mg  75 mg Oral DAILY Reji Correia M.D.   75 mg at 11/11/24 0613    nitroglycerin (Nitrostat) tablet 0.4 mg  0.4 mg Sublingual Q5 MIN PRN Reji Correia M.D.        aspirin (Asa) chewable tab 81 mg  81 mg Oral DAILY Yuri Stuart M.D.   81 mg at 11/11/24 0613       Allergies  No Known Allergies    Vital Signs last 24 hours  Temp:  [36 °C (96.8 °F)-36.6 °C (97.9 °F)] 36 °C (96.8 °F)  Pulse:  [62-77] 77  Resp:  [16-20] 20  BP: ()/(43-82) 125/75  SpO2:  [93 %-97 %] 96 %    Physical Exam  Physical Exam  Vitals and nursing note reviewed.   Constitutional:       General: He is not in acute distress.     Appearance: He is ill-appearing and toxic-appearing. He is not diaphoretic.   HENT:      Head: Normocephalic and atraumatic.      Mouth/Throat:      Mouth: Mucous membranes are moist.   Cardiovascular:      Rate and Rhythm: Normal rate and regular rhythm.      Heart sounds: No murmur heard.  Pulmonary:      Effort: Pulmonary effort is normal. No respiratory distress.      Breath sounds: Normal breath sounds. No wheezing, rhonchi or rales.   Abdominal:      General: Bowel sounds are normal. There is no distension.      Palpations: Abdomen is soft.      Tenderness: There is no abdominal tenderness. There is no guarding.   Musculoskeletal:         General: No swelling or tenderness.      Cervical back: Neck supple.      Right lower leg: No edema.      Left lower leg: No  edema.   Skin:     General: Skin is warm and dry.      Coloration: Skin is not jaundiced.   Neurological:      General: No focal deficit present.      Mental Status: He is alert and oriented to person, place, and time.      Cranial Nerves: No cranial nerve deficit.   Psychiatric:         Mood and Affect: Mood normal.         Behavior: Behavior normal.       Fluids    Intake/Output Summary (Last 24 hours) at 2024 2151  Last data filed at 2024 1826  Gross per 24 hour   Intake 480 ml   Output 1375 ml   Net -895 ml       Laboratory  Recent Results (from the past 48 hours)   EKG    Collection Time: 11/10/24  4:49 AM   Result Value Ref Range    Report       [facility] Emergency Dept[facility]    Test Date:  2024-11-10  Pt Name:    AMY KERR              Department: ER  MRN:        [id number]                      Room:       [facility]  Gender:     Male                         Technician: [id number]  :        1940-10                   Requested By:PETROS SAGASTUME  Order #:    [id number]                    Reading MD:    Measurements  Intervals                                Axis  Rate:       67                           P:          62  MI:         178                          QRS:        -42  QRSD:       119                          T:          80  QT:         441  QTc:        466    Interpretive Statements  Sinus rhythm  Left anterior fascicular block  Anteroseptal infarct, age indeterminate  Compared to ECG 2024 13:48:39  Left anterior fascicular block now present  Left-axis deviation no longer present  Myocardial infarct finding still present     CBC WITH DIFFERENTIAL    Collection Time: 11/10/24  4:58 AM   Result Value Ref Range    WBC 8.6 4.8 - 10.8 K/uL    RBC 3.96 (L) 4.70 - 6.10 M/uL    Hemoglobin 13.3 (L) 14.0 - 18.0 g/dL    Hematocrit 39.8 (L) 42.0 - 52.0 %    .5 (H) 81.4 - 97.8 fL    MCH 33.6 (H) 27.0 - 33.0 pg    MCHC 33.4 32.3 - 36.5 g/dL    RDW 52.1 (H) 35.9 - 50.0 fL     Platelet Count 178 164 - 446 K/uL    MPV 10.8 9.0 - 12.9 fL    Neutrophils-Polys 53.20 44.00 - 72.00 %    Lymphocytes 36.40 22.00 - 41.00 %    Monocytes 7.60 0.00 - 13.40 %    Eosinophils 2.00 0.00 - 6.90 %    Basophils 0.50 0.00 - 1.80 %    Immature Granulocytes 0.30 0.00 - 0.90 %    Nucleated RBC 0.00 0.00 - 0.20 /100 WBC    Neutrophils (Absolute) 4.59 1.82 - 7.42 K/uL    Lymphs (Absolute) 3.14 1.00 - 4.80 K/uL    Monos (Absolute) 0.66 0.00 - 0.85 K/uL    Eos (Absolute) 0.17 0.00 - 0.51 K/uL    Baso (Absolute) 0.04 0.00 - 0.12 K/uL    Immature Granulocytes (abs) 0.03 0.00 - 0.11 K/uL    NRBC (Absolute) 0.00 K/uL   CMP    Collection Time: 11/10/24  4:58 AM   Result Value Ref Range    Sodium 141 135 - 145 mmol/L    Potassium 3.9 3.6 - 5.5 mmol/L    Chloride 106 96 - 112 mmol/L    Co2 22 20 - 33 mmol/L    Anion Gap 13.0 7.0 - 16.0    Glucose 96 65 - 99 mg/dL    Bun 26 (H) 8 - 22 mg/dL    Creatinine 1.42 (H) 0.50 - 1.40 mg/dL    Calcium 9.6 8.5 - 10.5 mg/dL    Correct Calcium 9.5 8.5 - 10.5 mg/dL    AST(SGOT) 61 (H) 12 - 45 U/L    ALT(SGPT) 56 (H) 2 - 50 U/L    Alkaline Phosphatase 115 (H) 30 - 99 U/L    Total Bilirubin 0.2 0.1 - 1.5 mg/dL    Albumin 4.1 3.2 - 4.9 g/dL    Total Protein 7.1 6.0 - 8.2 g/dL    Globulin 3.0 1.9 - 3.5 g/dL    A-G Ratio 1.4 g/dL   TROPONIN    Collection Time: 11/10/24  4:58 AM   Result Value Ref Range    Troponin T 102 (H) 6 - 19 ng/L   PT/INR    Collection Time: 11/10/24  4:58 AM   Result Value Ref Range    PT 13.1 12.0 - 14.6 sec    INR 0.99 0.87 - 1.13   ESTIMATED GFR    Collection Time: 11/10/24  4:58 AM   Result Value Ref Range    GFR (CKD-EPI) 49 (A) >60 mL/min/1.73 m 2   MAGNESIUM    Collection Time: 11/10/24  4:58 AM   Result Value Ref Range    Magnesium 2.1 1.5 - 2.5 mg/dL   PHOSPHORUS    Collection Time: 11/10/24  4:58 AM   Result Value Ref Range    Phosphorus 3.1 2.5 - 4.5 mg/dL   VITAMIN B12    Collection Time: 11/10/24  4:58 AM   Result Value Ref Range    Vitamin B12 -True  Cobalamin 416 211 - 911 pg/mL   APTT    Collection Time: 11/10/24  4:58 AM   Result Value Ref Range    APTT 26.6 24.7 - 36.0 sec   Heparin Anti-Xa    Collection Time: 11/10/24  4:58 AM   Result Value Ref Range    Heparin Xa (UFH) <0.10 IU/mL   TROPONIN    Collection Time: 11/10/24  8:58 AM   Result Value Ref Range    Troponin T 93 (H) 6 - 19 ng/L   EKG in four (4) hours    Collection Time: 11/10/24 10:18 AM   Result Value Ref Range    Report       [facility]    Test Date:  2024-11-10  Pt Name:    AMY KERR              Department: ER  MRN:        [id number]                      Room:       T821  Gender:     Male                         Technician: CASPER  :        1940                   Requested By:CALEB WALDRON  Order #:    [id number]                    Reading MD: Jose Alejandro Marroquin MD    Measurements  Intervals                                Axis  Rate:       62                           P:          62  NV:         179                          QRS:        -50  QRSD:       105                          T:          0  QT:         455  QTc:        463    Interpretive Statements  Sinus rhythm  LAD, consider left anterior fascicular block  PROBABLE ANTEROSEPTAL INFARCT, AGE INDETERM  Electronically Signed On 2024 06:40:00 PST by Jose Alejandro Marroquin MD     Heparin Xa (Unfractionated)    Collection Time: 11/10/24  2:13 PM   Result Value Ref Range    Heparin Xa (UFH) 0.57 IU/mL   TROPONIN    Collection Time: 11/10/24  2:13 PM   Result Value Ref Range    Troponin T 78 (H) 6 - 19 ng/L   Heparin Xa (Unfractionated)    Collection Time: 11/10/24 10:38 PM   Result Value Ref Range    Heparin Xa (UFH) 0.43 IU/mL   CBC with Differential    Collection Time: 24  4:20 AM   Result Value Ref Range    WBC 5.7 4.8 - 10.8 K/uL    RBC 3.89 (L) 4.70 - 6.10 M/uL    Hemoglobin 12.9 (L) 14.0 - 18.0 g/dL    Hematocrit 38.3 (L) 42.0 - 52.0 %    MCV 98.5 (H) 81.4 - 97.8 fL    MCH 33.2 (H) 27.0 - 33.0 pg    MCHC 33.7 32.3 -  36.5 g/dL    RDW 50.8 (H) 35.9 - 50.0 fL    Platelet Count 173 164 - 446 K/uL    MPV 11.1 9.0 - 12.9 fL    Neutrophils-Polys 52.20 44.00 - 72.00 %    Lymphocytes 33.00 22.00 - 41.00 %    Monocytes 8.90 0.00 - 13.40 %    Eosinophils 4.50 0.00 - 6.90 %    Basophils 0.90 0.00 - 1.80 %    Immature Granulocytes 0.50 0.00 - 0.90 %    Nucleated RBC 0.00 0.00 - 0.20 /100 WBC    Neutrophils (Absolute) 2.98 1.82 - 7.42 K/uL    Lymphs (Absolute) 1.89 1.00 - 4.80 K/uL    Monos (Absolute) 0.51 0.00 - 0.85 K/uL    Eos (Absolute) 0.26 0.00 - 0.51 K/uL    Baso (Absolute) 0.05 0.00 - 0.12 K/uL    Immature Granulocytes (abs) 0.03 0.00 - 0.11 K/uL    NRBC (Absolute) 0.00 K/uL   Comp Metabolic Panel (CMP)    Collection Time: 11/11/24  4:20 AM   Result Value Ref Range    Sodium 139 135 - 145 mmol/L    Potassium 4.4 3.6 - 5.5 mmol/L    Chloride 107 96 - 112 mmol/L    Co2 24 20 - 33 mmol/L    Anion Gap 8.0 7.0 - 16.0    Glucose 98 65 - 99 mg/dL    Bun 23 (H) 8 - 22 mg/dL    Creatinine 1.55 (H) 0.50 - 1.40 mg/dL    Calcium 9.0 8.5 - 10.5 mg/dL    Correct Calcium 9.2 8.5 - 10.5 mg/dL    AST(SGOT) 42 12 - 45 U/L    ALT(SGPT) 40 2 - 50 U/L    Alkaline Phosphatase 106 (H) 30 - 99 U/L    Total Bilirubin 0.4 0.1 - 1.5 mg/dL    Albumin 3.7 3.2 - 4.9 g/dL    Total Protein 6.4 6.0 - 8.2 g/dL    Globulin 2.7 1.9 - 3.5 g/dL    A-G Ratio 1.4 g/dL   Lipid Profile (Lipid Panel) Fasting    Collection Time: 11/11/24  4:20 AM   Result Value Ref Range    Cholesterol,Tot 197 100 - 199 mg/dL    Triglycerides 88 0 - 149 mg/dL    HDL 62 >=40 mg/dL     (H) <100 mg/dL   Heparin Xa (Unfractionated)    Collection Time: 11/11/24  4:20 AM   Result Value Ref Range    Heparin Xa (UFH) 0.49 IU/mL   ESTIMATED GFR    Collection Time: 11/11/24  4:20 AM   Result Value Ref Range    GFR (CKD-EPI) 44 (A) >60 mL/min/1.73 m 2   EC-ECHOCARDIOGRAM COMPLETE W/ CONT    Collection Time: 11/11/24 11:00 AM   Result Value Ref Range    Eject.Frac. MOD BP 43.78     Eject.Frac. MOD  4C 50     Eject.Frac. MOD 2C 47.48     Left Ventrical Ejection Fraction 45    POCT glucose device results    Collection Time: 24  9:01 PM   Result Value Ref Range    POC Glucose, Blood 104 (H) 65 - 99 mg/dL   EKG    Collection Time: 24  9:15 PM   Result Value Ref Range    Report       [facility]    Test Date:  2024  Pt Name:    [facility]              Department: 183  MRN:        [id number]                      Room:       21  Gender:     Male                         Technician: [facility]  :        1940                   Requested By:JOJO BOX  Order #:    [id number]                    Reading MD:    Measurements  Intervals                                Axis  Rate:       101                          P:          0  ME:         0                            QRS:        -2  QRSD:       84                           T:          153  QT:         331  QTc:        429    Interpretive Statements  Atrial flutter  Anterior infarct, age indeterminate  Compared to ECG 11/10/2024 10:18:40  Sinus rhythm no longer present  Myocardial infarct finding still present     EKG    Collection Time: 24  9:20 PM   Result Value Ref Range    Report       [facility]    Test Date:  2024  Pt Name:    [facility]              Department: 183  MRN:        [id number]                      Room:       21  Gender:     Male                         Technician: [facility]  :        1940                   Requested By:JOJO BOX  Order #:    [id number]                    Reading MD:    Measurements  Intervals                                Axis  Rate:       90                           P:          0  ME:         0                            QRS:        -33  QRSD:       85                           T:          151  QT:         397  QTc:        486    Interpretive Statements  Atrial fibrillation  Ventricular premature complex  Left axis deviation  Probable anterior infarct, age  indeterminate  Compared to ECG 2024 21:15:41  Ventricular premature complex(es) now present  Left-axis deviation now present  Atrial flutter no longer present  Myocardial infarct finding still present     EKG    Collection Time: 24  9:22 PM   Result Value Ref Range    Report       [facility]    Test Date:  2024  Pt Name:    AMY KERR              Department: [facility]  MRN:        [id number]                      Room:       T821  Gender:     Male                         Technician: SERGEY  :        1940                   Requested By:YANET CISSE  Order #:    [id number]                    Reading MD:    Measurements  Intervals                                Axis  Rate:       67                           P:          72  GA:         207                          QRS:        -32  QRSD:       79                           T:          161  QT:         474  QTc:        501    Interpretive Statements  Sinus rhythm  Left axis deviation  Probable anterior infarct, age indeterminate  Prolonged QT interval  Compared to ECG 2024 21:20:34  Prolonged QT interval now present  Atrial fibrillation no longer present  Ventricular premature complex(es) no longer present  Myocardial infarct finding still present         Imaging  EC-ECHOCARDIOGRAM COMPLETE W/ CONT   Final Result      DX-CHEST-PORTABLE (1 VIEW)   Final Result   Impression:      1. No acute cardiopulmonary abnormalities identified.      2. Very severe bilateral shoulder arthritic changes.                     CT-CSPINE WITHOUT PLUS RECONS   Final Result   Impression:      1. No cervical spine fracture evident.      2. Severe degenerative changes.      3. Mild subluxations especially C3-4 unchanged from 3/5/2024 and likely degenerative.                     CT-HEAD W/O   Final Result   Impression:      1. No acute process in the brain evident.                  CL-LEFT HEART CATHETERIZATION WITH POSSIBLE INTERVENTION    (Results Pending)        Assessment/Plan  Sinus pause  Assessment & Plan  Multiple pauses leading to loss of consciousness.  Concern of his underlying coronary artery disease  in the setting of noncompliance of his DAPT. Also with new apical LV thrombus  Initial pt's rhythm was slow afib but now back to NSR. No ST-T wave changes  BP in 110/70s HR in 80s  Troponin negative  Continue heparin gtt  [facility] tomorrow am  Cardiology was notified  Keep K >4 and Mg >2  Transfer to ICU    LV (left ventricular) mural thrombus  Assessment & Plan  New this admission  On heparin gt    Ischemic cardiomyopathy- (present on admission)  Assessment & Plan  LVEF 40-45% with apical akinesis and LV thrombus  There is an immobile 1.1 x 1.2 cm LV apical thrombus      CKD (chronic kidney disease) stage 3, GFR 30-59 ml/min- (present on admission)  Assessment & Plan  Unclear baseline. ?cr 1.2-1.3?  Monitor UOP  Strict I/Os    ST elevation myocardial infarction involving left anterior descending (LAD) coronary artery (HCC)- (present on admission)  Assessment & Plan  Hx of STEMI in 5/2024 with PCI to DOUGIE  But only on DAPT for 30 days         Discussed patient condition and risk of morbidity and/or mortality with RN, RT, Pharmacy, Charge nurse / hot rounds, and Patient.    The patient remains critically ill.  Critical care time = 80 minutes in directly providing and coordinating critical care and extensive data review.  No time overlap and excludes procedures.

## 2024-11-12 NOTE — PROCEDURES
Cardiac Catheterization Procedure Note    DATE: 11/12/2024    : Cooper Gould MD    PROCEDURES PERFORMED:  Coronary angiography  Percutaneous coronary intervention to the proximal left circumflex coronary  Moderate conscious sedation    INDICATIONS:  The patient is an 84-year-old gentleman referred for coronary angiography in the setting of a syncopal episode with sinus pauses and noncompliance with dual antiplatelet therapy.    CONSENT:  The complete alternatives, risks, and benefits of the procedure were explained to the patient. Signed informed consent was obtained and placed in the chart prior to the procedure.  A timeout was performed prior to beginning procedure.    MEDICATIONS:  Lidocaine  Fentanyl  Midazolam  Nitroglycerin  Verapamil  Heparin  Clopidogrel    MODERATE CONSCIOUS SEDATION:  I personally supervised the administration of moderate conscious sedation by the nursing staff for 19 minutes.  Sedation start time: 3:18 PM  Sedation end time: 3:37 PM    CONTRAST: Omnipaque 85 cc    ACCESS: 6-Albanian Glidesheath in the right radial artery.    ESTIMATED BLOOD LOSS: 20 cc    COMPLICATIONS: None    PROCEDURE IN DETAIL:  The patient was brought to the cardiac catheterization laboratory in the fasting state.  The skin over the right wrist was prepped and draped in the usual sterile fashion. Lidocaine infiltration was used to anesthetize the tissue over the right radial artery.  Using the micropuncture technique, a 6-Albanian Glidesheath was inserted in the right radial artery.  A 5-Albanian Christiano diagnostic catheter was then advanced over a standard J-wire into the aortic root.  This catheter was then used to engage the ostium of the left main coronary artery and cineangiograms were obtained in multiple projections for complete evaluation of the left coronary system. This catheter was then used to engage the ostium of the right coronary artery and and cineangiograms were obtained in multiple projections  for complete evaluation of the right coronary system.  Following completion of coronary angiography, we proceeded with percutaneous coronary intervention.    The Christiano catheter was exchanged over a J-wire for a 6-Hungarian EBU-3.5 guide catheter.  This catheter was used to re-engage the ostium of the left main coronary artery.  Prowater wire was then advanced into the distal aspect of OM2.  A 2.5 x 15 mm compliant balloon was then advanced over the guidewire and was used to predilate the lesion.  Following pre-dilation, a 3.0 x 24 mm Synergy drug-eluting stent was advanced over the guidewire and was deployed across the lesion at a maximum pressure of 16 aj.  After deployment, the stent was post-dilated with a 3.5 x 20 mm non-compliant balloon at a maximum pressure of 16 aj.  Final cineangiograms were then obtained in orthogonal views, which demonstrated excellent stent expansion and apposition with no evidence of wire perforation, thrombus or dissection.  At the completion of the case, all wires, catheters, and sheaths were removed.  A TR band was placed using the patent hemostasis technique.    HEMODYNAMICS:   Aortic pressure: 121/51 mmHg    CORONARY ANGIOGRAPHY:  The left main coronary artery is a short vessel with luminal disease that bifurcates into the left anterior descending and left circumflex coronary arteries.  The left anterior descending coronary artery supplies several small diagonal branches and has a widely patent proximal to mid vessel stent followed by distal luminal irregularities.  The left circumflex coronary artery is a large, dominant vessel with proximal 80% disease, mid luminal irregularities, and distal 70% disease after the takeoff of the third obtuse marginal branch.  It supplies a small first obtuse marginal branch, a large and immediately bifurcating second obtuse marginal branch with 70% ostial disease in both limbs, a large third obtuse marginal branch with proximal 70% disease, and  several small posterolateral branches  The right coronary artery is a moderate, nondominant vessel with proximal 50% disease, mid 70% disease, and severe diffuse distal disease prior to a distal chronic total occlusion.  The distal vessel refills by faint collaterals from the left coronary system.    PERCUTANEOUS CORONARY INTERVENTION:  Lesion location: Proximal left circumflex coronary  Lesion type: A  Lesion length: 15 mm  Pre-intervention GAMAL flow: 3  Post-intervention GAMAL flow: 3  Pre-intervention stenosis: 80%  Post-intervention stenosis: 0%  Stent: 3.0 x 24 mm Synergy drug-eluting stent    IMPRESSION:  Widely patent left anterior descending coronary artery stents.  Distal right coronary now chronically occluded.  Successful percutaneous coronary intervention to the proximal left circumflex coronary artery with one 3.0 x 24 mm Synergy drug-eluting stent postdilated to 3.5 mm.    RECOMMENDATIONS:  Return to floor with continued care per primary service.  TR band release per protocol.  Restart heparin drip per protocol in 2 hours if access site no bleeding complications.  Strict adherence with dual antiplatelet therapy for at least 3 months if tolerated.  Optimal medical management of residual coronary artery disease with consideration for further intervention depending on patient's symptoms and medical compliance.   no

## 2024-11-12 NOTE — PROGRESS NOTES
"     Cardiology Follow-up Note    Name:   Dilan Calderón     YOB: 1940  Age:   84 y.o.  male   MRN:   [id number]        Attending Provider: Dr Marrero    Chief Complaint: Syncope (Pt woke to floor in kitchen in pool of blood with LAC above Rt eyebrow. ) and Extremity Weakness (X 2 days with occasional chest pain radiating across upper chest to bilateral shoulders. Intermittent. Pt reports it is not the same as cardiac chest pain. )       Reason for cardiology consult: Syncope    Outpatient Cardiologist: None    History of Present Illness  Dilan Calderón is 84 y.o. male with prior medical history significant for GIB (March 2024), CAD / MI s/p PCI DOUGIE p-mLAD (05/2024), ICM, HFrEF, CKD stage III who was admitted on 11/10/2024 with syncope.  Patient was symptomatic with lightheadedness and dizziness prior to syncope.  Then he woke up on the floor in a small pool of blood due to suffering trauma to right eyebrow.  Patient reports muscle tension like discomfort across both shoulders radiating down to both arms for about 3 to 4 days prior to admission, relieved mostly by deep breathing exercises.  This was associated with dizziness.  Last admission in May 2024 for NSTEMI when he received a cardiac stent to mid LAD.  Patient reports taking prescribed medications for 30 days only after which he decided to stop all his prescribed medications since he \"felt good.\" No follow-ups with cardiology since.    Interim Events 11/11/24   :  - Personal Telemetry interpretation: Sinus rhythm  - Overnight events: No Cardiac events   Patient denies chest pain, shortness of breath, edema, dizziness/lightheadedness, or palpitations.   - Vitals: 156/82, room air, heart rate 65  - Labs reviewed: Creatinine 1.55  - Weight: 134 pounds    Interim Events 11/12/24   :  - Personal Telemetry interpretation: Currently sinus rhythm  - Overnight events: At around 9 PM yesterday, sinus arrest with 7.5-second and 2.5 seconds with transient " "loss of consciousness prompting brief CPR due to questionable loss of pulse.  Transferred to ICU due to hypotension and bradycardia.  EKG showed atrial fibrillation.  Patient denies chest pain, shortness of breath, edema, dizziness/lightheadedness, or palpitations.   - Vitals: 134/79, on room air  - Labs reviewed: Creatinine 1.33    Updates to Plan of Care: Plan for [facility] today      Review of Systems   Constitutional:  Positive for malaise/fatigue. Negative for chills and fever.   HENT:          Headache/tension    Respiratory:  Negative for cough, shortness of breath and wheezing.    Cardiovascular:  Negative for chest pain, palpitations and leg swelling.   Neurological:  Negative for dizziness.   Psychiatric/Behavioral:  Negative for memory loss and substance abuse. The patient is not nervous/anxious.         Medical History  History reviewed. No pertinent past medical history.      History reviewed. No pertinent family history.      Social History     Tobacco Use    Smoking status: Former     Types: Cigarettes    Smokeless tobacco: Never   Vaping Use    Vaping status: Never Used   Substance Use Topics    Alcohol use: Not Currently    Drug use: Never         No Known Allergies      Medications   Scheduled Medications   Medication Dose Frequency    levothyroxine  50 mcg AM ES    omeprazole  20 mg DAILY    calcium CHLORIDE  1 g Once    atorvastatin  80 mg Q EVENING    clopidogrel  75 mg DAILY    aspirin  81 mg DAILY           Physical Exam    Body mass index is 21.17 kg/m².     /79   Pulse 63   Temp 37.5 °C (99.5 °F) (Temporal)   Resp (!) 22   Ht 1.702 m (5' 7\")   Wt 61.3 kg (135 lb 2.3 oz)   SpO2 95%      Vitals:    11/12/24 0500 11/12/24 0600 11/12/24 0700 11/12/24 0800   BP: 120/64 (!) 144/72 (!) 150/77 134/79   Pulse: (!) 51 63 (!) 57 63   Resp: 12 12 20 (!) 22   Temp:    37.5 °C (99.5 °F)   TempSrc:    Temporal   SpO2: 97% 93% 96% 95%   Weight:       Height:            Oxygen Therapy:  Pulse Oximetry: " "95 %, O2 (LPM): 0, O2 Delivery Device: None - Room Air      Physical Exam  Constitutional:       Appearance: Normal appearance.   Cardiovascular:      Rate and Rhythm: Normal rate and regular rhythm.      Heart sounds: Murmur heard.   Pulmonary:      Breath sounds: No rales.   Abdominal:      General: There is no distension.      Palpations: Abdomen is soft.   Skin:     General: Skin is warm and dry.      Coloration: Skin is pale.      Comments: Ecchymosis with laceration and repair to the right eyebrow   Neurological:      Mental Status: He is alert and oriented to person, place, and time.   Psychiatric:         Mood and Affect: Mood normal.         Behavior: Behavior normal.         Thought Content: Thought content normal.         Judgment: Judgment normal.               Labs (personally reviewed):     Estimated Creatinine Clearance: 37.1 mL/min (by C-G formula based on SCr of 1.33 mg/dL).    No results found for: \"BNPBTYPENAT\"  Recent Labs     11/10/24  0458 11/11/24  0420 11/11/24  2128 11/12/24  0243   CREATININE 1.42* 1.55* 1.50* 1.33   BUN 26* 23* 23* 21   POTASSIUM 3.9 4.4 4.1 4.3   SODIUM 141 139 139 139   CALCIUM 9.6 9.0 9.0 9.2   MAGNESIUM 2.1  --  2.1  --    CO2 22 24 17* 22   ALBUMIN 4.1 3.7 3.6  --      Recent Labs     11/11/24 0420 11/11/24 2128 11/12/24  0243   GLUCOSE 98 138* 102*     Recent Labs     11/10/24  0458 11/11/24  0420 11/11/24 2128   ASTSGOT 61* 42 40   ALTSGPT 56* 40 35   ALKPHOSPHAT 115* 106* 101*   INR 0.99  --   --      Recent Labs     11/11/24 0420 11/11/24 2128 11/12/24  0243   WBC 5.7 10.3 9.1   HEMOGLOBIN 12.9* 12.6* 12.7*   PLATELETCT 173 159* 162*     Recent Labs     11/10/24  0858 11/10/24  1413 11/11/24 2128   TROPONINT 93* 78* 67*   NTPROBNP  --   --  3359*     Lab Results   Component Value Date/Time    CHOLSTRLTOT 197 11/11/2024 04:20 AM     (H) 11/11/2024 04:20 AM    HDL 62 11/11/2024 04:20 AM    TRIGLYCERIDE 88 11/11/2024 04:20 AM "       Imaging:  EC-ECHOCARDIOGRAM COMPLETE W/ CONT   Final Result      DX-CHEST-PORTABLE (1 VIEW)   Final Result   Impression:      1. No acute cardiopulmonary abnormalities identified.      2. Very severe bilateral shoulder arthritic changes.                     CT-CSPINE WITHOUT PLUS RECONS   Final Result   Impression:      1. No cervical spine fracture evident.      2. Severe degenerative changes.      3. Mild subluxations especially C3-4 unchanged from 3/5/2024 and likely degenerative.                     CT-HEAD W/O   Final Result   Impression:      1. No acute process in the brain evident.                  CL-LEFT HEART CATHETERIZATION WITH POSSIBLE INTERVENTION    (Results Pending)   CL-PACEMAKER LEADLESS PACING SYSTEM    (Results Pending)       Cardiac Imaging and Procedures Review      Echocardiogram 5/4/2024  Moderately reduced left ventricular systolic function.  The left ventricular ejection fraction is visually estimated to be 35-  40%.  Hypokinesis anteroapical hypokinesis.  The right ventricle is normal in size and systolic function.  No prior study is available for comparison.     Echocardiogram 11/11/2024  Compared to the prior study on 5/4/2024: There is interval development   of LV apical thrombus  The ascending aorta diameter is 3.8 cm.  Mildly reduced LV systolic function, estimated LVEF 40-45% with apical   akinesis, suggestive of underlying ischemic cardiomyopathy  There is an immobile 1.1 x 1.2 cm LV apical thrombus  Normal RV size and systolic function  No significant valvular abnormalities  Aortic valve sclerosis without stenosis  Trivial pericardial effusion  Normal IVC size  Dr. Stuart notified via [employer] on 11/11/24 at 11:47 AM      Cardiac Catheterization 5/2/2024 by Dr. Johns :  IMPRESSION:  1.  Occluded proximal LAD (heavily calcified vessel) status post successful IVUS guided intravascular lithotripsy and PCI deploying overlapping Synergy 3 x 28 mm and 3 x 32 mm DOUGIE, postdilated to ~  3.5-3.7 mm.  2.  Ischemic cardiomyopathy with estimated LVEF 45% with anterior wall akinesis  3.  Significant elevated resting LVEDP 25 mmHg with no significant transaortic gradient on pullback     RECOMMENDATIONS:  Dual antiplatelet therapy for at least 12 months  Weigh risk versus benefits of further PCI to his remaining severe disease in the circumflex and RCA, especially given recent GI bleed with no clear identifiable source necessitating blood transfusions.  HI statin / PCSK9-I: Target LDL < 55 and TG < 150  TTE with echo enhancing agent  GDMT and lifestyle modifications for secondary ASCVD prevention  Cardiac Rehab referral      Principal Problem:    NSTEMI (non-ST elevated myocardial infarction) (HCC) (POA: Yes)  Active Problems:    ST elevation myocardial infarction involving left anterior descending (LAD) coronary artery (HCC) (POA: Yes)    History of GI bleed (POA: Yes)    CKD (chronic kidney disease) stage 3, GFR 30-59 ml/min (POA: Yes)    Ischemic cardiomyopathy (POA: Yes)    Syncope (POA: Unknown)    Chronic systolic heart failure (HCC) (POA: Unknown)    LV (left ventricular) mural thrombus (POA: Unknown)    Sinus pause (POA: Unknown)  Resolved Problems:    * No resolved hospital problems. *      Assessment and Medical Decision Making:    #.  Syncope  #.  Elevated troponin due to possible NSTEMI  #.  Coronary artery disease, S/P DOUGIE to p-mLAD (5/2024)  #.  LV apical thrombus  #.  Bradycardia  #.  Sinus pause / arrest  #.  Hypothyroidism  #.  Hypertension  #.  Hyperlipidemia  #.  Anemia  #.  HFrEF, EF 38%  #  Ischemic cardiomyopathy  #.  CKD  #.  Medication noncompliance          Recommendations:    Syncope   Positive orthostatics.     Sinus arrest /sinus pause   Multiple pauses on 11/11/2024 leading to a brief loss of consciousness.   Possibly the culprit for his admission due to syncope vs ACS   -Discontinue metoprolol and avoid all jordy agents   -EP with cardiology is consulted and planning on a PPM  placement today or tomorrow.  Appreciate their recommendations.    Elevated troponin due to possible NSTEMI  Prior left heart cath (on May 2, 2024 STEMI presentation) showed occluded proximal LAD, s/p Synergy 3 x 28 mm and 3 x 32 mm DOUGIE   -Patient stopped all his medications including DAPT after only taking it for 30 days post STEMI admission.  -Excessive education about compliance with medications and follow-ups.  Patient agrees to comply.  -ASA  -Clopidogrel 75 mg daily  -Antiocoagulant: Heparin gtt  -HI Statin, atorvastatin 80 mg nightly for goal LDL less than 70.  -BB -now contraindicated due to bradycardia and sinus arrest  -Echocardiogram showed mildly reduced LV systolic function, estimated LVEF 40-45% with apical akinesis, suggestive of underlying ischemic cardiomyopathy, and LV thrombus.  -Plan for left heart cath  -Keep n.p.o.     LV Apical Thrombus    immobile 1.1 x 1.2 cm LV apical thrombus on echo 11/11/2024  -Continue heparin drip.     HFrEF, ischemic cardiomyopathy  -ACE-I/ARB/ARNI: Hold off due to reduced kidney function  -Evidence Based Beta-blocker: Hold off given bradycardia and sinus arrest  -Aldosterone Antagonist: Hold off for now   -Diuretic: Appears dry  -SGLT2i: Hold off today   -Labs: Daily BMP  -Daily weights; Strict I+O’s:           Please contact me with any questions.  Thank you for allowing us to participate in the care of Mr. Calderón.      Please see Dr. Rodarte  attestation for further details and MDM.     Marley ROMERO A.P.RGALILEA performed a portion of the service face-to-face with the same patient on the same date of service independently of Dr. Rodarte FOR 15 minutes preparing to see the patient, reviewing hospital notes and tests, obtaining history from the patient, performing a medically appropriate exam, counseling and educating the patient, ordering medications/tests/procedures/referrals as clinically indicated, and documenting information in the electronic medical  record.    Please note this dictation was created using voice recognition software.  I have made every reasonable attempt to correct obvious errors, but there may be errors of grammar and possibly content that I did not discover before finalizing the note.    ISABEL Adkins.  [facility]  [phone]

## 2024-11-12 NOTE — CARE PLAN
The patient is Watcher - Medium risk of patient condition declining or worsening    Shift Goals  Clinical Goals: [facility] today  Patient Goals: Comfort, updates  Family Goals: DHARA    Progress made toward(s) clinical / shift goals:    Problem: Knowledge Deficit - Standard  Goal: Patient and family/care givers will demonstrate understanding of plan of care, disease process/condition, diagnostic tests and medications  Outcome: Progressing     Problem: Pain - Standard  Goal: Alleviation of pain or a reduction in pain to the patient’s comfort goal  Outcome: Progressing     Problem: Fall Risk  Goal: Patient will remain free from falls  Outcome: Progressing

## 2024-11-12 NOTE — CARE PLAN
The patient is Watcher - Medium risk of patient condition declining or worsening    Shift Goals  Clinical Goals: hep gtt, [facility] in AM  Patient Goals: rest, comfort, go home  Family Goals: No family    Progress made toward(s) clinical / shift goals:      Problem: Knowledge Deficit - Standard  Goal: Patient and family/care givers will demonstrate understanding of plan of care, disease process/condition, diagnostic tests and medications  Outcome: Progressing     Problem: Pain - Standard  Goal: Alleviation of pain or a reduction in pain to the patient’s comfort goal  Outcome: Progressing     Problem: Fall Risk  Goal: Patient will remain free from falls  Outcome: Progressing

## 2024-11-13 PROBLEM — I46.9 ASYSTOLE (HCC): Status: ACTIVE | Noted: 2024-11-13

## 2024-11-13 LAB
ALBUMIN SERPL BCP-MCNC: 3.5 G/DL (ref 3.2–4.9)
ALBUMIN/GLOB SERPL: 1.4 G/DL
ALP SERPL-CCNC: 100 U/L (ref 30–99)
ALT SERPL-CCNC: 28 U/L (ref 2–50)
ANION GAP SERPL CALC-SCNC: 10 MMOL/L (ref 7–16)
AST SERPL-CCNC: 35 U/L (ref 12–45)
BILIRUB SERPL-MCNC: 0.5 MG/DL (ref 0.1–1.5)
BUN SERPL-MCNC: 19 MG/DL (ref 8–22)
CALCIUM ALBUM COR SERPL-MCNC: 9.1 MG/DL (ref 8.5–10.5)
CALCIUM SERPL-MCNC: 8.7 MG/DL (ref 8.5–10.5)
CHLORIDE SERPL-SCNC: 106 MMOL/L (ref 96–112)
CO2 SERPL-SCNC: 22 MMOL/L (ref 20–33)
CREAT SERPL-MCNC: 1.25 MG/DL (ref 0.5–1.4)
EKG IMPRESSION: NORMAL
ERYTHROCYTE [DISTWIDTH] IN BLOOD BY AUTOMATED COUNT: 51.7 FL (ref 35.9–50)
GFR SERPLBLD CREATININE-BSD FMLA CKD-EPI: 57 ML/MIN/1.73 M 2
GLOBULIN SER CALC-MCNC: 2.5 G/DL (ref 1.9–3.5)
GLUCOSE SERPL-MCNC: 95 MG/DL (ref 65–99)
HCT VFR BLD AUTO: 37.9 % (ref 42–52)
HGB BLD-MCNC: 12.9 G/DL (ref 14–18)
MAGNESIUM SERPL-MCNC: 2 MG/DL (ref 1.5–2.5)
MCH RBC QN AUTO: 33.8 PG (ref 27–33)
MCHC RBC AUTO-ENTMCNC: 34 G/DL (ref 32.3–36.5)
MCV RBC AUTO: 99.2 FL (ref 81.4–97.8)
PHOSPHATE SERPL-MCNC: 3.6 MG/DL (ref 2.5–4.5)
PLATELET # BLD AUTO: 160 K/UL (ref 164–446)
PMV BLD AUTO: 11.2 FL (ref 9–12.9)
POTASSIUM SERPL-SCNC: 4.1 MMOL/L (ref 3.6–5.5)
PROT SERPL-MCNC: 6 G/DL (ref 6–8.2)
RBC # BLD AUTO: 3.82 M/UL (ref 4.7–6.1)
SODIUM SERPL-SCNC: 138 MMOL/L (ref 135–145)
UFH PPP CHRO-ACNC: 0.24 IU/ML
UFH PPP CHRO-ACNC: <0.1 IU/ML
WBC # BLD AUTO: 8.2 K/UL (ref 4.8–10.8)

## 2024-11-13 PROCEDURE — 700111 HCHG RX REV CODE 636 W/ 250 OVERRIDE (IP): Performed by: HOSPITALIST

## 2024-11-13 PROCEDURE — A9270 NON-COVERED ITEM OR SERVICE: HCPCS | Performed by: HOSPITALIST

## 2024-11-13 PROCEDURE — 700102 HCHG RX REV CODE 250 W/ 637 OVERRIDE(OP): Performed by: HOSPITALIST

## 2024-11-13 PROCEDURE — 770020 HCHG ROOM/CARE - TELE (206)

## 2024-11-13 PROCEDURE — 85027 COMPLETE CBC AUTOMATED: CPT

## 2024-11-13 PROCEDURE — 99233 SBSQ HOSP IP/OBS HIGH 50: CPT | Performed by: HOSPITALIST

## 2024-11-13 PROCEDURE — A9270 NON-COVERED ITEM OR SERVICE: HCPCS

## 2024-11-13 PROCEDURE — 84100 ASSAY OF PHOSPHORUS: CPT

## 2024-11-13 PROCEDURE — 99232 SBSQ HOSP IP/OBS MODERATE 35: CPT | Performed by: INTERNAL MEDICINE

## 2024-11-13 PROCEDURE — A9270 NON-COVERED ITEM OR SERVICE: HCPCS | Performed by: INTERNAL MEDICINE

## 2024-11-13 PROCEDURE — 99024 POSTOP FOLLOW-UP VISIT: CPT | Performed by: NURSE PRACTITIONER

## 2024-11-13 PROCEDURE — A9270 NON-COVERED ITEM OR SERVICE: HCPCS | Performed by: STUDENT IN AN ORGANIZED HEALTH CARE EDUCATION/TRAINING PROGRAM

## 2024-11-13 PROCEDURE — 700111 HCHG RX REV CODE 636 W/ 250 OVERRIDE (IP): Performed by: STUDENT IN AN ORGANIZED HEALTH CARE EDUCATION/TRAINING PROGRAM

## 2024-11-13 PROCEDURE — 700102 HCHG RX REV CODE 250 W/ 637 OVERRIDE(OP): Performed by: STUDENT IN AN ORGANIZED HEALTH CARE EDUCATION/TRAINING PROGRAM

## 2024-11-13 PROCEDURE — 700102 HCHG RX REV CODE 250 W/ 637 OVERRIDE(OP): Performed by: INTERNAL MEDICINE

## 2024-11-13 PROCEDURE — 83735 ASSAY OF MAGNESIUM: CPT

## 2024-11-13 PROCEDURE — 80053 COMPREHEN METABOLIC PANEL: CPT

## 2024-11-13 PROCEDURE — 85520 HEPARIN ASSAY: CPT

## 2024-11-13 PROCEDURE — 700102 HCHG RX REV CODE 250 W/ 637 OVERRIDE(OP)

## 2024-11-13 RX ORDER — HEPARIN SODIUM 1000 [USP'U]/ML
30 INJECTION, SOLUTION INTRAVENOUS; SUBCUTANEOUS PRN
Status: CANCELLED | OUTPATIENT
Start: 2024-11-13

## 2024-11-13 RX ORDER — METOPROLOL SUCCINATE 25 MG/1
25 TABLET, EXTENDED RELEASE ORAL
Status: DISCONTINUED | OUTPATIENT
Start: 2024-11-13 | End: 2024-11-14 | Stop reason: HOSPADM

## 2024-11-13 RX ORDER — ENOXAPARIN SODIUM 100 MG/ML
1 INJECTION SUBCUTANEOUS EVERY 12 HOURS
Status: DISCONTINUED | OUTPATIENT
Start: 2024-11-13 | End: 2024-11-14 | Stop reason: HOSPADM

## 2024-11-13 RX ORDER — ENOXAPARIN SODIUM 100 MG/ML
1 INJECTION SUBCUTANEOUS EVERY 12 HOURS
Status: DISCONTINUED | OUTPATIENT
Start: 2024-11-13 | End: 2024-11-13

## 2024-11-13 RX ORDER — HEPARIN SODIUM 5000 [USP'U]/100ML
0-30 INJECTION, SOLUTION INTRAVENOUS CONTINUOUS
Status: CANCELLED | OUTPATIENT
Start: 2024-11-13

## 2024-11-13 RX ORDER — HEPARIN SODIUM 1000 [USP'U]/ML
80 INJECTION, SOLUTION INTRAVENOUS; SUBCUTANEOUS ONCE
Status: CANCELLED | OUTPATIENT
Start: 2024-11-13 | End: 2024-11-13

## 2024-11-13 RX ORDER — HEPARIN SODIUM 1000 [USP'U]/ML
40 INJECTION, SOLUTION INTRAVENOUS; SUBCUTANEOUS PRN
Status: CANCELLED | OUTPATIENT
Start: 2024-11-13

## 2024-11-13 RX ORDER — WARFARIN SODIUM 5 MG/1
5 TABLET ORAL DAILY
Status: DISCONTINUED | OUTPATIENT
Start: 2024-11-13 | End: 2024-11-14 | Stop reason: HOSPADM

## 2024-11-13 RX ORDER — HEPARIN SODIUM 1000 [USP'U]/ML
60 INJECTION, SOLUTION INTRAVENOUS; SUBCUTANEOUS ONCE
Status: CANCELLED | OUTPATIENT
Start: 2024-11-13 | End: 2024-11-13

## 2024-11-13 RX ADMIN — LEVOTHYROXINE SODIUM 50 MCG: 0.05 TABLET ORAL at 05:01

## 2024-11-13 RX ADMIN — ENOXAPARIN SODIUM 60 MG: 100 INJECTION SUBCUTANEOUS at 12:44

## 2024-11-13 RX ADMIN — HEPARIN SODIUM 1900 UNITS: 1000 INJECTION, SOLUTION INTRAVENOUS; SUBCUTANEOUS at 08:13

## 2024-11-13 RX ADMIN — ACETAMINOPHEN 650 MG: 325 TABLET ORAL at 00:20

## 2024-11-13 RX ADMIN — CLOPIDOGREL BISULFATE 75 MG: 75 TABLET ORAL at 05:01

## 2024-11-13 RX ADMIN — ATORVASTATIN CALCIUM 80 MG: 80 TABLET, FILM COATED ORAL at 18:01

## 2024-11-13 RX ADMIN — OMEPRAZOLE 20 MG: 20 CAPSULE, DELAYED RELEASE ORAL at 05:01

## 2024-11-13 RX ADMIN — METOPROLOL SUCCINATE 25 MG: 25 TABLET, EXTENDED RELEASE ORAL at 09:44

## 2024-11-13 RX ADMIN — ENOXAPARIN SODIUM 60 MG: 100 INJECTION SUBCUTANEOUS at 21:28

## 2024-11-13 RX ADMIN — WARFARIN SODIUM 5 MG: 5 TABLET ORAL at 18:01

## 2024-11-13 RX ADMIN — ASPIRIN 81 MG: 81 TABLET, CHEWABLE ORAL at 05:01

## 2024-11-13 ASSESSMENT — PAIN DESCRIPTION - PAIN TYPE
TYPE: ACUTE PAIN
TYPE: ACUTE PAIN;SURGICAL PAIN
TYPE: ACUTE PAIN

## 2024-11-13 ASSESSMENT — COGNITIVE AND FUNCTIONAL STATUS - GENERAL
CLIMB 3 TO 5 STEPS WITH RAILING: A LITTLE
SUGGESTED CMS G CODE MODIFIER MOBILITY: CI
MOBILITY SCORE: 23
DAILY ACTIVITIY SCORE: 24
SUGGESTED CMS G CODE MODIFIER DAILY ACTIVITY: CH

## 2024-11-13 ASSESSMENT — ENCOUNTER SYMPTOMS
CHILLS: 0
TROUBLE SWALLOWING: 0
WHEEZING: 0
PALPITATIONS: 0
FATIGUE: 0
COUGH: 0
NERVOUS/ANXIOUS: 0
NUMBNESS: 0
NAUSEA: 0
FEVER: 0
HEADACHES: 0
WEAKNESS: 0
DIZZINESS: 0
SHORTNESS OF BREATH: 0
LIGHT-HEADEDNESS: 0
MEMORY LOSS: 0
SPEECH DIFFICULTY: 0

## 2024-11-13 ASSESSMENT — LIFESTYLE VARIABLES: SUBSTANCE_ABUSE: 0

## 2024-11-13 ASSESSMENT — FIBROSIS 4 INDEX: FIB4 SCORE: 3.42

## 2024-11-13 NOTE — PROGRESS NOTES
Cardiology Follow Up Progress Note    Date of Service  11/13/2024    Attending Physician  Delmar Oro D.O.    Chief Complaint/Reason for Consult   Syncope; Pauses     HPI  Dilan Calderón is a 84 y.o. male with a past medical history significant for CAD with prior STEMI in [place] 2025 with LAD PCI and residual obstructive CAD; lost to follow up and medication noncompliance (pt stopped dapt one month post stenting), prior GI bleeding in march (he also reported syncope with that admission), ICM, HF mildly-mod reduced EF, CKD, who presented 11/10/2024 with syncopal event.  Reports was standing in the kitchen and woke up on the floor in a pool of blood.  He does not report significant prodrome prior to syncope.  Was not altered when he came to.  Additionally reports chest pain radiating to both arms x weeks leading up to admission.  After admission, echo revealed EF improvement to 45%, however with LV thrombis.  He was planned for cath and started on IV Heparin.  Last night, episode of syncope.  He does not recall syncope prior.  Received brief CPR due to unsure pulse, with quick regain of consciousness.  Tele captured 7.5 sec high grade block without ventricular escape  He was transferred to ICU.       Interim Events  Reports feeling well this AM.  No chest pain or SOB.    Monitored rhythm is SR/SB.  No events overnight.   S/P Micra PPM yesterday and LHC with PCI to OM.     Medtronic Micra PPM interrogated this AM demonstrates appropriate function.      Review of Systems  Review of Systems   Constitutional:  Negative for chills, fatigue and fever.   HENT:  Negative for nosebleeds, tinnitus and trouble swallowing.    Respiratory:  Negative for cough, shortness of breath and wheezing.    Cardiovascular:  Negative for chest pain, palpitations and leg swelling.   Neurological:  Negative for dizziness, syncope, speech difficulty, weakness, light-headedness and numbness.       Vital signs in last 24 hours  Temp:  [37.1 °C  (98.7 °F)-37.5 °C (99.5 °F)] 37.2 °C (99 °F)  Pulse:  [52-91] 79  Resp:  [11-31] 28  BP: (120-183)/() 140/63  SpO2:  [77 %-97 %] 95 %    Physical Exam  Physical Exam  Vitals and nursing note reviewed.   Constitutional:       Appearance: Normal appearance.   HENT:      Head: Normocephalic and atraumatic.      Comments: Right eye facial trauma      Mouth/Throat:      Mouth: Mucous membranes are moist.      Pharynx: Oropharynx is clear.   Eyes:      Conjunctiva/sclera: Conjunctivae normal.      Pupils: Pupils are equal, round, and reactive to light.   Cardiovascular:      Rate and Rhythm: Normal rate and regular rhythm.      Pulses: Normal pulses.      Heart sounds: Normal heart sounds. No murmur heard.     No friction rub. No gallop.      Comments: Right Femoral venous access site is clean and dry, scant ecchymosis noted.  There is no evidence of ongoing bleeding or hematoma formation.  Figure of eight suture was removed.  Site redressed.     Pulmonary:      Effort: Pulmonary effort is normal.      Breath sounds: Normal breath sounds. No wheezing, rhonchi or rales.   Musculoskeletal:         General: Normal range of motion.      Cervical back: Normal range of motion.      Right lower leg: No edema.      Left lower leg: No edema.      Comments: Right knee bruising with bandage in place    Skin:     General: Skin is warm and dry.      Capillary Refill: Capillary refill takes 2 to 3 seconds.   Neurological:      Mental Status: He is alert and oriented to person, place, and time.   Psychiatric:         Mood and Affect: Mood normal.         Behavior: Behavior normal.         Thought Content: Thought content normal.         Judgment: Judgment normal.       Lab Review  Lab Results   Component Value Date/Time    WBC 9.1 11/12/2024 02:43 AM    RBC 3.73 (L) 11/12/2024 02:43 AM    HEMOGLOBIN 12.7 (L) 11/12/2024 02:43 AM    HEMATOCRIT 37.1 (L) 11/12/2024 02:43 AM    MCV 99.5 (H) 11/12/2024 02:43 AM    MCH 34.0 (H)  11/12/2024 02:43 AM    MCHC 34.2 11/12/2024 02:43 AM    MPV 10.8 11/12/2024 02:43 AM      Lab Results   Component Value Date/Time    SODIUM 138 11/13/2024 06:45 AM    POTASSIUM 4.1 11/13/2024 06:45 AM    CHLORIDE 106 11/13/2024 06:45 AM    CO2 22 11/13/2024 06:45 AM    GLUCOSE 95 11/13/2024 06:45 AM    BUN 19 11/13/2024 06:45 AM    CREATININE 1.25 11/13/2024 06:45 AM    CREATININE 1.2 02/11/2008 09:00 AM      Lab Results   Component Value Date/Time    ASTSGOT 35 11/13/2024 06:45 AM    ALTSGPT 28 11/13/2024 06:45 AM     Lab Results   Component Value Date/Time    CHOLSTRLTOT 197 11/11/2024 04:20 AM     (H) 11/11/2024 04:20 AM    HDL 62 11/11/2024 04:20 AM    TRIGLYCERIDE 88 11/11/2024 04:20 AM    TROPONINT 67 (H) 11/11/2024 09:28 PM       Recent Labs     11/11/24  2128   NTPROBNP 3359*       Cardiac Imaging and Procedures Review  EKG:  My personal interpretation of the EKG dated 11/12/24 is SB    Echocardiogram:  11/11/24  CONCLUSIONS  Compared to the prior study on 5/4/2024: There is interval development   of LV apical thrombus  The ascending aorta diameter is 3.8 cm.  Mildly reduced LV systolic function, estimated LVEF 40-45% with apical   akinesis, suggestive of underlying ischemic cardiomyopathy  There is an immobile 1.1 x 1.2 cm LV apical thrombus  Normal RV size and systolic function  No significant valvular abnormalities  Aortic valve sclerosis without stenosis  Trivial pericardial effusion  Normal IVC size    Cardiac Catheterization:  11/12/24  IMPRESSION:  Widely patent left anterior descending coronary artery stents.  Distal right coronary now chronically occluded.  Successful percutaneous coronary intervention to the proximal left circumflex coronary artery with one 3.0 x 24 mm Synergy drug-eluting stent postdilated to 3.5 mm.     Imaging    Assessment/Plan  Syncope   High Grade AV Block   CAD  ICM, EF 45% on recent echo   LV Thrombus   Hypothyroidism   Medication non-adherence      - Admission for  syncope and chest/arm discomfort.  Syncopal; event while admitted,  Tele monitoring captured 7.5 second high grade AV Block without ventricular escape.  He had brief CPR before confirmation of palpable pulse.   - Echo this admission with improvement in LVEF< however noted to have LV thrombus.  HFrEF therapy per primary cards.  He has been started on IV heparin for LV thrombus, will need to bridge with warfarin with clinically indicated.    - [facility] yesterday with PCI to OM.  Noted to have  to distal RCA.  Patent stent to LAD.  Management per cardiology.   - Medtronic Micra implanted 11/12/24.  Device interrogated this AM and demonstrates appropriate function. Figure of eight suture was removed.  No hematoma or ongoing bleeding appreciated.      EP will sign off.  Please message with any questions. Follow up in device clinic is arranged next week.     ISABEL Barrett.   [facility]  [phone]

## 2024-11-13 NOTE — PROGRESS NOTES
Re-attempted to remove 1cc of air from TR band. Oozing noted at site. 1cc air reinserted to TR band

## 2024-11-13 NOTE — PROGRESS NOTES
Report given to Natasha PRICE on tele 8. All questions answered. Pt transferred to tele with all belongings.

## 2024-11-13 NOTE — PROGRESS NOTES
"     Cardiology Follow-up Note    Name:   Dilan Calderón     YOB: 1940  Age:   84 y.o.  male   MRN:   [id number]        Attending Provider: Dr Marrero    Chief Complaint: Syncope (Pt woke to floor in kitchen in pool of blood with LAC above Rt eyebrow. ) and Extremity Weakness (X 2 days with occasional chest pain radiating across upper chest to bilateral shoulders. Intermittent. Pt reports it is not the same as cardiac chest pain. )       Reason for cardiology consult: Syncope    Outpatient Cardiologist: None    History of Present Illness  Dilan Calderón is 84 y.o. male with prior medical history significant for GIB (March 2024), CAD / MI s/p PCI DOUGIE p-mLAD (05/2024), ICM, HFrEF, CKD stage III who was admitted on 11/10/2024 with syncope.  Patient was symptomatic with lightheadedness and dizziness prior to syncope.  Then he woke up on the floor in a small pool of blood due to suffering trauma to right eyebrow.  Patient reports muscle tension like discomfort across both shoulders radiating down to both arms for about 3 to 4 days prior to admission, relieved mostly by deep breathing exercises.  This was associated with dizziness.  Last admission in May 2024 for NSTEMI when he received a cardiac stent to mid LAD.  Patient reports taking prescribed medications for 30 days only after which he decided to stop all his prescribed medications since he \"felt good.\" No follow-ups with cardiology since.    Interim Events 11/11/24   :  - Personal Telemetry interpretation: Sinus rhythm  - Overnight events: No Cardiac events   Patient denies chest pain, shortness of breath, edema, dizziness/lightheadedness, or palpitations.   - Vitals: 156/82, room air, heart rate 65  - Labs reviewed: Creatinine 1.55  - Weight: 134 pounds    Interim Events 11/12/24   :  - Personal Telemetry interpretation: Currently sinus rhythm  - Overnight events: At around 9 PM yesterday, sinus arrest with 7.5-second and 2.5 seconds with transient " loss of consciousness prompting brief CPR due to questionable loss of pulse.  Transferred to ICU due to hypotension and bradycardia.  EKG showed atrial fibrillation.  Patient denies chest pain, shortness of breath, edema, dizziness/lightheadedness, or palpitations.   - Vitals: 134/79, on room air  - Labs reviewed: Creatinine 1.33    Updates to Plan of Care: Plan for [facility] today    Interim Events 11/13/24   :  - Personal Telemetry interpretation: Sinus bradycardia-sinus rhythm, intermittently pacing at 50.  - Overnight events: Underwent left heart cath and Micra PPM placement yesterday  Patient denies chest pain, shortness of breath, edema, dizziness/lightheadedness, or palpitations.   - Vitals: 140/63, room air  Nursing reports a hematoma development over the right femoral site.  Soft without evidence of hematoma on my exam this morning.  Sutures are in place.  Right radial site is soft, without evidence of hematoma.  Report of oozing overnight, but appears to be controlled this morning.      Review of Systems   Constitutional:  Positive for malaise/fatigue. Negative for chills and fever.   HENT:          Headache/tension    Respiratory:  Negative for cough, shortness of breath and wheezing.    Cardiovascular:  Negative for chest pain, palpitations and leg swelling.   Neurological:  Negative for dizziness.   Psychiatric/Behavioral:  Negative for memory loss and substance abuse. The patient is not nervous/anxious.         Medical History  History reviewed. No pertinent past medical history.      History reviewed. No pertinent family history.      Social History     Tobacco Use    Smoking status: Former     Types: Cigarettes    Smokeless tobacco: Never   Vaping Use    Vaping status: Never Used   Substance Use Topics    Alcohol use: Not Currently    Drug use: Never         No Known Allergies      Medications   Scheduled Medications   Medication Dose Frequency    MD Alert...Warfarin per Pharmacy   PHARMACY TO DOSE     "metoprolol SR  25 mg Q DAY    levothyroxine  50 mcg AM ES    omeprazole  20 mg DAILY    atorvastatin  80 mg Q EVENING    clopidogrel  75 mg DAILY    aspirin  81 mg DAILY           Physical Exam    Body mass index is 20.65 kg/m².     BP (!) 159/72   Pulse (!) 56   Temp 37.2 °C (99 °F) (Temporal)   Resp 13   Ht 1.702 m (5' 7\")   Wt 59.8 kg (131 lb 13.4 oz)   SpO2 93%      Vitals:    11/13/24 0500 11/13/24 0530 11/13/24 0600 11/13/24 0630   BP: (!) 149/76  (!) 159/72    Pulse: 63 62 (!) 59 (!) 56   Resp: 18 (!) 31 18 13   Temp:   37.2 °C (99 °F)    TempSrc:   Temporal    SpO2: 95% 94% 96% 93%   Weight:       Height:            Oxygen Therapy:  Pulse Oximetry: 93 %, O2 (LPM): 0, O2 Delivery Device: None - Room Air      Physical Exam  Constitutional:       Appearance: Normal appearance.   Cardiovascular:      Rate and Rhythm: Normal rate and regular rhythm.      Heart sounds: Murmur heard.   Pulmonary:      Breath sounds: No rales.   Abdominal:      General: There is no distension.      Palpations: Abdomen is soft.   Skin:     General: Skin is warm and dry.      Coloration: Skin is pale.      Comments: Ecchymosis with laceration and repair to the right eyebrow.  Nursing reports a hematoma development over the right femoral site.  Soft without evidence of hematoma on my exam this morning.  Sutures are in place.  Right radial site is soft, without evidence of hematoma.  Report of oozing overnight, but appears to be controlled this morning.   Neurological:      Mental Status: He is alert and oriented to person, place, and time.   Psychiatric:         Mood and Affect: Mood normal.         Behavior: Behavior normal.         Thought Content: Thought content normal.         Judgment: Judgment normal.               Labs (personally reviewed):     Estimated Creatinine Clearance: 36.2 mL/min (by C-G formula based on SCr of 1.33 mg/dL).    No results found for: \"BNPBTYPENAT\"  Recent Labs     11/11/24  0420 11/11/24 2128 " 11/12/24  0243   CREATININE 1.55* 1.50* 1.33   BUN 23* 23* 21   POTASSIUM 4.4 4.1 4.3   SODIUM 139 139 139   CALCIUM 9.0 9.0 9.2   MAGNESIUM  --  2.1  --    CO2 24 17* 22   ALBUMIN 3.7 3.6  --      Recent Labs     11/11/24 0420 11/11/24 2128 11/12/24  0243   GLUCOSE 98 138* 102*     Recent Labs     11/11/24 0420 11/11/24 2128   ASTSGOT 42 40   ALTSGPT 40 35   ALKPHOSPHAT 106* 101*     Recent Labs     11/11/24 0420 11/11/24 2128 11/12/24  0243   WBC 5.7 10.3 9.1   HEMOGLOBIN 12.9* 12.6* 12.7*   PLATELETCT 173 159* 162*     Recent Labs     11/10/24  0858 11/10/24  1413 11/11/24 2128   TROPONINT 93* 78* 67*   NTPROBNP  --   --  3359*     Lab Results   Component Value Date/Time    CHOLSTRLTOT 197 11/11/2024 04:20 AM     (H) 11/11/2024 04:20 AM    HDL 62 11/11/2024 04:20 AM    TRIGLYCERIDE 88 11/11/2024 04:20 AM       Imaging:  EC-ECHOCARDIOGRAM COMPLETE W/ CONT   Final Result      DX-CHEST-PORTABLE (1 VIEW)   Final Result   Impression:      1. No acute cardiopulmonary abnormalities identified.      2. Very severe bilateral shoulder arthritic changes.                     CT-CSPINE WITHOUT PLUS RECONS   Final Result   Impression:      1. No cervical spine fracture evident.      2. Severe degenerative changes.      3. Mild subluxations especially C3-4 unchanged from 3/5/2024 and likely degenerative.                     CT-HEAD W/O   Final Result   Impression:      1. No acute process in the brain evident.                  CL-LEFT HEART CATHETERIZATION WITH POSSIBLE INTERVENTION    (Results Pending)   CL-PACEMAKER LEADLESS PACING SYSTEM    (Results Pending)       Cardiac Imaging and Procedures Review      Echocardiogram 5/4/2024  Moderately reduced left ventricular systolic function.  The left ventricular ejection fraction is visually estimated to be 35-  40%.  Hypokinesis anteroapical hypokinesis.  The right ventricle is normal in size and systolic function.  No prior study is available for comparison.      Echocardiogram 11/11/2024  Compared to the prior study on 5/4/2024: There is interval development   of LV apical thrombus  The ascending aorta diameter is 3.8 cm.  Mildly reduced LV systolic function, estimated LVEF 40-45% with apical   akinesis, suggestive of underlying ischemic cardiomyopathy  There is an immobile 1.1 x 1.2 cm LV apical thrombus  Normal RV size and systolic function  No significant valvular abnormalities  Aortic valve sclerosis without stenosis  Trivial pericardial effusion  Normal IVC size  Dr. Stuart notified via [employer] on 11/11/24 at 11:47 AM      Cardiac Catheterization 5/2/2024 by Dr. Johns :  IMPRESSION:  1.  Occluded proximal LAD (heavily calcified vessel) status post successful IVUS guided intravascular lithotripsy and PCI deploying overlapping Synergy 3 x 28 mm and 3 x 32 mm DOUGIE, postdilated to ~ 3.5-3.7 mm.  2.  Ischemic cardiomyopathy with estimated LVEF 45% with anterior wall akinesis  3.  Significant elevated resting LVEDP 25 mmHg with no significant transaortic gradient on pullback     RECOMMENDATIONS:  Dual antiplatelet therapy for at least 12 months  Weigh risk versus benefits of further PCI to his remaining severe disease in the circumflex and RCA, especially given recent GI bleed with no clear identifiable source necessitating blood transfusions.  HI statin / PCSK9-I: Target LDL < 55 and TG < 150  TTE with echo enhancing agent  GDMT and lifestyle modifications for secondary ASCVD prevention  Cardiac Rehab referral    Cardiac catheterization 11/12/2024 by Dr. Gould:  IMPRESSION:  Widely patent left anterior descending coronary artery stents.  Distal right coronary now chronically occluded.  Successful percutaneous coronary intervention to the proximal left circumflex coronary artery with one 3.0 x 24 mm Synergy drug-eluting stent postdilated to 3.5 mm.     RECOMMENDATIONS:  Return to floor with continued care per primary service.  TR band release per protocol.  Restart  heparin drip per protocol in 2 hours if access site no bleeding complications.  Strict adherence with dual antiplatelet therapy for at least 3 months if tolerated.  Optimal medical management of residual coronary artery disease with consideration for further intervention depending on patient's symptoms and medical compliance.    Micra PPM placement by  on 11/12/2024:  IMPLANTED DEVICE INFORMATION:  Right ventricular pacemaker is a Medtronic model # IQ4MKL7 , serial # [id number] ,R wave 5.1 millivolts, threshold 0.25 Volts, pacing impedance 840 Ohms.     DEVICE PROGRAMMING:  VDD      FLUOROSCOPY TIME: 4.1 min     IMPRESSIONS:  1. Successful Micra pacemaker implantation     RECOMMENDATIONS:  1. Bedrest for 6 hours after cath, can resume heparin gtt from my standpoint  2. Device interrogation prior to hospital discharge  3. Followup in device clinic        Principal Problem:    NSTEMI (non-ST elevated myocardial infarction) (HCC) (POA: Yes)  Active Problems:    ST elevation myocardial infarction involving left anterior descending (LAD) coronary artery (HCC) (POA: Yes)    History of GI bleed (POA: Yes)    CKD (chronic kidney disease) stage 3, GFR 30-59 ml/min (POA: Yes)    Ischemic cardiomyopathy (POA: Yes)    Syncope (POA: Unknown)    Chronic systolic heart failure (HCC) (POA: Unknown)    LV (left ventricular) mural thrombus (POA: Unknown)    Sinus pause (POA: Unknown)    Hypothyroidism (POA: Unknown)    Closed head injury (POA: Unknown)    Asystole (HCC) (POA: Yes)  Resolved Problems:    * No resolved hospital problems. *      Assessment and Medical Decision Making:    #.  Syncope  #.  NSTEMI  #.  Coronary artery disease, S/P DOUGIE to p-mLAD (5/2024), s/p   #.  LV apical thrombus  #.  High degree AV block  #.  Hypothyroidism  #.  Hypertension  #.  Hyperlipidemia  #.  Anemia  #.  HF mildly reduced EF, EF 40-45%  #  Ischemic cardiomyopathy  #.  CKD  #.  Medication  nonadherence          Recommendations:    Syncope   Positive orthostatics.  High degree AV block as a probable cause.   -Now with Micra PPM as of 11/12/2024.    Sinus arrest /sinus pause   Multiple pauses on 11/11/2024 leading to a brief loss of consciousness.   Possibly the culprit for his admission due to syncope vs ACS   -EP cardiology is on board   -Micra placement on 11/12/2024    NSTEMI  Prior left heart cath (on May 2, 2024 STEMI presentation) showed occluded proximal LAD, s/p Synergy 3 x 28 mm and 3 x 32 mm DOUGIE.  LHC 11/12/2024 showed patent LAD stent, distal RCA is chronically occluded,s/p PCI to pLcx with 3.0 x 24 mm Synergy DOUGIE.  -Patient stopped all his medications including DAPT after only taking it for 30 days post STEMI admission.  -Excessive education about compliance with medications and follow-ups.  Patient agrees to comply.  -ASA  -Clopidogrel 75 mg daily  -Antiocoagulant: Heparin gtt -bridge to warfarin for goal INR 2-3 due to LV thrombus.  -Recommend discontinuing aspirin when INR is therapeutic.  After that, continue with warfarin and Plavix only.  -Recommend PPI given prior GI bleed and anticoagulation needs  -HI Statin, atorvastatin 80 mg nightly for goal LDL less than 70.  -BB -restart Toprol-XL 25 mg daily  Echocardiogram shows LVEF 40-45% and LV thrombus.      LV Apical Thrombus    immobile 1.1 x 1.2 cm LV apical thrombus on echo 11/11/2024  -Continue heparin drip. -bridge to warfarin for goal INR 2-3    HF mildly reduced EF, ischemic cardiomyopathy  -Echocardiogram shows EF 40-45%  -ACE-I/ARB/ARNI: Consider losartan 25 mg later today or tomorrow if blood pressure holds.  -Evidence Based Beta-blocker: Start Toprol-XL 25 mg daily  -Diuretic: Appears dry  -Labs: Daily BMP  -Daily weights; Strict I+O’s:           Please contact me with any questions.  Thank you for allowing us to participate in the care of Mr. Calderón.      Please see Dr. Rodarte  attestation for further details and MDM.      I, CAROLINE Adkins performed a portion of the service face-to-face with the same patient on the same date of service independently of Dr. Rodarte FOR 15 minutes preparing to see the patient, reviewing hospital notes and tests, obtaining history from the patient, performing a medically appropriate exam, counseling and educating the patient, ordering medications/tests/procedures/referrals as clinically indicated, and documenting information in the electronic medical record.    Please note this dictation was created using voice recognition software.  I have made every reasonable attempt to correct obvious errors, but there may be errors of grammar and possibly content that I did not discover before finalizing the note.    CAROLINE Adkins  [facility]  [phone]

## 2024-11-13 NOTE — CARE PLAN
The patient is Watcher - Medium risk of patient condition declining or worsening    Shift Goals  Clinical Goals: Improve hemodynamics  Patient Goals: Comfort, updates  Family Goals: DHARA    Progress made toward(s) clinical / shift goals:    Problem: Knowledge Deficit - Standard  Goal: Patient and family/care givers will demonstrate understanding of plan of care, disease process/condition, diagnostic tests and medications  Outcome: Progressing     Problem: Pain - Standard  Goal: Alleviation of pain or a reduction in pain to the patient’s comfort goal  Outcome: Progressing, patient need pharmacological measures for R eyebrow pain     Problem: Risk for Bleeding  Goal: Patient will take measures to prevent bleeding and recognizes signs of bleeding that need to be reported immediately to a health care professional  Outcome: Progressing, Taken the proper precautions to prevent bleeding or hematomas.        Patient is not progressing towards the following goals:

## 2024-11-13 NOTE — DIETARY
Nutrition Services: Cardiac Education Consult   Day 3 of admit.  Dilan Calderón is an 84 y.o. male with admitting DX of NSTEMI (non-ST elevated myocardial infarction), NSTEMI (non-ST elevation myocardial infarction), Asystole    RD received consult for heart healthy diet education. RD included nutrition recommendations and tips in dietary discharge instructions 11/10.     RD to follow per dept guidelines.

## 2024-11-13 NOTE — THERAPY
Physical Therapy Contact Note    Re consult for cardiac rehab physical therapy received and acknowledged. Patient now s/p PPM. Patient verbalized understanding of prior cardiac rehab education, no further acute PT needs.     Sonali Sidhu, PT, DPT  [phone]

## 2024-11-13 NOTE — PROGRESS NOTES
Attempted to remove 3cc from radial TR band around 1705. Oozing noted at site upon removal of air, replaced 3cc. Pressure applied, small hematoma noted. Oozing also noted at groin site as well as peripheral IV sites and scab on knee. Heparin drip remained off at this time post-cath. Dr. Gould was notified of the events. Order received to check Xa now and to follow heparin protocol after Xa results.

## 2024-11-13 NOTE — PROGRESS NOTES
4 Eyes Skin Assessment Completed by ALBERT Kaur and ALBERT Noland.    Head Bruising and laceration  Ears WDL  Nose Bruising  Mouth WDL  Neck WDL  Breast/Chest WDL  Shoulder Blades WDL  Spine WDL  (R) Arm/Elbow/Hand WDL  (L) Arm/Elbow/Hand Bruising  Abdomen WDL  Groin WDL  Scrotum/Coccyx/Buttocks Redness and Blanching  (R) Leg Abrasion on knee  (L) Leg Scab  (R) Heel/Foot/Toe WDL  (L) Heel/Foot/Toe Redness and Blanching; 5th toe abrasion          Devices In Places Tele Box      Interventions In Place Heel Mepilex, Sacral Mepilex, Pillows, and Heels Loaded W/Pillows    Possible Skin Injury Yes    Pictures Uploaded Into Epic Yes  Wound Consult Placed Yes  RN Wound Prevention Protocol Ordered Yes

## 2024-11-13 NOTE — PROGRESS NOTES
Patient brought back to [facility] with cath lab nurse. Patient hooked up to bedside monitor. Radial site assessed with TR band 13CC full. HR 66, /89, oxygen 94% on room air. Patient shows no signs of distress and complains of no pain. Bed in low position, water and call light in reach.

## 2024-11-13 NOTE — PROGRESS NOTES
Pharmacy Warfarin Monitoring     Date: 11/13/2024  Reason for Anticoagulation: LV thrombus  Target INR: 2.0 to 3.0         Hemoglobin Value: (!) 12.9  Hematocrit Value: (!) 37.9  Lab Platelet Value: (!) 160    INR from last 7 days        Date/Time INR Value    11/10/24 0458 0.99                   Dose from last 7 days        Date/Time Dose (mg)    11/13/24 1228 5                   Home dose: New start    Bridge Therapy: Yes  Date of Last VTE Event: 11/10/24  Bridge Therapy Start Date: 11/13/24  Days of Overlap Therapy: 0  INR Value Greater than 2 Prior to Discontinuation of Parenteral Anticoagulation: Not Applicable - remains on lovenox    Comments: New start warfarin for LV thrombus.  Continues on lovenox bridge. Initiating therapy with 5 mg daily.    Education Material Provided?: No - will need prior to discharge  Pharmacist suggested discharge dosing: TBD pending INR trends     Thank you!  Deja Negrete, PharmD, BCCCP

## 2024-11-13 NOTE — PROGRESS NOTES
Noticed a small hematoma on the patients R femoral access site with bloody drainage. Reached out to Dr. Fabrizio Jenkins to inform him on the patient groin site. Ordered for a femstom to be placed.   TR band still in place with no air in it, bloody discharge from access site, will continue to monitor.

## 2024-11-13 NOTE — PROGRESS NOTES
Patient has been off heparin drip since he came back from cath lab. Xa taken at 1754 came back at >1.10, the patient was bleeding from access sites and heparin continued being held. Xa at 0001 came back <0.1, talked to pharmacy and they recommended restarting the protocol. Resident was notified about the result and about the time the heparin drip has been off in addition to pharmacy's recommendation. Dr. Simms ordered for the heparin protocol to be reinitiated. Heparin started at 12 units/kg/hr.

## 2024-11-13 NOTE — PROGRESS NOTES
Monitor Summary: SB-SR 50s-70s  Micra PPM placed this afternoon in cath lab. Patient was partially paced after placement.

## 2024-11-14 ENCOUNTER — PHARMACY VISIT (OUTPATIENT)
Dept: PHARMACY | Facility: MEDICAL CENTER | Age: 84
End: 2024-11-14
Payer: COMMERCIAL

## 2024-11-14 VITALS
BODY MASS INDEX: 21.11 KG/M2 | OXYGEN SATURATION: 96 % | HEART RATE: 66 BPM | SYSTOLIC BLOOD PRESSURE: 110 MMHG | TEMPERATURE: 97.9 F | HEIGHT: 67 IN | WEIGHT: 134.48 LBS | RESPIRATION RATE: 18 BRPM | DIASTOLIC BLOOD PRESSURE: 70 MMHG

## 2024-11-14 LAB
ANION GAP SERPL CALC-SCNC: 11 MMOL/L (ref 7–16)
BASOPHILS # BLD AUTO: 0.7 % (ref 0–1.8)
BASOPHILS # BLD: 0.04 K/UL (ref 0–0.12)
BUN SERPL-MCNC: 24 MG/DL (ref 8–22)
CALCIUM SERPL-MCNC: 8.8 MG/DL (ref 8.5–10.5)
CHLORIDE SERPL-SCNC: 107 MMOL/L (ref 96–112)
CO2 SERPL-SCNC: 21 MMOL/L (ref 20–33)
CREAT SERPL-MCNC: 1.39 MG/DL (ref 0.5–1.4)
EOSINOPHIL # BLD AUTO: 0.18 K/UL (ref 0–0.51)
EOSINOPHIL NFR BLD: 3.3 % (ref 0–6.9)
ERYTHROCYTE [DISTWIDTH] IN BLOOD BY AUTOMATED COUNT: 51.1 FL (ref 35.9–50)
GFR SERPLBLD CREATININE-BSD FMLA CKD-EPI: 51 ML/MIN/1.73 M 2
GLUCOSE SERPL-MCNC: 95 MG/DL (ref 65–99)
HCT VFR BLD AUTO: 32 % (ref 42–52)
HGB BLD-MCNC: 10.9 G/DL (ref 14–18)
IMM GRANULOCYTES # BLD AUTO: 0.03 K/UL (ref 0–0.11)
IMM GRANULOCYTES NFR BLD AUTO: 0.6 % (ref 0–0.9)
INR PPP: 1.15 (ref 0.87–1.13)
LYMPHOCYTES # BLD AUTO: 1.33 K/UL (ref 1–4.8)
LYMPHOCYTES NFR BLD: 24.5 % (ref 22–41)
MCH RBC QN AUTO: 33.6 PG (ref 27–33)
MCHC RBC AUTO-ENTMCNC: 34.1 G/DL (ref 32.3–36.5)
MCV RBC AUTO: 98.8 FL (ref 81.4–97.8)
MONOCYTES # BLD AUTO: 0.58 K/UL (ref 0–0.85)
MONOCYTES NFR BLD AUTO: 10.7 % (ref 0–13.4)
NEUTROPHILS # BLD AUTO: 3.26 K/UL (ref 1.82–7.42)
NEUTROPHILS NFR BLD: 60.2 % (ref 44–72)
NRBC # BLD AUTO: 0 K/UL
NRBC BLD-RTO: 0 /100 WBC (ref 0–0.2)
PLATELET # BLD AUTO: 137 K/UL (ref 164–446)
PMV BLD AUTO: 11.3 FL (ref 9–12.9)
POTASSIUM SERPL-SCNC: 4.1 MMOL/L (ref 3.6–5.5)
PROTHROMBIN TIME: 14.8 SEC (ref 12–14.6)
RBC # BLD AUTO: 3.24 M/UL (ref 4.7–6.1)
SODIUM SERPL-SCNC: 139 MMOL/L (ref 135–145)
WBC # BLD AUTO: 5.4 K/UL (ref 4.8–10.8)

## 2024-11-14 PROCEDURE — A9270 NON-COVERED ITEM OR SERVICE: HCPCS | Performed by: INTERNAL MEDICINE

## 2024-11-14 PROCEDURE — A9270 NON-COVERED ITEM OR SERVICE: HCPCS

## 2024-11-14 PROCEDURE — 85025 COMPLETE CBC W/AUTO DIFF WBC: CPT

## 2024-11-14 PROCEDURE — A9270 NON-COVERED ITEM OR SERVICE: HCPCS | Performed by: HOSPITALIST

## 2024-11-14 PROCEDURE — 700102 HCHG RX REV CODE 250 W/ 637 OVERRIDE(OP)

## 2024-11-14 PROCEDURE — 700102 HCHG RX REV CODE 250 W/ 637 OVERRIDE(OP): Performed by: INTERNAL MEDICINE

## 2024-11-14 PROCEDURE — 700102 HCHG RX REV CODE 250 W/ 637 OVERRIDE(OP): Performed by: HOSPITALIST

## 2024-11-14 PROCEDURE — 80048 BASIC METABOLIC PNL TOTAL CA: CPT

## 2024-11-14 PROCEDURE — 85610 PROTHROMBIN TIME: CPT

## 2024-11-14 PROCEDURE — A9270 NON-COVERED ITEM OR SERVICE: HCPCS | Performed by: STUDENT IN AN ORGANIZED HEALTH CARE EDUCATION/TRAINING PROGRAM

## 2024-11-14 PROCEDURE — 700102 HCHG RX REV CODE 250 W/ 637 OVERRIDE(OP): Performed by: STUDENT IN AN ORGANIZED HEALTH CARE EDUCATION/TRAINING PROGRAM

## 2024-11-14 PROCEDURE — 99239 HOSP IP/OBS DSCHRG MGMT >30: CPT | Performed by: INTERNAL MEDICINE

## 2024-11-14 PROCEDURE — RXMED WILLOW AMBULATORY MEDICATION CHARGE: Performed by: INTERNAL MEDICINE

## 2024-11-14 PROCEDURE — 700111 HCHG RX REV CODE 636 W/ 250 OVERRIDE (IP): Performed by: HOSPITALIST

## 2024-11-14 RX ORDER — ASPIRIN 81 MG/1
81 TABLET, CHEWABLE ORAL DAILY
Qty: 30 TABLET | Refills: 0 | Status: SHIPPED | OUTPATIENT
Start: 2024-11-15

## 2024-11-14 RX ORDER — ENOXAPARIN SODIUM 100 MG/ML
60 INJECTION SUBCUTANEOUS EVERY 12 HOURS
Qty: 14 EACH | Refills: 1 | Status: ON HOLD | OUTPATIENT
Start: 2024-11-14 | End: 2024-11-27

## 2024-11-14 RX ORDER — CLOPIDOGREL BISULFATE 75 MG/1
75 TABLET ORAL DAILY
Qty: 30 TABLET | Refills: 0 | Status: SHIPPED | OUTPATIENT
Start: 2024-11-14

## 2024-11-14 RX ORDER — ATORVASTATIN CALCIUM 80 MG/1
80 TABLET, FILM COATED ORAL EVERY EVENING
Qty: 30 TABLET | Refills: 0 | Status: SHIPPED | OUTPATIENT
Start: 2024-11-14

## 2024-11-14 RX ORDER — WARFARIN SODIUM 5 MG/1
5 TABLET ORAL DAILY
Qty: 30 TABLET | Refills: 3 | Status: ON HOLD | OUTPATIENT
Start: 2024-11-14 | End: 2024-11-27

## 2024-11-14 RX ORDER — METOPROLOL SUCCINATE 25 MG/1
25 TABLET, EXTENDED RELEASE ORAL DAILY
Qty: 30 TABLET | Refills: 0 | Status: ON HOLD | OUTPATIENT
Start: 2024-11-15 | End: 2024-11-27

## 2024-11-14 RX ORDER — LEVOTHYROXINE SODIUM 50 UG/1
50 TABLET ORAL
Qty: 30 TABLET | Refills: 0 | Status: SHIPPED | OUTPATIENT
Start: 2024-11-15

## 2024-11-14 RX ORDER — LOSARTAN POTASSIUM 25 MG/1
25 TABLET ORAL
Status: DISCONTINUED | OUTPATIENT
Start: 2024-11-14 | End: 2024-11-14 | Stop reason: HOSPADM

## 2024-11-14 RX ADMIN — METOPROLOL SUCCINATE 25 MG: 25 TABLET, EXTENDED RELEASE ORAL at 04:10

## 2024-11-14 RX ADMIN — ASPIRIN 81 MG: 81 TABLET, CHEWABLE ORAL at 04:10

## 2024-11-14 RX ADMIN — OMEPRAZOLE 20 MG: 20 CAPSULE, DELAYED RELEASE ORAL at 04:09

## 2024-11-14 RX ADMIN — ENOXAPARIN SODIUM 60 MG: 100 INJECTION SUBCUTANEOUS at 09:04

## 2024-11-14 RX ADMIN — LEVOTHYROXINE SODIUM 50 MCG: 0.05 TABLET ORAL at 04:09

## 2024-11-14 RX ADMIN — CLOPIDOGREL BISULFATE 75 MG: 75 TABLET ORAL at 04:09

## 2024-11-14 ASSESSMENT — PAIN DESCRIPTION - PAIN TYPE: TYPE: ACUTE PAIN

## 2024-11-14 ASSESSMENT — FIBROSIS 4 INDEX: FIB4 SCORE: 3.39

## 2024-11-14 NOTE — DISCHARGE SUMMARY
Discharge Summary    CHIEF COMPLAINT ON ADMISSION  Chief Complaint   Patient presents with    Syncope     Pt woke to floor in kitchen in pool of blood with LAC above Rt eyebrow.     Extremity Weakness     X 2 days with occasional chest pain radiating across upper chest to bilateral shoulders. Intermittent. Pt reports it is not the same as cardiac chest pain.        Reason for Admission  TBI     Admission Date  11/10/2024    CODE STATUS  Full Code    HPI & HOSPITAL COURSE  Dilan Calderón is a 84 y.o. male with a history of coronary artery disease with a stent placed to the left anterior descending on 5/24 and had been on dual antiplatelet therapy but per report only took for 30 days and had not followed up with cardiology, who unfortunately while at home had a syncopal event and woke up in a pool of blood with his glasses cutting his right eyebrow requiring stitches.  He was being monitored and cardiology had been initially consulted.  During this hospitalization he had another syncopal event with noted very thready pulse and CODE BLUE was called and required  CPR with ROSC. Patient had LHC on 11/12 with noted widely patent left anterior descending coronary artery stent but showed a distal right coronary which is now chronically occluded s/p successful proximal left circumflex coronary artery stent placement.  On monitoring, he was noted to have high-grade block without ventricular escape and patient had Micra pacemaker placed on 11/12.  On further evaluation, he was found to have EF improvement to 45% but with a ventricular thrombus for which he was initiated on Lovenox and Coumadin along with his dual antiplatelet therapy.  Cardiology recommended continuing on DAPT with aspirin and Plavix until INR is therapeutic and then can be just on Plavix and Coumadin.  During hospitalization, his renal function has been monitored and remained stable within CKD levels.  He was also noted to have elevated TSH consistent with  hypothyroidism for which he was started on levothyroxine.    He clinically improved.  He maintains paced rhythm on telemetry monitoring.  Hemoglobin remained stable.  He had no signs of infection or sepsis.  WBC count remained normal.  He remained hemodynamically stable and afebrile.  Home health services were offered but patient declined it.  He is referred to outpatient cardiology, anticoagulation clinic, and referred to establish care with a PCP.    I have personally seen and examined the patient on the day of discharge. With his clinical improvement, he was deemed ready to discharge from the hospital as he did not have any further hospitalization needs. Patient felt comfortable going home. The discharge plan was discussed with the patient, with which he was agreeable to.     Therefore, he is discharged in good and stable condition to home with close outpatient follow-up.    The patient met 2-midnight criteria for an inpatient stay at the time of discharge.    Discharge Date  11/14/2024      FOLLOW UP ITEMS POST DISCHARGE  -He will continue on aspirin and Plavix along with statin.  Can discontinue aspirin once INR therapeutic.  -Continue Lovenox bridging and Coumadin for now.  He is referred to the anticoagulation clinic for anticoagulation management.  -Outpatient follow-up with cardiology.  -Continue Synthroid.  -He is referred to establish care with a PCP.  - counseled to seek immediate medical attention, or return to the ED for recurrent or worsening symptoms.        DISCHARGE DIAGNOSES  Principal Problem:    NSTEMI (non-ST elevated myocardial infarction) (HCC) (POA: Yes)  Active Problems:    ST elevation myocardial infarction involving left anterior descending (LAD) coronary artery (HCC) (POA: Yes)    History of GI bleed (POA: Yes)    CKD (chronic kidney disease) stage 3, GFR 30-59 ml/min (POA: Yes)    Ischemic cardiomyopathy (POA: Yes)    Syncope (POA: Yes)    Chronic systolic heart failure (HCC) (POA:  Yes)    LV (left ventricular) mural thrombus (POA: Yes)    Sinus pause (POA: Yes)    Hypothyroidism (POA: Yes)    Closed head injury (POA: Yes)    Asystole (HCC) (POA: Yes)  Resolved Problems:    * No resolved hospital problems. *      FOLLOW UP  Future Appointments   Date Time Provider Department Center   11/20/2024  2:45 PM PACER CHECK-CAM B [facility] None   12/3/2024  3:45 PM CAROLINE Gonzalez [facility] None     [facility]  [address]  [address]  [facility] [id number] [phone]  Call  Your doctor has referred you for Cardiac Rehab which is important in your recovery. Please call to make an appointment.    [facility]  [address]  [facility] [address]  [phone]            MEDICATIONS ON DISCHARGE     Medication List        START taking these medications        Instructions   aspirin 81 MG Chew chewable tablet  Start taking on: November 15, 2024  Commonly known as: Asa   Chew 1 Tablet every day. Stop once INR therapeutic and off lovenox injections  Dose: 81 mg     enoxaparin 60 MG/0.6ML Sosy inj  Commonly known as: Lovenox   Inject 60 mg under the skin every 12 hours.  Dose: 60 mg     levothyroxine 50 MCG Tabs  Start taking on: November 15, 2024  Commonly known as: Synthroid   Take 1 Tablet by mouth every morning on an empty stomach.  Dose: 50 mcg     metoprolol SR 25 MG Tb24  Start taking on: November 15, 2024  Commonly known as: Toprol XL   Take 1 Tablet by mouth every day.  Dose: 25 mg     warfarin 5 MG Tabs  Commonly known as: Coumadin   Take 1 Tablet by mouth every day at 6 PM. Further dosing per anticoagulation clinic  Dose: 5 mg            CONTINUE taking these medications        Instructions   acetaminophen 500 MG Tabs  Commonly known as: Tylenol   Take 500-1,000 mg by mouth 2 times a day as needed.  Dose: 500-1,000 mg     atorvastatin 80 MG tablet  Commonly known as: Lipitor   Take 1 Tablet by mouth every evening.  Dose: 80 mg      Centrum Silver 50+Men Tabs   Take 1 Tablet by mouth every day.  Dose: 1 Tablet     clopidogrel 75 MG Tabs  Commonly known as: Plavix   Take 1 Tablet by mouth every day.  Dose: 75 mg     NON SPECIFIED   Take 1 Tablet by mouth every day. OTC allergy med  Dose: 1 Tablet              Allergies  No Known Allergies    DIET  Orders Placed This Encounter   Procedures    Diet Order Diet: Cardiac     Standing Status:   Standing     Number of Occurrences:   1     Order Specific Question:   Diet:     Answer:   Cardiac [6]       ACTIVITY  As tolerated.  Weight bearing as tolerated    CONSULTATIONS  Cardiology  Electrophysiology  Critical care    PROCEDURES  As above    LABORATORY  Lab Results   Component Value Date    SODIUM 139 11/14/2024    POTASSIUM 4.1 11/14/2024    CHLORIDE 107 11/14/2024    CO2 21 11/14/2024    GLUCOSE 95 11/14/2024    BUN 24 (H) 11/14/2024    CREATININE 1.39 11/14/2024    CREATININE 1.2 02/11/2008        Lab Results   Component Value Date    WBC 5.4 11/14/2024    HEMOGLOBIN 10.9 (L) 11/14/2024    HEMATOCRIT 32.0 (L) 11/14/2024    PLATELETCT 137 (L) 11/14/2024        Total time of the discharge process = 42 minutes.

## 2024-11-14 NOTE — CARE PLAN
The patient is Stable - Low risk of patient condition declining or worsening    Shift Goals  Clinical Goals: monitor for bleeding, lovenox teaching  Patient Goals: rest  Family Goals: DHARA    Progress made toward(s) clinical / shift goals:  Pt transferred to tele this afternoons. Denies CP, SOB, pain. R fem site CDI, R wrist slowly oozing blood. No bleeding noted from other sites.     Patient is not progressing towards the following goals:      Problem: Care Map:  Day 3 Optimal Outcome for the Heart Failure Patient  Goal: Day 3:  Optimal Care of the heart failure patient  Outcome: Progressing     Problem: Risk for Bleeding  Goal: Patient will take measures to prevent bleeding and recognizes signs of bleeding that need to be reported immediately to a health care professional  Outcome: Progressing     Problem: Risk for Bleeding  Goal: Patient will not experience bleeding as evidenced by normal blood pressure, stable hematocrit and hemoglobin levels and desired ranges for coagulation profiles  Outcome: Progressing     Problem: Pain - Standard  Goal: Alleviation of pain or a reduction in pain to the patient’s comfort goal  Outcome: Progressing

## 2024-11-14 NOTE — DISCHARGE INSTRUCTIONS
Diagnosis:  Acute Coronary Syndrome (ACS) is a diagnosis that encompasses cardiac-related chest pain and heart attack. ACS occurs when the blood flow to the heart muscle is severely reduced or cut off completely due to a slow process called atherosclerosis.  Atherosclerosis is a disease in which the coronary arteries become narrow from a buildup of fat, cholesterol, and other substances that combine to form plaque. If the plaque breaks, a blood clot will form and block the blood flow to the heart muscle. This lack of blood flow can cause damage or death to the heart muscle which is called a heart attack or Myocardial Infarction (MI). There are two different types of MIs:  ST Elevation Myocardial Infarction or STEMI (the most severe type of heart attack) and Non-ST Elevation Myocardial Infarction or NSTEMI.    Treatment Plan:  Cardiac Diet  - Low fat, low salt, low cholesterol   Cardiac Rehab  - Your doctor has ordered you a referral to [facility].  Call [phone] to schedule an appointment.  Attend my follow-up appointment with my Cardiologist.  Take my medications as prescribed by my doctor  Exercise daily  Reduce stress    Medications:  Certain medications are used to treat ACS.  Remember to always take medications as prescribed and never stop talking medications unless told by your doctor.    You have been prescribed the following medicatons:    Aspirin - Aspirin is used as a blood thinning medication and you will require this medication indefinitely.  Anti-platelet/blood thinner - Your Anti-platelet/Blood thinning medication is called lovenox, and is used in combination with aspirin to prevent clots from forming in your heart and/or around your stent.  Your doctor will determine how long you need to be on this medicine.  Beta-Blocker - Beta-Blocker metoprolol is used to lower blood pressure and heart rate, and/or helps your heart heal after a heart attack.  Statin - Statin atorvastatin is used to lower  cholesterol.

## 2024-11-14 NOTE — PROGRESS NOTES
[facility] Medicine Daily Progress Note    Date of Service  11/13/2024    Chief Complaint  Syncope at home      Hospital Course  Dilan Calderón is a 84 y.o. male with a history of coronary artery disease with a stent placed 5/24 to the left anterior descending and had been on dual antiplatelet therapy but per report only took for 30 days and had not followed up with cardiology.  Unfortunately the patient while at home had a syncopal event he states he woke up in a pool of blood with his glasses cutting his right eyebrow requiring stitches.  He was being monitored cardiology had been initially consulted.  During his hospitalization he had a another syncopal event CODE BLUE was called and found him to have a very thready pulse and CPR was initiated but he shortly thereafter woke up and was found to have a systolic blood pressure 60 over 40s.  Patient did go to the cardiac Cath Lab on 11/12 with a widely patent left anterior descending coronary artery stent but showing a distal right coronary now chronically occluded and he had successful proximal left circumflex coronary artery stent placed.  Patient also had electrophysiologist placed a Micra pacemaker on 11/12.  During evaluation he was found to have a ventricular thrombus and was initiated on Lovenox and Coumadin along with his dual antiplatelet therapy.      Interval Problem Update  11/13 patient denies any neurologic deficits.  He states no chest pain no shortness of breath.  We had a long discussion on him being on multiple agents that could easily cause him to bleed.  I instructed him that he will need instructions on how to give himself Lovenox shots until his Coumadin levels are therapeutic.  I instructed him on the importance of following up with a primary care provider and alerted him that we would try and call and set up an appointment for him.  I also alerted him that it be important that he follow-up with cardiology.  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  Patient otherwise is alert and oriented he states he still drives and only lives 6 blocks away from hospital.    I have discussed this patient's plan of care and discharge plan at IDT rounds today with Case Management, Nursing, Nursing leadership, and other members of the IDT team.    Consultants/Specialty  cardiology    Code Status  Full Code    Disposition  The patient is not medically cleared for discharge to home or a post-acute facility.  Anticipate discharge to: home with close outpatient follow-up    I have placed the appropriate orders for post-discharge needs.    Review of Systems  Review of Systems   Constitutional:  Negative for fever and malaise/fatigue.   Respiratory:  Negative for shortness of breath.    Cardiovascular:  Negative for chest pain, palpitations and leg swelling.   Gastrointestinal:  Negative for nausea.   Neurological:  Negative for dizziness and headaches.   Psychiatric/Behavioral:  The patient is not nervous/anxious.         Physical Exam  Temp:  [36.6 °C (97.9 °F)-37.5 °C (99.5 °F)] 36.6 °C (97.9 °F)  Pulse:  [55-91] 61  Resp:  [11-31] 16  BP: (114-183)/() 115/63  SpO2:  [90 %-97 %] 93 %    Physical Exam  Vitals reviewed.   Constitutional:       Appearance: Normal appearance.   HENT:      Head: Normocephalic.      Nose: Nose normal.      Mouth/Throat:      Mouth: Mucous membranes are dry.      Pharynx: Oropharynx is clear. No oropharyngeal exudate.   Eyes:      General:         Right eye: No discharge.         Left eye: No discharge.      Extraocular Movements: Extraocular movements intact.      Conjunctiva/sclera: Conjunctivae normal.   Cardiovascular:      Rate and Rhythm: Normal rate and regular rhythm.      Heart sounds: Murmur heard.   Pulmonary:      Effort: No respiratory distress.      Breath sounds: Normal breath sounds. No wheezing.   Abdominal:      General: Bowel sounds are normal. There is no  distension.      Palpations: Abdomen is soft.   Musculoskeletal:      Cervical back: Neck supple.      Right lower leg: No edema.      Left lower leg: No edema.   Lymphadenopathy:      Cervical: No cervical adenopathy.   Skin:     General: Skin is warm.      Findings: Bruising present.      Comments: There are sutures present over the right eyebrow good approximation of surgical edges I do not see any dehiscence, drainage of the right eyebrow laceration area.   Neurological:      General: No focal deficit present.      Mental Status: He is alert and oriented to person, place, and time.      Cranial Nerves: No cranial nerve deficit.   Psychiatric:         Mood and Affect: Mood normal.         Fluids    Intake/Output Summary (Last 24 hours) at 11/13/2024 1730  Last data filed at 11/13/2024 1200  Gross per 24 hour   Intake 803.82 ml   Output 1050 ml   Net -246.18 ml        Laboratory  Recent Labs     11/11/24 2128 11/12/24 0243 11/13/24  1035   WBC 10.3 9.1 8.2   RBC 3.86* 3.73* 3.82*   HEMOGLOBIN 12.6* 12.7* 12.9*   HEMATOCRIT 38.9* 37.1* 37.9*   .8* 99.5* 99.2*   MCH 32.6 34.0* 33.8*   MCHC 32.4 34.2 34.0   RDW 51.6* 51.7* 51.7*   PLATELETCT 159* 162* 160*   MPV 10.6 10.8 11.2     Recent Labs     11/11/24 2128 11/12/24  0243 11/13/24  0645   SODIUM 139 139 138   POTASSIUM 4.1 4.3 4.1   CHLORIDE 107 105 106   CO2 17* 22 22   GLUCOSE 138* 102* 95   BUN 23* 21 19   CREATININE 1.50* 1.33 1.25   CALCIUM 9.0 9.2 8.7             Recent Labs     11/11/24  0420   TRIGLYCERIDE 88   HDL 62   *       Imaging  EC-ECHOCARDIOGRAM COMPLETE W/ CONT   Final Result      DX-CHEST-PORTABLE (1 VIEW)   Final Result   Impression:      1. No acute cardiopulmonary abnormalities identified.      2. Very severe bilateral shoulder arthritic changes.                     CT-CSPINE WITHOUT PLUS RECONS   Final Result   Impression:      1. No cervical spine fracture evident.      2. Severe degenerative changes.      3. Mild  subluxations especially C3-4 unchanged from 3/5/2024 and likely degenerative.                     CT-HEAD W/O   Final Result   Impression:      1. No acute process in the brain evident.                  CL-LEFT HEART CATHETERIZATION WITH POSSIBLE INTERVENTION    (Results Pending)   CL-PACEMAKER LEADLESS PACING SYSTEM    (Results Pending)        Assessment/Plan  * NSTEMI (non-ST elevated myocardial infarction) ([facility])- (present on admission)  Assessment & Plan  Continue atorvastatin, aspirin, plavix; hx of LAD stent in 5/2024  Echo shows LV EF 40%, new LV thrombus.  Patient is now on Lovenox to cover bridging for Coumadin.  Discussed with patient.  I also discussed with nursing to see if we could initiate self teaching so he can give himself his own Lovenox shots.  I alerted him that we would need to set him up with Coumadin clinic.  Cardiology consulted and performed cardiac cath 11/12 with occlusion of the RCA for which was opened with a drug-eluting stent  Dual antiplatelet therapy aspirin Plavix due to ventricular thrombus Mrs. Initiated on Coumadin and Lovenox      Closed head injury  Assessment & Plan  Watch for any postconcussion symptoms    Hypothyroidism  Assessment & Plan  Elevated TSH and low FT4  Discussed with patient he will need establishment with a primary care provider for repeat thyroid studies in 1-1/2 months.  Continue active treatment with levothyroxine he will need this prescribed upon discharge    Sinus pause  Assessment & Plan  Micra pacemaker placed 11/12/2024  Monitoring on telemetry and I discussed the importance of following up with his cardiologist    LV (left ventricular) mural thrombus  Assessment & Plan  Lovenox twice daily weight-based as well as warfarin.  He will need to be set up with Lovenox teaching to give self shots  He will need to follow-up with Coumadin clinic  Follow-up with cardiology outpatient    Chronic systolic heart failure (HCC)  Assessment & Plan  Monitor volume  status  Strict ins and outs and daily weights  Metoprolol, not on ACE or Aldactone yet  Echo shows LV EF 40%, new LV thrombus  euvolemic    Syncope  Assessment & Plan  Suspect cardiac event as etiology  Patient has a pacemaker placed this admission 11/12/2024  Patient made aware that he will need to have follow-up with cardiology outpatient  Monitoring on telemetry  Cardiac cath did show an occluded RCA for which he had a new stent placed this admission 11/12  Medical compliance  PT did see patient and stated no additional postacute needs    Ischemic cardiomyopathy- (present on admission)  Assessment & Plan  Echo showing EF 45% and reviewed with patient    CKD (chronic kidney disease) stage 3, GFR 30-59 ml/min- (present on admission)  Assessment & Plan  11/13 BUN 19, creatinine 1.25  Monitor BMP and assess response  Avoid IV contrast/nephrotoxins/NSAIDs  Dose adjust meds for decreased GFR      History of GI bleed- (present on admission)  Assessment & Plan  Patient's hemoglobin is currently stable and he denies any bloody stools.   Monitor hemoglobin    ST elevation myocardial infarction involving left anterior descending (LAD) coronary artery (HCC)- (present on admission)  Assessment & Plan  History of LAD stent placed 5/2024  Continue with aspirin and Plavix  Metoprolol and statin         VTE prophylaxis:   SCDs/TEDs   enoxaparin ppx   therapeutic anticoagulation with coumadin dosing per pharmacy, adjust PRN and weight-based lovenox BID, 1 mg/kg

## 2024-11-14 NOTE — PROGRESS NOTES
Discharge orders received.  Patient arrived to the discharge lounge.  PIV x2 removed by floor RN. Meds to beds medications verified by discharge RN, bag of medications given to patient.  Instructions given, medications reviewed and general discharge education provided to patient.  Follow up appointments discussed.  Patient verbalized understanding of dc instructions and prescriptions.  Patient signed discharge instructions.  Patient verbalized he had all belongings with him, Denied having any home medications locked in our inpatient pharmacy that  he needs back. Taxi called/ Patient ambulated with steady gait from discharge lounge to private vehicle. Patient left via taxi to home in stable condition.

## 2024-11-14 NOTE — PROGRESS NOTES
Bedside report received from off going RN/tech: Natasha, assumed care of patient.     Fall Risk Score: MODERATE RISK  Fall risk interventions in place: Provide patient/family education based on risk assessment, Educate patient/family to call staff for assistance when getting out of bed, Utilize bed/chair fall alarm, and Bed alarm connected correctly  Bed type: Regular (Robinson Score less than 17 interventions in place)  Patient on cardiac monitor: Yes  IVF/IV medications: Not Applicable   Oxygen: Room Air  Bedside sitter: Not Applicable   Isolation: Not applicable

## 2024-11-14 NOTE — DISCHARGE PLANNING
Care Transition Team Discharge Planning    Anticipated Discharge Information  Discharge Disposition: Discharged to home/self care (01)  Discharge Address: [address] [address]  Discharge Contact Phone Number: [phone]    Discharge Plan:  RNCM met with patient at bedside. Explained what HH involves. Patient declines HH at this time stating he lives alone and is not comfortable with someone coming into his home. Let pt know that if he changes his mind to let bedside RN know and RNCM can assist with setting up HH for patient.     1120: Patient discussed in IDT rounds. MD requesting anticoag apt for patient for tomorrow 11/15. RNCM placed call to anticoag clinic [phone]. Soonest available appointment is Tuesday 11/19 1400 and patient placed on cancellation list in case any appointments open sooner. MD and bedside RN updated. Patient requesting assistance with transport home. Verified home address, cab voucher provided.

## 2024-11-14 NOTE — PROGRESS NOTES
Monitor Summary    Rhythm: paced/SR  Rate: 51-74  Ectopy: none  Measurements: .17 / .07 / .41

## 2024-11-14 NOTE — CARE PLAN
The patient is Stable - Low risk of patient condition declining or worsening    Shift Goals  Clinical Goals: lovenox education  Patient Goals: sleep  Family Goals: arlen    Progress made toward(s) clinical / shift goals:      Problem: Knowledge Deficit - Standard  Goal: Patient and family/care givers will demonstrate understanding of plan of care, disease process/condition, diagnostic tests and medications  Outcome: Progressing     Problem: Fall Risk  Goal: Patient will remain free from falls  Outcome: Progressing       Patient is not progressing towards the following goals:

## 2024-11-14 NOTE — PROGRESS NOTES
Bedside report received from off going RN/tech: nelson Ayala care of patient.     Fall Risk Score: MODERATE RISK  Fall risk interventions in place: Provide patient/family education based on risk assessment, Educate patient/family to call staff for assistance when getting out of bed, Utilize bed/chair fall alarm, and Bed alarm connected correctly  Bed type: Regular (Robinson Score less than 17 interventions in place)  Patient on cardiac monitor: Yes  IVF/IV medications: Not Applicable   Oxygen: Room Air  Bedside sitter: Not Applicable   Isolation: Not applicable

## 2024-11-14 NOTE — ASSESSMENT & PLAN NOTE
11/13 BUN 19, creatinine 1.25  Monitor BMP and assess response  Avoid IV contrast/nephrotoxins/NSAIDs  Dose adjust meds for decreased GFR    
Continue atorvastatin, aspirin, plavix; hx of LAD stent in 5/2024  Echo shows LV EF 40%, new LV thrombus.  Patient is now on Lovenox to cover bridging for Coumadin.  Discussed with patient.  I also discussed with nursing to see if we could initiate self teaching so he can give himself his own Lovenox shots.  I alerted him that we would need to set him up with Coumadin clinic.  Cardiology consulted and performed cardiac cath 11/12 with occlusion of the RCA for which was opened with a drug-eluting stent  Dual antiplatelet therapy aspirin Plavix due to ventricular thrombus Mrs. Initiated on Coumadin and Lovenox    
Currently at baseline function  Avoid nephrotoxins  Strict I/Os  
Echo showing EF 45% and reviewed with patient  
Echo with EF 40-45% with no valvular dz, akinesis of apical region  Going for LHC, start GDMT   On room air no acute heart failure  TSH high and low T4 start replacement therapy 11/12  Cardiology consulting  
Elevated TSH and low FT4  Discussed with patient he will need establishment with a primary care provider for repeat thyroid studies in 1-1/2 months.  Continue active treatment with levothyroxine he will need this prescribed upon discharge  
Heparin gtt following Xa levels    
History of LAD stent placed 5/2024  Continue with aspirin and Plavix  Metoprolol and statin  
Hx of STEMI in 5/2024 with PCI to DOUGIE  But only on DAPT for 30 days   
LVEF 40-45% with apical akinesis and LV thrombus  There is an immobile 1.1 x 1.2 cm LV apical thrombus    
Lovenox twice daily weight-based as well as warfarin.  He will need to be set up with Lovenox teaching to give self shots  He will need to follow-up with Coumadin clinic  Follow-up with cardiology outpatient  
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  
Monitor volume status  Strict ins and outs and daily weights  Metoprolol, not on ACE or Aldactone yet  Echo shows LV EF 40%, new LV thrombus  euvolemic  
P waves with no ventricular escape or conduction.   Hx of anterior wall MI also given metoprolol and has findings of hypothyroid that is not been on treatment for.   Multiple pauses leading to loss of consciousness.    Stop all jordy agents.   Tele monitoring  Replace thyroid hormone  Cardiology and EP consulted    
Patient's hemoglobin is currently stable and he denies any bloody stools.   Monitor hemoglobin  
Suspect cardiac event as etiology  Patient has a pacemaker placed this admission 11/12/2024  Patient made aware that he will need to have follow-up with cardiology outpatient  Monitoring on telemetry  Cardiac cath did show an occluded RCA for which he had a new stent placed this admission 11/12  Medical compliance  PT did see patient and stated no additional postacute needs  
Syncope with right orbital trauma  CT head negative  Careful monitor while on dual antiplatelets and heparin gtt for delayed hemorrhage with patient degree of atrophy  Fall precautions   
TSH 83.1 this admission in the setting of bradycardia/sinus pauses  Start synthroid 50mcg daily 11/12 recheck in 2-3 months and adjust.   
Watch for any postconcussion symptoms  
Initial (On Arrival)

## 2024-11-19 ENCOUNTER — APPOINTMENT (OUTPATIENT)
Dept: RADIOLOGY | Facility: MEDICAL CENTER | Age: 84
DRG: 371 | End: 2024-11-19
Attending: STUDENT IN AN ORGANIZED HEALTH CARE EDUCATION/TRAINING PROGRAM
Payer: MEDICARE

## 2024-11-19 ENCOUNTER — APPOINTMENT (OUTPATIENT)
Dept: VASCULAR LAB | Facility: MEDICAL CENTER | Age: 84
End: 2024-11-19
Attending: INTERNAL MEDICINE
Payer: MEDICARE

## 2024-11-19 ENCOUNTER — HOSPITAL ENCOUNTER (OUTPATIENT)
Facility: MEDICAL CENTER | Age: 84
DRG: 371 | End: 2024-11-19
Attending: STUDENT IN AN ORGANIZED HEALTH CARE EDUCATION/TRAINING PROGRAM | Admitting: STUDENT IN AN ORGANIZED HEALTH CARE EDUCATION/TRAINING PROGRAM
Payer: MEDICARE

## 2024-11-19 VITALS
HEART RATE: 61 BPM | RESPIRATION RATE: 16 BRPM | DIASTOLIC BLOOD PRESSURE: 78 MMHG | SYSTOLIC BLOOD PRESSURE: 125 MMHG | HEIGHT: 67 IN | OXYGEN SATURATION: 94 % | TEMPERATURE: 98 F | BODY MASS INDEX: 21.38 KG/M2 | WEIGHT: 136.24 LBS

## 2024-11-19 DIAGNOSIS — R40.4 UNRESPONSIVE EPISODE: ICD-10-CM

## 2024-11-19 PROBLEM — R55 PRE-SYNCOPE: Status: ACTIVE | Noted: 2024-11-19

## 2024-11-19 PROBLEM — R55 PRE-SYNCOPE: Status: RESOLVED | Noted: 2024-11-19 | Resolved: 2024-11-19

## 2024-11-19 PROBLEM — I25.10 CORONARY ARTERY DISEASE: Status: ACTIVE | Noted: 2024-11-19

## 2024-11-19 LAB
ALBUMIN SERPL BCP-MCNC: 3.6 G/DL (ref 3.2–4.9)
ALBUMIN/GLOB SERPL: 1.3 G/DL
ALP SERPL-CCNC: 112 U/L (ref 30–99)
ALT SERPL-CCNC: 33 U/L (ref 2–50)
ANION GAP SERPL CALC-SCNC: 13 MMOL/L (ref 7–16)
AST SERPL-CCNC: 38 U/L (ref 12–45)
BASOPHILS # BLD AUTO: 0.3 % (ref 0–1.8)
BASOPHILS # BLD: 0.02 K/UL (ref 0–0.12)
BILIRUB SERPL-MCNC: 0.4 MG/DL (ref 0.1–1.5)
BUN SERPL-MCNC: 23 MG/DL (ref 8–22)
CALCIUM ALBUM COR SERPL-MCNC: 8.7 MG/DL (ref 8.5–10.5)
CALCIUM SERPL-MCNC: 8.4 MG/DL (ref 8.5–10.5)
CHLORIDE SERPL-SCNC: 109 MMOL/L (ref 96–112)
CO2 SERPL-SCNC: 22 MMOL/L (ref 20–33)
CREAT SERPL-MCNC: 1.17 MG/DL (ref 0.5–1.4)
EKG IMPRESSION: NORMAL
EOSINOPHIL # BLD AUTO: 0.14 K/UL (ref 0–0.51)
EOSINOPHIL NFR BLD: 2 % (ref 0–6.9)
ERYTHROCYTE [DISTWIDTH] IN BLOOD BY AUTOMATED COUNT: 52.2 FL (ref 35.9–50)
GFR SERPLBLD CREATININE-BSD FMLA CKD-EPI: 62 ML/MIN/1.73 M 2
GLOBULIN SER CALC-MCNC: 2.7 G/DL (ref 1.9–3.5)
GLUCOSE SERPL-MCNC: 100 MG/DL (ref 65–99)
HCT VFR BLD AUTO: 30.9 % (ref 42–52)
HGB BLD-MCNC: 10.1 G/DL (ref 14–18)
IMM GRANULOCYTES # BLD AUTO: 0.04 K/UL (ref 0–0.11)
IMM GRANULOCYTES NFR BLD AUTO: 0.6 % (ref 0–0.9)
INR PPP: 5.22 (ref 0.87–1.13)
LYMPHOCYTES # BLD AUTO: 2.16 K/UL (ref 1–4.8)
LYMPHOCYTES NFR BLD: 31 % (ref 22–41)
MCH RBC QN AUTO: 33 PG (ref 27–33)
MCHC RBC AUTO-ENTMCNC: 32.7 G/DL (ref 32.3–36.5)
MCV RBC AUTO: 101 FL (ref 81.4–97.8)
MONOCYTES # BLD AUTO: 0.59 K/UL (ref 0–0.85)
MONOCYTES NFR BLD AUTO: 8.5 % (ref 0–13.4)
NEUTROPHILS # BLD AUTO: 4.02 K/UL (ref 1.82–7.42)
NEUTROPHILS NFR BLD: 57.6 % (ref 44–72)
NRBC # BLD AUTO: 0 K/UL
NRBC BLD-RTO: 0 /100 WBC (ref 0–0.2)
NT-PROBNP SERPL IA-MCNC: 1877 PG/ML (ref 0–125)
PLATELET # BLD AUTO: 163 K/UL (ref 164–446)
PMV BLD AUTO: 12.3 FL (ref 9–12.9)
POTASSIUM SERPL-SCNC: 3.6 MMOL/L (ref 3.6–5.5)
PROT SERPL-MCNC: 6.3 G/DL (ref 6–8.2)
PROTHROMBIN TIME: 48.5 SEC (ref 12–14.6)
RBC # BLD AUTO: 3.06 M/UL (ref 4.7–6.1)
SODIUM SERPL-SCNC: 144 MMOL/L (ref 135–145)
TROPONIN T SERPL-MCNC: 99 NG/L (ref 6–19)
WBC # BLD AUTO: 7 K/UL (ref 4.8–10.8)

## 2024-11-19 PROCEDURE — 97535 SELF CARE MNGMENT TRAINING: CPT

## 2024-11-19 PROCEDURE — 70450 CT HEAD/BRAIN W/O DYE: CPT

## 2024-11-19 PROCEDURE — 85025 COMPLETE CBC W/AUTO DIFF WBC: CPT

## 2024-11-19 PROCEDURE — G0378 HOSPITAL OBSERVATION PER HR: HCPCS

## 2024-11-19 PROCEDURE — 83880 ASSAY OF NATRIURETIC PEPTIDE: CPT

## 2024-11-19 PROCEDURE — 97165 OT EVAL LOW COMPLEX 30 MIN: CPT

## 2024-11-19 PROCEDURE — 84484 ASSAY OF TROPONIN QUANT: CPT

## 2024-11-19 PROCEDURE — 71045 X-RAY EXAM CHEST 1 VIEW: CPT

## 2024-11-19 PROCEDURE — 80053 COMPREHEN METABOLIC PANEL: CPT

## 2024-11-19 PROCEDURE — A9270 NON-COVERED ITEM OR SERVICE: HCPCS | Performed by: STUDENT IN AN ORGANIZED HEALTH CARE EDUCATION/TRAINING PROGRAM

## 2024-11-19 PROCEDURE — 93005 ELECTROCARDIOGRAM TRACING: CPT

## 2024-11-19 PROCEDURE — 97162 PT EVAL MOD COMPLEX 30 MIN: CPT

## 2024-11-19 PROCEDURE — 99235 HOSP IP/OBS SAME DATE MOD 70: CPT | Performed by: STUDENT IN AN ORGANIZED HEALTH CARE EDUCATION/TRAINING PROGRAM

## 2024-11-19 PROCEDURE — 99285 EMERGENCY DEPT VISIT HI MDM: CPT

## 2024-11-19 PROCEDURE — 99222 1ST HOSP IP/OBS MODERATE 55: CPT | Performed by: STUDENT IN AN ORGANIZED HEALTH CARE EDUCATION/TRAINING PROGRAM

## 2024-11-19 PROCEDURE — 700102 HCHG RX REV CODE 250 W/ 637 OVERRIDE(OP): Performed by: STUDENT IN AN ORGANIZED HEALTH CARE EDUCATION/TRAINING PROGRAM

## 2024-11-19 PROCEDURE — 36415 COLL VENOUS BLD VENIPUNCTURE: CPT

## 2024-11-19 PROCEDURE — 85610 PROTHROMBIN TIME: CPT

## 2024-11-19 PROCEDURE — 93005 ELECTROCARDIOGRAM TRACING: CPT | Performed by: STUDENT IN AN ORGANIZED HEALTH CARE EDUCATION/TRAINING PROGRAM

## 2024-11-19 RX ORDER — METOPROLOL SUCCINATE 25 MG/1
25 TABLET, EXTENDED RELEASE ORAL DAILY
Status: DISCONTINUED | OUTPATIENT
Start: 2024-11-19 | End: 2024-11-19 | Stop reason: HOSPADM

## 2024-11-19 RX ORDER — LEVOTHYROXINE SODIUM 50 UG/1
50 TABLET ORAL
Status: DISCONTINUED | OUTPATIENT
Start: 2024-11-19 | End: 2024-11-19 | Stop reason: HOSPADM

## 2024-11-19 RX ORDER — ATORVASTATIN CALCIUM 80 MG/1
80 TABLET, FILM COATED ORAL EVERY EVENING
Status: DISCONTINUED | OUTPATIENT
Start: 2024-11-19 | End: 2024-11-19 | Stop reason: HOSPADM

## 2024-11-19 RX ORDER — CLOPIDOGREL BISULFATE 75 MG/1
75 TABLET ORAL DAILY
Status: DISCONTINUED | OUTPATIENT
Start: 2024-11-19 | End: 2024-11-19 | Stop reason: HOSPADM

## 2024-11-19 RX ADMIN — LEVOTHYROXINE SODIUM 50 MCG: 0.05 TABLET ORAL at 05:49

## 2024-11-19 RX ADMIN — CLOPIDOGREL BISULFATE 75 MG: 75 TABLET ORAL at 05:49

## 2024-11-19 RX ADMIN — METOPROLOL SUCCINATE 25 MG: 25 TABLET, EXTENDED RELEASE ORAL at 05:49

## 2024-11-19 ASSESSMENT — ENCOUNTER SYMPTOMS
EYE DISCHARGE: 0
DIZZINESS: 0
NAUSEA: 0
SINUS PAIN: 0
COUGH: 0
HEMOPTYSIS: 0
CHILLS: 0
ABDOMINAL PAIN: 0
LOSS OF CONSCIOUSNESS: 1
STRIDOR: 0
ORTHOPNEA: 0
NECK PAIN: 0
HEADACHES: 0
BACK PAIN: 1
DOUBLE VISION: 0
PALPITATIONS: 0
SHORTNESS OF BREATH: 0
DIARRHEA: 0
FEVER: 0
SPUTUM PRODUCTION: 0
EYE PAIN: 0
PHOTOPHOBIA: 0
VOMITING: 0

## 2024-11-19 ASSESSMENT — COGNITIVE AND FUNCTIONAL STATUS - GENERAL
DAILY ACTIVITIY SCORE: 24
SUGGESTED CMS G CODE MODIFIER DAILY ACTIVITY: CH

## 2024-11-19 ASSESSMENT — LIFESTYLE VARIABLES
HOW MANY TIMES IN THE PAST YEAR HAVE YOU HAD 5 OR MORE DRINKS IN A DAY: 0
ALCOHOL_USE: NO
CONSUMPTION TOTAL: NEGATIVE
EVER HAD A DRINK FIRST THING IN THE MORNING TO STEADY YOUR NERVES TO GET RID OF A HANGOVER: NO
DOES PATIENT WANT TO STOP DRINKING: NO
TOTAL SCORE: 0
EVER FELT BAD OR GUILTY ABOUT YOUR DRINKING: NO
TOTAL SCORE: 0
HAVE PEOPLE ANNOYED YOU BY CRITICIZING YOUR DRINKING: NO
HAVE YOU EVER FELT YOU SHOULD CUT DOWN ON YOUR DRINKING: NO
AVERAGE NUMBER OF DAYS PER WEEK YOU HAVE A DRINK CONTAINING ALCOHOL: 0
ON A TYPICAL DAY WHEN YOU DRINK ALCOHOL HOW MANY DRINKS DO YOU HAVE: 0
TOTAL SCORE: 0

## 2024-11-19 ASSESSMENT — SOCIAL DETERMINANTS OF HEALTH (SDOH)
IN THE PAST 12 MONTHS, HAS THE ELECTRIC, GAS, OIL, OR WATER COMPANY THREATENED TO SHUT OFF SERVICE IN YOUR HOME?: NO
WITHIN THE LAST YEAR, HAVE TO BEEN RAPED OR FORCED TO HAVE ANY KIND OF SEXUAL ACTIVITY BY YOUR PARTNER OR EX-PARTNER?: NO
WITHIN THE PAST 12 MONTHS, THE FOOD YOU BOUGHT JUST DIDN'T LAST AND YOU DIDN'T HAVE MONEY TO GET MORE: NEVER TRUE
WITHIN THE LAST YEAR, HAVE YOU BEEN HUMILIATED OR EMOTIONALLY ABUSED IN OTHER WAYS BY YOUR PARTNER OR EX-PARTNER?: NO
WITHIN THE LAST YEAR, HAVE YOU BEEN AFRAID OF YOUR PARTNER OR EX-PARTNER?: NO
WITHIN THE LAST YEAR, HAVE YOU BEEN KICKED, HIT, SLAPPED, OR OTHERWISE PHYSICALLY HURT BY YOUR PARTNER OR EX-PARTNER?: NO
WITHIN THE PAST 12 MONTHS, YOU WORRIED THAT YOUR FOOD WOULD RUN OUT BEFORE YOU GOT THE MONEY TO BUY MORE: NEVER TRUE

## 2024-11-19 ASSESSMENT — PAIN DESCRIPTION - PAIN TYPE
TYPE: ACUTE PAIN
TYPE: ACUTE PAIN

## 2024-11-19 ASSESSMENT — FIBROSIS 4 INDEX
FIB4 SCORE: 3.33
FIB4 SCORE: 3.33

## 2024-11-19 ASSESSMENT — ACTIVITIES OF DAILY LIVING (ADL): TOILETING: INDEPENDENT

## 2024-11-19 NOTE — DISCHARGE INSTRUCTIONS
Please call Life Alert to set up program [phone]    FOLLOW UP ITEMS POST DISCHARGE  Please call [phone] to schedule PCP appointment for patient.    Required specialty appointments include:       Discharge Instructions per ISABEL Blue.    Follow-up with PCP s/p hospitalization  Follow-up with cardiology as indicated  Continue home medications  Monitor blood pressure  Obtain a life alert as you are high risk of fall and you live alone    DIET: Cardiac    ACTIVITY: As tolerated    DIAGNOSIS: Presyncope    Return to ER if symptoms persist, chest pain, palpitations, shortness of breath, numbness, tingling, weakness, and high fevers.

## 2024-11-19 NOTE — ASSESSMENT & PLAN NOTE
Recent echocardiogram showed ejection fraction of 40 to 45% with no valvular disease.  Akinesis of the apical region

## 2024-11-19 NOTE — DISCHARGE SUMMARY
Discharge Summary    CHIEF COMPLAINT ON ADMISSION  Chief Complaint   Patient presents with    Syncope     Pt juan jose Michelesa from home for syncopal episodes.  Pt reports this has happened multiple times.  Pt has a cardiac history including a recent NSTEMI with stent placement on 11/10.  Pt had a syncopal episode a few days ago resulting in a lac above his eye and stitches.  Pt is on a blood thinner, bruises in various stages of healing.  PIV in place by resma.       Reason for Admission  Pre-syncope     Admission Date  11/19/2024    CODE STATUS  Full Code    HPI & HOSPITAL COURSE    Mr. Dilan Calderón is a 84 y.o. male with a PMHx of coronary artery disease status post stent placement to the LAD on May 24, and a subsequent stent placed on his previous hospitalization, along with a pacemaker placement. who presented 11/19/2024 with a presyncopal event.      Patient  Patient was most recently discharged on November 14, 2024 in a stable state.  Patient states that he has been compliant with all his medications while at home.  Earlier this evening, patient states that he was ambulating around his house.  He subsequently felt dizzy and lightheaded, and had a syncopal event.  Unclear how long the patient lost consciousness for.  As result EMS was alerted.      In Emergency Department, vital signs stable.  CT head unremarkable.  Pacemaker interrogated and it was reported the was functioning properly.  Patient will be admitted for observation for monitoring.    No overnight events noted.  Patient evaluated by physical therapy with no further recommendations except for patient getting a life alert bracelet as he lives alone.  Only noted significant change was orthostatic hypotension of which this has resolved overnight.  Patient seen and examined prior to being discharged.  Patient denies any dizziness, no lightheadedness.  Patient was ambulated around the unit with minimal assist.  Patient to continue follow-up with PCP and  cardiology as an outpatient.  Patient to continue home medications.  All questions and concerns answered plan to be discharged.  Patient discharged home.        Therefore, he is discharged in good and stable condition to home with close outpatient follow-up.    The patient recovered much more quickly than anticipated on admission.    Discharge Date  11/19/24      FOLLOW UP ITEMS POST DISCHARGE  Please call [phone] to schedule PCP appointment for patient.    Required specialty appointments include:       Discharge Instructions per MARY [email or web address]    Follow-up with PCP s/p hospitalization  Follow-up with cardiology as indicated  Continue home medications  Monitor blood pressure  Obtain a life alert as you are high risk of fall and you live alone    DIET: Cardiac    ACTIVITY: As tolerated    DIAGNOSIS: Presyncope    Return to ER if symptoms persist, chest pain, palpitations, shortness of breath, numbness, tingling, weakness, and high fevers.      DISCHARGE DIAGNOSES  Principal Problem (Resolved):    Pre-syncope (POA: Yes)  Active Problems:    Chronic systolic heart failure (HCC) (POA: Yes)    LV (left ventricular) mural thrombus (POA: Yes)    Coronary artery disease (POA: Unknown)      FOLLOW UP  Future Appointments   Date Time Provider Department Center   11/20/2024  2:45 PM [facility]ER [facility] None   11/25/2024  1:00 PM Deja Torres M.D. [facility] [place]   11/27/2024  2:45 PM [facility] None   12/3/2024  3:45 PM CAROLINE Gonzalez [facility] None     No follow-up provider specified.    MEDICATIONS ON DISCHARGE     Medication List        CONTINUE taking these medications        Instructions   acetaminophen 500 MG Tabs  Commonly known as: Tylenol   Take 500-1,000 mg by mouth 2 times a day as needed.  Dose: 500-1,000 mg     Aspirin Low Dose 81 MG Chew chewable tablet  Generic drug: aspirin   Chew 1 Tablet every day. Stop once INR therapeutic and off lovenox injections  Dose:  81 mg     atorvastatin 80 MG tablet  Commonly known as: Lipitor   Take 1 Tablet by mouth every evening.  Dose: 80 mg     Centrum Silver 50+Men Tabs   Take 1 Tablet by mouth every day.  Dose: 1 Tablet     clopidogrel 75 MG Tabs  Commonly known as: Plavix   Take 1 Tablet by mouth every day.  Dose: 75 mg     enoxaparin 60 MG/0.6ML Sosy inj  Commonly known as: Lovenox   Inject 1 syringe (60 mg) under the skin every 12 hours.  Dose: 60 mg     levothyroxine 50 MCG Tabs  Commonly known as: Synthroid   Take 1 Tablet by mouth every morning on an empty stomach.  Dose: 50 mcg     metoprolol SR 25 MG Tb24  Commonly known as: Toprol XL   Take 1 Tablet by mouth every day.  Dose: 25 mg     NON SPECIFIED   Take 1 Tablet by mouth every day. OTC allergy med  Dose: 1 Tablet     warfarin 5 MG Tabs  Commonly known as: Coumadin   Take 1 Tablet by mouth every day at 6 PM. Further dosing per anticoagulation clinic  Dose: 5 mg              Allergies  No Known Allergies    DIET  Orders Placed This Encounter   Procedures    Diet Order Diet: Cardiac     Standing Status:   Standing     Number of Occurrences:   1     Order Specific Question:   Diet:     Answer:   Cardiac [6]       ACTIVITY  As tolerated.  Weight bearing as tolerated    CONSULTATIONS  NONE    PROCEDURES  NONE    IMAGING    CT-HEAD W/O   Final Result         1.  No acute intracranial abnormality is identified, there are nonspecific white matter changes, commonly associated with small vessel ischemic disease.  Associated mild cerebral atrophy is noted.   2.  Atherosclerosis.               DX-CHEST-PORTABLE (1 VIEW)   Final Result         1.  No acute cardiopulmonary disease.   2.  Atherosclerosis            LABORATORY  Lab Results   Component Value Date    SODIUM 144 11/19/2024    POTASSIUM 3.6 11/19/2024    CHLORIDE 109 11/19/2024    CO2 22 11/19/2024    GLUCOSE 100 (H) 11/19/2024    BUN 23 (H) 11/19/2024    CREATININE 1.17 11/19/2024    CREATININE 1.2 02/11/2008        Lab  Results   Component Value Date    WBC 7.0 11/19/2024    HEMOGLOBIN 10.1 (L) 11/19/2024    HEMATOCRIT 30.9 (L) 11/19/2024    PLATELETCT 163 (L) 11/19/2024

## 2024-11-19 NOTE — ASSESSMENT & PLAN NOTE
Patient did have a left heart cath done in May 2024 due to STEMI presentation.  Showed occluded proximal LAD, status post stent placement.  He had a repeat left heart cath back in November 12, 2024, showing a patent LAD stent, distal RCA is chronically occluded, status post PCI placement.  Per chart review, it was recommended patient be on Plavix monotherapy after being therapeutic on his warfarin  Continue with Plavix  Pharmacy to dose warfarin dosing  Continue with statin

## 2024-11-19 NOTE — PROGRESS NOTES
2 RN Skin Assessment Completed by ALBERT Tang and Celestina RN.     Head: Rt eyebrow incision approximated with sutures, scabbing noted. Large amount of swelling and ecchymosis to site. Facial ecchymosis noted.   Ears: Erythema and scabbing noted behind left ear  Nose: WDL  Mouth: WDL  Neck: WDL  Breasts/Chest: WDL except ecchymosis  Shoulder Blades: WDL  Spine: WDL  (R) Arm/Elbow/hand: Extensive ecchymosis in various stages of healing. Scattered scabbing.  (L) Arm/Elbow/hand: Extensive ecchymosis in various stages of healing. Scattered scabbing.  Abdomen: Extensive ecchymosis in various stages of healing.  Groin: Recent rt femoral cath site. Hematoma noted with extensive ecchymosis, site firm with scab intact.   Sacrum/Coccyx/Buttocks: blanching  (R) Leg: Scattered scabbing and ecchymosis. Abrasion to rt anterior knee.  (L) Leg: Scattered scabbing and ecchymosis.   (R) Heel/Foot/Toe: blanching  (L) Heel/Foot/Toe: left lateral distal foot partial thickness wound with partial scabbing.         Devices in place: BP Cuff, Pulse Ox, tele box     Interventions in place: Pillows, dressing to left foot, rt knee     Possible skin injury found: Yes     Pictures uploaded into Epic: Yes  Wound Consult Placed: Yes

## 2024-11-19 NOTE — PROGRESS NOTES
[facility]  Discussed discharge paperwork and medications with patient. Answered all questions. No home meds to return. Patient escorted out, personal belongings in hand.

## 2024-11-19 NOTE — HOSPITAL COURSE
Mr. Dilan Calderón is a 84 y.o. male with a PMHx of coronary artery disease status post stent placement to the LAD on May 24, and a subsequent stent placed on his previous hospitalization, along with a pacemaker placement. who presented 11/19/2024 with a presyncopal event.      Patient  Patient was most recently discharged on November 14, 2024 in a stable state.  Patient states that he has been compliant with all his medications while at home.  Earlier this evening, patient states that he was ambulating around his house.  He subsequently felt dizzy and lightheaded, and had a syncopal event.  Unclear how long the patient lost consciousness for.  As result EMS was alerted.      In Emergency Department, vital signs stable.  CT head unremarkable.  Pacemaker interrogated and it was reported the was functioning properly.  Patient will be admitted for observation for monitoring.    No overnight events noted.  Patient evaluated by physical therapy with no further recommendations except for patient getting a life alert bracelet as he lives alone.  Only noted significant change was orthostatic hypotension of which this has resolved overnight.  Patient seen and examined prior to being discharged.  Patient denies any dizziness, no lightheadedness.  Patient was ambulated around the unit with minimal assist.  Patient to continue follow-up with PCP and cardiology as an outpatient.  Patient to continue home medications.  All questions and concerns answered plan to be discharged.  Patient discharged home.

## 2024-11-19 NOTE — ASSESSMENT & PLAN NOTE
Patient brought in by EMS after a syncopal episode  Unknown LOC  Had a recent pacemaker placed  Pacemaker interrogated in the ER  Admit to Tele  Cardiology consult in the morning

## 2024-11-19 NOTE — PROGRESS NOTES
Report received from ALBERT Flores. Pt arrived to unit via gurney at 0450. A&Ox4, pt oriented to room, unit, and plan of care. Tele-monitor placed and monitor room notified. Extensive ecchymosis and skin injuries noted, see skin note. Reports mild pain to rt eyebrow incision, denies need for intervention at this time. All questions answered at this time. Call light within reach; fall precautions in place.

## 2024-11-19 NOTE — H&P
Hospital Medicine History & Physical Note    Date of Service  11/19/2024    Primary Care Physician  Pcp Pt States None        Code Status  Prior    Chief Complaint  Chief Complaint   Patient presents with    Syncope     Pt juan jose [place] from home for syncopal episodes.  Pt reports this has happened multiple times.  Pt has a cardiac history including a recent NSTEMI with stent placement on 11/10.  Pt had a syncopal episode a few days ago resulting in a lac above his eye and stitches.  Pt is on a blood thinner, bruises in various stages of healing.  PIV in place by resma.       History of Presenting Illness  Dilan Calderón is a 84 y.o. male who presented 11/19/2024 with a presyncopal event.  Patient has a known past medical history of coronary artery disease status post stent placement to the LAD on May 24, and a subsequent stent placed on his previous hospitalization, along with a pacemaker placement.  Patient was most recently discharged on November 14, 2024 in a stable state.  Patient states that he has been compliant with all his medications while at home.  Earlier this evening, patient states that he was ambulating around his house.  He subsequently felt dizzy and lightheaded, and had a syncopal event.  Unclear how long the patient lost consciousness for.  As result EMS was alerted.  In Emergency Department, vital signs stable.  CT head unremarkable.  Pacemaker interrogated and it was reported the was functioning properly.  Patient will be admitted for observation and cardiology evaluation in the morning    I discussed the plan of care with patient.    Review of Systems  Review of Systems   Constitutional:  Negative for chills and fever.   HENT:  Negative for congestion, ear discharge, ear pain, nosebleeds, sinus pain and tinnitus.    Eyes:  Negative for double vision, photophobia, pain and discharge.   Respiratory:  Negative for cough, hemoptysis, sputum production, shortness of breath and stridor.     Cardiovascular:  Negative for chest pain, palpitations and orthopnea.   Gastrointestinal:  Negative for abdominal pain, diarrhea, nausea and vomiting.   Musculoskeletal:  Positive for back pain. Negative for joint pain and neck pain.   Neurological:  Positive for loss of consciousness. Negative for dizziness and headaches.       Past Medical History   has a past medical history of NSTEMI (non-ST elevated myocardial infarction) (HCC).    Surgical History   has a past surgical history that includes pr upper gi endoscopy,diagnosis (N/A, 03/06/2024); pr colonoscopy,diagnostic (N/A, 03/06/2024); pr upper gi endoscopy,w/dilat,gastric out (N/A, 03/06/2024); and other cardiac surgery (11/10/2024).     Family History  family history is not on file.   Family history reviewed with patient. There is no family history that is pertinent to the chief complaint.     Social History   reports that he has quit smoking. His smoking use included cigarettes. He has never used smokeless tobacco. He reports that he does not currently use alcohol. He reports that he does not use drugs.    Allergies  No Known Allergies    Medications  Prior to Admission Medications   Prescriptions Last Dose Informant Patient Reported? Taking?   Multiple Vitamins-Minerals (CENTRUM SILVER 50+MEN) Tab  Patient Yes No   Sig: Take 1 Tablet by mouth every day.   NON SPECIFIED  Patient Yes No   Sig: Take 1 Tablet by mouth every day. OTC allergy med   acetaminophen (TYLENOL) 500 MG Tab  Patient Yes No   Sig: Take 500-1,000 mg by mouth 2 times a day as needed.   aspirin (ASA) 81 MG Chew Tab chewable tablet   No No   Sig: Chew 1 Tablet every day. Stop once INR therapeutic and off lovenox injections   atorvastatin (LIPITOR) 80 MG tablet   No No   Sig: Take 1 Tablet by mouth every evening.   clopidogrel (PLAVIX) 75 MG Tab   No No   Sig: Take 1 Tablet by mouth every day.   enoxaparin (LOVENOX) 60 MG/0.6ML Solution Prefilled Syringe inj   No No   Sig: Inject 1 syringe  (60 mg) under the skin every 12 hours.   levothyroxine (SYNTHROID) 50 MCG Tab   No No   Sig: Take 1 Tablet by mouth every morning on an empty stomach.   metoprolol SR (TOPROL XL) 25 MG TABLET SR 24 HR   No No   Sig: Take 1 Tablet by mouth every day.   warfarin (COUMADIN) 5 MG Tab   No No   Sig: Take 1 Tablet by mouth every day at 6 PM. Further dosing per anticoagulation clinic      Facility-Administered Medications: None       Physical Exam  Temp:  [36.7 °C (98.1 °F)] 36.7 °C (98.1 °F)  Pulse:  [65] 65  Resp:  [24] 24  BP: (153)/(72) 153/72  SpO2:  [96 %] 96 %  Blood Pressure : (!) 153/72   Temperature: 36.7 °C (98.1 °F)   Pulse: 65   Respiration: (!) 24   Pulse Oximetry: 96 %       Physical Exam  Constitutional:       General: He is not in acute distress.     Appearance: Normal appearance. He is normal weight. He is not ill-appearing, toxic-appearing or diaphoretic.   HENT:      Head: Normocephalic and atraumatic.      Mouth/Throat:      Mouth: Mucous membranes are moist.   Eyes:      Pupils: Pupils are equal, round, and reactive to light.   Cardiovascular:      Rate and Rhythm: Normal rate and regular rhythm.      Pulses: Normal pulses.      Heart sounds: Normal heart sounds. No murmur heard.     No friction rub. No gallop.   Pulmonary:      Effort: Pulmonary effort is normal. No respiratory distress.      Breath sounds: Normal breath sounds. No stridor. No wheezing, rhonchi or rales.   Chest:      Chest wall: No tenderness.   Abdominal:      General: There is no distension.      Palpations: There is no mass.      Tenderness: There is no abdominal tenderness. There is no right CVA tenderness, left CVA tenderness, guarding or rebound.      Hernia: No hernia is present.   Musculoskeletal:         General: No swelling, tenderness, deformity or signs of injury.      Right lower leg: No edema.      Left lower leg: No edema.   Skin:     General: Skin is warm and dry.      Capillary Refill: Capillary refill takes less  than 2 seconds.      Coloration: Skin is not jaundiced or pale.      Findings: Bruising present. No erythema, lesion or rash.   Neurological:      General: No focal deficit present.      Mental Status: He is alert and oriented to person, place, and time. Mental status is at baseline.      Cranial Nerves: No cranial nerve deficit.      Sensory: No sensory deficit.      Motor: No weakness.      Coordination: Coordination normal.   Psychiatric:         Mood and Affect: Mood normal.         Laboratory:  Recent Labs     11/19/24 0200   WBC 7.0   RBC 3.06*   HEMOGLOBIN 10.1*   HEMATOCRIT 30.9*   .0*   MCH 33.0   MCHC 32.7   RDW 52.2*   PLATELETCT 163*   MPV 12.3     Recent Labs     11/19/24 0200   SODIUM 144   POTASSIUM 3.6   CHLORIDE 109   CO2 22   GLUCOSE 100*   BUN 23*   CREATININE 1.17   CALCIUM 8.4*     Recent Labs     11/19/24 0200   ALTSGPT 33   ASTSGOT 38   ALKPHOSPHAT 112*   TBILIRUBIN 0.4   GLUCOSE 100*     Recent Labs     11/19/24 0200   INR 5.22*     Recent Labs     11/19/24 0200   NTPROBNP 1877*         Recent Labs     11/19/24 0200   TROPONINT 99*       Imaging:  CT-HEAD W/O   Final Result         1.  No acute intracranial abnormality is identified, there are nonspecific white matter changes, commonly associated with small vessel ischemic disease.  Associated mild cerebral atrophy is noted.   2.  Atherosclerosis.               DX-CHEST-PORTABLE (1 VIEW)   Final Result         1.  No acute cardiopulmonary disease.   2.  Atherosclerosis          X-Ray:  I have personally reviewed the images and compared with prior images.  EKG:  I have personally reviewed the images and compared with prior images.    Assessment/Plan:  Justification for Admission Status  I anticipate this patient is appropriate for observation status at this time because pre syncope    Patient will need a Telemetry bed on MEDICAL service .  The need is secondary to pre syncope.    * Pre-syncope- (present on admission)  Assessment &  Plan  Patient brought in by EMS after a syncopal episode  Unknown LOC  Had a recent pacemaker placed  Pacemaker interrogated in the ER  Admit to Tele  Cardiology consult in the morning     Coronary artery disease  Assessment & Plan  Patient did have a left heart cath done in May 2024 due to STEMI presentation.  Showed occluded proximal LAD, status post stent placement.  He had a repeat left heart cath back in November 12, 2024, showing a patent LAD stent, distal RCA is chronically occluded, status post PCI placement.  Per chart review, it was recommended patient be on Plavix monotherapy after being therapeutic on his warfarin  Continue with Plavix  Pharmacy to dose warfarin dosing  Continue with statin    LV (left ventricular) mural thrombus- (present on admission)  Assessment & Plan  Currently supratherapeutic on his warfarin    Chronic systolic heart failure (HCC)- (present on admission)  Assessment & Plan  Recent echocardiogram showed ejection fraction of 40 to 45% with no valvular disease.  Akinesis of the apical region        VTE prophylaxis: therapeutic anticoagulation with warfarin

## 2024-11-19 NOTE — ED NOTES
Medication history reviewed with patient.  Med rec is complete  Allergies reviewed.   Patient denies any outpatient antibiotics in the last 30 days.   Anticoagulants taken in the last 14 days? Yes  Patient on Warfarin and Lovenox, last dose 11/18/24 at 1800    Amarjit Jimenez

## 2024-11-19 NOTE — ED TRIAGE NOTES
Chief Complaint   Patient presents with    Syncope     Pt juan jose [place] from home for syncopal episodes.  Pt reports this has happened multiple times.  Pt has a cardiac history including a recent NSTEMI with stent placement on 11/10.  Pt had a syncopal episode a few days ago resulting in a lac above his eye and stitches.  Pt is on a blood thinner, bruises in various stages of healing.  PIV in place by resma.     Pt connected to monitor, labs drawn and sent.  Warm blanket provided

## 2024-11-19 NOTE — PROGRESS NOTES
Bedside report received, Assume care.     A&O x 4, Able to make needs known.  Pain Assessment: 5/10 to right forehead from previous fall, Suture above eyelid CDI. Medication provided per MAR.  Continuous cardiac and [place] monitoring in place.     Bed in low position and locked, pt resting comfortably now, call light with in reach, all needs met at this time. Interventions will be executed per plan of care.     1402- Discharge orders acknowledged, Pt aware and compliant with new orders,  Personal belongings in pt possession. PIV removed.  Pt escorted off unit via wheelchair with assistance from CNA to Discharge Lounge.

## 2024-11-19 NOTE — ED PROVIDER NOTES
"ER Provider Note    Scribed for Wil Hatch D.o. by Roque Escamilla. 11/19/2024  2:15 AM    Primary Care Provider: Pcp Pt States None    CHIEF COMPLAINT   Chief Complaint   Patient presents with    Syncope     Pt juan jose [place] from home for syncopal episodes.  Pt reports this has happened multiple times.  Pt has a cardiac history including a recent NSTEMI with stent placement on 11/10.  Pt had a syncopal episode a few days ago resulting in a lac above his eye and stitches.  Pt is on a blood thinner, bruises in various stages of healing.  PIV in place by resma.     EXTERNAL RECORDS REVIEWED  Inpatient Notes Discharge summary was reviewed from 11/14/24 from admission of NSTEMI. Stents  and pacemaker were placed at this time.      HPI/ROS  LIMITATION TO HISTORY   Select: : None  OUTSIDE HISTORIAN(S):  None    Dilan Calderón is a 84 y.o. male who presents to the ED for evaluation of syncope onset 1 hour ago. The patient arrives from home after experiencing a syncope episode that made him lose balance and drop to his knees. He states to have had a recent NSTEMI with stent and pacemaker placement on 11/10 and notes he has been experiencing \"unsteadiness\" since the procedure. He did have a syncopal episode a few days ago resulting in a lac above his eye that required stitches. The patient is on a blood thinners. He has had normal bowel movement and urination. Denies any vomiting, diarrhea, or fevers. The patient does state he has not been feeling great as he hope from the procedure. Concern of episode the patient proceeded to call EMS for further help.     PAST MEDICAL HISTORY  Past Medical History:   Diagnosis Date    NSTEMI (non-ST elevated myocardial infarction) (HCC)        SURGICAL HISTORY  Past Surgical History:   Procedure Laterality Date    OTHER CARDIAC SURGERY  11/10/2024    stent placement    PA UPPER GI ENDOSCOPY,DIAGNOSIS N/A 03/06/2024    Procedure: GASTROSCOPY;  Surgeon: Greyson Serrato M.D.;  Location: " SURGERY SAME DAY [facility];  Service: Gastroenterology    MN COLONOSCOPY,DIAGNOSTIC N/A 03/06/2024    Procedure: COLONOSCOPY;  Surgeon: Greyson Serrato M.D.;  Location: SURGERY SAME DAY [facility];  Service: Gastroenterology    MN UPPER GI ENDOSCOPY,W/DILAT,GASTRIC OUT N/A 03/06/2024    Procedure: GASTROSCOPY, WITH BALLOON DILATION;  Surgeon: Greyson Serrato M.D.;  Location: SURGERY SAME DAY [facility];  Service: Gastroenterology       FAMILY HISTORY  History reviewed. No pertinent family history.    SOCIAL HISTORY   reports that he has quit smoking. His smoking use included cigarettes. He has never used smokeless tobacco. He reports that he does not currently use alcohol. He reports that he does not use drugs.    CURRENT MEDICATIONS  Current Discharge Medication List        CONTINUE these medications which have NOT CHANGED    Details   atorvastatin (LIPITOR) 80 MG tablet Take 1 Tablet by mouth every evening.  Qty: 30 Tablet, Refills: 0    Associated Diagnoses: ST elevation myocardial infarction involving left anterior descending (LAD) coronary artery (HCC)      clopidogrel (PLAVIX) 75 MG Tab Take 1 Tablet by mouth every day.  Qty: 30 Tablet, Refills: 0    Associated Diagnoses: ST elevation myocardial infarction involving left anterior descending (LAD) coronary artery (HCC)      aspirin (ASA) 81 MG Chew Tab chewable tablet Chew 1 Tablet every day. Stop once INR therapeutic and off lovenox injections  Qty: 30 Tablet, Refills: 0    Associated Diagnoses: ST elevation myocardial infarction involving left anterior descending (LAD) coronary artery (HCC)      enoxaparin (LOVENOX) 60 MG/0.6ML Solution Prefilled Syringe inj Inject 1 syringe (60 mg) under the skin every 12 hours.  Qty: 14 Each, Refills: 1    Associated Diagnoses: ST elevation myocardial infarction involving left anterior descending (LAD) coronary artery (HCC)      levothyroxine (SYNTHROID) 50 MCG Tab Take 1 Tablet by mouth every morning on an empty stomach.  Qty:  30 Tablet, Refills: 0    Associated Diagnoses: ST elevation myocardial infarction involving left anterior descending (LAD) coronary artery (HCC)      metoprolol SR (TOPROL XL) 25 MG TABLET SR 24 HR Take 1 Tablet by mouth every day.  Qty: 30 Tablet, Refills: 0    Associated Diagnoses: ST elevation myocardial infarction involving left anterior descending (LAD) coronary artery (HCC)      warfarin (COUMADIN) 5 MG Tab Take 1 Tablet by mouth every day at 6 PM. Further dosing per anticoagulation clinic  Qty: 30 Tablet, Refills: 3    Associated Diagnoses: ST elevation myocardial infarction involving left anterior descending (LAD) coronary artery (HCC)      Multiple Vitamins-Minerals (CENTRUM SILVER 50+MEN) Tab Take 1 Tablet by mouth every day.      acetaminophen (TYLENOL) 500 MG Tab Take 500-1,000 mg by mouth 2 times a day as needed.      NON SPECIFIED Take 1 Tablet by mouth every day. OTC allergy med             ALLERGIES  Patient has no known allergies.    PHYSICAL EXAM  BP (!) 153/72   Pulse 65   Temp 36.7 °C (98.1 °F) (Temporal)   Resp (!) 24   SpO2 96%   Constitutional: Alert in no apparent distress.Chronically ill appearing.  HENT: No signs of trauma, Bilateral external ears normal, Nose normal.   Eyes: Pupils are equal and reactive, Conjunctiva normal, Non-icteric.   Neck: Normal range of motion, No tenderness, Supple, No stridor.   Lymphatic: No lymphadenopathy noted.   Cardiovascular: Regular rate and rhythm, no murmurs.   Thorax & Lungs: Normal breath sounds, No respiratory distress, No wheezing, No chest tenderness.   Abdomen: Bowel sounds normal, Soft, No tenderness, No masses, No pulsatile masses.   Skin: Warm, Dry, No erythema, No rash. Repaired suture on right upper eye with surrounding erythema and mild swelling. Puncture sight on right wrist with swelling, no tenderness. Area of ecchymosis with mild swelling to the right inguinal area.   Back: No bony tenderness, No CVA tenderness.   Extremities: Intact  distal pulses, No edema, No tenderness, No cyanosis  Musculoskeletal: Good range of motion in all major joints. No tenderness to palpation or major deformities noted.   Neurologic: Alert , Normal motor function, Normal sensory function, No focal deficits noted.     DIAGNOSTIC STUDIES    EKG/LABS  Results for orders placed or performed during the hospital encounter of 11/19/24   CBC WITH DIFFERENTIAL    Collection Time: 11/19/24  2:00 AM   Result Value Ref Range    WBC 7.0 4.8 - 10.8 K/uL    RBC 3.06 (L) 4.70 - 6.10 M/uL    Hemoglobin 10.1 (L) 14.0 - 18.0 g/dL    Hematocrit 30.9 (L) 42.0 - 52.0 %    .0 (H) 81.4 - 97.8 fL    MCH 33.0 27.0 - 33.0 pg    MCHC 32.7 32.3 - 36.5 g/dL    RDW 52.2 (H) 35.9 - 50.0 fL    Platelet Count 163 (L) 164 - 446 K/uL    MPV 12.3 9.0 - 12.9 fL    Neutrophils-Polys 57.60 44.00 - 72.00 %    Lymphocytes 31.00 22.00 - 41.00 %    Monocytes 8.50 0.00 - 13.40 %    Eosinophils 2.00 0.00 - 6.90 %    Basophils 0.30 0.00 - 1.80 %    Immature Granulocytes 0.60 0.00 - 0.90 %    Nucleated RBC 0.00 0.00 - 0.20 /100 WBC    Neutrophils (Absolute) 4.02 1.82 - 7.42 K/uL    Lymphs (Absolute) 2.16 1.00 - 4.80 K/uL    Monos (Absolute) 0.59 0.00 - 0.85 K/uL    Eos (Absolute) 0.14 0.00 - 0.51 K/uL    Baso (Absolute) 0.02 0.00 - 0.12 K/uL    Immature Granulocytes (abs) 0.04 0.00 - 0.11 K/uL    NRBC (Absolute) 0.00 K/uL   COMP METABOLIC PANEL    Collection Time: 11/19/24  2:00 AM   Result Value Ref Range    Sodium 144 135 - 145 mmol/L    Potassium 3.6 3.6 - 5.5 mmol/L    Chloride 109 96 - 112 mmol/L    Co2 22 20 - 33 mmol/L    Anion Gap 13.0 7.0 - 16.0    Glucose 100 (H) 65 - 99 mg/dL    Bun 23 (H) 8 - 22 mg/dL    Creatinine 1.17 0.50 - 1.40 mg/dL    Calcium 8.4 (L) 8.5 - 10.5 mg/dL    Correct Calcium 8.7 8.5 - 10.5 mg/dL    AST(SGOT) 38 12 - 45 U/L    ALT(SGPT) 33 2 - 50 U/L    Alkaline Phosphatase 112 (H) 30 - 99 U/L    Total Bilirubin 0.4 0.1 - 1.5 mg/dL    Albumin 3.6 3.2 - 4.9 g/dL    Total Protein  6.3 6.0 - 8.2 g/dL    Globulin 2.7 1.9 - 3.5 g/dL    A-G Ratio 1.3 g/dL   TROPONIN    Collection Time: 24  2:00 AM   Result Value Ref Range    Troponin T 99 (H) 6 - 19 ng/L   proBrain Natriuretic Peptide, NT    Collection Time: 24  2:00 AM   Result Value Ref Range    NT-proBNP 1877 (H) 0 - 125 pg/mL   Prothrombin Time    Collection Time: 24  2:00 AM   Result Value Ref Range    PT 48.5 (H) 12.0 - 14.6 sec    INR 5.22 (H) 0.87 - 1.13   ESTIMATED GFR    Collection Time: 24  2:00 AM   Result Value Ref Range    GFR (CKD-EPI) 62 >60 mL/min/1.73 m 2   EKG    Collection Time: 24  2:10 AM   Result Value Ref Range    Report       [facility] Emergency Dept[facility]    Test Date:  2024  Pt Name:    AMY KERR              Department: ER  MRN:        [id number]                      Room:       [facility]  Gender:     Male                         Technician: [id number]  :        [id number]                   Requested By:ER TRIAGE PROTOCOL  Order #:    [id number]                    Reading MD:    Measurements  Intervals                                Axis  Rate:       61                           P:          67  IA:         171                          QRS:        33  QRSD:       80                           T:          75  QT:         442  QTc:        446    Interpretive Statements  Sinus rhythm  Atrial premature complex  Anterior infarct, old  Compared to ECG 2024 17:33:50  Atrial premature complex(es) now present  Sinus bradycardia no longer present  Left anterior fascicular block no longer present  Prolonged QT interval no longer present  Myocardial infarct finding still present        I have independently interpreted this EKG    RADIOLOGY/PROCEDURES   The attending emergency physician has independently interpreted the diagnostic imaging associated with this visit and am waiting the final reading from the radiologist.   My preliminary interpretation is a follows: No intracranial  hemorrhage    Radiologist interpretation:  CT-HEAD W/O   Final Result         1.  No acute intracranial abnormality is identified, there are nonspecific white matter changes, commonly associated with small vessel ischemic disease.  Associated mild cerebral atrophy is noted.   2.  Atherosclerosis.               DX-CHEST-PORTABLE (1 VIEW)   Final Result         1.  No acute cardiopulmonary disease.   2.  Atherosclerosis          COURSE & MEDICAL DECISION MAKING     ASSESSMENT, COURSE AND PLAN  Care Narrative: Patient with recent admission for coronary artery disease underwent stent placement as well as pacemaker after brief cardiac arrest, found to be in high-grade AV block.  Patient has had another syncopal episode while at home today without prodromal symptoms or change in position.  Patient currently asymptomatic with stable hemodynamics, sinus rhythm with no ongoing bradycardia or tacky dysrhythmias.  Patient suspects that he did strike his head today.  Will obtain CT imaging to assess for intracranial hemorrhage.  Will obtain blood work to assess for gross hematologic or metabolic derangements, INR and cardiac biomarkers.    4:19 AM-given patient's significant underlying cardiovascular disease history of recent cardiac arrest spoke with hospitalist to admit in order to facilitate close cardiology evaluation.  Blood work with stable elevation of troponin, BNP noted to have supratherapeutic INR.  CT imaging of head without signs of intracranial hemorrhage.  Patient's pacemaker was interrogated and reportedly is functioning normally unable to determine if patient had abnormal rhythm including ventricular dysrhythmia.  Spoke with Dr. Koch (Hospitalist) regarding the patient. They have accepted the patient for admission.            DISPOSITION AND DISCUSSIONS  I have discussed management of the patient with the following physicians and ADELAIDE's:  Dr. Koch (Hospitalist)    Discussion of management with other \[facility] [place] [facility]\""  or appropriate source(s): None     Barriers to care at this time, including but not limited to: Patient does not have established PCP.     Decision tools and prescription drugs considered including, but not limited to:  None .    DISPOSITION:  Patient will be hospitalized by Dr. Koch (Hospitalist)    FINAL DIANGOSIS  1. Unresponsive episode      Roque ROMERO (Scribe), am scribing for, and in the presence of, Wil Hatch D.O    Electronically signed by: Roque Escamilla (Emersonibgeronimo), 11/19/2024    Wil ROMERO D.O personally performed the services described in this documentation, as scribed by Roque Escamilla in my presence, and it is both accurate and complete.      The note accurately reflects work and decisions made by me.  Wil Hatch D.O.  11/19/2024  6:52 AM

## 2024-11-19 NOTE — PROGRESS NOTES
Inpatient Anticoagulation Service Note for 11/19/2024      Reason for Anticoagulation: Other (Comments) (LV thrombus)           Hemoglobin Value: (!) 10.1  Hematocrit Value: (!) 30.9  Lab Platelet Value: (!) 163  Target INR: 2.0 to 3.0    INR from last 7 days        Date/Time INR Value    11/19/24 0200 5.22                   Dose from last 7 days        Date/Time Dose (mg)    11/19/24 0508 5                   Average Dose (mg): 5  Significant Interactions: Antiplatelet Medications  Bridge Therapy: No (If less than 5 days and overlap therapy discontinued -- document reason (i.e. Bleed Risk))    (If still on overlap therapy, if No -- document reason (i.e. Bleed Risk))    Reversal Agent Administered: Not Applicable    Plan:  Hold dose and reassess starting when INR is near therapeutic range.  Education Material Provided?: No    Pharmacist suggested discharge dosing: TBD     Shimon Steven, PharmD

## 2024-11-20 ENCOUNTER — APPOINTMENT (OUTPATIENT)
Dept: RADIOLOGY | Facility: MEDICAL CENTER | Age: 84
End: 2024-11-20
Attending: EMERGENCY MEDICINE
Payer: MEDICARE

## 2024-11-20 ENCOUNTER — HOSPITAL ENCOUNTER (INPATIENT)
Facility: MEDICAL CENTER | Age: 84
LOS: 6 days | End: 2024-11-27
Attending: EMERGENCY MEDICINE | Admitting: STUDENT IN AN ORGANIZED HEALTH CARE EDUCATION/TRAINING PROGRAM
Payer: MEDICARE

## 2024-11-20 DIAGNOSIS — R53.1 GENERALIZED WEAKNESS: ICD-10-CM

## 2024-11-20 DIAGNOSIS — I46.9 CARDIAC ARREST (HCC): ICD-10-CM

## 2024-11-20 DIAGNOSIS — R55 NEAR SYNCOPE: ICD-10-CM

## 2024-11-20 DIAGNOSIS — N17.9 AKI (ACUTE KIDNEY INJURY) (HCC): ICD-10-CM

## 2024-11-20 PROBLEM — R42 POSTURAL DIZZINESS WITH PRESYNCOPE: Status: ACTIVE | Noted: 2024-11-20

## 2024-11-20 PROBLEM — D53.9 MACROCYTIC ANEMIA: Status: ACTIVE | Noted: 2024-11-20

## 2024-11-20 PROBLEM — R10.31 GROIN PAIN, RIGHT: Status: ACTIVE | Noted: 2024-11-20

## 2024-11-20 LAB
ALBUMIN SERPL BCP-MCNC: 3.8 G/DL (ref 3.2–4.9)
ALBUMIN/GLOB SERPL: 1.5 G/DL
ALP SERPL-CCNC: 118 U/L (ref 30–99)
ALT SERPL-CCNC: 44 U/L (ref 2–50)
ANION GAP SERPL CALC-SCNC: 12 MMOL/L (ref 7–16)
AST SERPL-CCNC: 59 U/L (ref 12–45)
BASOPHILS # BLD AUTO: 0.3 % (ref 0–1.8)
BASOPHILS # BLD: 0.02 K/UL (ref 0–0.12)
BILIRUB SERPL-MCNC: 0.4 MG/DL (ref 0.1–1.5)
BUN SERPL-MCNC: 24 MG/DL (ref 8–22)
CALCIUM ALBUM COR SERPL-MCNC: 8.7 MG/DL (ref 8.5–10.5)
CALCIUM SERPL-MCNC: 8.5 MG/DL (ref 8.5–10.5)
CHLORIDE SERPL-SCNC: 107 MMOL/L (ref 96–112)
CO2 SERPL-SCNC: 22 MMOL/L (ref 20–33)
CREAT SERPL-MCNC: 1.46 MG/DL (ref 0.5–1.4)
EKG IMPRESSION: NORMAL
EOSINOPHIL # BLD AUTO: 0.04 K/UL (ref 0–0.51)
EOSINOPHIL NFR BLD: 0.5 % (ref 0–6.9)
ERYTHROCYTE [DISTWIDTH] IN BLOOD BY AUTOMATED COUNT: 52 FL (ref 35.9–50)
FERRITIN SERPL-MCNC: 79.2 NG/ML (ref 22–322)
GFR SERPLBLD CREATININE-BSD FMLA CKD-EPI: 48 ML/MIN/1.73 M 2
GLOBULIN SER CALC-MCNC: 2.6 G/DL (ref 1.9–3.5)
GLUCOSE SERPL-MCNC: 135 MG/DL (ref 65–99)
HCT VFR BLD AUTO: 27.3 % (ref 42–52)
HGB BLD-MCNC: 9.2 G/DL (ref 14–18)
IMM GRANULOCYTES # BLD AUTO: 0.04 K/UL (ref 0–0.11)
IMM GRANULOCYTES NFR BLD AUTO: 0.5 % (ref 0–0.9)
INR PPP: 3.88 (ref 0.87–1.13)
INR PPP: 4 (ref 0.87–1.13)
IRON SATN MFR SERPL: 25 % (ref 15–55)
IRON SERPL-MCNC: 69 UG/DL (ref 50–180)
LYMPHOCYTES # BLD AUTO: 1.98 K/UL (ref 1–4.8)
LYMPHOCYTES NFR BLD: 26.7 % (ref 22–41)
MCH RBC QN AUTO: 33.8 PG (ref 27–33)
MCHC RBC AUTO-ENTMCNC: 33.7 G/DL (ref 32.3–36.5)
MCV RBC AUTO: 100.4 FL (ref 81.4–97.8)
MONOCYTES # BLD AUTO: 0.6 K/UL (ref 0–0.85)
MONOCYTES NFR BLD AUTO: 8.1 % (ref 0–13.4)
NEUTROPHILS # BLD AUTO: 4.73 K/UL (ref 1.82–7.42)
NEUTROPHILS NFR BLD: 63.9 % (ref 44–72)
NRBC # BLD AUTO: 0 K/UL
NRBC BLD-RTO: 0 /100 WBC (ref 0–0.2)
PLATELET # BLD AUTO: 192 K/UL (ref 164–446)
PMV BLD AUTO: 11.9 FL (ref 9–12.9)
POTASSIUM SERPL-SCNC: 3.9 MMOL/L (ref 3.6–5.5)
PROT SERPL-MCNC: 6.4 G/DL (ref 6–8.2)
PROTHROMBIN TIME: 38.4 SEC (ref 12–14.6)
PROTHROMBIN TIME: 39.3 SEC (ref 12–14.6)
RBC # BLD AUTO: 2.72 M/UL (ref 4.7–6.1)
SODIUM SERPL-SCNC: 141 MMOL/L (ref 135–145)
TIBC SERPL-MCNC: 279 UG/DL (ref 250–450)
TROPONIN T SERPL-MCNC: 82 NG/L (ref 6–19)
UIBC SERPL-MCNC: 210 UG/DL (ref 110–370)
VIT B12 SERPL-MCNC: 476 PG/ML (ref 211–911)
WBC # BLD AUTO: 7.4 K/UL (ref 4.8–10.8)

## 2024-11-20 PROCEDURE — 99285 EMERGENCY DEPT VISIT HI MDM: CPT

## 2024-11-20 PROCEDURE — A9270 NON-COVERED ITEM OR SERVICE: HCPCS | Performed by: STUDENT IN AN ORGANIZED HEALTH CARE EDUCATION/TRAINING PROGRAM

## 2024-11-20 PROCEDURE — 36415 COLL VENOUS BLD VENIPUNCTURE: CPT

## 2024-11-20 PROCEDURE — 94760 N-INVAS EAR/PLS OXIMETRY 1: CPT

## 2024-11-20 PROCEDURE — 71045 X-RAY EXAM CHEST 1 VIEW: CPT

## 2024-11-20 PROCEDURE — 700105 HCHG RX REV CODE 258: Performed by: EMERGENCY MEDICINE

## 2024-11-20 PROCEDURE — 84484 ASSAY OF TROPONIN QUANT: CPT

## 2024-11-20 PROCEDURE — 85610 PROTHROMBIN TIME: CPT

## 2024-11-20 PROCEDURE — G0378 HOSPITAL OBSERVATION PER HR: HCPCS

## 2024-11-20 PROCEDURE — 82728 ASSAY OF FERRITIN: CPT

## 2024-11-20 PROCEDURE — 93005 ELECTROCARDIOGRAM TRACING: CPT | Performed by: EMERGENCY MEDICINE

## 2024-11-20 PROCEDURE — 99223 1ST HOSP IP/OBS HIGH 75: CPT | Performed by: STUDENT IN AN ORGANIZED HEALTH CARE EDUCATION/TRAINING PROGRAM

## 2024-11-20 PROCEDURE — 96374 THER/PROPH/DIAG INJ IV PUSH: CPT

## 2024-11-20 PROCEDURE — 700102 HCHG RX REV CODE 250 W/ 637 OVERRIDE(OP): Performed by: STUDENT IN AN ORGANIZED HEALTH CARE EDUCATION/TRAINING PROGRAM

## 2024-11-20 PROCEDURE — 700105 HCHG RX REV CODE 258: Performed by: STUDENT IN AN ORGANIZED HEALTH CARE EDUCATION/TRAINING PROGRAM

## 2024-11-20 PROCEDURE — 83540 ASSAY OF IRON: CPT

## 2024-11-20 PROCEDURE — 83550 IRON BINDING TEST: CPT

## 2024-11-20 PROCEDURE — 700111 HCHG RX REV CODE 636 W/ 250 OVERRIDE (IP): Mod: JZ | Performed by: EMERGENCY MEDICINE

## 2024-11-20 PROCEDURE — 80053 COMPREHEN METABOLIC PANEL: CPT

## 2024-11-20 PROCEDURE — 82607 VITAMIN B-12: CPT

## 2024-11-20 PROCEDURE — 85025 COMPLETE CBC W/AUTO DIFF WBC: CPT

## 2024-11-20 RX ORDER — SODIUM CHLORIDE 9 MG/ML
500 INJECTION, SOLUTION INTRAVENOUS ONCE
Status: COMPLETED | OUTPATIENT
Start: 2024-11-20 | End: 2024-11-20

## 2024-11-20 RX ORDER — ENOXAPARIN SODIUM 100 MG/ML
60 INJECTION SUBCUTANEOUS EVERY 12 HOURS
Status: DISCONTINUED | OUTPATIENT
Start: 2024-11-20 | End: 2024-11-20

## 2024-11-20 RX ORDER — SODIUM CHLORIDE 9 MG/ML
INJECTION, SOLUTION INTRAVENOUS CONTINUOUS
Status: DISPENSED | OUTPATIENT
Start: 2024-11-20 | End: 2024-11-21

## 2024-11-20 RX ORDER — HYDROMORPHONE HYDROCHLORIDE 1 MG/ML
0.5 INJECTION, SOLUTION INTRAMUSCULAR; INTRAVENOUS; SUBCUTANEOUS EVERY 4 HOURS PRN
Status: DISCONTINUED | OUTPATIENT
Start: 2024-11-20 | End: 2024-11-23

## 2024-11-20 RX ORDER — WARFARIN SODIUM 5 MG/1
5 TABLET ORAL DAILY
Status: DISCONTINUED | OUTPATIENT
Start: 2024-11-20 | End: 2024-11-20

## 2024-11-20 RX ORDER — ACETAMINOPHEN 325 MG/1
650 TABLET ORAL EVERY 6 HOURS PRN
Status: DISCONTINUED | OUTPATIENT
Start: 2024-11-20 | End: 2024-11-27 | Stop reason: HOSPADM

## 2024-11-20 RX ORDER — CLOPIDOGREL BISULFATE 75 MG/1
75 TABLET ORAL DAILY
Status: DISCONTINUED | OUTPATIENT
Start: 2024-11-20 | End: 2024-11-27 | Stop reason: HOSPADM

## 2024-11-20 RX ORDER — OXYCODONE HYDROCHLORIDE 5 MG/1
5 TABLET ORAL EVERY 4 HOURS PRN
Status: DISCONTINUED | OUTPATIENT
Start: 2024-11-20 | End: 2024-11-27 | Stop reason: HOSPADM

## 2024-11-20 RX ORDER — ATORVASTATIN CALCIUM 80 MG/1
80 TABLET, FILM COATED ORAL EVERY EVENING
Status: DISCONTINUED | OUTPATIENT
Start: 2024-11-20 | End: 2024-11-27 | Stop reason: HOSPADM

## 2024-11-20 RX ORDER — LEVOTHYROXINE SODIUM 50 UG/1
50 TABLET ORAL
Status: DISCONTINUED | OUTPATIENT
Start: 2024-11-20 | End: 2024-11-27 | Stop reason: HOSPADM

## 2024-11-20 RX ORDER — METOPROLOL SUCCINATE 25 MG/1
25 TABLET, EXTENDED RELEASE ORAL DAILY
Status: DISCONTINUED | OUTPATIENT
Start: 2024-11-20 | End: 2024-11-22

## 2024-11-20 RX ORDER — ASPIRIN 81 MG/1
81 TABLET, CHEWABLE ORAL DAILY
Status: DISCONTINUED | OUTPATIENT
Start: 2024-11-20 | End: 2024-11-21

## 2024-11-20 RX ORDER — MORPHINE SULFATE 4 MG/ML
4 INJECTION INTRAVENOUS ONCE
Status: COMPLETED | OUTPATIENT
Start: 2024-11-20 | End: 2024-11-20

## 2024-11-20 RX ADMIN — ASPIRIN 81 MG: 81 TABLET, CHEWABLE ORAL at 13:02

## 2024-11-20 RX ADMIN — LEVOTHYROXINE SODIUM 50 MCG: 0.05 TABLET ORAL at 13:03

## 2024-11-20 RX ADMIN — METOPROLOL SUCCINATE 25 MG: 25 TABLET, EXTENDED RELEASE ORAL at 13:03

## 2024-11-20 RX ADMIN — SODIUM CHLORIDE: 9 INJECTION, SOLUTION INTRAVENOUS at 16:43

## 2024-11-20 RX ADMIN — CLOPIDOGREL BISULFATE 75 MG: 75 TABLET ORAL at 13:03

## 2024-11-20 RX ADMIN — OXYCODONE 5 MG: 5 TABLET ORAL at 13:03

## 2024-11-20 RX ADMIN — SODIUM CHLORIDE 500 ML: 9 INJECTION, SOLUTION INTRAVENOUS at 07:52

## 2024-11-20 RX ADMIN — ATORVASTATIN CALCIUM 80 MG: 80 TABLET, FILM COATED ORAL at 19:15

## 2024-11-20 RX ADMIN — OXYCODONE 5 MG: 5 TABLET ORAL at 19:15

## 2024-11-20 RX ADMIN — MORPHINE SULFATE 4 MG: 4 INJECTION, SOLUTION INTRAMUSCULAR; INTRAVENOUS at 07:46

## 2024-11-20 ASSESSMENT — COGNITIVE AND FUNCTIONAL STATUS - GENERAL
MOBILITY SCORE: 18
SUGGESTED CMS G CODE MODIFIER DAILY ACTIVITY: CJ
STANDING UP FROM CHAIR USING ARMS: A LITTLE
SUGGESTED CMS G CODE MODIFIER MOBILITY: CK
TURNING FROM BACK TO SIDE WHILE IN FLAT BAD: A LITTLE
CLIMB 3 TO 5 STEPS WITH RAILING: A LITTLE
DAILY ACTIVITIY SCORE: 22
MOVING FROM LYING ON BACK TO SITTING ON SIDE OF FLAT BED: A LITTLE
TOILETING: A LITTLE
DRESSING REGULAR LOWER BODY CLOTHING: A LITTLE
WALKING IN HOSPITAL ROOM: A LITTLE
MOVING TO AND FROM BED TO CHAIR: A LITTLE

## 2024-11-20 ASSESSMENT — ENCOUNTER SYMPTOMS
HEARTBURN: 0
FEVER: 0
VOMITING: 0
ABDOMINAL PAIN: 0
NAUSEA: 0
CHILLS: 0
DIZZINESS: 0
SORE THROAT: 0
WEAKNESS: 1

## 2024-11-20 ASSESSMENT — PAIN DESCRIPTION - PAIN TYPE
TYPE: ACUTE PAIN

## 2024-11-20 ASSESSMENT — SOCIAL DETERMINANTS OF HEALTH (SDOH)
WITHIN THE LAST YEAR, HAVE TO BEEN RAPED OR FORCED TO HAVE ANY KIND OF SEXUAL ACTIVITY BY YOUR PARTNER OR EX-PARTNER?: NO
WITHIN THE PAST 12 MONTHS, YOU WORRIED THAT YOUR FOOD WOULD RUN OUT BEFORE YOU GOT THE MONEY TO BUY MORE: NEVER TRUE
WITHIN THE LAST YEAR, HAVE YOU BEEN HUMILIATED OR EMOTIONALLY ABUSED IN OTHER WAYS BY YOUR PARTNER OR EX-PARTNER?: NO
WITHIN THE PAST 12 MONTHS, THE FOOD YOU BOUGHT JUST DIDN'T LAST AND YOU DIDN'T HAVE MONEY TO GET MORE: NEVER TRUE
WITHIN THE LAST YEAR, HAVE YOU BEEN AFRAID OF YOUR PARTNER OR EX-PARTNER?: NO
IN THE PAST 12 MONTHS, HAS THE ELECTRIC, GAS, OIL, OR WATER COMPANY THREATENED TO SHUT OFF SERVICE IN YOUR HOME?: NO
WITHIN THE LAST YEAR, HAVE YOU BEEN KICKED, HIT, SLAPPED, OR OTHERWISE PHYSICALLY HURT BY YOUR PARTNER OR EX-PARTNER?: NO

## 2024-11-20 ASSESSMENT — LIFESTYLE VARIABLES
DOES PATIENT WANT TO STOP DRINKING: NO
AVERAGE NUMBER OF DAYS PER WEEK YOU HAVE A DRINK CONTAINING ALCOHOL: 0
HOW MANY TIMES IN THE PAST YEAR HAVE YOU HAD 5 OR MORE DRINKS IN A DAY: 0
EVER FELT BAD OR GUILTY ABOUT YOUR DRINKING: NO
ON A TYPICAL DAY WHEN YOU DRINK ALCOHOL HOW MANY DRINKS DO YOU HAVE: 0
EVER HAD A DRINK FIRST THING IN THE MORNING TO STEADY YOUR NERVES TO GET RID OF A HANGOVER: NO
TOTAL SCORE: 0
TOTAL SCORE: 0
CONSUMPTION TOTAL: NEGATIVE
ALCOHOL_USE: NO
TOTAL SCORE: 0
HAVE YOU EVER FELT YOU SHOULD CUT DOWN ON YOUR DRINKING: NO
HAVE PEOPLE ANNOYED YOU BY CRITICIZING YOUR DRINKING: NO

## 2024-11-20 ASSESSMENT — FIBROSIS 4 INDEX
FIB4 SCORE: 3.8
FIB4 SCORE: 3.8
FIB4 SCORE: 3.33

## 2024-11-20 NOTE — ED NOTES
Rounded on pt. Pt groaning in pain from R hip cath site and requesting pain medication. ERP made aware and orders received. Medicated pt with 4 mg of morphine and provided with warm blanket. Started NS per MAR. Pt reports decrease in pain from 9/10 to 7/10 after a few minutes of morphine. Call light in reach.

## 2024-11-20 NOTE — ASSESSMENT & PLAN NOTE
Patient was recently discharge from hospital after normal workup up for presyncope on 11/19  When he went home he reports feeling tired, fatigue, and feel like he is going to faint  Denies chest pain   No LOC  No Fall   Most recent CT head with no acute pathology   His pacemaker was recently interrogated with no no issue   Looks slightly dehydrated on exam     Check orthostatics (modified with just laying and sitting until cleared by PT/OT)  Judicious IV fluids use in given hx of CHF  Will obtain PT/OT  SNF evaluation

## 2024-11-20 NOTE — ASSESSMENT & PLAN NOTE
Secondary to large retroperitoneal hematoma  Monitor closely for additional transfusion needs  -Transfused additional unit of PRBC 11/25

## 2024-11-20 NOTE — THERAPY
"Occupational Therapy   Initial Evaluation     Patient Name: Dilan Calderón  Age:  84 y.o., Sex:  male  Medical Record #: [id number]  Today's Date: 11/19/2024     Precautions  Precautions: (P) Fall Risk    Assessment  Patient is 84 y.o. male who presents to acute for multiple syncopal episodes. PMH includes recent NSTEMI w/ stent placement and micra pacer placement. Pt demo'd BADLs at SPV level. No further acute OT needs noted at this time. Recommend DC home.     Plan    Occupational Therapy Initial Treatment Plan   Duration: (P) Evaluation only    DC Equipment Recommendations: (P) None  Discharge Recommendations: (P) Anticipate that the patient will have no further occupational therapy needs after discharge from the hospital     Subjective    \"Maybe I can get a life alert\"     Objective      Initial Contact Note    Initial Contact Note Order Received and Verified, Occupational Therapy Evaluation in Progress with Full Report to Follow.   Prior Living Situation   Prior Services None   Housing / Facility 1 Story House   Bathroom Set up Walk In Shower   Equipment Owned None   Lives with - Patient's Self Care Capacity Alone and Able to Care For Self   Comments Pt endorses no social supports, does not have cell phone, only landline   Prior Level of ADL Function   Self Feeding Independent   Grooming / Hygiene Independent   Bathing Independent   Dressing Independent   Toileting Independent   Prior Level of IADL Function   Medication Management Independent   Laundry Independent   Kitchen Mobility Independent   Finances Independent   Home Management Independent   Shopping Independent   Prior Level Of Mobility Independent Without Device in Community;Independent Without Device in Home   Driving / Transportation Driving Independent   Precautions   Precautions Fall Risk   Vitals   O2 (LPM) 0   O2 Delivery Device None - Room Air   Pain 0 - 10 Group   Therapist Pain Assessment Post Activity Pain Same as Prior to Activity;Nurse " Notified  (no c/o pain during session)   Cognition    Cognition / Consciousness WDL   Level of Consciousness Alert   Comments pleasent and cooperative   Active ROM Upper Body   Active ROM Upper Body  WDL   Strength Upper Body   Upper Body Strength  WDL   Coordination Upper Body   Coordination WDL   Balance Assessment   Sitting Balance (Static) Good   Sitting Balance (Dynamic) Good   Standing Balance (Static) Fair +   Standing Balance (Dynamic) Fair +   Weight Shift Sitting Good   Weight Shift Standing Good   Comments no AD, no LOB   Bed Mobility    Supine to Sit Supervised   Sit to Supine   (NT in chair post)   Scooting Supervised   ADL Assessment   Grooming Supervision;Standing   Lower Body Dressing Supervision  (cues to don pants in sitting)   Toileting Supervision   How much help from another person does the patient currently need...   Putting on and taking off regular lower body clothing? 4   Bathing (including washing, rinsing, and drying)? 4   Toileting, which includes using a toilet, bedpan, or urinal? 4   Putting on and taking off regular upper body clothing? 4   Taking care of personal grooming such as brushing teeth? 4   Eating meals? 4   6 Clicks Daily Activity Score 24   Functional Mobility   Sit to Stand Supervised   Bed, Chair, Wheelchair Transfer Supervised   Toilet Transfers Supervised   Mobility within room and bathroom, no AD   Activity Tolerance   Sitting in Chair 10+ min (up post)   Sitting Edge of Bed 5 min   Standing 5 min   Education Group   Role of Occupational Therapist Patient Response Patient;Acceptance;Explanation;Demonstration;Verbal Demonstration;Action Demonstration   Occupational Therapy Initial Treatment Plan    Duration Evaluation only   Problem List   Problem List None   Anticipated Discharge Equipment and Recommendations   DC Equipment Recommendations None   Discharge Recommendations Anticipate that the patient will have no further occupational therapy needs after discharge from  the [facility]   Interdisciplinary Plan of Care Collaboration   IDT Collaboration with  Nursing   Patient Position at End of Therapy Seated;Call Light within Reach;Tray Table within Reach;Phone within Reach   Collaboration Comments report given   Session Information   Date / Session Number  11/19, 1x/only

## 2024-11-20 NOTE — ASSESSMENT & PLAN NOTE
-known.   - restarting warfarin with heparin bridge - as hemoglobin is stable.  - INR therapeutic range 2-3  -monitor for bleeding  -repeat ECHO this admission shows decrease in size from prior ECHO

## 2024-11-20 NOTE — H&P
Hospital Medicine History & Physical Note    Date of Service  11/20/2024    Primary Care Physician  Pcp Pt States None    Consultants  none      Code Status  Full Code    Chief Complaint  Chief Complaint   Patient presents with    Wound Check    Fatigue     Reports bleeding from stitches on right side eyebrow upon waking up at 3am today, stitches from GLF 2 weeks ago. Patient states feeling fatigue for 2 days. Here recently with same. Hx of GLF, and had stents 2 weeks ago. On blood thinners    Post-Op Pain     Intermittent pain since surgery (implant) 2 weeks ago       History of Presenting Illness  Dilan Calderón is a 84 y.o. male who presented 11/20/2024 with past medical history of CAD status post stent placement to the LAD on May 24, C on 11/12 with stent to the left circumflex, Pacemaker HFrEF, LV mural thrombus on coumadin,    who presented to the ED for fatigue and bleeding from the right sided eyebrow and right groin pain from previous procedure site. He reports feeling dizzy. He denies chest pain. Denies left arm or jaw pain. Patient was discharged from hospital on 11/19. He had normal presyncopal workup  but reports when he went home he was not feeling well. He reports feeling fatigue, weak, and felt like he is going to faint and fall. He lives alone.     In emergency department, patient is hemodynamically stable.  On room air.  CBC showed hemoglobin of 9.2, .  CMP showed glucose of 135, BUN 24, creatinine 1.46, GFR 48, AST 59, alk phos 118.  Troponin 82.  EKG showed sinus rhythm with anterior infarct which is old.  Chest x-ray with no acute pathology.  I was requested to admit patient to the hospital for further management.        I discussed the plan of care with patient.    Review of Systems  Review of Systems   Constitutional:  Negative for chills and fever.   HENT:  Negative for congestion and sore throat.    Cardiovascular:  Negative for chest pain and leg swelling.   Gastrointestinal:   Negative for abdominal pain, heartburn, nausea and vomiting.   Genitourinary:  Negative for dysuria, frequency and urgency.   Neurological:  Positive for weakness. Negative for dizziness.       Past Medical History   has a past medical history of NSTEMI (non-ST elevated myocardial infarction) (HCC).    Surgical History   has a past surgical history that includes pr upper gi endoscopy,diagnosis (N/A, 03/06/2024); pr colonoscopy,diagnostic (N/A, 03/06/2024); pr upper gi endoscopy,w/dilat,gastric out (N/A, 03/06/2024); and other cardiac surgery (11/10/2024).     Family History  family history is not on file.   Family history reviewed with patient. There is no family history that is pertinent to the chief complaint.     Social History   reports that he has quit smoking. His smoking use included cigarettes. He has never used smokeless tobacco. He reports that he does not currently use alcohol. He reports that he does not use drugs.    Allergies  No Known Allergies    Medications  Prior to Admission Medications   Prescriptions Last Dose Informant Patient Reported? Taking?   Multiple Vitamins-Minerals (CENTRUM SILVER 50+MEN) Tab  Patient Yes No   Sig: Take 1 Tablet by mouth every day.   NON SPECIFIED  Patient Yes No   Sig: Take 1 Tablet by mouth every day. OTC allergy med   acetaminophen (TYLENOL) 500 MG Tab  Patient Yes No   Sig: Take 500-1,000 mg by mouth 2 times a day as needed.   aspirin (ASA) 81 MG Chew Tab chewable tablet   No No   Sig: Chew 1 Tablet every day. Stop once INR therapeutic and off lovenox injections   atorvastatin (LIPITOR) 80 MG tablet   No No   Sig: Take 1 Tablet by mouth every evening.   clopidogrel (PLAVIX) 75 MG Tab   No No   Sig: Take 1 Tablet by mouth every day.   enoxaparin (LOVENOX) 60 MG/0.6ML Solution Prefilled Syringe inj   No No   Sig: Inject 1 syringe (60 mg) under the skin every 12 hours.   levothyroxine (SYNTHROID) 50 MCG Tab   No No   Sig: Take 1 Tablet by mouth every morning on an  empty stomach.   metoprolol SR (TOPROL XL) 25 MG TABLET SR 24 HR   No No   Sig: Take 1 Tablet by mouth every day.   warfarin (COUMADIN) 5 MG Tab   No No   Sig: Take 1 Tablet by mouth every day at 6 PM. Further dosing per anticoagulation clinic      Facility-Administered Medications: None       Physical Exam  Temp:  [35.9 °C (96.7 °F)-36.8 °C (98.2 °F)] 36.8 °C (98.2 °F)  Pulse:  [56-98] 98  Resp:  [14-26] 16  BP: (122-155)/(62-81) 127/73  SpO2:  [94 %-99 %] 97 %  Blood Pressure : (!) 155/81   Temperature: 35.9 °C (96.7 °F)   Pulse: 91   Respiration: 18   Pulse Oximetry: 98 %       Physical Exam  Constitutional:       Appearance: Normal appearance.   HENT:      Head: Normocephalic.      Nose: Nose normal.      Mouth/Throat:      Mouth: Mucous membranes are moist.   Eyes:      Extraocular Movements: Extraocular movements intact.      Pupils: Pupils are equal, round, and reactive to light.      Comments: Right upper eye lid laceration with suture in place   No bleeding noticed   Cardiovascular:      Rate and Rhythm: Normal rate and regular rhythm.   Pulmonary:      Effort: Pulmonary effort is normal.      Breath sounds: Normal breath sounds.   Abdominal:      General: Abdomen is flat.      Palpations: Abdomen is soft.   Musculoskeletal:         General: Normal range of motion.      Comments: Right groin pain   No bleeding noticed    Skin:     General: Skin is warm and dry.   Neurological:      General: No focal deficit present.      Mental Status: He is alert and oriented to person, place, and time.   Psychiatric:         Mood and Affect: Mood normal.         Laboratory:  Recent Labs     11/19/24  0200 11/20/24  0508   WBC 7.0 7.4   RBC 3.06* 2.72*   HEMOGLOBIN 10.1* 9.2*   HEMATOCRIT 30.9* 27.3*   .0* 100.4*   MCH 33.0 33.8*   MCHC 32.7 33.7   RDW 52.2* 52.0*   PLATELETCT 163* 192   MPV 12.3 11.9     Recent Labs     11/19/24  0200 11/20/24  0508   SODIUM 144 141   POTASSIUM 3.6 3.9   CHLORIDE 109 107   CO2 22  22   GLUCOSE 100* 135*   BUN 23* 24*   CREATININE 1.17 1.46*   CALCIUM 8.4* 8.5     Recent Labs     11/19/24  0200 11/20/24  0508   ALTSGPT 33 44   ASTSGOT 38 59*   ALKPHOSPHAT 112* 118*   TBILIRUBIN 0.4 0.4   GLUCOSE 100* 135*     Recent Labs     11/19/24  0200 11/20/24  0508 11/20/24  0923   INR 5.22* 4.00* 3.88*     Recent Labs     11/19/24  0200   NTPROBNP 1877*         Recent Labs     11/19/24  0200 11/20/24  0508   TROPONINT 99* 82*       Imaging:  DX-CHEST-PORTABLE (1 VIEW)   Final Result         1.  No acute cardiopulmonary disease.   2.  Cardiomegaly   3.  Atherosclerosis              Assessment/Plan:  Justification for Admission Status  I anticipate this patient is appropriate for observation status at this time because of presyncope, right upper eye lid bleeding and right groin pain.      Patient will need a Telemetry bed on EMERGENCY service .  The need is secondary to above .    * Pre-syncope- (present on admission)  Assessment & Plan  Patient was recently discharge from hospital after normal workup up for presyncope on 11/19  When he went home he reports feeling tired, fatigue, and feel like he is going to faint  Denies chest pain   No LOC  No Fall   Most recent CT head with no acute pathology   His pacemaker was recently interrogated with no no issue   Looks slightly dehydrated on exam     Check orthostatics  Judicious IV fluids use in given hx of CHF  Will obtain PT/OT  SNF evaluation       Groin pain, right  Assessment & Plan  Patient had LHC on 11/12 and complaining of groin pain  I don't see any bleeding. Has good pedal pulse   Pain medication as needed     Acute renal failure (ARF) ([facility])  Assessment & Plan  Creatinine on 11/19 1.17  Now slightly bump in Cr. 1.46  Looks dehydrated on exam   Avoid nephrotoxin agent  Renally dose medication   Judicious IV fluids   Repeat BMP in am     Coronary artery disease- (present on admission)  Assessment & Plan  Status post stent to the LAD in may 2024 and   [facility] on 11/12 with stent to the left circumflex  Continue aspirin and plavix    LV (left ventricular) mural thrombus- (present on admission)  Assessment & Plan  On Lovenox to warfarin bridge.  Warfarin dosed per pharmacy    Chronic systolic heart failure (HCC)- (present on admission)  Assessment & Plan  Not in acute exacerbation   Last Echo showed EF of EF of 45%  Goal directed medical therapy as tolerated       Macrocytic anemia  Assessment & Plan  Check B 12 and iron panel   Monitor for bleeding   Monitor hgb   Cbc in am         VTE prophylaxis: SCD/DEBI

## 2024-11-20 NOTE — ED PROVIDER NOTES
"ED Provider Note    CHIEF COMPLAINT  Chief Complaint   Patient presents with    Wound Check    Fatigue     Reports bleeding from stitches on right side eyebrow upon waking up at 3am today, stitches from GLF 2 weeks ago. Patient states feeling fatigue for 2 days. Here recently with same. Hx of GLF, and had stents 2 weeks ago. On blood thinners    Post-Op Pain     Intermittent pain since surgery (implant) 2 weeks ago       EXTERNAL RECORDS REVIEWED    Discharge summary from patient's recent hospitalization is reviewed.  He presented on November 19, discharged the following day.  [facility].  History of recent non-ST elevation MI and stent placement on November 10.  Patient was admitted to the hospital in November to 10th, discharged on November 14.  He was diagnosed with a left mural thrombus and ST elevation infarct involving the LAD.  Patient apparently, has a history of LAD stenting from May of this year.  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  He underwent Micra pacemaker placement.  EF was noted to be 45%.  Due to thrombus, he was started on Lovenox and Coumadin along with his dual antiplatelet therapy.    Hospitalization in May of this year.  Patient presented May 2, was discharged on the seventh for ST elevation MI.  It is at this time, that the patient underwent stenting of his LAD.  At that time, he was noted to have a reduced ejection fraction of 38%.    HPI/ROS  LIMITATION TO HISTORY   Select: : None  OUTSIDE HISTORIAN(S):  EMS no interventions in route    Dilan Calderón is a 84 y.o. male who presents to the emergency department via EMS.  No apparent interventions in route.  Patient states he was discharged from the hospital yesterday about 3 PM.  States he did not feel \"100%\" but was told that he was ready to go home.  He lives alone.  He took a cab " home and immediately rested.  He was able to do some chores yesterday evening.  He went to bed.  Woke up at 3:00 this morning with bleeding from his right eyebrow from a fall a few weeks ago.  He reports being on anticoagulation and had difficulty getting the bleeding to stop.  He states he was recently hospitalized for 2 nights.  He believes he had a pacemaker, defibrillator implanted.  He generally, feels weak and fatigued and like he is going to faint.  He denies having any chest pain or shortness of breath.    PAST MEDICAL HISTORY   has a past medical history of NSTEMI (non-ST elevated myocardial infarction) (HCC).    SURGICAL HISTORY   has a past surgical history that includes upper gi endoscopy,diagnosis (N/A, 03/06/2024); colonoscopy,diagnostic (N/A, 03/06/2024); upper gi endoscopy,w/dilat,gastric out (N/A, 03/06/2024); and other cardiac surgery (11/10/2024).    FAMILY HISTORY  No family history on file.    SOCIAL HISTORY  Social History     Tobacco Use    Smoking status: Former     Types: Cigarettes    Smokeless tobacco: Never   Vaping Use    Vaping status: Never Used   Substance and Sexual Activity    Alcohol use: Not Currently    Drug use: Never    Sexual activity: Not on file       CURRENT MEDICATIONS  Home Medications       Reviewed by Amarjit De La Paz (Pharmacy Tech) on 11/20/24 at 0820  Med List Status: Complete     Medication Last Dose Status   acetaminophen (TYLENOL) 500 MG Tab 11/20/2024 Active   aspirin (ASA) 81 MG Chew Tab chewable tablet Unknown Active   atorvastatin (LIPITOR) 80 MG tablet 11/19/2024 Active   clopidogrel (PLAVIX) 75 MG Tab Unknown Active   enoxaparin (LOVENOX) 60 MG/0.6ML Solution Prefilled Syringe inj 11/19/2024 Active   levothyroxine (SYNTHROID) 50 MCG Tab Unknown Active   metoprolol SR (TOPROL XL) 25 MG TABLET SR 24 HR Unknown Active   multivitamin Tab Unknown Active   NON SPECIFIED Unknown Active   warfarin (COUMADIN) 5 MG Tab Unknown Active                  Audit from  "Redirected Encounters    **Home medications have not yet been reviewed for this encounter**         ALLERGIES  No Known Allergies    PHYSICAL EXAM  VITAL SIGNS: /72   Pulse 83   Temp 36.4 °C (97.5 °F) (Temporal)   Resp 14   Ht 1.702 m (5' 7\")   Wt 61.4 kg (135 lb 5.8 oz)   SpO2 96%   BMI 21.20 kg/m²    Vitals reviewed.  Constitutional: Patient is oriented to person, place, and time. Appears well-developed and well-nourished. Mild distress.    Head: Normocephalic. Hematoma right eyebrow, sutures in place.  No active bleeding.  Ears: Normal external ears bilaterally.   Mouth/Throat: Oropharynx is clear and moist  Eyes: Conjunctivae are normal.   Neck: Normal range of motion. Neck supple.   Cardiovascular: Normal rate, regular rhythm and normal heart sounds. Normal peripheral pulses.  Pulmonary/Chest: Effort normal and breath sounds normal. No respiratory distress, no wheezes, rhonchi, or rales. No chest wall tenderness.  Abdominal: Soft. Bowel sounds are normal. There is no tenderness, rebound or guarding, or peritoneal signs. No CVA tenderness.  Musculoskeletal: No edema and no tenderness. TTP right inguinal area, at site of left heart cath, puncture site.  Lymphadenopathy: No cervical adenopathy.   Neurological: Patient is alert and oriented to person, place, and time. No cranial nerve deficits. Normal motor and sensory exam. No focal deficits.   Skin: Skin is warm and dry. No erythema. No pallor.   Psychiatric: Patient has a normal mood and affect.     EKG/LABS  Results for orders placed or performed during the hospital encounter of 11/20/24   CBC w/ Differential    Collection Time: 11/20/24  5:08 AM   Result Value Ref Range    WBC 7.4 4.8 - 10.8 K/uL    RBC 2.72 (L) 4.70 - 6.10 M/uL    Hemoglobin 9.2 (L) 14.0 - 18.0 g/dL    Hematocrit 27.3 (L) 42.0 - 52.0 %    .4 (H) 81.4 - 97.8 fL    MCH 33.8 (H) 27.0 - 33.0 pg    MCHC 33.7 32.3 - 36.5 g/dL    RDW 52.0 (H) 35.9 - 50.0 fL    Platelet Count 192 " 164 - 446 K/uL    MPV 11.9 9.0 - 12.9 fL    Neutrophils-Polys 63.90 44.00 - 72.00 %    Lymphocytes 26.70 22.00 - 41.00 %    Monocytes 8.10 0.00 - 13.40 %    Eosinophils 0.50 0.00 - 6.90 %    Basophils 0.30 0.00 - 1.80 %    Immature Granulocytes 0.50 0.00 - 0.90 %    Nucleated RBC 0.00 0.00 - 0.20 /100 WBC    Neutrophils (Absolute) 4.73 1.82 - 7.42 K/uL    Lymphs (Absolute) 1.98 1.00 - 4.80 K/uL    Monos (Absolute) 0.60 0.00 - 0.85 K/uL    Eos (Absolute) 0.04 0.00 - 0.51 K/uL    Baso (Absolute) 0.02 0.00 - 0.12 K/uL    Immature Granulocytes (abs) 0.04 0.00 - 0.11 K/uL    NRBC (Absolute) 0.00 K/uL   Complete Metabolic Panel (CMP)    Collection Time: 11/20/24  5:08 AM   Result Value Ref Range    Sodium 141 135 - 145 mmol/L    Potassium 3.9 3.6 - 5.5 mmol/L    Chloride 107 96 - 112 mmol/L    Co2 22 20 - 33 mmol/L    Anion Gap 12.0 7.0 - 16.0    Glucose 135 (H) 65 - 99 mg/dL    Bun 24 (H) 8 - 22 mg/dL    Creatinine 1.46 (H) 0.50 - 1.40 mg/dL    Calcium 8.5 8.5 - 10.5 mg/dL    Correct Calcium 8.7 8.5 - 10.5 mg/dL    AST(SGOT) 59 (H) 12 - 45 U/L    ALT(SGPT) 44 2 - 50 U/L    Alkaline Phosphatase 118 (H) 30 - 99 U/L    Total Bilirubin 0.4 0.1 - 1.5 mg/dL    Albumin 3.8 3.2 - 4.9 g/dL    Total Protein 6.4 6.0 - 8.2 g/dL    Globulin 2.6 1.9 - 3.5 g/dL    A-G Ratio 1.5 g/dL   TROPONIN    Collection Time: 11/20/24  5:08 AM   Result Value Ref Range    Troponin T 82 (H) 6 - 19 ng/L   PT/INR    Collection Time: 11/20/24  5:08 AM   Result Value Ref Range    PT 39.3 (H) 12.0 - 14.6 sec    INR 4.00 (H) 0.87 - 1.13   ESTIMATED GFR    Collection Time: 11/20/24  5:08 AM   Result Value Ref Range    GFR (CKD-EPI) 48 (A) >60 mL/min/1.73 m 2   VITAMIN B12    Collection Time: 11/20/24  5:08 AM   Result Value Ref Range    Vitamin B12 -True Cobalamin 476 211 - 911 pg/mL   IRON/TOTAL IRON BIND    Collection Time: 11/20/24  5:08 AM   Result Value Ref Range    Iron 69 50 - 180 ug/dL    Total Iron Binding 279 250 - 450 ug/dL    Unsat Iron Binding  210 110 - 370 ug/dL    % Saturation 25 15 - 55 %   FERRITIN    Collection Time: 24  5:08 AM   Result Value Ref Range    Ferritin 79.2 22.0 - 322.0 ng/mL   EKG    Collection Time: 24  5:35 AM   Result Value Ref Range    Report       [facility] Emergency Dept.    Test Date:  2024  Pt Name:    AMY KERR              Department: ER  MRN:        [id number]                      Room:       [facility]  Gender:     Male                         Technician: [id number]  :        [id number]                   Requested By:BONI MORALES  Order #:    [id number]                    Reading MD:    Measurements  Intervals                                Axis  Rate:       62                           P:          59  UT:         173                          QRS:        -30  QRSD:       100                          T:          35  QT:         403  QTc:        410    Interpretive Statements  Sinus rhythm  Left axis deviation  Anterior infarct, old  ST elevation, consider inferior injury  Baseline wander in lead(s) V5  Compared to ECG 2024 02:10:15  Left-axis deviation now present  ST (T wave) deviation now present  Atrial premature complex(es) no longer present  Myocardial infarct finding still present     Prothrombin Time    Collection Time: 24  9:23 AM   Result Value Ref Range    PT 38.4 (H) 12.0 - 14.6 sec    INR 3.88 (H) 0.87 - 1.13     I have independently interpreted this EKG    RADIOLOGY/PROCEDURES   I have independently interpreted the diagnostic imaging associated with this visit and am waiting the final reading from the radiologist.   My preliminary interpretation is as follows: CM, no new infiltrate    Radiologist interpretation:  DX-CHEST-PORTABLE (1 VIEW)   Final Result         1.  No acute cardiopulmonary disease.   2.  Cardiomegaly   3.  Atherosclerosis          COURSE & MEDICAL DECISION MAKING    ASSESSMENT, COURSE AND PLAN  Care Narrative:     This is a pleasant 84-year-old  male.  He lives alone.  He has had multiple hospitalizations over the last approximately 6 months.  He has had 2 left heart cath in that time.  He underwent LAD stenting in May of this year.  He underwent pacemaker placement at his most recent hospitalization, earlier this month.  He still not feeling well.  Still experiencing syncope, near syncope.  He is anticoagulated, has a laceration which has sutures in place over his right eyebrow.  Generally feeling weak and tired but not experiencing pain.    8:01 AM D/W hospitalist, Dr. Elizabeth regarding patient's presentation, workup here in the ED.  He is requesting interrogation of the patient's pacemaker which have relayed to nursing staff and they will investigate how this can be done.  It is not a typical pacemaker will call the .  I relayed this plan of care to nursing staff as well as the patient.  No change in patient condition.  Admitted in stable but guarded condition.    ADDITIONAL PROBLEMS MANAGED    DISPOSITION AND DISCUSSIONS  I have discussed management of the patient with the following physicians and ADELAIDE's:  Hospitalist    Discussion of management with other QHP or appropriate source(s): None     Escalation of care considered, and ultimately not performed:diagnostic imaging, after discussion with hospitalist, will hold off on imaging of the right groin at this time.  Anticipate improvement in his creatinine after IV fluid resuscitation and can reassess need for imaging of this area.    Barriers to care at this time, including but not limited to: None.   FINAL DIAGNOSIS  1. Near syncope    2. Generalized weakness    3. MARCEL (acute kidney injury) ([facility])         Electronically signed by: Sandra Agarwal D.O., 11/20/2024 5:00 AM

## 2024-11-20 NOTE — ED TRIAGE NOTES
"Chief Complaint   Patient presents with    Wound Check    Fatigue     Reports bleeding from stitches on right side eyebrow upon waking up at 3am today, stitches from GLF 2 weeks ago. Patient states feeling fatigue for 2 days. Here recently with same. Hx of GLF, and had stents 2 weeks ago. On blood thinners    Post-Op Pain     Intermittent pain since surgery (implant) 2 weeks ago     Patient BIB EMS from home.     /67   Pulse 93   Temp 35.9 °C (96.7 °F) (Temporal)   Resp 18   Ht 1.702 m (5' 7\")   Wt 61.4 kg (135 lb 5.8 oz)   SpO2 97%   BMI 21.20 kg/m²     Pain: 10/10    Pt came in to triage ambulatory with steady gait for the above complaints.     Pt is alert and oriented x 4, speaking in full sentences, follows commands and responds appropriately to questions.     Respirations are even and unlabored.    Pt placed in lobby. Pt educated on triage process.     Pt encouraged to inform staff for any changes in condition or if needs help while waiting to be room in.    "

## 2024-11-20 NOTE — PROGRESS NOTES
Inpatient Anticoagulation Service Note for 11/20/2024      Reason for Anticoagulation: Other (Comments) (LV thrombus)     Hemoglobin Value: (!) 9.2  Hematocrit Value: (!) 27.3  Lab Platelet Value: 192  Target INR: 2.0 to 3.0    INR from last 7 days        Date/Time INR Value    11/20/24 0508 4                   Dose from last 7 days        5 mg Warfarin daily w/ 60 mg Lovenox BID                   Significant Interactions: Antiplatelet Medications (Patient on DAPT PTA)  Bridge Therapy: No (If less than 5 days and overlap therapy discontinued -- document reason (i.e. Bleed Risk))    (If still on overlap therapy, if No -- document reason (i.e. Bleed Risk))    Reversal Agent Administered: Not Applicable  Comments: Patient presents to the ED for syncopal episode. PMH includes CAD, NSTEMI, HFrEF. Patient recently went to cardiac Cath Lab on 11/12 and had a successful proximal left  coronary artery stent placed. Patient also had a pacemaker placed on 11/12.  During evaluation he was found to have a ventricular thrombus and was initiated on Lovenox and Coumadin along with his dual antiplatelet therapy. Patient has not seen the Coumadin clinic since discharge on the 14th. Has continued to take 5 mg Warfarin and lovenox without guideance, INR now 4.0. Discussed plan with ERP to discontinue Lovenox and hold warfarin for tonight. Please continue to monitor and adjust anticoagulation as need.    Plan:  Hold tonight's dose of Warfarin and discontinue Lovenox.   Education Material Provided?: Yes (Counseled patient about Warfarin basics.)    Pharmacist suggested discharge dosing: Patient may need to alternate between 2.5 mg and 5 mg since he became supra-therapeutic while on 5 mg daily for a week. Ensure follow up with Coumadin Clinic.      Abbie Noe, FanD

## 2024-11-20 NOTE — ED NOTES
Med rec is complete per Patient at bedside.  Pt does not remember all of his medications or last doses.   Pt just discharged yesterday and manages his own medication.  Allergies reviewed.    Has patient had any outside antibiotics in the last 30 days? unk    Any Anticoagulants (rivaroxaban, apixaban, edoxaban, dabigatran, warfarin, enoxaparin) taken in the last 14 days? Y  Anticoagulant name: Lovenox, Last dose: 11/19/24 @ pm and Warfarin =unknown last dose.        Pharmacy/Pharmacies Pt utilizes : [facility]

## 2024-11-20 NOTE — CARE PLAN
The patient is Watcher - Medium risk of patient condition declining or worsening         Progress made toward(s) clinical / shift goals:    Problem: Pain - Standard  Goal: Alleviation of pain or a reduction in pain to the patient’s comfort goal  Outcome: Progressing     Problem: Knowledge Deficit - Standard  Goal: Patient and family/care givers will demonstrate understanding of plan of care, disease process/condition, diagnostic tests and medications  Outcome: Progressing     Problem: Fall Risk  Goal: Patient will remain free from falls  Outcome: Progressing     Problem: Communication  Goal: The ability to communicate needs accurately and effectively will improve  Outcome: Progressing     Problem: Safety  Goal: Will remain free from injury  Outcome: Progressing  Goal: Will remain free from falls  Outcome: Progressing     Problem: Pain Management  Goal: Pain level will decrease to patient's comfort goal  Outcome: Progressing     Problem: Infection  Goal: Will remain free from infection  Outcome: Progressing     Problem: Venous Thromboembolism (VTW)/Deep Vein Thrombosis (DVT) Prevention:  Goal: Patient will participate in Venous Thrombosis (VTE)/Deep Vein Thrombosis (DVT)Prevention Measures  Outcome: Progressing     Problem: Psychosocial Needs:  Goal: Level of anxiety will decrease  Outcome: Progressing     Problem: Fluid Volume:  Goal: Will maintain balanced intake and output  Outcome: Progressing     Problem: Respiratory:  Goal: Respiratory status will improve  Outcome: Progressing     Problem: Bowel/Gastric:  Goal: Normal bowel function is maintained or improved  Outcome: Progressing  Goal: Will not experience complications related to bowel motility  Outcome: Progressing     Problem: Urinary Elimination:  Goal: Ability to reestablish a normal urinary elimination pattern will improve  Outcome: Progressing     Problem: Skin Integrity  Goal: Risk for impaired skin integrity will decrease  Outcome: Progressing      Problem: Mobility  Goal: Risk for activity intolerance will decrease  Outcome: Progressing     Problem: Medication  Goal: Compliance with prescribed medication will improve  Outcome: Progressing     Problem: Knowledge Deficit  Goal: Knowledge of disease process/condition, treatment plan, diagnostic tests, and medications will improve  Outcome: Progressing  Goal: Knowledge of the prescribed therapeutic regimen will improve  Outcome: Progressing     Problem: Discharge Barriers/Planning  Goal: Patient's continuum of care needs will be met  Outcome: Progressing       Patient is not progressing towards the following goals:

## 2024-11-21 ENCOUNTER — APPOINTMENT (OUTPATIENT)
Dept: RADIOLOGY | Facility: MEDICAL CENTER | Age: 84
End: 2024-11-21
Attending: INTERNAL MEDICINE
Payer: MEDICARE

## 2024-11-21 ENCOUNTER — APPOINTMENT (OUTPATIENT)
Dept: CARDIOLOGY | Facility: MEDICAL CENTER | Age: 84
End: 2024-11-21
Attending: INTERNAL MEDICINE
Payer: MEDICARE

## 2024-11-21 PROBLEM — S01.111A LACERATION OF RIGHT EYEBROW: Status: ACTIVE | Noted: 2024-11-21

## 2024-11-21 PROBLEM — S09.90XA HEAD TRAUMA: Status: ACTIVE | Noted: 2024-11-21

## 2024-11-21 LAB
ABO GROUP BLD: NORMAL
ALBUMIN SERPL BCP-MCNC: 3.7 G/DL (ref 3.2–4.9)
ALBUMIN/GLOB SERPL: 1.5 G/DL
ALP SERPL-CCNC: 107 U/L (ref 30–99)
ALT SERPL-CCNC: 45 U/L (ref 2–50)
ANION GAP SERPL CALC-SCNC: 14 MMOL/L (ref 7–16)
APPEARANCE UR: CLEAR
AST SERPL-CCNC: 82 U/L (ref 12–45)
BARCODED ABORH UBTYP: 8400
BARCODED PRD CODE UBPRD: NORMAL
BARCODED UNIT NUM UBUNT: NORMAL
BILIRUB SERPL-MCNC: 0.4 MG/DL (ref 0.1–1.5)
BILIRUB UR QL STRIP.AUTO: NEGATIVE
BLD GP AB SCN SERPL QL: NORMAL
BUN SERPL-MCNC: 31 MG/DL (ref 8–22)
CALCIUM ALBUM COR SERPL-MCNC: 8.8 MG/DL (ref 8.5–10.5)
CALCIUM SERPL-MCNC: 8.6 MG/DL (ref 8.5–10.5)
CFT BLD TEG: 9.2 MIN (ref 4.6–9.1)
CFT P HPASE BLD TEG: 9.8 MIN (ref 4.3–8.3)
CHLORIDE SERPL-SCNC: 106 MMOL/L (ref 96–112)
CLOT ANGLE BLD TEG: 66.9 DEGREES (ref 63–78)
CLOT LYSIS 30M P MA LENFR BLD TEG: 0.4 % (ref 0–2.6)
CO2 SERPL-SCNC: 20 MMOL/L (ref 20–33)
COLOR UR: YELLOW
COMPONENT F 8504F: NORMAL
CREAT SERPL-MCNC: 1.46 MG/DL (ref 0.5–1.4)
CT.EXTRINSIC BLD ROTEM: 1.9 MIN (ref 0.8–2.1)
EKG IMPRESSION: NORMAL
EKG IMPRESSION: NORMAL
ERYTHROCYTE [DISTWIDTH] IN BLOOD BY AUTOMATED COUNT: 51.8 FL (ref 35.9–50)
GFR SERPLBLD CREATININE-BSD FMLA CKD-EPI: 48 ML/MIN/1.73 M 2
GLOBULIN SER CALC-MCNC: 2.5 G/DL (ref 1.9–3.5)
GLUCOSE BLD STRIP.AUTO-MCNC: 181 MG/DL (ref 65–99)
GLUCOSE SERPL-MCNC: 127 MG/DL (ref 65–99)
GLUCOSE UR STRIP.AUTO-MCNC: NEGATIVE MG/DL
HCT VFR BLD AUTO: 19.8 % (ref 42–52)
HCT VFR BLD AUTO: 20.8 % (ref 42–52)
HCT VFR BLD AUTO: 21.4 % (ref 42–52)
HCT VFR BLD AUTO: 25.4 % (ref 42–52)
HGB BLD-MCNC: 6.3 G/DL (ref 14–18)
HGB BLD-MCNC: 6.9 G/DL (ref 14–18)
HGB BLD-MCNC: 7 G/DL (ref 14–18)
HGB BLD-MCNC: 8.4 G/DL (ref 14–18)
INR PPP: 4.97 (ref 0.87–1.13)
INR PPP: 5.79 (ref 0.87–1.13)
KETONES UR STRIP.AUTO-MCNC: NEGATIVE MG/DL
LACTATE SERPL-SCNC: 2.6 MMOL/L (ref 0.5–2)
LACTATE SERPL-SCNC: 2.8 MMOL/L (ref 0.5–2)
LEUKOCYTE ESTERASE UR QL STRIP.AUTO: NEGATIVE
LV EJECT FRACT  99904: 45
LV EJECT FRACT MOD 2C 99903: 56.06
LV EJECT FRACT MOD 4C 99902: 56.77
LV EJECT FRACT MOD BP 99901: 55.2
MAGNESIUM SERPL-MCNC: 2.1 MG/DL (ref 1.5–2.5)
MCF BLD TEG: 63.4 MM (ref 52–69)
MCF.PLATELET INHIB BLD ROTEM: 22.5 MM (ref 15–32)
MCH RBC QN AUTO: 33.5 PG (ref 27–33)
MCHC RBC AUTO-ENTMCNC: 33.7 G/DL (ref 32.3–36.5)
MCV RBC AUTO: 99.5 FL (ref 81.4–97.8)
MICRO URNS: NORMAL
NITRITE UR QL STRIP.AUTO: NEGATIVE
PA AA BLD-ACNC: 92.8 % (ref 0–11)
PA ADP BLD-ACNC: 46.9 % (ref 0–17)
PH UR STRIP.AUTO: 5 [PH] (ref 5–8)
PHOSPHATE SERPL-MCNC: 3.8 MG/DL (ref 2.5–4.5)
PLATELET # BLD AUTO: 187 K/UL (ref 164–446)
PMV BLD AUTO: 12.1 FL (ref 9–12.9)
POTASSIUM SERPL-SCNC: 4.6 MMOL/L (ref 3.6–5.5)
PRODUCT TYPE UPROD: NORMAL
PROT SERPL-MCNC: 6.2 G/DL (ref 6–8.2)
PROT UR QL STRIP: NEGATIVE MG/DL
PROTHROMBIN TIME: 46.7 SEC (ref 12–14.6)
PROTHROMBIN TIME: 52.6 SEC (ref 12–14.6)
RBC # BLD AUTO: 2.09 M/UL (ref 4.7–6.1)
RBC UR QL AUTO: NEGATIVE
RH BLD: NORMAL
SODIUM SERPL-SCNC: 140 MMOL/L (ref 135–145)
SP GR UR STRIP.AUTO: >1.045
TEG ALGORITHM TGALG: ABNORMAL
TROPONIN T SERPL-MCNC: 74 NG/L (ref 6–19)
TROPONIN T SERPL-MCNC: 83 NG/L (ref 6–19)
TROPONIN T SERPL-MCNC: 89 NG/L (ref 6–19)
UNIT STATUS USTAT: NORMAL
UROBILINOGEN UR STRIP.AUTO-MCNC: 1 EU/DL
WBC # BLD AUTO: 12.8 K/UL (ref 4.8–10.8)

## 2024-11-21 PROCEDURE — 36620 INSERTION CATHETER ARTERY: CPT | Performed by: INTERNAL MEDICINE

## 2024-11-21 PROCEDURE — 93010 ELECTROCARDIOGRAM REPORT: CPT | Mod: 59,76 | Performed by: INTERNAL MEDICINE

## 2024-11-21 PROCEDURE — 73552 X-RAY EXAM OF FEMUR 2/>: CPT | Mod: RT

## 2024-11-21 PROCEDURE — 700105 HCHG RX REV CODE 258: Performed by: INTERNAL MEDICINE

## 2024-11-21 PROCEDURE — 95816 EEG AWAKE AND DROWSY: CPT | Mod: 26 | Performed by: STUDENT IN AN ORGANIZED HEALTH CARE EDUCATION/TRAINING PROGRAM

## 2024-11-21 PROCEDURE — 85014 HEMATOCRIT: CPT | Mod: 91

## 2024-11-21 PROCEDURE — 93010 ELECTROCARDIOGRAM REPORT: CPT | Mod: 59 | Performed by: INTERNAL MEDICINE

## 2024-11-21 PROCEDURE — 99292 CRITICAL CARE ADDL 30 MIN: CPT | Mod: 25 | Performed by: INTERNAL MEDICINE

## 2024-11-21 PROCEDURE — 95816 EEG AWAKE AND DROWSY: CPT | Performed by: PSYCHIATRY & NEUROLOGY

## 2024-11-21 PROCEDURE — 93005 ELECTROCARDIOGRAM TRACING: CPT | Performed by: INTERNAL MEDICINE

## 2024-11-21 PROCEDURE — 97602 WOUND(S) CARE NON-SELECTIVE: CPT

## 2024-11-21 PROCEDURE — 700102 HCHG RX REV CODE 250 W/ 637 OVERRIDE(OP): Performed by: STUDENT IN AN ORGANIZED HEALTH CARE EDUCATION/TRAINING PROGRAM

## 2024-11-21 PROCEDURE — 84484 ASSAY OF TROPONIN QUANT: CPT

## 2024-11-21 PROCEDURE — 81003 URINALYSIS AUTO W/O SCOPE: CPT

## 2024-11-21 PROCEDURE — 93308 TTE F-UP OR LMTD: CPT

## 2024-11-21 PROCEDURE — 86901 BLOOD TYPING SEROLOGIC RH(D): CPT

## 2024-11-21 PROCEDURE — 85018 HEMOGLOBIN: CPT | Mod: 91

## 2024-11-21 PROCEDURE — 4A10X4Z MONITORING OF CENTRAL NERVOUS ELECTRICAL ACTIVITY, EXTERNAL APPROACH: ICD-10-PCS | Performed by: STUDENT IN AN ORGANIZED HEALTH CARE EDUCATION/TRAINING PROGRAM

## 2024-11-21 PROCEDURE — 70450 CT HEAD/BRAIN W/O DYE: CPT

## 2024-11-21 PROCEDURE — 86900 BLOOD TYPING SEROLOGIC ABO: CPT

## 2024-11-21 PROCEDURE — 85347 COAGULATION TIME ACTIVATED: CPT

## 2024-11-21 PROCEDURE — 30233N1 TRANSFUSION OF NONAUTOLOGOUS RED BLOOD CELLS INTO PERIPHERAL VEIN, PERCUTANEOUS APPROACH: ICD-10-PCS | Performed by: INTERNAL MEDICINE

## 2024-11-21 PROCEDURE — 30233K1 TRANSFUSION OF NONAUTOLOGOUS FROZEN PLASMA INTO PERIPHERAL VEIN, PERCUTANEOUS APPROACH: ICD-10-PCS | Performed by: INTERNAL MEDICINE

## 2024-11-21 PROCEDURE — P9016 RBC LEUKOCYTES REDUCED: HCPCS

## 2024-11-21 PROCEDURE — 71045 X-RAY EXAM CHEST 1 VIEW: CPT

## 2024-11-21 PROCEDURE — 82962 GLUCOSE BLOOD TEST: CPT

## 2024-11-21 PROCEDURE — 85576 BLOOD PLATELET AGGREGATION: CPT | Mod: 91

## 2024-11-21 PROCEDURE — A9270 NON-COVERED ITEM OR SERVICE: HCPCS | Performed by: STUDENT IN AN ORGANIZED HEALTH CARE EDUCATION/TRAINING PROGRAM

## 2024-11-21 PROCEDURE — 99223 1ST HOSP IP/OBS HIGH 75: CPT | Performed by: INTERNAL MEDICINE

## 2024-11-21 PROCEDURE — 85610 PROTHROMBIN TIME: CPT

## 2024-11-21 PROCEDURE — 03HY32Z INSERTION OF MONITORING DEVICE INTO UPPER ARTERY, PERCUTANEOUS APPROACH: ICD-10-PCS | Performed by: INTERNAL MEDICINE

## 2024-11-21 PROCEDURE — P9017 PLASMA 1 DONOR FRZ W/IN 8 HR: HCPCS

## 2024-11-21 PROCEDURE — 36415 COLL VENOUS BLD VENIPUNCTURE: CPT

## 2024-11-21 PROCEDURE — 84100 ASSAY OF PHOSPHORUS: CPT

## 2024-11-21 PROCEDURE — 5A2204Z RESTORATION OF CARDIAC RHYTHM, SINGLE: ICD-10-PCS | Performed by: INTERNAL MEDICINE

## 2024-11-21 PROCEDURE — 85027 COMPLETE CBC AUTOMATED: CPT

## 2024-11-21 PROCEDURE — 700117 HCHG RX CONTRAST REV CODE 255: Performed by: INTERNAL MEDICINE

## 2024-11-21 PROCEDURE — 83735 ASSAY OF MAGNESIUM: CPT

## 2024-11-21 PROCEDURE — 36620 INSERTION CATHETER ARTERY: CPT

## 2024-11-21 PROCEDURE — 36430 TRANSFUSION BLD/BLD COMPNT: CPT

## 2024-11-21 PROCEDURE — 5A12012 PERFORMANCE OF CARDIAC OUTPUT, SINGLE, MANUAL: ICD-10-PCS | Performed by: INTERNAL MEDICINE

## 2024-11-21 PROCEDURE — 770022 HCHG ROOM/CARE - ICU (200)

## 2024-11-21 PROCEDURE — 83605 ASSAY OF LACTIC ACID: CPT

## 2024-11-21 PROCEDURE — 92950 HEART/LUNG RESUSCITATION CPR: CPT | Performed by: INTERNAL MEDICINE

## 2024-11-21 PROCEDURE — 85384 FIBRINOGEN ACTIVITY: CPT | Mod: 91

## 2024-11-21 PROCEDURE — 700111 HCHG RX REV CODE 636 W/ 250 OVERRIDE (IP): Performed by: INTERNAL MEDICINE

## 2024-11-21 PROCEDURE — 99291 CRITICAL CARE FIRST HOUR: CPT | Mod: 25 | Performed by: INTERNAL MEDICINE

## 2024-11-21 PROCEDURE — 71275 CT ANGIOGRAPHY CHEST: CPT

## 2024-11-21 PROCEDURE — 93308 TTE F-UP OR LMTD: CPT | Mod: 26 | Performed by: INTERNAL MEDICINE

## 2024-11-21 PROCEDURE — 93005 ELECTROCARDIOGRAM TRACING: CPT

## 2024-11-21 PROCEDURE — 86850 RBC ANTIBODY SCREEN: CPT

## 2024-11-21 PROCEDURE — 99291 CRITICAL CARE FIRST HOUR: CPT | Performed by: INTERNAL MEDICINE

## 2024-11-21 PROCEDURE — 700101 HCHG RX REV CODE 250: Performed by: INTERNAL MEDICINE

## 2024-11-21 PROCEDURE — 86923 COMPATIBILITY TEST ELECTRIC: CPT

## 2024-11-21 PROCEDURE — 80053 COMPREHEN METABOLIC PANEL: CPT

## 2024-11-21 PROCEDURE — 72125 CT NECK SPINE W/O DYE: CPT

## 2024-11-21 PROCEDURE — 74176 CT ABD & PELVIS W/O CONTRAST: CPT

## 2024-11-21 RX ORDER — SODIUM CHLORIDE 9 MG/ML
INJECTION, SOLUTION INTRAVENOUS CONTINUOUS
Status: ACTIVE | OUTPATIENT
Start: 2024-11-21 | End: 2024-11-22

## 2024-11-21 RX ORDER — SODIUM CHLORIDE 9 MG/ML
INJECTION, SOLUTION INTRAVENOUS CONTINUOUS
Status: DISCONTINUED | OUTPATIENT
Start: 2024-11-21 | End: 2024-11-21

## 2024-11-21 RX ORDER — SODIUM CHLORIDE 9 MG/ML
1000 INJECTION, SOLUTION INTRAVENOUS ONCE
Status: COMPLETED | OUTPATIENT
Start: 2024-11-21 | End: 2024-11-21

## 2024-11-21 RX ORDER — NOREPINEPHRINE BITARTRATE 0.03 MG/ML
0-1 INJECTION, SOLUTION INTRAVENOUS CONTINUOUS
Status: DISCONTINUED | OUTPATIENT
Start: 2024-11-21 | End: 2024-11-22

## 2024-11-21 RX ORDER — LIDOCAINE 4 G/G
1 PATCH TOPICAL DAILY
Status: DISCONTINUED | OUTPATIENT
Start: 2024-11-21 | End: 2024-11-27 | Stop reason: HOSPADM

## 2024-11-21 RX ORDER — PANTOPRAZOLE SODIUM 40 MG/10ML
40 INJECTION, POWDER, LYOPHILIZED, FOR SOLUTION INTRAVENOUS 2 TIMES DAILY
Status: DISCONTINUED | OUTPATIENT
Start: 2024-11-21 | End: 2024-11-22

## 2024-11-21 RX ORDER — SODIUM CHLORIDE 9 MG/ML
INJECTION, SOLUTION INTRAVENOUS
Status: DISCONTINUED | OUTPATIENT
Start: 2024-11-21 | End: 2024-11-23

## 2024-11-21 RX ADMIN — CLOPIDOGREL BISULFATE 75 MG: 75 TABLET ORAL at 08:17

## 2024-11-21 RX ADMIN — PANTOPRAZOLE SODIUM 40 MG: 40 INJECTION, POWDER, FOR SOLUTION INTRAVENOUS at 18:00

## 2024-11-21 RX ADMIN — LIDOCAINE 1 PATCH: 4 PATCH TOPICAL at 10:27

## 2024-11-21 RX ADMIN — SODIUM CHLORIDE 1000 ML: 9 INJECTION, SOLUTION INTRAVENOUS at 07:00

## 2024-11-21 RX ADMIN — SODIUM CHLORIDE 1 L: 9 INJECTION, SOLUTION INTRAVENOUS at 06:35

## 2024-11-21 RX ADMIN — OXYCODONE 5 MG: 5 TABLET ORAL at 20:07

## 2024-11-21 RX ADMIN — LEVOTHYROXINE SODIUM 50 MCG: 0.05 TABLET ORAL at 08:17

## 2024-11-21 RX ADMIN — NOREPINEPHRINE BITARTRATE 0.05 MCG/KG/MIN: 1 INJECTION, SOLUTION, CONCENTRATE INTRAVENOUS at 08:47

## 2024-11-21 RX ADMIN — IOHEXOL 60 ML: 350 INJECTION, SOLUTION INTRAVENOUS at 07:57

## 2024-11-21 RX ADMIN — OXYCODONE 5 MG: 5 TABLET ORAL at 08:18

## 2024-11-21 RX ADMIN — ATORVASTATIN CALCIUM 80 MG: 80 TABLET, FILM COATED ORAL at 18:00

## 2024-11-21 RX ADMIN — OXYCODONE 5 MG: 5 TABLET ORAL at 15:23

## 2024-11-21 RX ADMIN — PANTOPRAZOLE SODIUM 40 MG: 40 INJECTION, POWDER, FOR SOLUTION INTRAVENOUS at 10:27

## 2024-11-21 ASSESSMENT — PAIN DESCRIPTION - PAIN TYPE
TYPE: ACUTE PAIN

## 2024-11-21 ASSESSMENT — ENCOUNTER SYMPTOMS
NERVOUS/ANXIOUS: 0
CHILLS: 0
PALPITATIONS: 0
NAUSEA: 0
HEADACHES: 0
SPEECH CHANGE: 0
SEIZURES: 0
MYALGIAS: 0
FEVER: 0
SPUTUM PRODUCTION: 0
WEAKNESS: 1
DEPRESSION: 0
COUGH: 0
DOUBLE VISION: 0
DIZZINESS: 0
BACK PAIN: 0
BLURRED VISION: 0
VOMITING: 0
SHORTNESS OF BREATH: 0
BRUISES/BLEEDS EASILY: 1
SORE THROAT: 0
ABDOMINAL PAIN: 0
SENSORY CHANGE: 0

## 2024-11-21 ASSESSMENT — PATIENT HEALTH QUESTIONNAIRE - PHQ9
2. FEELING DOWN, DEPRESSED, IRRITABLE, OR HOPELESS: NOT AT ALL
1. LITTLE INTEREST OR PLEASURE IN DOING THINGS: NOT AT ALL
SUM OF ALL RESPONSES TO PHQ9 QUESTIONS 1 AND 2: 0

## 2024-11-21 ASSESSMENT — FIBROSIS 4 INDEX: FIB4 SCORE: 5.36

## 2024-11-21 NOTE — ASSESSMENT & PLAN NOTE
-Patient has recently had multiple syncopal events.  Currently multifactorial due to to anemia, bradycardia, hypotension  -Cardiology following  -ECHO yesterday showed stable EF 45-50%, Apical Akinesis, known LV thrombus  -Carotid Duplex unremarkable  -Fall Precautions

## 2024-11-21 NOTE — PROGRESS NOTES
Patient complaining of chest pain.  Patient placed on 15L nonrebreather. Repeat EKG ordered. 1L NS bolus given for hypotension (BP 75/40). Patient transferring to RICU on the zoll with the rapid team. /54.

## 2024-11-21 NOTE — PROGRESS NOTES
0645 patient arrive to R117 on Zoll 15L NRB, SBP stable, bolus infusing, HR 80s - Dr Gonda at bedside

## 2024-11-21 NOTE — CARE PLAN
The patient is Stable - Low risk of patient condition declining or worsening    Shift Goals  Clinical Goals: safety, monitor vs/labs  Patient Goals: pain control, to sleep  Family Goals: DHARA    Progress made toward(s) clinical / shift goals:    Problem: Pain - Standard  Goal: Alleviation of pain or a reduction in pain to the patient’s comfort goal  Outcome: Progressing  Flowsheets (Taken 11/20/2024 2015)  Pain Rating Scale (NPRS): 2     Problem: Knowledge Deficit - Standard  Goal: Patient and family/care givers will demonstrate understanding of plan of care, disease process/condition, diagnostic tests and medications  Outcome: Progressing     Problem: Fall Risk  Goal: Patient will remain free from falls  Outcome: Progressing     Problem: Communication  Goal: The ability to communicate needs accurately and effectively will improve  Outcome: Progressing       Patient is not progressing towards the following goals:

## 2024-11-21 NOTE — PROGRESS NOTES
RN at bedside performing orthostatic blood pressures. Patient sat at edge of bed, asymptomatic, Aox4. RN went to cycle BP, patient had a witnessed fall. Existing laceration to right eyebrow now open and bleeding, patient able to state name and  while on the floor. Staff assisted patient back to bed, at that time patient became unresponsive, rapid response called, Dr. De Guzman at bedside. Patient then noted to be pulseless with agonal breathing, code blue called. CPR was started, after 1 round patient ROSC achieved. Patient able to state name and  and where he is. Rapid now at bedside, rapid protocol took over.    Stat head CT ordered, patient transferred to RICU 117, accompanied by RRT. Report called to ALBERT Baires.

## 2024-11-21 NOTE — PROCEDURES
INPATIENT ROUTINE VIDEO ELECTROENCEPHALOGRAM REPORT    REFERRING PROVIDER: Dr. Kong  DOS: 11/21/24   ROOM: R117/00  TOTAL RECORDING TIME: 0 hours and 24 minutes of total recording time    INDICATION:  Dilan Calderón 84 y.o. male presenting with seizure(s) and altered mental status    RELEVANT TREATMENTS/MEDICATIONS:  pantoprazole, 40 mg, BID  lidocaine, 1 Patch, DAILY  atorvastatin, 80 mg, Q EVENING  clopidogrel, 75 mg, DAILY  levothyroxine, 50 mcg, AM ES  [Held by provider] metoprolol SR, 25 mg, DAILY  [Held by provider] MD Alert...Warfarin per Pharmacy, , PHARMACY TO DOSE         Intravenous Meds:  NS, Last Rate: 1 L (11/21/24 0635)  NORepinephrine, Last Rate: 0.05 mcg/kg/min (11/21/24 1112)  NS  NS, Last Rate: 50 mL/hr at 11/20/24 1643        TECHNIQUE:   Routine VEEG was set up by a Neurodiagnostic technologist who performed education to the patient and staff. A minimum of 23 electrodes and 23 channel recording was setup and performed by Neurodiagnostic technologist, in accordance with the international 10-20 system. The study was reviewed in bipolar and referential montages. The recording examined the patient in the  awake and drowsy state(s).     DESCRIPTION OF THE RECORD:  EEG background: During maximal wakefulness, the background was continuous, symmetrical, and showed a 9 Hz posterior dominant rhythm.  Reactivity and state changes were present.  During drowsiness, a loss of myogenic artifact and theta/delta frequencies were seen.     EEG Sleep: N2 sleep architecture was not seen.    ICTAL AND INTERICTAL FINDINGS:   No focal or generalized epileptiform activity noted.     No regional slowing or persistent focal asymmetries were seen.    No definite seizures.     ACTIVATION PROCEDURES:   NA    EKG: Sampling of the EKG recording showed sinus rhythm    EVENTS:    No clinical events were captured or reported.    INTERPRETATION:  Normal video EEG recording in the awake and drowsy state(s):  -No persistent  focal or regional slowing and no background asymmetries were seen.   -No epileptiform discharges were seen.  -No seizures.   -Clinical Events: None    Note: A normal EEG does not rule out the possibility of seizures or exclude a diagnosis of epilepsy.  If the clinical suspicion remains high for seizures, a prolonged recording to capture clinical or subclinical events may be helpful.      Hernan Soni MD  Department of Neurology at [facility]  General Neurologist and Epileptologist  Director of [facility]'s Level III Comprehensive Epilepsy Program  Professor of Clinical Neurology, [facility].   Phone: [phone]  Fax: [phone]  E-mail: [email or web address]

## 2024-11-21 NOTE — PROGRESS NOTES
4 Eyes Skin Assessment Completed by ALBERT Hernandez and ALBERT Iqbal.    Head Scab, Bruising, Swelling, and Redness. Ecchymosis and swelling right eyebrow. Sutures to right eyebrow.  Ears Discoloration, scabbing behind left ear.  Nose WDL  Mouth WDL  Neck WDL  Breast/Chest Redness and Bruising  Shoulder Blades WDL  Spine WDL  (R) Arm/Elbow/Hand Bruising  (L) Arm/Elbow/Hand Redness, Bruising, and Discoloration  Abdomen Redness and Bruising  Groin Bruising and Swelling. Discoloration with recent right femoral cath site. Hematoma at site with scab intact.  Scrotum/Coccyx/Buttocks WDL, scab right buttocks  (R) Leg Scab and Bruising. Right knee abrasion.  (L) Leg Bruising  (R) Heel/Foot/Toe Redness to heel.  (L) Heel/Foot/Toe Scab/wound to left lateral foot.           Devices In Places Tele Box, Blood Pressure Cuff, and Pulse Ox      Interventions In Place Pillows    Possible Skin Injury Yes    Pictures Uploaded Into Epic Yes  Wound Consult Placed Yes  RN Wound Prevention Protocol Ordered Yes

## 2024-11-21 NOTE — PROGRESS NOTES
Rapid team responded to code blue. Primary rn stated she was getting patient to EOB, pt became orthostatic, fell forward and hit head. RN and CNA placed pt back in bed when pt then lost consciousness and pulse was absent. Compressions started. 1 round completed and pt gained a pulse. MD Gonda, Deann Rife NP @ bedside. EKG, troponin ordered, STAT head CT ordered.

## 2024-11-21 NOTE — CONSULTS
Reason for Consult:  Asked by Dr Jeremy M Gonda, M.D. to see this patient with  syncope  Patient's PCP: Pcp Pt States None    CC:   Chief Complaint   Patient presents with    Wound Check    Fatigue     Reports bleeding from stitches on right side eyebrow upon waking up at 3am today, stitches from GLF 2 weeks ago. Patient states feeling fatigue for 2 days. Here recently with same. Hx of GLF, and had stents 2 weeks ago. On blood thinners    Post-Op Pain     Intermittent pain since surgery (implant) 2 weeks ago       HPI: 84-year-old male patient with complex medical history, prior history of LAD PCI, recent circumflex artery PCI 1112, left ventricular apical thrombus, second-degree AV block status post Micra pacemaker, hypertension, dyslipidemia has been having syncope at home, resulted in dry laceration above his right eyebrow.  During hospitalization he had several episodes of syncope, hypertension requiring rapid response, CODE BLUE, brief CPR.  His rhythm strips during these episodes shows a rhythm change from sinus to paced rhythm.  Patient also has significant anemia requiring blood transfusion    Medications / Drug list prior to admission:  No current facility-administered medications on file prior to encounter.     Current Outpatient Medications on File Prior to Encounter   Medication Sig Dispense Refill    atorvastatin (LIPITOR) 80 MG tablet Take 1 Tablet by mouth every evening. 30 Tablet 0    enoxaparin (LOVENOX) 60 MG/0.6ML Solution Prefilled Syringe inj Inject 1 syringe (60 mg) under the skin every 12 hours. 14 Each 1    acetaminophen (TYLENOL) 500 MG Tab Take 500-1,000 mg by mouth 2 times a day as needed.      multivitamin Tab Take 1 Tablet by mouth every evening.      clopidogrel (PLAVIX) 75 MG Tab Take 1 Tablet by mouth every day. 30 Tablet 0    aspirin (ASA) 81 MG Chew Tab chewable tablet Chew 1 Tablet every day. Stop once INR therapeutic and off lovenox injections 30 Tablet 0    levothyroxine  (SYNTHROID) 50 MCG Tab Take 1 Tablet by mouth every morning on an empty stomach. 30 Tablet 0    metoprolol SR (TOPROL XL) 25 MG TABLET SR 24 HR Take 1 Tablet by mouth every day. 30 Tablet 0    warfarin (COUMADIN) 5 MG Tab Take 1 Tablet by mouth every day at 6 PM. Further dosing per anticoagulation clinic 30 Tablet 3    NON SPECIFIED Take 1 Tablet by mouth every day. OTC allergy med         Current list of administered Medications:    Current Facility-Administered Medications:     NS (Bolus) 0.9 % infusion, , , ED CONTINUOUS, Jeremy M Gonda, M.D., Last Rate: 999 mL/hr at 11/21/24 0635, 1 L at 11/21/24 0635    norepinephrine (Levophed) 8 mg in 250 mL NS infusion (premix), 0-1 mcg/kg/min, Intravenous, Continuous, Jeremy M Gonda, M.D., Last Rate: 6 mL/hr at 11/21/24 1112, 0.05 mcg/kg/min at 11/21/24 1112    pantoprazole (Protonix) injection 40 mg, 40 mg, Intravenous, BID, Jeremy M Gonda, M.D., 40 mg at 11/21/24 1027    lidocaine (Asperflex) 4 % patch 1 Patch, 1 Patch, Transdermal, DAILY, Jeremy M Gonda, M.D., 1 Patch at 11/21/24 1027    NS infusion, , Intravenous, Continuous, Jeremy M Gonda, M.D.    acetaminophen (Tylenol) tablet 650 mg, 650 mg, Oral, Q6HRS PRN, Kerri Elizabeth M.D.    atorvastatin (Lipitor) tablet 80 mg, 80 mg, Oral, Q EVENING, Kerri Elizabeth M.D., 80 mg at 11/20/24 1915    clopidogrel (Plavix) tablet 75 mg, 75 mg, Oral, DAILY, Kerri Elizabeth M.D., 75 mg at 11/21/24 0817    levothyroxine (Synthroid) tablet 50 mcg, 50 mcg, Oral, AM ES, Kerri Elizabeth M.D., 50 mcg at 11/21/24 0817    [Held by provider] metoprolol SR (Toprol XL) tablet 25 mg, 25 mg, Oral, DAILY, Kerri Elizabeth M.D., 25 mg at 11/20/24 1303    [Held by provider] MD Alert...Warfarin per Pharmacy, , Other, PHARMACY TO DOSE, Kerri Elizabeth M.D.    oxyCODONE immediate-release (Roxicodone) tablet 5 mg, 5 mg, Oral, Q4HRS PRN, Kerri Elizabeth M.D., 5 mg at 11/21/24 0818    HYDROmorphone (Dilaudid) injection 0.5 mg, 0.5 mg, Intravenous,  Q4HRS PRN, Kerri Elizabeth M.D.    NS infusion, , Intravenous, Continuous, Kerri Elizabeth M.D., Last Rate: 50 mL/hr at 11/20/24 1643, New Bag at 11/20/24 1643    Past Medical History:   Diagnosis Date    NSTEMI (non-ST elevated myocardial infarction) (HCC)        Past Surgical History:   Procedure Laterality Date    OTHER CARDIAC SURGERY  11/10/2024    stent placement    VT UPPER GI ENDOSCOPY,DIAGNOSIS N/A 03/06/2024    Procedure: GASTROSCOPY;  Surgeon: Greyson Serrato M.D.;  Location: SURGERY SAME DAY [facility];  Service: Gastroenterology    VT COLONOSCOPY,DIAGNOSTIC N/A 03/06/2024    Procedure: COLONOSCOPY;  Surgeon: Greyson Serrato M.D.;  Location: SURGERY SAME DAY [facility];  Service: Gastroenterology    VT UPPER GI ENDOSCOPY,W/DILAT,GASTRIC OUT N/A 03/06/2024    Procedure: GASTROSCOPY, WITH BALLOON DILATION;  Surgeon: Greyson Serrato M.D.;  Location: SURGERY SAME DAY [facility];  Service: Gastroenterology       No family history on file.  Patient family history was personally reviewed, no pertinent family history to current presentation    Social History     Tobacco Use    Smoking status: Former     Types: Cigarettes    Smokeless tobacco: Never   Vaping Use    Vaping status: Never Used   Substance Use Topics    Alcohol use: Not Currently    Drug use: Never       ALLERGIES:  No Known Allergies    Review of systems:  A complete review of symptoms was reviewed with patient. This is reviewed in H&P and PMH. ALL OTHERS reviewed and negative    Physical exam:  Patient Vitals for the past 24 hrs:   BP Temp Temp src Pulse Resp SpO2 Height Weight   11/21/24 1100 126/60 -- -- 84 20 97 % -- --   11/21/24 1045 -- -- -- 76 (!) 22 99 % -- --   11/21/24 1030 -- -- -- 83 (!) 23 99 % -- --   11/21/24 1015 -- -- -- 81 20 98 % -- --   11/21/24 1000 -- -- -- 84 17 98 % -- --   11/21/24 0953 113/55 (!) 35.7 °C (96.2 °F) Temporal (!) 102 (!) 21 91 % -- --   11/21/24 0945 -- -- -- 100 (!) 22 92 % -- --   11/21/24 0937 116/51 (!) 35.8  "°C (96.4 °F) -- (!) 103 (!) 21 96 % -- --   11/21/24 0930 -- -- -- 90 18 (!) 86 % -- --   11/21/24 0915 124/56 -- -- 99 16 92 % -- --   11/21/24 0905 128/58 -- -- 100 18 96 % -- --   11/21/24 0900 107/55 -- -- 100 20 -- -- --   11/21/24 0850 91/55 -- -- 99 20 100 % -- --   11/21/24 0845 91/44 -- -- 100 (!) 23 90 % -- --   11/21/24 0835 120/56 -- -- (!) 104 (!) 21 88 % -- --   11/21/24 0815 116/63 -- -- 96 18 93 % -- --   11/21/24 0800 111/61 -- -- 88 19 92 % -- --   11/21/24 0755 121/78 -- -- 89 18 99 % -- --   11/21/24 0705 -- -- -- -- 20 95 % -- --   11/21/24 0700 112/55 -- -- 88 20 100 % -- --   11/21/24 0645 109/54 36.6 °C (97.8 °F) Temporal 85 20 99 % -- --   11/21/24 0634 (!) 74/47 -- -- (!) 101 (!) 24 95 % -- --   11/21/24 0400 105/58 37.8 °C (100.1 °F) Oral 63 16 94 % -- --   11/20/24 2350 101/56 37.8 °C (100 °F) Oral 84 16 96 % -- --   11/20/24 1915 126/66 37 °C (98.6 °F) Temporal 100 14 95 % -- --   11/20/24 1606 127/73 36.8 °C (98.2 °F) Temporal 98 16 -- -- --   11/20/24 1335 129/72 36.6 °C (97.8 °F) Temporal 65 16 97 % 1.702 m (5' 7\") 63.5 kg (139 lb 15.9 oz)   11/20/24 1315 -- -- -- -- -- -- -- 63.5 kg (139 lb 15.9 oz)     General: Appears frail  EYES: no jaundice  HEENT: OP clear   Neck:  No JVD.   CVS:  ] RRR. S1 + S2. No M/R/G  Resp: Clear anteriorly abdomen: Soft, ND,  Skin: Grossly nothing acute no obvious rashes  Neurological: Alert, Moves all extremities  Extremities:   [  no ] edema. No cyanosis.       Data:  Laboratory studies personally reviewed by me:  Recent Results (from the past 24 hours)   CBC without Differential    Collection Time: 11/21/24  5:57 AM   Result Value Ref Range    WBC 12.8 (H) 4.8 - 10.8 K/uL    RBC 2.09 (L) 4.70 - 6.10 M/uL    Hemoglobin 7.0 (L) 14.0 - 18.0 g/dL    Hematocrit 20.8 (L) 42.0 - 52.0 %    MCV 99.5 (H) 81.4 - 97.8 fL    MCH 33.5 (H) 27.0 - 33.0 pg    MCHC 33.7 32.3 - 36.5 g/dL    RDW 51.8 (H) 35.9 - 50.0 fL    Platelet Count 187 164 - 446 K/uL    MPV 12.1 9.0 - " 12.9 fL   Comp Metabolic Panel (CMP)    Collection Time: 24  5:57 AM   Result Value Ref Range    Sodium 140 135 - 145 mmol/L    Potassium 4.6 3.6 - 5.5 mmol/L    Chloride 106 96 - 112 mmol/L    Co2 20 20 - 33 mmol/L    Anion Gap 14.0 7.0 - 16.0    Glucose 127 (H) 65 - 99 mg/dL    Bun 31 (H) 8 - 22 mg/dL    Creatinine 1.46 (H) 0.50 - 1.40 mg/dL    Calcium 8.6 8.5 - 10.5 mg/dL    Correct Calcium 8.8 8.5 - 10.5 mg/dL    AST(SGOT) 82 (H) 12 - 45 U/L    ALT(SGPT) 45 2 - 50 U/L    Alkaline Phosphatase 107 (H) 30 - 99 U/L    Total Bilirubin 0.4 0.1 - 1.5 mg/dL    Albumin 3.7 3.2 - 4.9 g/dL    Total Protein 6.2 6.0 - 8.2 g/dL    Globulin 2.5 1.9 - 3.5 g/dL    A-G Ratio 1.5 g/dL   TROPONIN    Collection Time: 24  5:57 AM   Result Value Ref Range    Troponin T 74 (H) 6 - 19 ng/L   MAGNESIUM    Collection Time: 24  5:57 AM   Result Value Ref Range    Magnesium 2.1 1.5 - 2.5 mg/dL   PHOSPHORUS    Collection Time: 24  5:57 AM   Result Value Ref Range    Phosphorus 3.8 2.5 - 4.5 mg/dL   ESTIMATED GFR    Collection Time: 24  5:57 AM   Result Value Ref Range    GFR (CKD-EPI) 48 (A) >60 mL/min/1.73 m 2   POCT glucose device results    Collection Time: 24  6:19 AM   Result Value Ref Range    POC Glucose, Blood 181 (H) 65 - 99 mg/dL   EKG    Collection Time: 24  6:20 AM   Result Value Ref Range    Report       [facility]    Test Date:  2024  Pt Name:    AMY KERR              Department: CPU  MRN:        [id number]                      Room:       [facility]  Gender:     Male                         Technician: [facility]  :        1940                   Requested By:JENNIFER PEREZ  Order #:    [id number]                    Reading MD: Nilay Cavanaugh MD    Measurements  Intervals                                Axis  Rate:       99                           P:          59  OH:         166                          QRS:        -10  QRSD:       114                          T:           124  QT:         353  QTc:        453    Interpretive Statements  Sinus rhythm  Low voltage, precordial leads  Anteroseptal infarct, old  Borderline repolarization abnormality  Compared to ECG 2024 05:35:32  No significant changes  Electronically Signed On 2024 10:18:18 PST by Nilay Cavanaugh MD     EKG    Collection Time: 24  6:37 AM   Result Value Ref Range    Report       [facility]    Test Date:  2024  Pt Name:    AMY KERR              Department: [facility]  MRN:        [id number]                      Room:       [facility]  Gender:     Male                         Technician: [facility]  :        1940                   Requested By:GABRIEL ALANIS  Order #:    [id number]                    Reading MD: Nilay Cavanaugh MD    Measurements  Intervals                                Axis  Rate:       100                          P:          62  SD:         161                          QRS:        -18  QRSD:       75                           T:          84  QT:         366  QTc:        473    Interpretive Statements  Sinus tachycardia  Borderline left axis deviation  Anteroseptal infarct, old  Compared to ECG 2024 06:20:13  Sinus rhythm no longer present    Electronically Signed On 2024 10:16:34 PST by Nilay Cavanaugh MD     COD - Adult (Type and Screen)    Collection Time: 24  8:28 AM   Result Value Ref Range    ABO Grouping Only O     Rh Grouping Only POS     Antibody Screen-Cod NEG    COMPONENT CELLULAR    Collection Time: 24  8:28 AM   Result Value Ref Range    Component R       Red Cells, LR       [id number]   issued       24 09:32    Product Type R99     Dispense Status issued     Unit Number (Barcoded) [id number]     Product Code (Barcoded) [id number]     Blood Type (Barcoded) 5100    HEMOGLOBIN AND HEMATOCRIT    Collection Time: 24 11:04 AM   Result Value Ref Range    Hemoglobin 6.3 (L) 14.0 - 18.0 g/dL    Hematocrit 19.8 (L) 42.0 - 52.0 %    Prothrombin Time    Collection Time: 11/21/24 11:04 AM   Result Value Ref Range    PT 52.6 (H) 12.0 - 14.6 sec    INR 5.79 (H) 0.87 - 1.13   HEMOGLOBIN AND HEMATOCRIT    Collection Time: 11/21/24 11:59 AM   Result Value Ref Range    Hemoglobin 6.9 (L) 14.0 - 18.0 g/dL    Hematocrit 21.4 (L) 42.0 - 52.0 %   LACTIC ACID    Collection Time: 11/21/24 12:08 PM   Result Value Ref Range    Lactic Acid 2.6 (H) 0.5 - 2.0 mmol/L   Prothrombin Time    Collection Time: 11/21/24 12:08 PM   Result Value Ref Range    PT 46.7 (H) 12.0 - 14.6 sec    INR 4.97 (H) 0.87 - 1.13       Imaging:  [id number]-2+ RIGHT   Final Result      No acute osseous abnormality.      CT-CTA CHEST PULMONARY ARTERY W/ RECONS   Final Result      1.  No evidence of pulmonary embolism.   2.  Small right-sided pleural effusion with associated compressive atelectasis and or consolidation. Underlying infection is possible.   3.  Perirenal fat stranding, uncertain etiology and significance.   4.  Small pericardial effusion.   5.  Ascending thoracic aortic ectasia measuring 4.1 cm.   6.  Atherosclerosis with coronary artery disease.            CT-HEAD W/O   Final Result      1.  No evidence of acute territorial infarct, intracranial hemorrhage or mass lesion.   2.  Mild diffuse cerebral substance loss.   3.  Mild microangiopathic ischemic change versus demyelination or gliosis.               CT-CSPINE WITHOUT PLUS RECONS   Final Result      Degenerative changes of the cervical spine without acute fracture or malalignment.      DX-CHEST-LIMITED (1 VIEW)   Final Result         1.  No acute cardiopulmonary disease.   2.  Atherosclerosis      DX-CHEST-PORTABLE (1 VIEW)   Final Result         1.  No acute cardiopulmonary disease.   2.  Cardiomegaly   3.  Atherosclerosis      EC-ECHOCARDIOGRAM LTD W/O CONT    (Results Pending)   CT-ABDOMEN-PELVIS W/O    (Results Pending)           EKG tracings personally reviewed by me sinus rhythm, extensive prior anterior  infarct    Echocardiogram images personally reviewed by me show ejection fraction about 35%, apical wall motion abnormality, small LV thrombus    All pertinent features of laboratory and imaging reviewed including primary images where applicable      Principal Problem:    Syncope (POA: Yes)  Active Problems:    Chronic systolic heart failure (HCC) (POA: Yes)    LV (left ventricular) mural thrombus (POA: Yes)    Hypothyroidism (POA: Yes)    Closed head injury (POA: Yes)    Cardiac arrest (HCC) (POA: Yes)    Coronary artery disease (POA: Yes)    Acute renal failure (ARF) (HCC) (POA: Unknown)    Macrocytic anemia (POA: Unknown)    Groin pain, right (POA: Unknown)    Laceration of right eyebrow (POA: Unknown)  Resolved Problems:    * No resolved hospital problems. *      Assessment / Plan:    84-year-old male patient with complex medical history including coronary artery disease, recent PCI, left ventricular apical thrombus recurrent admissions, syncope, anemia.  His rhythm strips reviewed, appears to have rhythm change from sinus rhythm to paced rhythm when he has these episodes, anemia in combination with bradycardia probably causing these episodes.  Agree with blood transfusion, keep hemoglobin at 10.  He has apical LV thrombus but he has ongoing bleeding we have to stop Coumadin, try to find the source of bleeding.  Micra pacemaker is working normally.  Baseline heart rate increased to 80 bpm, rate response therapy is turned on.  We will follow      I personally discussed his case with  Dr Jeremy M Gonda, M.D.    Future Appointments   Date Time Provider Department Center   11/21/2024  3:00 PM [facility] CT 3 [facility]   11/25/2024  1:00 PM Deja Torres M.D. [facility]   11/27/2024  2:45 PM [facility] EXAM 4 [facility] None   12/3/2024  3:45 PM CAROLINE Gonzalez [facility] None       It is my pleasure to participate in the care of Mr. Calderón.  Please do not hesitate to contact me with questions or  concerns.    Farzad Man M.D.    11/21/2024

## 2024-11-21 NOTE — PROCEDURES
Procedure Note    Date: 11/21/2024  Time: 0605    Procedure: Cardiopulmonary resuscitation    Indication: PEA cardiopulmonary arrest    Consent: Implied/emergent as patient is a FULL CODE    Procedure: I arrived to the patient's bedside finding patient in cardiac arrest.  Prior to my arrival, patient had gotten up out of bed and fallen striking his head on the ground.  Staff heard a thud in the room and entered finding him on the ground with a bleeding forehead.  When assisting him back into bed, he became unresponsive with agonal breathing and no palpable pulse.  CPR was initiated and he received less than 2 minutes while pads were being placed before patient became responsive again.  I assumed care as a  of the CODE BLUE team continuing resuscitation using ACLS standard of care including high-quality chest compressions, limiting time off chest, airway support, and medications/defibrillation as indicated.  I immediately evaluated the patient for the potential causes of cardiac arrest including: Cardiac arrhythmia versus hypotension versus neurologic process versus electrolyte abnormality versus PE versus acute coronary syndrome versus syncope.  A glucose was assessed at my request which was within normal limits.  Orders were placed for labs, CT imaging, EKG, and chest x-ray.  EKG did not show acute coronary syndrome.  Patient subsequently became hypotensive with a blood pressure of 70/40 and symptomatic.  He was given 1 L of normal saline bolus on a pressure bag and a norepinephrine infusion was ordered.  He will be transferred to the ICU for stabilization followed by CT imaging.  His pacemaker will be interrogated.  We called for a telemetry report and apparently he was in normal sinus rhythm before the event with a PVC followed by junctional rhythm before he had removed his telemetry leads at the time of the fall.    Chest x-ray: Enlarged cardiac and aortic silhouette with leadless pacer, no acute  cardiopulmonary process  Bedside cardiac ultrasound: Pending  EKG: Sinus tach with left axis deviation, prior infarct, no acute changes    Disposition: Transfer to intensive care unit  Complications: Laceration to right forehead requiring wound care    Jeremy Gonda, MD  Critical Care Medicine

## 2024-11-21 NOTE — ASSESSMENT & PLAN NOTE
-known. Previuosly on Warfarin, holding for anemia  -repeat ECHO this admission shows decrease in size from prior ECHO  -resume AC when clinically able

## 2024-11-21 NOTE — ASSESSMENT & PLAN NOTE
-due to retroperitoneal hematoma  -transfuse for Hgb < 7 or < 8 for cardiac ischemia  -monitor for bleeding  -follow CBC

## 2024-11-21 NOTE — ASSESSMENT & PLAN NOTE
-After syncopal event and head trauma the morning of 11/21 -potentially not a true loss of pulses s/p CPR x 90 seconds

## 2024-11-21 NOTE — CONSULTS
Critical Care Consultation    Date of consult: 11/21/2024    Referring Physician  Brenden De Guzman M.D.    Reason for Consultation  Syncope, hypotension    History of Presenting Illness  84 y.o. male who presented 11/20/2024 with a past medical history significant for CAD status post LAD stent in May 2024 and left circumflex stent 11/12 on dual antiplatelet therapy, syncope with Mobitz type II AV block status post Micra pacemaker implantation 11/12, LV thrombus on Coumadin, prior tobacco abuse, hypothyroidism, hypertension, dyslipidemia.  He has been admitted on several occasions recently for syncope and most recently on the 10th head passed out resulting in a laceration above his right eyebrow.  He had a similar CODE BLUE event 11/11 inpatient with rapid ROSC and transient hypotension. He returned to the emergency department on 11/20 complaining of bleeding from the stitches near his right eyebrow with fatigue for 2 days and intermittent postoperative pain from his pacemaker placement in his right groin.  He was admitted to the ops unit overnight for presyncope with IV fluids, PT/OT evaluation, acute renal failure management that was felt to be due to dehydration.  This morning, patient had gotten up out of bed and had another syncopal event resulting in a repeat head strike and further bleeding to his right eyebrow.  Bleeding being transferred from the floor back to his bed, he became unresponsive with agonal breathing and potential loss of pulses.  He underwent approximately 90 seconds of CPR before becoming responsive.  Unclear whether he ever lost pulses.  I was consulted during the CODE BLUE event and transferred patient to the ICU.  He was hypotensive after the event requiring IV fluid resuscitation.    Update @ 0900 -will updated the patient on his results, he suddenly became unresponsive again and began gurgling.  His heart rate had dropped down into the 50s and his blood pressure dropped as well.  While  setting up for possible intubation and initiating norepinephrine drip, he regained consciousness and began protecting his airway and talking.  He has no recollection of the event.  On telemetry review, he had a sudden change in access at the time of the event which will be shown to cardiology.  I have also ordered an EEG to rule out potential seizure although I think these events are likely cardiac in nature.  A limited echo has been ordered and we are awaiting the pacemaker interrogation.    Code Status  Full Code    Review of Systems  Review of Systems   Constitutional:  Positive for malaise/fatigue. Negative for chills and fever.   HENT:  Negative for congestion and sore throat.    Eyes:  Negative for blurred vision and double vision.   Respiratory:  Negative for cough, sputum production and shortness of breath.    Cardiovascular:  Negative for chest pain, palpitations and leg swelling.   Gastrointestinal:  Negative for abdominal pain, nausea and vomiting.   Genitourinary:  Negative for dysuria.   Musculoskeletal:  Negative for back pain and myalgias.   Skin:  Negative for rash.   Neurological:  Positive for weakness. Negative for dizziness, sensory change, speech change, seizures and headaches.   Endo/Heme/Allergies:  Bruises/bleeds easily.   Psychiatric/Behavioral:  Negative for depression. The patient is not nervous/anxious.    All other systems reviewed and are negative.      Past Medical History   has a past medical history of NSTEMI (non-ST elevated myocardial infarction) (HCC).    Surgical History   has a past surgical history that includes pr upper gi endoscopy,diagnosis (N/A, 03/06/2024); pr colonoscopy,diagnostic (N/A, 03/06/2024); pr upper gi endoscopy,w/dilat,gastric out (N/A, 03/06/2024); and other cardiac surgery (11/10/2024).    Family History  family history is not on file.    Social History   reports that he has quit smoking. His smoking use included cigarettes. He has never used smokeless  tobacco. He reports that he does not currently use alcohol. He reports that he does not use drugs.    Medications  Home Medications       Reviewed by Amarjit De La Paz (Pharmacy Tech) on 11/20/24 at 0820  Med List Status: Complete     Medication Last Dose Status   acetaminophen (TYLENOL) 500 MG Tab 11/20/2024 Active   aspirin (ASA) 81 MG Chew Tab chewable tablet Unknown Active   atorvastatin (LIPITOR) 80 MG tablet 11/19/2024 Active   clopidogrel (PLAVIX) 75 MG Tab Unknown Active   enoxaparin (LOVENOX) 60 MG/0.6ML Solution Prefilled Syringe inj 11/19/2024 Active   levothyroxine (SYNTHROID) 50 MCG Tab Unknown Active   metoprolol SR (TOPROL XL) 25 MG TABLET SR 24 HR Unknown Active   multivitamin Tab Unknown Active   NON SPECIFIED Unknown Active   warfarin (COUMADIN) 5 MG Tab Unknown Active                  Audit from Redirected Encounters    **Home medications have not yet been reviewed for this encounter**       Current Facility-Administered Medications   Medication Dose Route Frequency Provider Last Rate Last Admin    NS (Bolus) 0.9 % infusion    ED CONTINUOUS Jeremy M Gonda, M.D. 999 mL/hr at 11/21/24 0635 1 L at 11/21/24 0635    norepinephrine (Levophed) 8 mg in 250 mL NS infusion (premix)  0-1 mcg/kg/min Intravenous Continuous Jeremy M Gonda, M.D.   Held at 11/21/24 0700    NS (Bolus) 0.9 % infusion 1,000 mL  1,000 mL Intravenous Once Jeremy M Gonda, M.D.        acetaminophen (Tylenol) tablet 650 mg  650 mg Oral Q6HRS PRN Kerri Elizabeth M.D.        aspirin (Asa) chewable tab 81 mg  81 mg Oral DAILY Kerri Elizabeth M.D.   81 mg at 11/20/24 1302    atorvastatin (Lipitor) tablet 80 mg  80 mg Oral Q EVENING Kerri Elizabeth M.D.   80 mg at 11/20/24 1915    clopidogrel (Plavix) tablet 75 mg  75 mg Oral DAILY Kerri Elizabeth M.D.   75 mg at 11/20/24 1303    levothyroxine (Synthroid) tablet 50 mcg  50 mcg Oral AM ES Kerri Elizabeth M.D.   50 mcg at 11/20/24 1303    [Held by provider] metoprolol SR (Toprol XL)  tablet 25 mg  25 mg Oral DAILY Kerri Elizabeth M.D.   25 mg at 11/20/24 1303    MD Alert...Warfarin per Pharmacy   Other PHARMACY TO DOSE Kerri Elizabeth M.D.        oxyCODONE immediate-release (Roxicodone) tablet 5 mg  5 mg Oral Q4HRS PRN Kerri Elizabeth M.D.   5 mg at 11/20/24 1915    HYDROmorphone (Dilaudid) injection 0.5 mg  0.5 mg Intravenous Q4HRS PRN Kerri Elizabeth M.D.        NS infusion   Intravenous Continuous Kerri Elizabeth M.D. 50 mL/hr at 11/20/24 1643 New Bag at 11/20/24 1643       Allergies  No Known Allergies    Vital Signs last 24 hours  Temp:  [36.4 °C (97.5 °F)-37.8 °C (100.1 °F)] 36.6 °C (97.8 °F)  Pulse:  [] 88  Resp:  [14-26] 20  BP: ()/(47-77) 112/55  SpO2:  [94 %-100 %] 95 %    Physical Exam  Physical Exam  Vitals and nursing note reviewed.   Constitutional:       General: He is awake. He is in acute distress.      Appearance: He is well-developed and normal weight. He is ill-appearing.      Interventions: Face mask in place.   HENT:      Head: Normocephalic.      Comments: Laceration over the right eyebrow with some venous oozing after recent trauma, sutures in place     Nose: Nose normal.      Comments: No epistaxis     Mouth/Throat:      Mouth: Mucous membranes are moist.      Pharynx: Oropharynx is clear. No oropharyngeal exudate.   Eyes:      General: No scleral icterus.     Conjunctiva/sclera: Conjunctivae normal.      Pupils: Pupils are equal, round, and reactive to light.   Neck:      Vascular: No JVD.      Comments: Nontender in the midline C-spine, no step-off or deformity  Cardiovascular:      Rate and Rhythm: Regular rhythm. Tachycardia present. Occasional Extrasystoles are present.     Chest Wall: PMI is displaced.      Pulses: Decreased pulses.           Radial pulses are 1+ on the right side and 1+ on the left side.      Heart sounds: Murmur heard.      Comments: Transient hypotension, intermittent paced beats  Pulmonary:      Effort: Tachypnea present. No  accessory muscle usage or respiratory distress.      Breath sounds: Normal breath sounds. No stridor. No wheezing or rhonchi.      Comments: Protecting airway, speaking in full sentences  Abdominal:      General: Bowel sounds are normal. There is no distension.      Palpations: Abdomen is soft.      Tenderness: There is no abdominal tenderness. There is no guarding or rebound.      Comments: No flank bruising or Tanner's sign   Musculoskeletal:         General: No tenderness (mildly tender mid-femur on the right).      Cervical back: Normal range of motion and neck supple. No rigidity or tenderness.      Right lower leg: No edema.      Left lower leg: No edema.      Comments: No tenderness to pelvic AP and lateral compression or signs of trauma to other extremities   Skin:     General: Skin is warm and dry.      Capillary Refill: Capillary refill takes less than 2 seconds.      Coloration: Skin is pale.      Findings: Bruising (Scattered bruising in different stages of healing throughout) present.      Comments: Wound to right knee with bandage in place   Neurological:      General: No focal deficit present.      Mental Status: He is alert and oriented to person, place, and time.      Cranial Nerves: No cranial nerve deficit.      Sensory: No sensory deficit.      Motor: Weakness (Generalized) present.      Coordination: Coordination normal.      Comments: No facial droop, negative pronator drift   Psychiatric:         Mood and Affect: Mood normal.         Behavior: Behavior normal. Behavior is cooperative.         Thought Content: Thought content normal.         Fluids    Intake/Output Summary (Last 24 hours) at 11/21/2024 0751  Last data filed at 11/21/2024 0400  Gross per 24 hour   Intake 0 ml   Output 600 ml   Net -600 ml       Laboratory  Recent Results (from the past 48 hours)   CBC w/ Differential    Collection Time: 11/20/24  5:08 AM   Result Value Ref Range    WBC 7.4 4.8 - 10.8 K/uL    RBC 2.72 (L) 4.70 -  6.10 M/uL    Hemoglobin 9.2 (L) 14.0 - 18.0 g/dL    Hematocrit 27.3 (L) 42.0 - 52.0 %    .4 (H) 81.4 - 97.8 fL    MCH 33.8 (H) 27.0 - 33.0 pg    MCHC 33.7 32.3 - 36.5 g/dL    RDW 52.0 (H) 35.9 - 50.0 fL    Platelet Count 192 164 - 446 K/uL    MPV 11.9 9.0 - 12.9 fL    Neutrophils-Polys 63.90 44.00 - 72.00 %    Lymphocytes 26.70 22.00 - 41.00 %    Monocytes 8.10 0.00 - 13.40 %    Eosinophils 0.50 0.00 - 6.90 %    Basophils 0.30 0.00 - 1.80 %    Immature Granulocytes 0.50 0.00 - 0.90 %    Nucleated RBC 0.00 0.00 - 0.20 /100 WBC    Neutrophils (Absolute) 4.73 1.82 - 7.42 K/uL    Lymphs (Absolute) 1.98 1.00 - 4.80 K/uL    Monos (Absolute) 0.60 0.00 - 0.85 K/uL    Eos (Absolute) 0.04 0.00 - 0.51 K/uL    Baso (Absolute) 0.02 0.00 - 0.12 K/uL    Immature Granulocytes (abs) 0.04 0.00 - 0.11 K/uL    NRBC (Absolute) 0.00 K/uL   Complete Metabolic Panel (CMP)    Collection Time: 11/20/24  5:08 AM   Result Value Ref Range    Sodium 141 135 - 145 mmol/L    Potassium 3.9 3.6 - 5.5 mmol/L    Chloride 107 96 - 112 mmol/L    Co2 22 20 - 33 mmol/L    Anion Gap 12.0 7.0 - 16.0    Glucose 135 (H) 65 - 99 mg/dL    Bun 24 (H) 8 - 22 mg/dL    Creatinine 1.46 (H) 0.50 - 1.40 mg/dL    Calcium 8.5 8.5 - 10.5 mg/dL    Correct Calcium 8.7 8.5 - 10.5 mg/dL    AST(SGOT) 59 (H) 12 - 45 U/L    ALT(SGPT) 44 2 - 50 U/L    Alkaline Phosphatase 118 (H) 30 - 99 U/L    Total Bilirubin 0.4 0.1 - 1.5 mg/dL    Albumin 3.8 3.2 - 4.9 g/dL    Total Protein 6.4 6.0 - 8.2 g/dL    Globulin 2.6 1.9 - 3.5 g/dL    A-G Ratio 1.5 g/dL   TROPONIN    Collection Time: 11/20/24  5:08 AM   Result Value Ref Range    Troponin T 82 (H) 6 - 19 ng/L   PT/INR    Collection Time: 11/20/24  5:08 AM   Result Value Ref Range    PT 39.3 (H) 12.0 - 14.6 sec    INR 4.00 (H) 0.87 - 1.13   ESTIMATED GFR    Collection Time: 11/20/24  5:08 AM   Result Value Ref Range    GFR (CKD-EPI) 48 (A) >60 mL/min/1.73 m 2   VITAMIN B12    Collection Time: 11/20/24  5:08 AM   Result Value Ref  Range    Vitamin B12 -True Cobalamin 476 211 - 911 pg/mL   IRON/TOTAL IRON BIND    Collection Time: 24  5:08 AM   Result Value Ref Range    Iron 69 50 - 180 ug/dL    Total Iron Binding 279 250 - 450 ug/dL    Unsat Iron Binding 210 110 - 370 ug/dL    % Saturation 25 15 - 55 %   FERRITIN    Collection Time: 24  5:08 AM   Result Value Ref Range    Ferritin 79.2 22.0 - 322.0 ng/mL   EKG    Collection Time: 24  5:35 AM   Result Value Ref Range    Report       [facility] Emergency Dept.    Test Date:  2024  Pt Name:    AMY KERR              Department: ER  MRN:        [id number]                      Room:       T214  Gender:     Male                         Technician: [id number]  :        1940                   Requested By:BONI MORALES  Order #:    [id number]                    Reading MD: BONI MORALES DO    Measurements  Intervals                                Axis  Rate:       62                           P:          59  ND:         173                          QRS:        -30  QRSD:       100                          T:          35  QT:         403  QTc:        410    Interpretive Statements  Sinus rhythm  Left axis deviation  Anterior infarct, old  ST elevation, consider inferior injury  Baseline wander in lead(s) V5  Compared to ECG 2024 02:10:15  Left-axis deviation now present  ST (T wave) deviation now present  Atrial premature complex(es) no longer present  Myocardial infarct finding stil l present  Electronically Signed On 2024 12:15:31 PST by BONI MORALES DO     Prothrombin Time    Collection Time: 24  9:23 AM   Result Value Ref Range    PT 38.4 (H) 12.0 - 14.6 sec    INR 3.88 (H) 0.87 - 1.13   Comp Metabolic Panel (CMP)    Collection Time: 24  5:57 AM   Result Value Ref Range    Sodium 140 135 - 145 mmol/L    Potassium 4.6 3.6 - 5.5 mmol/L    Chloride 106 96 - 112 mmol/L    Co2 20 20 - 33 mmol/L    Anion Gap 14.0 7.0 - 16.0     Glucose 127 (H) 65 - 99 mg/dL    Bun 31 (H) 8 - 22 mg/dL    Creatinine 1.46 (H) 0.50 - 1.40 mg/dL    Calcium 8.6 8.5 - 10.5 mg/dL    Correct Calcium 8.8 8.5 - 10.5 mg/dL    AST(SGOT) 82 (H) 12 - 45 U/L    ALT(SGPT) 45 2 - 50 U/L    Alkaline Phosphatase 107 (H) 30 - 99 U/L    Total Bilirubin 0.4 0.1 - 1.5 mg/dL    Albumin 3.7 3.2 - 4.9 g/dL    Total Protein 6.2 6.0 - 8.2 g/dL    Globulin 2.5 1.9 - 3.5 g/dL    A-G Ratio 1.5 g/dL   TROPONIN    Collection Time: 24  5:57 AM   Result Value Ref Range    Troponin T 74 (H) 6 - 19 ng/L   MAGNESIUM    Collection Time: 24  5:57 AM   Result Value Ref Range    Magnesium 2.1 1.5 - 2.5 mg/dL   PHOSPHORUS    Collection Time: 24  5:57 AM   Result Value Ref Range    Phosphorus 3.8 2.5 - 4.5 mg/dL   ESTIMATED GFR    Collection Time: 24  5:57 AM   Result Value Ref Range    GFR (CKD-EPI) 48 (A) >60 mL/min/1.73 m 2   POCT glucose device results    Collection Time: 24  6:19 AM   Result Value Ref Range    POC Glucose, Blood 181 (H) 65 - 99 mg/dL   EKG    Collection Time: 24  6:20 AM   Result Value Ref Range    Report       [facility]    Test Date:  2024  Pt Name:    AMY KERR              Department: [facility]  MRN:        [id number]                      Room:       T214  Gender:     Male                         Technician: [facility]  :        1940                   Requested By:JENNIFER PEREZ  Order #:    [id number]                    Lindsay MD:    Measurements  Intervals                                Axis  Rate:       99                           P:          59  SC:         166                          QRS:        -10  QRSD:       114                          T:          124  QT:         353  QTc:        453    Interpretive Statements  Sinus rhythm  Low voltage, precordial leads  Anteroseptal infarct, old  Borderline repolarization abnormality  Compared to ECG 2024 05:35:32  Low QRS voltage now present  Left-axis deviation no longer  present  ST (T wave) deviation no longer present  Myocardial infarct finding still present     EKG    Collection Time: 24  6:37 AM   Result Value Ref Range    Report       [facility]    Test Date:  2024  Pt Name:    AMY KERR              Department: [facility]  MRN:        [id number]                      Room:       T214  Gender:     Male                         Technician: AMADOU  :        1940                   Requested By:GABRIEL ALANIS  Order #:    [id number]                    Reading MD:    Measurements  Intervals                                Axis  Rate:       100                          P:          62  W[place]:         161                          QRS:        -18  QRSD:       75                           T:          84  QT:         366  QTc:        473    Interpretive Statements  Sinus tachycardia  Borderline left axis deviation  Anteroseptal infarct, old  Compared to ECG 2024 06:20:13  Sinus rhythm no longer present  Myocardial infarct finding still present         Imaging  DX-CHEST-LIMITED (1 VIEW)   Final Result         1.  No acute cardiopulmonary disease.   2.  Atherosclerosis      DX-CHEST-PORTABLE (1 VIEW)   Final Result         1.  No acute cardiopulmonary disease.   2.  Cardiomegaly   3.  Atherosclerosis      CT-HEAD W/O    (Results Pending)   CT-CSPINE WITHOUT PLUS RECONS    (Results Pending)   CT-CTA CHEST PULMONARY ARTERY W/ RECONS    (Results Pending)    *Chest x-ray showing enlarged cardiac and aortic silhouette without acute cardiopulmonary process, leadless pacemaker noted   *EKG personally reviewed showing sinus tachycardia with a heart rate of 100 with left axis deviation, normal intervals and no acute ST changes    Assessment/Plan  * Syncope- (present on admission)  Assessment & Plan  Unclear etiology, potentially cardiac syncope  Continue syncope workup  Cardiology consultation (called Dr. Man)  Pacemaker interrogation  Continuous telemetry  monitoring    Cardiac arrest (HCC)- (present on admission)  Assessment & Plan  After syncopal event and head trauma the morning of 11/21 -potentially not a true loss of pulses s/p CPR x 90 seconds  Monitor cardiopulmonary function closely, transfer to ICU  CT PE study, trend troponin/EKG  Continuous telemetry monitoring  Pacemaker interrogation  Cardiology consultation  Repeat limited echo    Closed head injury- (present on admission)  Assessment & Plan  Initial fall with eyebrow laceration status postrepair and head CT in the ED 11/20  Repeat fall with recurrent right eyebrow laceration 11/21  Repeat head CT, CT C-spine  Fall precautions  PT/OT eval    LV (left ventricular) mural thrombus- (present on admission)  Assessment & Plan  Continue systemic anticoagulation with Coumadin per pharmacy    Laceration of right eyebrow  Assessment & Plan  Repeat wound care after recurrent fall with additional laceration repair if needed    Macrocytic anemia  Assessment & Plan  Acute blood loss anemia - ?source. Likely GI vs RP hemorrhage  Serial H/H with conservative transfusion strategy --> 1 unit pRBC ordered given drop in Hgb  Empiric PPI, monitor BMs  Consider CT abd/pelvis if Hgb unresponsive to transfusion  High risk of holding plavix and coumadin. D/c ASA per cardiology's prior recommendations    Acute renal failure (ARF) (HCC)  Assessment & Plan  Secondary to ATN  Additional fluids this morning for hypotension  Avoid nephrotoxins  Monitor creatinine, urine output, electrolytes closely    Coronary artery disease- (present on admission)  Assessment & Plan  Continue dual antiplatelet therapy, statin  Hold beta-blocker while hypotensive  Trend troponin    Chronic systolic heart failure (HCC)- (present on admission)  Assessment & Plan  Without acute decompensation, EF 45% from echo 11/11  Holding GDMT in the setting of hypotension    Hypothyroidism- (present on admission)  Assessment & Plan  Continue current dose of synthroid  with need for outpatient TSH follow up in 6 weeks for potential dose adjustment        Discussed patient condition and risk of morbidity and/or mortality with Hospitalist, RN, RT, Code status disscussed, Charge nurse / hot rounds, and Patient.    The patient remains critically ill.  Critical care time = 109 minutes in directly providing and coordinating critical care and extensive data review.  No time overlap and excludes procedures.    Please note that this dictation was created using voice recognition software. I have made every reasonable attempt to correct obvious errors, but there may be errors of grammar and possibly content that I did not discover before finalizing the note.

## 2024-11-21 NOTE — ASSESSMENT & PLAN NOTE
-Without acute decompensation, EF 45% from echo 11/11  -Holding GDMT in the setting of hypotension  -daily weights  -I&O  -Cardiology follow up

## 2024-11-21 NOTE — ASSESSMENT & PLAN NOTE
-Initial fall with eyebrow laceration status postrepair and head CT in the ED 11/20  -Repeat fall with recurrent right eyebrow laceration 11/21  -Repeat head CT, CT C-spine without acute findings  -Fall precautions  -PT/OT

## 2024-11-21 NOTE — PROGRESS NOTES
4 Eyes Skin Assessment Completed by ALBERT Thomas and ALBERT Vance.    Head abrasion to R eyebrow with bleeding and staples, bruising on cheek, under R eye, posterior head scab  Ears Bruising behind L ear  Nose WDL  Mouth WDL  Neck WDL  Breast/Chest Bruising   Shoulder Blades WDL  Spine WDL, R flank bruise  (R) Arm/Elbow/Hand Bruising  (L) Arm/Elbow/Hand Bruising  Abdomen Bruising  Groin Bruising  Scrotum/Coccyx/Buttocks WDL  (R) Leg Bruising, R shin and knee blood blister  (L) Leg Bruising  (R) Heel/Foot/Toe Redness and Blanching  (L) Heel/Foot/Toe Redness and Blanching, 5th digit skin tear          Devices In Places ECG, Blood Pressure Cuff, Pulse Ox, SCD's, and Oxy Mask      Interventions In Place Heel Mepilex, Sacral Mepilex, Q2 Turns, and Low Air Loss Mattress    Possible Skin Injury No    Pictures Uploaded Into Epic Yes  Wound Consult Placed Yes  RN Wound Prevention Protocol Ordered Yes     Yes

## 2024-11-21 NOTE — PROGRESS NOTES
Monitor Summary:  Rhythm: SR-ST  Rate:   Ectopy: Rare PVC    0.16/0.08/0.39    Per monitor room interpretation

## 2024-11-21 NOTE — PROGRESS NOTES
"- I was called into the room by nursing STAT after patient had an attack with a small (nursing heard a \"thump\") and found him on the floor, with previous eyelid laceration bleeding.  He was found to monitor up to the point of fall however it was accidentally pulled out.  He was put back into bed, but was noted to be unresponsive and have agonal breathing and pulseless.  CODE BLUE was called.  We started CPR.  Within 1 round of CPR, patient started to respond and spontaneously bleeding.  Pulse was initially thready but became stronger.  He started to respond to me and was talking and asking \"what happened\".  BP low 75/52. IVF bolus given. CODE BLUE team came, and I discussed care with intensivist Dr. Gonda who accepted the patient to the ICU for close monitoring.   -Starting on Levophed drip.  - will get stat head CT.  Repeat EKG as having slight ST depressions on telemetry monitor.    Time spent: The patient is critically ill,, with high chance of deterioration into cardiorespiratory arrest, and eventually death if left untreated. The care that has been undertaken is medically complex. I spent 24 minutes CRITICAL CARE TIME, including managing medical issues, coordination of care, not including doing procedures, with no overlap in critical care time.     "

## 2024-11-21 NOTE — ASSESSMENT & PLAN NOTE
-home Levothyroxine   Progress Note    Patient: Arron Oden Date: 12/31/2021   male, 63 year old  Admit Date: 12/30/2021   Attending: Sylvester Gomes MD         Remdesivir discontinued as per pt and wife's wishes on day 1 .      I left a message with patient's wife Janet over the phone as he requested I speak to her regarding remdesivir even though he is fully awake oriented with full insight he deferred the treatment-in to his wife.  I left my personal cell phone number on the voicemail for Janet to call back.      SUBJECTIVE:   Patient seen and examined by me in follow up for Pneumonia due to COVID-19 virus . Day 2 of hospitalization. No overnight events. Unvaccinated with multiple risk factors. The patient states his symptoms began on 12/24/2021 while he was at work.    Needing 55  lits high flow  of oxygen supplementation.  Increasing oxygen requirements from 2-55 L over the last 24 hours.       CT chest PE protocol done on the day of admission that is on 12/30-      IMPRESSION:   1. No CTA evidence for PE.  2. Coronary arterial calcifications.  3. Mediastinal and hilar mild lymphadenopathy as described possibly  reactive.  4. Extensive bilateral patchy infiltrates compatible with the patient's  diagnosis of Covid.        MEDICATIONS: Personally reviewed today in this patient's active orders section of Epic.  • sodium chloride (PF)  2 mL Intracatheter 2 times per day   • aspirin  81 mg Oral Daily   • cholecalciferol  2,000 Units Oral Daily   • fenofibrate micronized  134 mg Oral Daily with breakfast   • lisinopril  10 mg Oral Daily   • dexamethasone  10 mg Intravenous Daily   • enoxaparin  40 mg Subcutaneous Daily   • insulin lispro   Subcutaneous TID WC   • atorvastatin  80 mg Oral Nightly   • amLODIPine  10 mg Oral Daily   • metoPROLOL succinate  100 mg Oral Daily     ALLERGIES: Personally reviewed today in Epic.  ROS: Pertinent systems negative except as above.    PHYSICAL EXAM:      Vital 24 Hour Range Most Recent  Value   Temperature Temp  Min: 97.6 °F (36.4 °C)  Max: 98.9 °F (37.2 °C) 98 °F (36.7 °C)   Pulse Pulse  Min: 70  Max: 97 88   Respiratory Resp  Min: 13  Max: 26 (!) 23   Blood Pressure BP  Min: 122/61  Max: 161/85 134/67   Pulse Oximetry SpO2  Min: 84 %  Max: 96 % 91 %   Arterial BP No data recorded     O2 O2 Flow Rate (L/min)  Av.8 L/min  Min: 2 L/min   Min taken time: 21 1257  Max: 55 L/min   Max taken time: 21 0705       Vital Most Recent Value First Value   Weight       Height 6' 2\" (188 cm) Height: 6' 2\" (188 cm)   BMI   N/A     General: well developed, well nourished and obese surprisingly appears comfortable despite high-flow oxygen requirement  CV: regular rate and rhythm and no murmurs, rubs, or thrills  Resp: clear to auscultation bilaterally  Abd: soft, nontender, nondistended and no hepatosplenomegaly  Ext: no rashes, lesions, or ulcers noted, no induration, subcutaneous nodules, or tightening noted and loss of subcutaneous fat, no clubbing, cyanosis, or ischemia and 5/5 muscle strength in upper and lower extremities bilaterally and edema absent       LABS:  Recent Labs   Lab 21  0527 21  1334   WBC 6.2 5.8   RBC 4.44* 4.55   HGB 13.3 13.5   HCT 39.8 40.4    176   SEG  --  84     Recent Labs   Lab 21  0527 21  1334   SODIUM 136 135   POTASSIUM 4.5 4.1   CHLORIDE 99 99   CO2 19* 24   BUN 24* 17   CREATININE 1.15 1.16   GLUCOSE 229* 213*   CALCIUM 8.9 8.7   ALBUMIN 2.5* 2.7*   AST 56* 55*   GPT 40 39   BILIRUBIN 0.7 0.6   ALKPT 51 50   INR  --  1.1       Radiology:  CT Chest PE Imaging    Result Date: 2021  EXAM: CT ANGIOGRAM CHEST PE IMAGING- 3D CLINICAL INFORMATION: PE suspected, low/intermediate prob, positive D-dimer, covid +, hypoxia COMPARISON: No prior CT comparison. TECHNIQUE:  Using a multidetector-row, multislice helical CT scanner, CT angiography of the chest is performed following administration of IV contrast as a dynamic bolus of 250 cc of  Omnipaque 350.  Coronal, sagittal and axial MIP reconstructed images are reviewed and archived to PACS.   FINDINGS:  There is no CTA evidence for PE. The thoracic aorta is normal in caliber without aneurysm. The cardiac silhouette is at the upper limits of normal size. Coronary arterial calcifications are present. No significant pleural or pericardial effusions are present. Mildly prominent mediastinal and hilar lymph nodes are present. There is a 15 mm maximum short axis dimension prevascular lymph node, as well as a 9 mm and 13 mm right and left hilar lymph nodes respectively. Significance uncertain. This could be reactive. Extensive patchy infiltrate throughout all lobes of both lungs compatible with the patient's diagnosis of Covid. Images of the included upper abdomen are within normal limits. Coronal, sagittal and axial MIP reconstructions serve to further define and confirm the above findings.     IMPRESSION: 1. No CTA evidence for PE. 2. Coronary arterial calcifications. 3. Mediastinal and hilar mild lymphadenopathy as described possibly reactive. 4. Extensive bilateral patchy infiltrates compatible with the patient's diagnosis of Covid.        Central line:    Available Intravenous Access     PICC Line / CVC Line / PIV Line / Intraosseous Line / Line / UAC Line            Peripheral IV 12/30/21 Right Antecubital 18 1 day                 Blood Cultures:   Results for orders placed or performed during the hospital encounter of 01/03/16   Blood Culture    Specimen: Blood   Result Value Ref Range    Specimen Description BLOOD ANTECUBITAL,LEFT     CULTURE NO GROWTH 5 DAYS.     REPORT STATUS 01/08/2016 FINAL           Active Hospital Problems    Diagnosis    • Pneumonia due to COVID-19 virus    • Coronary atherosclerosis of unspecified type of vessel, native or graft    • Essential hypertension, benign    • Mixed hyperlipidemia    • Type 2 diabetes mellitus with stage 3 chronic kidney disease, without long-term  current use of insulin (CMS/[facility])         ASSESSMENT AND PLAN:     COVID Pneumonia in unvaccinated patient  Will initiated high dose Decadron given current 10 L need for oxygen. Initiate Remdesivir 100 mg daily. Consider Tocizumab if condition worsens.  12/31-patient is on Decadron 10 mg IV.  Patient and wife refused remdesivir yesterday.  Sodium that is worse was discontinued.  Patient is well ever fact that he is getting worse as evidenced by increasing FiO2 requirements from bilateral COVID pneumonia.  Patient is high risk given obesity diabetes hypertension.  This is very clearly discussed with the patient.     Hypoxemic Respiratory Failure secondary to COVID pneumonia  Will treat as above.  Patient currently requiring 10 L/min oxygen [id number]-worsening clinical status now requiring 55 L of high-flow oxygen supplementation via nasal cannula.     Coronary artery disease with a stent placed around 2010  Will continue home medications, including ASA, Metoprolol 100 mg daily (need to change to succinate, not tartrate as listed on med rec), ACE and lipitor 80 mg nightly.12/31-no active complaints of chest pain.  Continue home medications.     Essential hypertension  Will resume home Amlodipine 10 mg daily, Metoprolol succinate (not tartrate) 100 mg daily, 12/31-monitor and control.     Diabetes type 2  Patient appears to be on multiple oral medications, including Invokana 300 mg daily, Januvia 100 mg, Glipizide 15 mg BID.  Patient will likely suffer from very high blood sugars with the Steroids being given.12/31-anticipate worsening blood sugar control with addition of Decadron.  Monitor blood sugars a.c. and HS.  Basal and bolus days of insulin therapy.     Most recent A1C was 7.4% in July 2021.     Hyperlipidemia  Will continue his Atorvastatin 80 mg     CKD stage two  GFR is 68, creatinine is typically 1.1 - 1.2 in this patient, monitor.       Smoking status- non smoker    Nutrition status-  appropriate    Body mass index is 37.93 kg/m². - Obese (BMI 30-39 without a comorbid condition or 30-34 with a comorbid condition)    DVT Prophylaxis - Lovenox therapeutic dosing  and SCDs      Disposition: tbd The patients treatment plans were discussed with patient and RN.    I personally spent 40 minutes today in the care and management of this patient and more than 50% of my time was spent in counseling and coordination of care.     Sylvester Gomes MD  Hospitalist  12/31/2021  8:29 AM       Addendum-spoke again over the phone with Janet patient's wife over the phone at patient's request.  Had extensive discussion with the patient's wife.  Ultimately a decision was made by the patient himself to take remdesivir infusion which is ordered.  Risks benefits alternatives of the proposed treatment discussed with the patient as well as wife.

## 2024-11-21 NOTE — DISCHARGE PLANNING
Case Management Discharge Planning    Admission Date: 11/20/2024  GMLOS:    ALOS: 0    6-Clicks ADL Score: 22  6-Clicks Mobility Score: 18      Anticipated Discharge Dispo: Discharge Disposition: D/T to SNF with Medicare cert in anticipation of skilled care (03)    Action(s) Taken:   Request to DPA to please send referrals to [place] SNFs.      Medically Clear: No    Next Steps:   Follow up on referrals.    Barriers to Discharge: Medical clearance, Pending Placement, and Pending PT Evaluation

## 2024-11-21 NOTE — PROCEDURES
Procedure Note    Date: 11/21/2024  Time: 0910    Procedure: Arterial Line placement  Site: R radial artery    Indication: Shock requiring continuous BP monitoring, frequent ABG monitoring  Consent: Informed consent obtained from patient or designated decision maker after explaining the benefits/risks of the procedure including but not limited to bleeding, infection, nerve or other deep structure injury, or failure of placement. Patient or surrogate expressed understanding and agreement.    Time out: Verbal consent was obtained. Immediately prior to procedure, a time out was called to verify the correct patient, procedure, equipment, support staff and site/side marked as required. Pre-procedure pain reported as 0/10 and post-procedure was 1/10.    Procedure: After obtaining consent, a time-out was performed. An Ted's test was performed to test for adequate collateral circulation. Patient positioned, prepped, and draped in sterile fashion.  3 mL of local anesthetic injected (1% lidocaine without epinephrine) achieving appropriate comfort level for patient. Artery was located using realtime US/physical exam. A 20 gauge catheter was placed using Seldinger technique. Appropriate arterial blood visualized from the catheter and the arterial waveform confirmed on the monitor. Line secured and dressed in place. Patient tolerated procedure well without any difficulties and left in care of bedside nurse.     EBL: minimal  Complications: none    Jeremy Gonda, MD  Critical Care Medicine

## 2024-11-21 NOTE — PROGRESS NOTES
This RN responded to pt room due to a loud thump. Pt found down and primary RN assessing pt status. Pt able to state name and  while on floor. Pt assisted back into bed by staff. Pt became unresponsive rapid response called. Dr. De Guzman called to bedside. Pt then noted to be pulseless with agonal breathing. Code blue called. CPR was initiated after 1 round ROSC was achieved. Rapid response team arrived at bedside and pt now able to state name, , and place. Rapid protocol took over, pt attached to zoll and transferred to RICU 117. No emergency contact on file, unable to notify.

## 2024-11-22 PROBLEM — I25.10 CORONARY ARTERY DISEASE: Chronic | Status: ACTIVE | Noted: 2024-11-19

## 2024-11-22 PROBLEM — E03.9 HYPOTHYROIDISM: Chronic | Status: ACTIVE | Noted: 2024-11-12

## 2024-11-22 PROBLEM — I51.3 LV (LEFT VENTRICULAR) MURAL THROMBUS: Chronic | Status: ACTIVE | Noted: 2024-11-11

## 2024-11-22 PROBLEM — D62 ACUTE BLOOD LOSS ANEMIA: Status: ACTIVE | Noted: 2024-11-20

## 2024-11-22 PROBLEM — I50.22 CHRONIC SYSTOLIC HEART FAILURE (HCC): Chronic | Status: ACTIVE | Noted: 2024-11-10

## 2024-11-22 LAB
ALBUMIN SERPL BCP-MCNC: 3 G/DL (ref 3.2–4.9)
ALBUMIN/GLOB SERPL: 1.6 G/DL
ALP SERPL-CCNC: 75 U/L (ref 30–99)
ALT SERPL-CCNC: 44 U/L (ref 2–50)
ANION GAP SERPL CALC-SCNC: 9 MMOL/L (ref 7–16)
AST SERPL-CCNC: 90 U/L (ref 12–45)
BASOPHILS # BLD AUTO: 0.1 % (ref 0–1.8)
BASOPHILS # BLD: 0.02 K/UL (ref 0–0.12)
BILIRUB SERPL-MCNC: 0.6 MG/DL (ref 0.1–1.5)
BUN SERPL-MCNC: 39 MG/DL (ref 8–22)
CALCIUM ALBUM COR SERPL-MCNC: 8.4 MG/DL (ref 8.5–10.5)
CALCIUM SERPL-MCNC: 7.6 MG/DL (ref 8.5–10.5)
CHLORIDE SERPL-SCNC: 109 MMOL/L (ref 96–112)
CO2 SERPL-SCNC: 19 MMOL/L (ref 20–33)
CREAT SERPL-MCNC: 1.58 MG/DL (ref 0.5–1.4)
EOSINOPHIL # BLD AUTO: 0 K/UL (ref 0–0.51)
EOSINOPHIL NFR BLD: 0 % (ref 0–6.9)
ERYTHROCYTE [DISTWIDTH] IN BLOOD BY AUTOMATED COUNT: 54.7 FL (ref 35.9–50)
GFR SERPLBLD CREATININE-BSD FMLA CKD-EPI: 43 ML/MIN/1.73 M 2
GLOBULIN SER CALC-MCNC: 1.9 G/DL (ref 1.9–3.5)
GLUCOSE SERPL-MCNC: 130 MG/DL (ref 65–99)
HCT VFR BLD AUTO: 19.9 % (ref 42–52)
HCT VFR BLD AUTO: 22.2 % (ref 42–52)
HCT VFR BLD AUTO: 24.3 % (ref 42–52)
HCT VFR BLD AUTO: 26 % (ref 42–52)
HGB BLD-MCNC: 6.8 G/DL (ref 14–18)
HGB BLD-MCNC: 7.7 G/DL (ref 14–18)
HGB BLD-MCNC: 8.4 G/DL (ref 14–18)
HGB BLD-MCNC: 8.8 G/DL (ref 14–18)
IMM GRANULOCYTES # BLD AUTO: 0.27 K/UL (ref 0–0.11)
IMM GRANULOCYTES NFR BLD AUTO: 1.9 % (ref 0–0.9)
INR PPP: 3.51 (ref 0.87–1.13)
LACTATE SERPL-SCNC: 1.2 MMOL/L (ref 0.5–2)
LYMPHOCYTES # BLD AUTO: 2.41 K/UL (ref 1–4.8)
LYMPHOCYTES NFR BLD: 16.7 % (ref 22–41)
MAGNESIUM SERPL-MCNC: 2.1 MG/DL (ref 1.5–2.5)
MCH RBC QN AUTO: 31.4 PG (ref 27–33)
MCHC RBC AUTO-ENTMCNC: 34.7 G/DL (ref 32.3–36.5)
MCV RBC AUTO: 90.6 FL (ref 81.4–97.8)
MONOCYTES # BLD AUTO: 1.43 K/UL (ref 0–0.85)
MONOCYTES NFR BLD AUTO: 9.9 % (ref 0–13.4)
NEUTROPHILS # BLD AUTO: 10.28 K/UL (ref 1.82–7.42)
NEUTROPHILS NFR BLD: 71.4 % (ref 44–72)
NRBC # BLD AUTO: 0.16 K/UL
NRBC BLD-RTO: 1.1 /100 WBC (ref 0–0.2)
PHOSPHATE SERPL-MCNC: 3.4 MG/DL (ref 2.5–4.5)
PLATELET # BLD AUTO: 105 K/UL (ref 164–446)
PMV BLD AUTO: 11.7 FL (ref 9–12.9)
POTASSIUM SERPL-SCNC: 4.5 MMOL/L (ref 3.6–5.5)
PROT SERPL-MCNC: 4.9 G/DL (ref 6–8.2)
PROTHROMBIN TIME: 35.4 SEC (ref 12–14.6)
RBC # BLD AUTO: 2.45 M/UL (ref 4.7–6.1)
SODIUM SERPL-SCNC: 137 MMOL/L (ref 135–145)
T4 FREE SERPL-MCNC: 0.67 NG/DL (ref 0.93–1.7)
TSH SERPL DL<=0.005 MIU/L-ACNC: 49.4 UIU/ML (ref 0.38–5.33)
WBC # BLD AUTO: 14.4 K/UL (ref 4.8–10.8)

## 2024-11-22 PROCEDURE — 85025 COMPLETE CBC W/AUTO DIFF WBC: CPT

## 2024-11-22 PROCEDURE — 97163 PT EVAL HIGH COMPLEX 45 MIN: CPT

## 2024-11-22 PROCEDURE — 85014 HEMATOCRIT: CPT | Mod: 91

## 2024-11-22 PROCEDURE — 36430 TRANSFUSION BLD/BLD COMPNT: CPT

## 2024-11-22 PROCEDURE — 700102 HCHG RX REV CODE 250 W/ 637 OVERRIDE(OP): Performed by: NURSE PRACTITIONER

## 2024-11-22 PROCEDURE — 700105 HCHG RX REV CODE 258: Performed by: NURSE PRACTITIONER

## 2024-11-22 PROCEDURE — A9270 NON-COVERED ITEM OR SERVICE: HCPCS | Performed by: STUDENT IN AN ORGANIZED HEALTH CARE EDUCATION/TRAINING PROGRAM

## 2024-11-22 PROCEDURE — 83605 ASSAY OF LACTIC ACID: CPT

## 2024-11-22 PROCEDURE — 84100 ASSAY OF PHOSPHORUS: CPT

## 2024-11-22 PROCEDURE — 80053 COMPREHEN METABOLIC PANEL: CPT

## 2024-11-22 PROCEDURE — P9016 RBC LEUKOCYTES REDUCED: HCPCS

## 2024-11-22 PROCEDURE — 770022 HCHG ROOM/CARE - ICU (200)

## 2024-11-22 PROCEDURE — 83735 ASSAY OF MAGNESIUM: CPT

## 2024-11-22 PROCEDURE — 700101 HCHG RX REV CODE 250: Performed by: INTERNAL MEDICINE

## 2024-11-22 PROCEDURE — 99232 SBSQ HOSP IP/OBS MODERATE 35: CPT | Performed by: NURSE PRACTITIONER

## 2024-11-22 PROCEDURE — 700111 HCHG RX REV CODE 636 W/ 250 OVERRIDE (IP): Mod: JZ,JG | Performed by: NURSE PRACTITIONER

## 2024-11-22 PROCEDURE — 700102 HCHG RX REV CODE 250 W/ 637 OVERRIDE(OP): Performed by: STUDENT IN AN ORGANIZED HEALTH CARE EDUCATION/TRAINING PROGRAM

## 2024-11-22 PROCEDURE — 86923 COMPATIBILITY TEST ELECTRIC: CPT

## 2024-11-22 PROCEDURE — 84443 ASSAY THYROID STIM HORMONE: CPT

## 2024-11-22 PROCEDURE — 99291 CRITICAL CARE FIRST HOUR: CPT | Performed by: NURSE PRACTITIONER

## 2024-11-22 PROCEDURE — 84439 ASSAY OF FREE THYROXINE: CPT

## 2024-11-22 PROCEDURE — 85610 PROTHROMBIN TIME: CPT

## 2024-11-22 PROCEDURE — A9270 NON-COVERED ITEM OR SERVICE: HCPCS | Performed by: NURSE PRACTITIONER

## 2024-11-22 PROCEDURE — 700111 HCHG RX REV CODE 636 W/ 250 OVERRIDE (IP): Performed by: INTERNAL MEDICINE

## 2024-11-22 PROCEDURE — 97167 OT EVAL HIGH COMPLEX 60 MIN: CPT

## 2024-11-22 PROCEDURE — 85018 HEMOGLOBIN: CPT

## 2024-11-22 RX ORDER — SODIUM CHLORIDE 9 MG/ML
INJECTION, SOLUTION INTRAVENOUS CONTINUOUS
Status: ACTIVE | OUTPATIENT
Start: 2024-11-22 | End: 2024-11-22

## 2024-11-22 RX ORDER — POLYETHYLENE GLYCOL 3350 17 G/17G
1 POWDER, FOR SOLUTION ORAL
Status: DISCONTINUED | OUTPATIENT
Start: 2024-11-22 | End: 2024-11-27 | Stop reason: HOSPADM

## 2024-11-22 RX ORDER — AMOXICILLIN 250 MG
2 CAPSULE ORAL EVERY EVENING
Status: DISCONTINUED | OUTPATIENT
Start: 2024-11-22 | End: 2024-11-27 | Stop reason: HOSPADM

## 2024-11-22 RX ORDER — CALCIUM GLUCONATE 20 MG/ML
2 INJECTION, SOLUTION INTRAVENOUS ONCE
Status: COMPLETED | OUTPATIENT
Start: 2024-11-22 | End: 2024-11-22

## 2024-11-22 RX ADMIN — SODIUM CHLORIDE: 9 INJECTION, SOLUTION INTRAVENOUS at 01:52

## 2024-11-22 RX ADMIN — LIDOCAINE 1 PATCH: 4 PATCH TOPICAL at 05:29

## 2024-11-22 RX ADMIN — OXYCODONE 5 MG: 5 TABLET ORAL at 00:06

## 2024-11-22 RX ADMIN — OXYCODONE 5 MG: 5 TABLET ORAL at 23:57

## 2024-11-22 RX ADMIN — ACETAMINOPHEN 650 MG: 325 TABLET ORAL at 16:50

## 2024-11-22 RX ADMIN — ACETAMINOPHEN 650 MG: 325 TABLET ORAL at 21:01

## 2024-11-22 RX ADMIN — SENNOSIDES AND DOCUSATE SODIUM 2 TABLET: 50; 8.6 TABLET ORAL at 17:32

## 2024-11-22 RX ADMIN — LEVOTHYROXINE SODIUM 50 MCG: 0.05 TABLET ORAL at 05:29

## 2024-11-22 RX ADMIN — ACETAMINOPHEN 650 MG: 325 TABLET ORAL at 03:37

## 2024-11-22 RX ADMIN — OXYCODONE 5 MG: 5 TABLET ORAL at 04:13

## 2024-11-22 RX ADMIN — ATORVASTATIN CALCIUM 80 MG: 80 TABLET, FILM COATED ORAL at 17:33

## 2024-11-22 RX ADMIN — CLOPIDOGREL BISULFATE 75 MG: 75 TABLET ORAL at 05:30

## 2024-11-22 RX ADMIN — OXYCODONE 5 MG: 5 TABLET ORAL at 13:40

## 2024-11-22 RX ADMIN — PANTOPRAZOLE SODIUM 40 MG: 40 INJECTION, POWDER, FOR SOLUTION INTRAVENOUS at 05:29

## 2024-11-22 RX ADMIN — CALCIUM GLUCONATE 2 G: 20 INJECTION, SOLUTION INTRAVENOUS at 11:13

## 2024-11-22 ASSESSMENT — COGNITIVE AND FUNCTIONAL STATUS - GENERAL
MOVING TO AND FROM BED TO CHAIR: TOTAL
HELP NEEDED FOR BATHING: A LITTLE
STANDING UP FROM CHAIR USING ARMS: TOTAL
PERSONAL GROOMING: A LITTLE
DRESSING REGULAR LOWER BODY CLOTHING: A LITTLE
MOBILITY SCORE: 10
MOVING FROM LYING ON BACK TO SITTING ON SIDE OF FLAT BED: A LITTLE
DRESSING REGULAR UPPER BODY CLOTHING: A LITTLE
WALKING IN HOSPITAL ROOM: TOTAL
TURNING FROM BACK TO SIDE WHILE IN FLAT BAD: A LITTLE
SUGGESTED CMS G CODE MODIFIER DAILY ACTIVITY: CK
TOILETING: A LITTLE
SUGGESTED CMS G CODE MODIFIER MOBILITY: CL
DAILY ACTIVITIY SCORE: 19
CLIMB 3 TO 5 STEPS WITH RAILING: TOTAL

## 2024-11-22 ASSESSMENT — ENCOUNTER SYMPTOMS
BACK PAIN: 0
SHORTNESS OF BREATH: 0
CHILLS: 0
BLURRED VISION: 0
ABDOMINAL PAIN: 0
COUGH: 0
PALPITATIONS: 0
FEVER: 0
DIZZINESS: 0
CHEST TIGHTNESS: 0
DOUBLE VISION: 0
MEMORY LOSS: 0
NECK PAIN: 0
BLOOD IN STOOL: 0
WEAKNESS: 1
INSOMNIA: 0
DIZZINESS: 1

## 2024-11-22 ASSESSMENT — PAIN DESCRIPTION - PAIN TYPE
TYPE: ACUTE PAIN
TYPE: ACUTE PAIN;SURGICAL PAIN
TYPE: ACUTE PAIN;SURGICAL PAIN
TYPE: ACUTE PAIN
TYPE: ACUTE PAIN
TYPE: ACUTE PAIN;SURGICAL PAIN
TYPE: ACUTE PAIN

## 2024-11-22 ASSESSMENT — ACTIVITIES OF DAILY LIVING (ADL): TOILETING: INDEPENDENT

## 2024-11-22 ASSESSMENT — FIBROSIS 4 INDEX: FIB4 SCORE: 10.6

## 2024-11-22 ASSESSMENT — GAIT ASSESSMENTS: GAIT LEVEL OF ASSIST: UNABLE TO PARTICIPATE

## 2024-11-22 NOTE — THERAPY
"Physical Therapy   Initial Evaluation     Patient Name: Dilan Calderón  Age:  84 y.o., Sex:  male  Medical Record #: [id number]  Today's Date: 11/22/2024     Precautions  Precautions: Fall Risk;Cardiac Precautions (See Comments)  Comments: recent stent and micropacemaker placement  (11/12/24)    Assessment    84 y.o. male was presented to the hospital s/p fall with a R eyebrow laceration, fatigue, and reporting R groin pain at his incision site.  Pt had a cardiac stent and micro pacemaker placement on 11/12 and was dc'd on 11/19.  He went in to cardiac arrest yesterday morning while here in the hospital.  Per nurse, pt's pacemaker is kicking in with mobility and he is having a drop in BP with syncope.  On eval, pt presents with decreased activity tolerance, sitting EOB x5 minutes which was limited by dizziness with an 11 point drop in SBP.  Dizziness increase with prolonged sitting.  He was unable to attempt standing.  He also reports the burning has spread from the incision site to the R thigh with movement and sitting EOB.  He will benefit from continued acute PT services to address the above deficits in order to return home safely.  Recommend post acute PT services.    Plan    Physical Therapy Initial Treatment Plan   Treatment Plan : Bed Mobility, Equipment, Family / Caregiver Training, Gait Training, Manual Therapy, Neuro Re-Education / Balance, Self Care / Home Evaluation, Therapeutic Activities, Therapeutic Exercise, Stair Training  Treatment Frequency: (P) 3 Times per Week  Duration: (P) Until Therapy Goals Met    DC Equipment Recommendations: Unable to determine at this time  Discharge Recommendations: Recommend post-acute placement for additional physical therapy services prior to discharge home       Subjective    \"I feel groggy.\"     Objective       11/22/24 0941   Initial Contact Note    Initial Contact Note Order Received and Verified, Physical Therapy Evaluation in Progress with Full Report to Follow. "   Precautions   Precautions Fall Risk;Cardiac Precautions (See Comments)   Comments recent stent and micropacemaker placement  (11/12/24)   Vitals   Pulse 93  (supine, 101 sitting, 99 return to supine)   Blood Pressure  109/59  (supine, 98/58 sitting with dizziness, 106/65 return to supin)   Pain 0 - 10 Group   Therapist Pain Assessment During Activity  (R groin and thigh with RLE movement and sitting EOB)   Prior Living Situation   Housing / Facility 1 Story House   Steps Into Home 4   Rail None   Equipment Owned None   Lives with - Patient's Self Care Capacity Alone and Able to Care For Self   Comments gets help from his next door neighbor   Prior Level of Functional Mobility   Bed Mobility Independent   Transfer Status Independent   Ambulation Independent   Ambulation Distance community   Assistive Devices Used None   Stairs Independent   Comments drives   History of Falls   History of Falls Yes   Date of Last Fall   (reason for admission, pt reports a few falls x1 year- passing out, dizzy)   Cognition    Cognition / Consciousness WDL   Level of Consciousness Alert   Active ROM Lower Body    Active ROM Lower Body  WDL   Strength Lower Body   Lower Body Strength  WDL   Sensation Lower Body   Lower Extremity Sensation   WDL   Balance Assessment   Sitting Balance (Static) Fair   Sitting Balance (Dynamic) Fair -   Weight Shift Sitting Fair   Bed Mobility    Supine to Sit Contact Guard Assist   Sit to Supine Contact Guard Assist   Scooting Standby Assist   Rolling Standby Assist   Comments HOB partially elevated, rail   Gait Analysis   Gait Level Of Assist Unable to Participate   Functional Mobility   Sit to Stand Unable to Participate   Bed, Chair, Wheelchair Transfer Unable to Participate   ICU Target Mobility Level   ICU Mobility - Targeted Level Level 2   Activity Tolerance   Sitting Edge of Bed 5  min   Edema / Skin Assessment   Edema / Skin  Not Assessed   Short Term Goals    Short Term Goal # 1 Pt will perform  supine < > sit independently with bed flat, no rail in 6 visits in order to set up for upright mobility   Short Term Goal # 2 Pt will perform sit < > stand CGA in 6 visits in order to prepare for ambulation   Short Term Goal # 3 Pt will ambulate 50' CGA with FWW in 6 visits in order to progress household ambulation   Short Term Goal # 4 Pt will ascend/ descend 4 steps with Ilan in 6 visits in order to access his home   Education Group   Education Provided Role of Physical Therapist   Role of Physical Therapist Patient Response Patient;Acceptance;Explanation;Action Demonstration   Additional Comments bed mobility- action demonstration, reinforcement needed with positioning in sitting to decrease R thigh burning   Physical Therapy Initial Treatment Plan    Treatment Plan  Bed Mobility;Equipment;Family / Caregiver Training;Gait Training;Manual Therapy;Neuro Re-Education / Balance;Self Care / Home Evaluation;Therapeutic Activities;Therapeutic Exercise;Stair Training   Treatment Frequency 3 Times per Week   Duration Until Therapy Goals Met   Problem List    Problems Impaired Bed Mobility;Impaired Transfers;Impaired Ambulation;Decreased Activity Tolerance   Anticipated Discharge Equipment and Recommendations   DC Equipment Recommendations Unable to determine at this time   Discharge Recommendations Recommend post-acute placement for additional physical therapy services prior to discharge home   Interdisciplinary Plan of Care Collaboration   IDT Collaboration with  Nursing;Physical Therapist   Patient Position at End of Therapy Edge of Bed;Call Light within Reach;Tray Table within Reach   Collaboration Comments RN aware, co-eval with OT   Session Information   Date / Session Number  11/22 -1 (1/3, 11/28)       Patient seen for team evaluation with Occupational Therapist for the following reason(s):  Due to the medical complexity, the skill of both practitioners is needed to monitor vitals, patient status, and adjust the  intervention to fit the patient's needs and goals.. Physical Therapist facilitated mobiltiy while Occupational Therapist simultaneously treated pt according to POC.

## 2024-11-22 NOTE — WOUND TEAM
Patient seen for wound consult regarding R eyebrow, L foot, R pretibial, and R groin site.    Orders:  R knee: Mepitel one, aquacel, silicone adhesive foam change q48h  L 5th MTH: Aquacel and silicone adhesive foam change q48h    Remainder of wounds not indicative of further advanced wound care needs.    Full note to follow.

## 2024-11-22 NOTE — CONSULTS
Cardiology Initial Consultation    Date of Service  11/22/2024    Referring Physician  Lincoln Rodriguez Jr., D.O.    Reason for Consultation  Syncope, Hypotension with V pacing    History of Presenting Illness  Dilan Calderón is a 84 y.o. male admitted with syncope that resulted in laceration above his right eyebrow.     Yesterday morning patient had gotten up out of bed and had another syncopal event resulting in a repeat head strike with further bleeding from the right eye laceration site.  Patient became unresponsive with agonal breathing and potential loss of pulse, he underwent approximately 90 seconds of CPR before becoming responsive again.  Shortly after yesterday morning around 9 AM patient became unresponsive again and began gurgling.  His heart rate dropped into the 50s with hypotension.  While staff at bedside was setting up for intubation patient spontaneously regained consciousness.  Cardiac monitor strips were reviewed during these episodes showing a change from sinus rhythm to V paced rhythm.    Cardiology was consulted.  Patient's pacemaker was interrogated showing normal sensing and function.  Patient's baseline rate was increased to 80 bpm and rate response therapy was turned on.    Patient has also had significant anemia requiring blood transfusions.    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    Today patient states that his recurrent syncope started back in April.  Patient will have no prodromal symptoms and will find himself on the floor or in the same position he was sitting or lying in with no recollection of what happened.  Patient denies any nausea/vomiting, coughing, changes in vision or lightheadedness prior to his syncopal events.    Review of Systems  Review of Systems   Constitutional:  Negative for fever.    Respiratory:  Negative for chest tightness and shortness of breath.    Cardiovascular:  Negative for chest pain, palpitations and leg swelling.   Gastrointestinal:  Negative for abdominal pain and blood in stool.   Genitourinary:  Negative for hematuria.   Musculoskeletal:  Negative for gait problem.   Neurological:  Positive for syncope. Negative for dizziness.   Psychiatric/Behavioral:          Frustrated with lack of answers regarding his recurrent syncope   All other systems reviewed and are negative.      Past Medical History   has a past medical history of NSTEMI (non-ST elevated myocardial infarction) (HCC).    Surgical History   has a past surgical history that includes pr upper gi endoscopy,diagnosis (N/A, 03/06/2024); pr colonoscopy,diagnostic (N/A, 03/06/2024); pr upper gi endoscopy,w/dilat,gastric out (N/A, 03/06/2024); and other cardiac surgery (11/10/2024).    Family History  family history is not on file.    Social History   reports that he has quit smoking. His smoking use included cigarettes. He has never used smokeless tobacco. He reports that he does not currently use alcohol. He reports that he does not use drugs.    Medications  Prior to Admission Medications   Prescriptions Last Dose Informant Patient Reported? Taking?   NON SPECIFIED Unknown Historical Yes No   Sig: Take 1 Tablet by mouth every day. OTC allergy med   acetaminophen (TYLENOL) 500 MG Tab 11/20/2024 at  3:00 AM Patient, Historical Yes Yes   Sig: Take 500-1,000 mg by mouth 2 times a day as needed.   aspirin (ASA) 81 MG Chew Tab chewable tablet Unknown Patient, Historical No No   Sig: Chew 1 Tablet every day. Stop once INR therapeutic and off lovenox injections   atorvastatin (LIPITOR) 80 MG tablet 11/19/2024 Evening Patient, Historical No Yes   Sig: Take 1 Tablet by mouth every evening.   clopidogrel (PLAVIX) 75 MG Tab Unknown Historical No No   Sig: Take 1 Tablet by mouth every day.   enoxaparin (LOVENOX) 60 MG/0.6ML Solution  Prefilled Syringe inj 11/19/2024 Evening Patient, Historical No Yes   Sig: Inject 1 syringe (60 mg) under the skin every 12 hours.   levothyroxine (SYNTHROID) 50 MCG Tab Unknown Historical No No   Sig: Take 1 Tablet by mouth every morning on an empty stomach.   metoprolol SR (TOPROL XL) 25 MG TABLET SR 24 HR Unknown Patient, Historical No No   Sig: Take 1 Tablet by mouth every day.   multivitamin Tab Unknown Patient, Historical Yes No   Sig: Take 1 Tablet by mouth every evening.   warfarin (COUMADIN) 5 MG Tab Unknown Historical No No   Sig: Take 1 Tablet by mouth every day at 6 PM. Further dosing per anticoagulation clinic      Facility-Administered Medications: None       Allergies  No Known Allergies    Vital signs in last 24 hours  Temp:  [36.1 °C (97 °F)-36.7 °C (98 °F)] 36.6 °C (97.8 °F)  Pulse:  [] 91  Resp:  [15-36] 17  BP: ()/(51-75) 129/71  SpO2:  [89 %-100 %] 89 %    Physical Exam  Physical Exam  Constitutional:       General: He is not in acute distress.     Appearance: Normal appearance.   HENT:      Head: Normocephalic.   Eyes:      Extraocular Movements: Extraocular movements intact.   Cardiovascular:      Rate and Rhythm: Normal rate and regular rhythm.      Pulses: Normal pulses.      Heart sounds: Murmur heard.      Systolic murmur is present with a grade of 1/6.   Pulmonary:      Effort: Pulmonary effort is normal.      Breath sounds: Normal breath sounds.   Musculoskeletal:         General: No swelling.      Cervical back: Normal range of motion.      Right lower leg: No edema.      Left lower leg: No edema.   Skin:     General: Skin is warm and dry.   Neurological:      Mental Status: He is alert and oriented to person, place, and time.   Psychiatric:         Mood and Affect: Mood normal.         Thought Content: Thought content normal.         Judgment: Judgment normal.         Lab Review  Lab Results   Component Value Date/Time    WBC 14.4 (H) 11/22/2024 05:19 AM    RBC 2.45 (L)  "11/22/2024 05:19 AM    HEMOGLOBIN 8.8 (L) 11/22/2024 01:05 PM    HEMATOCRIT 26.0 (L) 11/22/2024 01:05 PM    MCV 90.6 11/22/2024 05:19 AM    MCH 31.4 11/22/2024 05:19 AM    MCHC 34.7 11/22/2024 05:19 AM    MPV 11.7 11/22/2024 05:19 AM      Lab Results   Component Value Date/Time    SODIUM 137 11/22/2024 05:19 AM    POTASSIUM 4.5 11/22/2024 05:19 AM    CHLORIDE 109 11/22/2024 05:19 AM    CO2 19 (L) 11/22/2024 05:19 AM    GLUCOSE 130 (H) 11/22/2024 05:19 AM    BUN 39 (H) 11/22/2024 05:19 AM    CREATININE 1.58 (H) 11/22/2024 05:19 AM    CREATININE 1.2 02/11/2008 09:00 AM      Lab Results   Component Value Date/Time    ASTSGOT 90 (H) 11/22/2024 05:19 AM    ALTSGPT 44 11/22/2024 05:19 AM     Lab Results   Component Value Date/Time    CHOLSTRLTOT 197 11/11/2024 04:20 AM     (H) 11/11/2024 04:20 AM    HDL 62 11/11/2024 04:20 AM    TRIGLYCERIDE 88 11/11/2024 04:20 AM    TROPONINT 83 (H) 11/21/2024 06:12 PM       No results for input(s): \"NTPROBNP\" in the last 72 hours.    Cardiac Imaging and Procedures Review  Echocardiogram 11/21/2024:  CONCLUSIONS  Prior study on 11/11/24, compared to the images of the prior study, the   apical thrombus is still present, however, less is less pronounced.  Mildly reduced left ventricular systolic function.  The left ventricular ejection fraction is visually estimated to be 45-  50%.  Apical akinesis.  Left ventricular thrombus noted.    Assessment/Plan  #Syncope  #CHB  #Micra PPM In Situ  #Anemia    Device interrogation shows normal Micra pacemaker sensing and function.  No pauses or loss of capture possibly causing syncope. Lower rate was increased to 80 bpm and rate response was turned on.    Patient reports having recurrent syncope since April, well before Micra was placed a couple of weeks ago.     Discussed case with Dr. Beckman who does not feel that patient's V pacing is causing his hypotension. BP appears to also be labile when not V pacing. Syncopal episodes concerning for " vasovagal or orthostatic hypotension compounded by chronic anemia.      Agree with PPM programming changes made yesterday.    Consider trial of PO Midodrine for hypotension.     ISABEL Joyner.   Cardiologist, [facility]  [phone]

## 2024-11-22 NOTE — WOUND TEAM
[facility]  Inpatient Services  Initial Wound and Skin Care Evaluation    Admission Date: 11/20/2024     Last order of IP CONSULT TO WOUND CARE was found on 11/20/2024 from Hospital Encounter on 11/20/2024     HPI, PMH, SH: Reviewed    Past Surgical History:   Procedure Laterality Date    OTHER CARDIAC SURGERY  11/10/2024    stent placement    OR UPPER GI ENDOSCOPY,DIAGNOSIS N/A 03/06/2024    Procedure: GASTROSCOPY;  Surgeon: Greyson Serrato M.D.;  Location: SURGERY SAME DAY [facility];  Service: Gastroenterology    OR COLONOSCOPY,DIAGNOSTIC N/A 03/06/2024    Procedure: COLONOSCOPY;  Surgeon: Greyson Serrato M.D.;  Location: SURGERY SAME DAY [facility];  Service: Gastroenterology    OR UPPER GI ENDOSCOPY,W/DILAT,GASTRIC OUT N/A 03/06/2024    Procedure: GASTROSCOPY, WITH BALLOON DILATION;  Surgeon: Greyson Serrato M.D.;  Location: SURGERY SAME DAY [facility];  Service: Gastroenterology     Social History     Tobacco Use    Smoking status: Former     Types: Cigarettes    Smokeless tobacco: Never   Substance Use Topics    Alcohol use: Not Currently     Chief Complaint   Patient presents with    Wound Check    Fatigue     Reports bleeding from stitches on right side eyebrow upon waking up at 3am today, stitches from GLF 2 weeks ago. Patient states feeling fatigue for 2 days. Here recently with same. Hx of GLF, and had stents 2 weeks ago. On blood thinners    Post-Op Pain     Intermittent pain since surgery (implant) 2 weeks ago     Diagnosis: Postural dizziness with presyncope [R42, R55]    Unit where seen by Wound Team: R117/00     WOUND CONSULT RELATED TO:  R eyebrow, L foot, R pretibial, and R groin    WOUND TEAM PLAN OF CARE - Frequency of Follow-up:   Nursing to follow dressing orders written for wound care. Contact wound team if area fails to progress, deteriorates or with any questions/concerns if something comes up before next scheduled follow up (See below as to whether wound is following and  frequency of wound follow up)   Not following, consult as needed  - R eyebrow, L foot, R tibial, and R groin    WOUND HISTORY:   84 y.o. male admitted for fatigue, bleeding from R eyebrow, and R groin pain.       WOUND ASSESSMENT/LDA  Wound 11/11/24 Partial Thickness Wound Foot Lateral Left L 5th MTH (Active)   Date First Assessed/Time First Assessed: 11/11/24 1647   Present on Original Admission: Yes  Hand Hygiene Completed: Yes  Primary Wound Type: Partial Thickness Wound  Location: Foot  Wound Orientation: Lateral  Laterality: Left  Wound Description (Com...      Assessments 11/21/2024  5:00 PM   Wound Image     Site Assessment Pink   Periwound Assessment Clean;Dry;Intact   Margins Attached edges;Defined edges   Closure Secondary intention   Drainage Amount Scant   Drainage Description Serosanguineous   Treatments Cleansed   Wound Cleansing Normal Saline Irrigation   Dressing Status Clean;Dry;Intact   Dressing Changed Changed   Dressing Cleansing/Solutions Not Applicable   Dressing Options Hydrofiber Silver;Silicone Adhesive Foam   Dressing Change/Treatment Frequency Every 48 hrs, and As Needed   NEXT Dressing Change/Treatment Date 11/23/24   NEXT Weekly Photo (Inpatient Only) 11/28/24   Wound Team Following Not following   Non-staged Wound Description Partial thickness   Wound Length (cm) 1.5 cm   Wound Width (cm) 2.2 cm   Wound Depth (cm) 0 cm   Wound Surface Area (cm^2) 3.3 cm^2   Wound Volume (cm^3) 0 cm^3   Shape Irregular   Wound Odor None   WOUND NURSE ONLY - Time Spent with Patient (mins) 45       Wound 11/19/24 Partial Thickness Wound Pretibial Proximal;Anterior Right (Active)   Date First Assessed/Time First Assessed: 11/19/24 0500   Present on Original Admission: Yes  Hand Hygiene Completed: Yes  Primary Wound Type: Partial Thickness Wound  Location: Pretibial  Wound Orientation: Proximal;Anterior  Laterality: Right      Assessments 11/21/2024  5:00 PM   Wound Image     Site Assessment  Red;Bleeding;Fragile   Periwound Assessment Fragile   Margins Defined edges;Attached edges   Closure Secondary intention   Drainage Amount Small   Drainage Description Sanguineous   Treatments Cleansed   Wound Cleansing Normal Saline Irrigation   Dressing Status Clean;Dry;Intact   Dressing Changed Changed   Dressing Cleansing/Solutions Not Applicable   Dressing Options Mepitel One;Hydrofiber Silver;Silicone Adhesive Foam   Dressing Change/Treatment Frequency Every 48 hrs, and As Needed   NEXT Dressing Change/Treatment Date 11/23/24   NEXT Weekly Photo (Inpatient Only) 11/28/24   Wound Team Following Not following   Non-staged Wound Description Partial thickness       Wound 11/21/24 Pressure Injury Heel Right POA DTI (Active)   Date First Assessed/Time First Assessed: 11/21/24 1733   Present on Original Admission: Yes  Primary Wound Type: Pressure Injury  Location: Heel  Laterality: Right  Wound Description (Comments): POA DTI      Assessments 11/21/2024  5:00 PM   Wound Image     Site Assessment Purple   Periwound Assessment Blanchable erythema   Margins Defined edges;Attached edges   Closure Adhesive bandage   Drainage Amount None   Treatments Offloading   Dressing Status Clean;Dry;Intact   Dressing Changed Observed   Dressing Cleansing/Solutions Not Applicable   Dressing Options Offloading Dressing - Heel   Dressing Change/Treatment Frequency Every 72 hrs, and As Needed   NEXT Dressing Change/Treatment Date 11/24/24   NEXT Weekly Photo (Inpatient Only) 11/28/24   Wound Team Following Not following   Pressure Injury Stage Deep Tissue   Wound Length (cm) 0.6 cm   Wound Width (cm) 1.5 cm   Wound Surface Area (cm^2) 0.9 cm^2   Shape Round   Wound Odor None   Exposed Structures DHARA        Vascular:    ADRIANA:   No results found.    Lab Values:    Lab Results   Component Value Date/Time    WBC 12.8 (H) 11/21/2024 05:57 AM    RBC 2.09 (L) 11/21/2024 05:57 AM    HEMOGLOBIN 8.4 (L) 11/21/2024 03:27 PM    HEMATOCRIT 25.4 (L)  11/21/2024 03:27 PM    HBA1C 5.8 (H) 05/03/2024 04:00 AM         Culture Results show:  No results found for this or any previous visit (from the past 720 hours).    Pain Level/Medicated:  None, Tolerated without pain medication       INTERVENTIONS BY WOUND TEAM:  Chart and images reviewed. Discussed with bedside RN. All areas of concern (based on picture review, LDA review and discussion with bedside RN) have been thoroughly assessed. Documentation of areas based on significant findings. This RN in to assess patient. Performed standard wound care which includes appropriate positioning, dressing removal and non-selective debridement. Pictures and measurements obtained weekly if/when required.    Wound:  R knee  Preparation for Dressing removal: Removed without difficulty  Cleansed/Non-selectively Debrided with:  Normal Saline and Gauze  Primary Dressing:  Mepitel one, Aquacel  Secondary (Outer) Dressing: Silicone adhesive foam     Wound:  L lateral 5th MTH  Preparation for Dressing removal: Removed without difficulty  Cleansed/Non-selectively Debrided with:  Normal Saline and Gauze  Primary Dressing:  Aquacel  Secondary (Outer) Dressing: Silicone adhesive foam    Advanced Wound Care Discharge Planning  Number of Clinicians necessary to complete wound care: 1  Is patient requiring IV pain medications for dressing changes:  No   Length of time for dressing change 15 min. (This does not include chart review, pre-medication time, set up, clean up or time spent charting.)    Interdisciplinary consultation: Patient, Bedside RN    EVALUATION / RATIONALE FOR TREATMENT:     Date:  11/21/24  Wound Status:  Initial evaluation    R Eyebrow with suture laceration and surrounding scabbing. Okay to remain GLENN.  R Groin with ecchymosis and induration, no wound care interventions indicated at this time.  R Knee with skin tear, area still bleeding as patient is on blood thinners. Protected with mepitel, then covered with Aquacel to  drainage management.  R Weston with intact scab, okay to remain GLENN.  L 5th MTH with chronic partial thickness wound. Aquacel Ag Hydrofiber applied to manage bioburden, absorb exudate, and maintain a moist wound environment without laterally wicking exudate therefore reducing shelley-wound maceration.           Goals: Steady decrease in wound area and depth weekly.    NURSING PLAN OF CARE ORDERS:  Dressing changes: See Dressing Care orders    NUTRITION RECOMMENDATIONS   Wound Team Recommendations:  N/A    DIET ORDERS (From admission to next 24h)       Start     Ordered    11/20/24 0819  Diet Order Diet: Cardiac  ALL MEALS        Question:  Diet:  Answer:  Cardiac    11/20/24 0819                    PREVENTATIVE INTERVENTIONS:    Q shift Robinson - performed per nursing policy  Q shift pressure point assessments - performed per nursing policy    Surface/Positioning  Low Airloss - Currently in Place  Reposition q 2 hours - Currently in Place  TAPs Turning system - Currently in Place    Offloading/Redistribution  Sacral offloading dressing (Silicone dressing) - Currently in Place    Anticipated discharge plans:  TBD        Vac Discharge Needs:  Vac Discharge plan is purely a recommendation from wound team and not a requirement for discharge unless otherwise stated by physician.  Not Applicable Pt not on a wound vac

## 2024-11-22 NOTE — CARE PLAN
The patient is Watcher - Medium risk of patient condition declining or worsening    Shift Goals  Clinical Goals: monitor for syncope, stable VS, stable H/H  Patient Goals: pain control, get rest tonight  Family Goals: DHARA    Progress made toward(s) clinical / shift goals:  Problem: Pain - Standard  Goal: Alleviation of pain or a reduction in pain to the patient’s comfort goal  Outcome: Progressing     Problem: Knowledge Deficit - Standard  Goal: Patient and family/care givers will demonstrate understanding of plan of care, disease process/condition, diagnostic tests and medications  Outcome: Progressing     Problem: Fluid Volume:  Goal: Will maintain balanced intake and output  Outcome: Progressing     Problem: Bowel/Gastric:  Goal: Normal bowel function is maintained or improved  Outcome: Progressing     Problem: Discharge Barriers/Planning  Goal: Patient's continuum of care needs will be met  Outcome: Progressing

## 2024-11-22 NOTE — DIETARY
"Nutrition Services: Initial Assessment     Day 1 of admit. Dilan Calderón is a 84 y.o. male with admitting DX of Postural dizziness with presyncope     Consult received for MST score >/= 2.     Nutrition Assessment:      Height: 170.2 cm (5' 7\")  Weight: 71.3 kg (157 lb 3 oz)  Weight taken via bed scale  Body mass index is 24.62 kg/m². BMI classification: Normal.  Wt Readings from Last 6 Encounters:   11/22/24 71.3 kg (157 lb 3 oz)   11/19/24 61.8 kg (136 lb 3.9 oz)   11/14/24 61 kg (134 lb 7.7 oz)   05/07/24 60.9 kg (134 lb 4.2 oz)   03/06/24 66.3 kg (146 lb 2.6 oz)      Objective:  Pertinent medical hx:   Past Medical History:   Diagnosis Date    NSTEMI (non-ST elevated myocardial infarction) (HCC)      Pertinent labs 11/22: Glu 130 (H), BUN 39 (H), Creat 1.58 (H), Alb 3 (L)  Pertinent meds: Lipitor, Senna  Skin/wounds: skin tears, laceration right eyebrow, L 5th MTH with chronic partial thickness wound per wound team note   Food Allergies: NKFA  Last BM: 11/17/24 per flowsheets     Current diet order:   Cardiac diet. PO intake 25-50%.    Subjective:   Patient states he lost weight last April, but weight has been stable since.  Pt reports he is eating mostly the sides at his meals, but not the entrees. He is agreeable to addition of Ensure.    Nutrition Focused Physical Exam (NFPE)   Weight loss: Weight has increased. No recent weight loss noted.  Muscle mass: No loss noted  Subcutaneous fat: No loss noted  Fluid Accumulation: trace per flow sheets  Reduced  Strength: N/A in acute care setting.     Nutrition Diagnosis:      Inadequate oral intake related to poor appetite as evidenced by recorded PO intake <50% of meals.    Based on RD assessment at this time, pt does not meet criteria in congruence with ASPEN/Academy guidelines for malnutrition.       Nutrition Interventions:      Encourage PO intake >50% of meals, supplements.  Recommend Ensure Plus High Protein (provides 350 calories, 20 g protein per 8 fl " oz) TID.  Patient aware of active plan of care as appropriate.     Nutrition Monitoring and Evaluation:     Monitor nutrition POC  Additional fluids per MD/DO  Monitor vital signs pertinent to nutrition       RD following and will provide updated recommendations as indicated.

## 2024-11-22 NOTE — THERAPY
Occupational Therapy   Initial Evaluation     Patient Name: Dilan Calderón  Age:  84 y.o., Sex:  male  Medical Record #: [id number]  Today's Date: 11/22/2024     Precautions: Fall Risk, Other (See Comments)  Comments: labile BP    Assessment  Patient is 84 y.o. male admitted for wound check, fatigue and post op pain. Pt has a complex medical hx w/recent hospitalizations. Hx includes CAD s/p stent 5/24 and LHC 11/12 as well as recent PPM. Pt has been having frequent syncope events causing injury. Additional hx includes: chronic sytolic heart failure, hypothyroidism, GI bleed, anemia, NSTEMI, and ARF. Pt reports being independent prior to recent admit but has been having difficulty w/dizziness and syncope issues.   This admission pt is dx w/syncope, cardiac arrest, CHI w/eye brow laceration, LV mural thrombus, anemia, and ARF. Pt presents w/generalized deconditioning and symptomatic BP issues while sitting EOB pt had c/o worsening dizziness and further OOB assessment was deferred. At this time will benefit from OT on going medical management of current symptoms.     Patient seen for team evaluation with Physical Therapist for the following reason(s):  Patient required 2 person assistance for safety and to provide effective interventions. Each discipline assisted patient with appropriate and separate goals.. Occupational Therapist facilitated UB/ADL assessment while Physical Therapist simultaneously treated pt according to POC.      Plan  Occupational Therapy Initial Treatment Plan   Treatment Interventions: Self Care / Activities of Daily Living, Adaptive Equipment, Community Re-Integration, Manual Therapy Techniques, Neuro Re-Education / Balance, Therapeutic Exercises, Therapeutic Activity  Treatment Frequency: 3 Times per Week  Duration: Until Therapy Goals Met    DC Equipment Recommendations: Unable to determine at this time  Discharge Recommendations: Recommend post-acute placement for additional occupational  "therapy services prior to discharge home     Subjective  \"I just want to know why this is happening\"      Objective     11/22/24 0942   Initial Contact Note    Initial Contact Note Order Received and Verified, Occupational Therapy Evaluation in Progress with Full Report to Follow.   Prior Living Situation   Prior Services None   Housing / Facility 1 Story House   Steps Into Home 4   Bathroom Set up Walk In Shower   Equipment Owned None   Lives with - Patient's Self Care Capacity Alone and Able to Care For Self   Comments pt reports his neighbor is supportive   Prior Level of ADL Function   Self Feeding Independent   Grooming / Hygiene Independent   Bathing Independent   Dressing Independent   Toileting Independent   Prior Level of IADL Function   Medication Management Independent   Laundry Independent   Kitchen Mobility Independent   Finances Independent   Home Management Independent   Shopping Independent   Prior Level Of Mobility Independent Without Device in Community   Driving / Transportation Driving Independent   History of Falls   History of Falls Yes   Date of Last Fall   (several recent falls related to syncope)   Precautions   Precautions Fall Risk;Other (See Comments)   Comments labile BP   Vitals   Vitals Comments monitored BP supine: 109/59, 98/58 deferred standing supine 106/65   Pain 0 - 10 Group   Location Leg   Location Orientation Right;Upper;Mid   Therapist Pain Assessment During Activity;Nurse Notified  (not rated c/o burning/tingling discomfort)   Cognition    Cognition / Consciousness WDL   Level of Consciousness Alert   Passive ROM Upper Body   Passive ROM Upper Body WDL   Active ROM Upper Body   Active ROM Upper Body  WDL   Strength Upper Body   Upper Body Strength  X   Gross Strength Generalized Weakness, Equal Bilaterally.    Sensation Upper Body   Upper Extremity Sensation  WDL   Upper Body Muscle Tone   Upper Body Muscle Tone  WDL   Neurological Concerns   Neurological Concerns No "   Coordination Upper Body   Coordination WDL   Balance Assessment   Sitting Balance (Static) Fair   Sitting Balance (Dynamic) Fair   Weight Shift Sitting Fair   Weight Shift Standing Fair   Comments deferred standing d/t c/o worsening dizziness at EOB   Bed Mobility    Supine to Sit Minimal Assist   Sit to Supine Minimal Assist   ADL Assessment   Grooming Contact Guard Assist;Seated   Upper Body Dressing Minimal Assist   Lower Body Dressing Maximal Assist   Toileting   (NT)   How much help from another person does the patient currently need...   6 Clicks Daily Activity Score 19   Functional Mobility   Sit to Stand Unable to Participate   Bed, Chair, Wheelchair Transfer Unable to Participate   Toilet Transfers Unable to Participate   Mobility EOB only   Activity Tolerance   Comments poor d/t c/o dizziness   Patient / Family Goals   Patient / Family Goal #1 to figure out whats happening w/me   Short Term Goals   Short Term Goal # 1 pt will complete txf to BSC w/spv   Short Term Goal # 2 pt will complete LB dressing w/spv   Short Term Goal # 3 pt will complete grooming seated w/set up   Education Group   Role of Occupational Therapist Patient Response Patient;Eager;Acceptance;Explanation   Occupational Therapy Initial Treatment Plan    Treatment Interventions Self Care / Activities of Daily Living;Adaptive Equipment;Community Re-Integration;Manual Therapy Techniques;Neuro Re-Education / Balance;Therapeutic Exercises;Therapeutic Activity   Treatment Frequency 3 Times per Week   Duration Until Therapy Goals Met   Problem List   Problem List Decreased Active Daily Living Skills;Decreased Upper Extremity Strength Right;Decreased Upper Extremity Strength Left;Decreased Functional Mobility;Decreased Activity Tolerance;Impaired Postural Control / Balance   Anticipated Discharge Equipment and Recommendations   DC Equipment Recommendations Unable to determine at this time   Discharge Recommendations Recommend post-acute  placement for additional occupational therapy services prior to discharge home   Interdisciplinary Plan of Care Collaboration   IDT Collaboration with  Nursing;Physical Therapist   Patient Position at End of Therapy In Bed;Call Light within Reach;Tray Table within Reach;Phone within Reach   Collaboration Comments RN aware of OT eval and pts efforts   Session Information   Date / Session Number  11/22#1 (1/3, 11/28)

## 2024-11-22 NOTE — PROGRESS NOTES
Critical Care Progress Note    Date of admission  11/20/2024    Hospital Course  Dilan Calderón is an 84-year-old male with PMH significant for CAD s/p stent LAD May 2024, Lcx stent 11/12/24 on DAPT, Mobitz type II AV block s/p Micra pacemaker implantation 11/12/24, LV thrombus on chronic AC with Warfarin, HTN, HLD, tobacco abuse, hypothyroidism, and recent admissions for syncope (most recently 11/19 head passed out resulting in a laceration above his right eyebrow requiring stiches) who presented 11/20 with bleeding from his stitches, postoperative pain from his PPM placement in his right groin.  He was initially admitted to the hospitalist service, however a.m. 11/21 he sustained another syncopal event resulting in repeat head strike with fall to the floor and bleeding from his right eyebrow stitches.  He was transferred back to bed and became unresponsive with agonal breathing and loss of pulses.  He received about 90 seconds of CPR before becoming responsive.  He was transferred to ICU where he had another episode of unresponsive with gurgling and bradycardia 50s with associated hypotension.  While setting up for possible intubation and pressors he regained consciousness and began protecting his airway with no recollection of the event.  Head CT unremarkable.  Hgb's were trending and he received total 3 units of RBCs.  CT abdomen pelvis with contrast showed large right-sided retroperitoneal hemorrhage involving the right psoas and hip iliopsoas muscles.  Cardiology was consulted and increased his pacemaker rate to 80.    Interval Problem Update  Reviewed last 24 hour events:  No overnight events.  Hgb 7.7 after 3 units. Transfuse 1 more unit.  Afebrile  Rhythm SR/ - decreased SBP 80's when paced (symptomatic)  SBP 's. No drips  Neuro: oriented x 3 (disoriented to situation/events), following commands, moving all.  RASS: 0, no sedation  Cardiac diet. BM 11/17  PIV, artline  Right groin/LE pain  Voiding -  "bladder scan 200mL  Mobility 2    Review of Systems  Review of Systems   Constitutional:  Negative for chills and fever.   HENT: Negative.     Eyes:  Negative for blurred vision and double vision.   Respiratory:  Negative for cough and shortness of breath.    Cardiovascular:  Positive for chest pain (\"It hurts where they did the compressions\").   Musculoskeletal:  Negative for back pain and neck pain.   Neurological:  Positive for dizziness and weakness.   Psychiatric/Behavioral:  Negative for memory loss. The patient does not have insomnia.    All other systems reviewed and are negative.       Vital Signs for last 24 hours   Temp:  [36.1 °C (97 °F)-36.7 °C (98 °F)] 36.6 °C (97.8 °F)  Pulse:  [] 91  Resp:  [15-36] 17  BP: ()/(51-75) 129/71  SpO2:  [89 %-100 %] 89 %    Hemodynamic parameters for last 24 hours       Respiratory Information for the last 24 hours       Physical Exam   Physical Exam  Vitals and nursing note reviewed.   Constitutional:       General: He is not in acute distress.     Appearance: Normal appearance. He is ill-appearing. He is not toxic-appearing.   HENT:      Head: Normocephalic.      Right Ear: Hearing normal.      Left Ear: Hearing normal.      Nose: Nose normal.      Mouth/Throat:      Lips: Pink.      Mouth: Mucous membranes are moist.   Eyes:      Pupils: Pupils are equal, round, and reactive to light.   Cardiovascular:      Rate and Rhythm: Normal rate and regular rhythm.      Pulses: Normal pulses.      Heart sounds: Normal heart sounds.   Pulmonary:      Effort: Pulmonary effort is normal.      Breath sounds: Normal breath sounds. No wheezing.   Abdominal:      General: Bowel sounds are normal.      Palpations: Abdomen is soft.      Tenderness: There is no abdominal tenderness. There is no guarding or rebound.   Musculoskeletal:      Right lower leg: No edema.      Left lower leg: No edema.      Comments: Moving all   Skin:     General: Skin is warm and dry.      " Capillary Refill: Capillary refill takes less than 2 seconds.      Coloration: Skin is pale.   Neurological:      Mental Status: He is alert. He is disoriented.      Cranial Nerves: Cranial nerves 2-12 are intact.      Sensory: Sensation is intact.      Motor: Motor function is intact.   Psychiatric:         Mood and Affect: Mood normal.         Speech: Speech normal.         Behavior: Behavior is cooperative.         Cognition and Memory: He exhibits impaired recent memory.         Judgment: Judgment normal.         Medications  Current Facility-Administered Medications   Medication Dose Route Frequency Provider Last Rate Last Admin    senna-docusate (Pericolace Or Senokot S) 8.6-50 MG per tablet 2 Tablet  2 Tablet Oral Q EVENING Alanis LMarilou Latona        And    polyethylene glycol/lytes (Miralax) Packet 1 Packet  1 Packet Oral QDAY PRN Alanis LMarilou Latona        NS (Bolus) 0.9 % infusion    ED CONTINUOUS Jeremy M Gonda, M.D. 999 mL/hr at 11/21/24 0635 1 L at 11/21/24 0635    lidocaine (Asperflex) 4 % patch 1 Patch  1 Patch Transdermal DAILY Jeremy M Gonda, M.D.   1 Patch at 11/22/24 0529    acetaminophen (Tylenol) tablet 650 mg  650 mg Oral Q6HRS PRN Kerri Elizabeth M.D.   650 mg at 11/22/24 0337    atorvastatin (Lipitor) tablet 80 mg  80 mg Oral Q EVENING Kerri Elizabeth M.D.   80 mg at 11/21/24 1800    clopidogrel (Plavix) tablet 75 mg  75 mg Oral DAILY Kerri Elizabeth M.D.   75 mg at 11/22/24 0530    levothyroxine (Synthroid) tablet 50 mcg  50 mcg Oral AM ES Kerri Elizabeth M.D.   50 mcg at 11/22/24 0529    [Held by provider] MD Alert...Warfarin per Pharmacy   Other PHARMACY TO DOSE Kerri Elizabeth M.D.        oxyCODONE immediate-release (Roxicodone) tablet 5 mg  5 mg Oral Q4HRS PRN Kerri Elizabeth M.D.   5 mg at 11/22/24 1340    HYDROmorphone (Dilaudid) injection 0.5 mg  0.5 mg Intravenous Q4HRS PRN Mohammad I Elizabeth, M.D.           Fluids    Intake/Output Summary (Last 24 hours) at 11/22/2024 1454  Last data  filed at 11/22/2024 1400  Gross per 24 hour   Intake 3965.01 ml   Output 650 ml   Net 3315.01 ml       Laboratory          Recent Labs     11/20/24  0508 11/21/24  0557 11/22/24  0519   SODIUM 141 140 137   POTASSIUM 3.9 4.6 4.5   CHLORIDE 107 106 109   CO2 22 20 19*   BUN 24* 31* 39*   CREATININE 1.46* 1.46* 1.58*   MAGNESIUM  --  2.1 2.1   PHOSPHORUS  --  3.8 3.4   CALCIUM 8.5 8.6 7.6*     Recent Labs     11/20/24  0508 11/21/24  0557 11/22/24  0519   ALTSGPT 44 45 44   ASTSGOT 59* 82* 90*   ALKPHOSPHAT 118* 107* 75   TBILIRUBIN 0.4 0.4 0.6   GLUCOSE 135* 127* 130*     Recent Labs     11/20/24  0508 11/21/24  0557 11/22/24  0519   WBC 7.4 12.8* 14.4*   NEUTSPOLYS 63.90  --  71.40   LYMPHOCYTES 26.70  --  16.70*   MONOCYTES 8.10  --  9.90   EOSINOPHILS 0.50  --  0.00   BASOPHILS 0.30  --  0.10   ASTSGOT 59* 82* 90*   ALTSGPT 44 45 44   ALKPHOSPHAT 118* 107* 75   TBILIRUBIN 0.4 0.4 0.6     Recent Labs     11/20/24  0508 11/20/24  0923 11/21/24  0557 11/21/24  1104 11/21/24  1159 11/21/24  1208 11/21/24  1527 11/21/24  2344 11/22/24  0519 11/22/24  1305   RBC 2.72*  --  2.09*  --   --   --   --   --  2.45*  --    HEMOGLOBIN 9.2*  --  7.0* 6.3*   < >  --    < > 6.8* 7.7* 8.8*   HEMATOCRIT 27.3*  --  20.8* 19.8*   < >  --    < > 19.9* 22.2* 26.0*   PLATELETCT 192  --  187  --   --   --   --   --  105*  --    PROTHROMBTM 39.3*   < >  --  52.6*  --  46.7*  --   --  35.4*  --    INR 4.00*   < >  --  5.79*  --  4.97*  --   --  3.51*  --    IRON 69  --   --   --   --   --   --   --   --   --    FERRITIN 79.2  --   --   --   --   --   --   --   --   --    TOTIRONBC 279  --   --   --   --   --   --   --   --   --     < > = values in this interval not displayed.       Imaging  CT:    Reviewed  Echo:   Reviewed    Assessment/Plan  * Syncope- (present on admission)  Assessment & Plan  -Patient has recently had multiple syncopal events.  Currently multifactorial due to to anemia, bradycardia, hypotension  -Cardiology  following  -ECHO yesterday showed stable EF 45-50%, Apical Akinesis, known LV thrombus  -Carotid Duplex ordered  -Fall Precautions    Acute blood loss anemia  Assessment & Plan  -due to retroperitoneal hematoma  -transfuse for Hgb < 7 or < 8 for cardiac ischemia  -monitor for bleeding  -follow CBC    Laceration of right eyebrow- (present on admission)  Assessment & Plan  -s/p suturing  -wound care    Groin pain, right  Assessment & Plan  -CT showing retroperitoneal bleed due to procedure access while on AC  -multimodal pain management    Acute renal failure (ARF) (HCC)- (present on admission)  Assessment & Plan  -Secondary to ATN  -Additional fluids this morning for hypotension  -Avoid nephrotoxins  -Monitor creatinine, urine output, electrolytes closely    Coronary artery disease- (present on admission)  Assessment & Plan  -DAPT and statin    Cardiac arrest (HCC)- (present on admission)  Assessment & Plan  -After syncopal event and head trauma the morning of 11/21 -potentially not a true loss of pulses s/p CPR x 90 seconds  -Monitor cardiopulmonary function closely  -ICU status    Closed head injury- (present on admission)  Assessment & Plan  -Initial fall with eyebrow laceration status postrepair and head CT in the ED 11/20  -Repeat fall with recurrent right eyebrow laceration 11/21  -Repeat head CT, CT C-spine without acute findings  -Fall precautions  -PT/OT    Hypothyroidism- (present on admission)  Assessment & Plan  -home Levothyroxine  -check TSH/T4    LV (left ventricular) mural thrombus- (present on admission)  Assessment & Plan  -known. Previuosly on Warfarin, holding for anemia  -repeat ECHO this admission shows decrease in size from prior ECHO  -resume AC when clinically able    Chronic systolic heart failure (HCC)- (present on admission)  Assessment & Plan  -Without acute decompensation, EF 45% from echo 11/11  -Holding GDMT in the setting of hypotension  -daily weights  -I&O  -Cardiology follow up          VTE:  Contraindicated  Ulcer: Not Indicated  Lines: None    I have performed a physical exam and reviewed and updated ROS and Plan today (11/22/2024). In review of yesterday's note (11/21/2024), there are no changes except as documented above.     Discussed patient condition and risk of morbidity and/or mortality with RN, RT, Pharmacy, , Patient, and my attending Dr. Rodriguez.  The patient remains critically ill.  Critical care time = 49 minutes in directly providing and coordinating critical care and extensive data review.  No time overlap and excludes procedures.    Please note that this dictation was created using voice recognition software. I have made every reasonable attempt to correct obvious errors, but there may be errors of grammar and possibly content that I did not discover before finalizing the note.    ISABEL Brothers.

## 2024-11-22 NOTE — CARE PLAN
The patient is Watcher - Medium risk of patient condition declining or worsening    Shift Goals  Clinical Goals: Hemodynamically stable  Patient Goals: pain control, to sleep  Family Goals: DHARA    Progress made toward(s) clinical / shift goals:      Problem: Pain Management  Goal: Pain level will decrease to patient's comfort goal  Outcome: Progressing     Problem: Fluid Volume:  Goal: Will maintain balanced intake and output  Outcome: Progressing     Problem: Respiratory:  Goal: Respiratory status will improve  11/21/2024 1822 by Francy Montalvo R.N.  Outcome: Progressing  11/21/2024 1822 by Francy Montalvo R.N.  Outcome: Progressing

## 2024-11-22 NOTE — ASSESSMENT & PLAN NOTE
-CT showing retroperitoneal bleed due to procedure access while on AC  -multimodal pain management

## 2024-11-23 ENCOUNTER — APPOINTMENT (OUTPATIENT)
Dept: RADIOLOGY | Facility: MEDICAL CENTER | Age: 84
End: 2024-11-23
Attending: NURSE PRACTITIONER
Payer: MEDICARE

## 2024-11-23 LAB
ALBUMIN SERPL BCP-MCNC: 3 G/DL (ref 3.2–4.9)
ALBUMIN/GLOB SERPL: 1.4 G/DL
ALP SERPL-CCNC: 84 U/L (ref 30–99)
ALT SERPL-CCNC: 47 U/L (ref 2–50)
ANION GAP SERPL CALC-SCNC: 8 MMOL/L (ref 7–16)
AST SERPL-CCNC: 86 U/L (ref 12–45)
BARCODED ABORH UBTYP: 5100
BARCODED ABORH UBTYP: 9500
BARCODED PRD CODE UBPRD: NORMAL
BARCODED UNIT NUM UBUNT: NORMAL
BASOPHILS # BLD AUTO: 0.1 % (ref 0–1.8)
BASOPHILS # BLD: 0.01 K/UL (ref 0–0.12)
BILIRUB SERPL-MCNC: 0.6 MG/DL (ref 0.1–1.5)
BUN SERPL-MCNC: 37 MG/DL (ref 8–22)
CALCIUM ALBUM COR SERPL-MCNC: 8.7 MG/DL (ref 8.5–10.5)
CALCIUM SERPL-MCNC: 7.9 MG/DL (ref 8.5–10.5)
CHLORIDE SERPL-SCNC: 108 MMOL/L (ref 96–112)
CO2 SERPL-SCNC: 19 MMOL/L (ref 20–33)
COMPONENT R 8504R: NORMAL
CREAT SERPL-MCNC: 1.37 MG/DL (ref 0.5–1.4)
EOSINOPHIL # BLD AUTO: 0.02 K/UL (ref 0–0.51)
EOSINOPHIL NFR BLD: 0.1 % (ref 0–6.9)
ERYTHROCYTE [DISTWIDTH] IN BLOOD BY AUTOMATED COUNT: 55.5 FL (ref 35.9–50)
GFR SERPLBLD CREATININE-BSD FMLA CKD-EPI: 51 ML/MIN/1.73 M 2
GLOBULIN SER CALC-MCNC: 2.1 G/DL (ref 1.9–3.5)
GLUCOSE SERPL-MCNC: 117 MG/DL (ref 65–99)
HCT VFR BLD AUTO: 22.8 % (ref 42–52)
HCT VFR BLD AUTO: 23.1 % (ref 42–52)
HCT VFR BLD AUTO: 23.1 % (ref 42–52)
HGB BLD-MCNC: 7.5 G/DL (ref 14–18)
HGB BLD-MCNC: 7.6 G/DL (ref 14–18)
HGB BLD-MCNC: 7.9 G/DL (ref 14–18)
HGB BLD-MCNC: 7.9 G/DL (ref 14–18)
IMM GRANULOCYTES # BLD AUTO: 0.14 K/UL (ref 0–0.11)
IMM GRANULOCYTES NFR BLD AUTO: 1 % (ref 0–0.9)
INR PPP: 3.27 (ref 0.87–1.13)
LYMPHOCYTES # BLD AUTO: 2.2 K/UL (ref 1–4.8)
LYMPHOCYTES NFR BLD: 16.2 % (ref 22–41)
MAGNESIUM SERPL-MCNC: 2 MG/DL (ref 1.5–2.5)
MCH RBC QN AUTO: 31.3 PG (ref 27–33)
MCHC RBC AUTO-ENTMCNC: 34.6 G/DL (ref 32.3–36.5)
MCV RBC AUTO: 90.5 FL (ref 81.4–97.8)
MONOCYTES # BLD AUTO: 1.21 K/UL (ref 0–0.85)
MONOCYTES NFR BLD AUTO: 8.9 % (ref 0–13.4)
NEUTROPHILS # BLD AUTO: 9.96 K/UL (ref 1.82–7.42)
NEUTROPHILS NFR BLD: 73.7 % (ref 44–72)
NRBC # BLD AUTO: 0.11 K/UL
NRBC BLD-RTO: 0.8 /100 WBC (ref 0–0.2)
PHOSPHATE SERPL-MCNC: 3 MG/DL (ref 2.5–4.5)
PLATELET # BLD AUTO: 107 K/UL (ref 164–446)
PMV BLD AUTO: 12.2 FL (ref 9–12.9)
POTASSIUM SERPL-SCNC: 4 MMOL/L (ref 3.6–5.5)
PRODUCT TYPE UPROD: NORMAL
PROT SERPL-MCNC: 5.1 G/DL (ref 6–8.2)
PROTHROMBIN TIME: 33.5 SEC (ref 12–14.6)
RBC # BLD AUTO: 2.52 M/UL (ref 4.7–6.1)
SODIUM SERPL-SCNC: 135 MMOL/L (ref 135–145)
UNIT STATUS USTAT: NORMAL
WBC # BLD AUTO: 13.5 K/UL (ref 4.8–10.8)

## 2024-11-23 PROCEDURE — 85014 HEMATOCRIT: CPT

## 2024-11-23 PROCEDURE — P9016 RBC LEUKOCYTES REDUCED: HCPCS

## 2024-11-23 PROCEDURE — 86923 COMPATIBILITY TEST ELECTRIC: CPT

## 2024-11-23 PROCEDURE — 85018 HEMOGLOBIN: CPT

## 2024-11-23 PROCEDURE — 700102 HCHG RX REV CODE 250 W/ 637 OVERRIDE(OP): Performed by: NURSE PRACTITIONER

## 2024-11-23 PROCEDURE — 85025 COMPLETE CBC W/AUTO DIFF WBC: CPT

## 2024-11-23 PROCEDURE — 99232 SBSQ HOSP IP/OBS MODERATE 35: CPT | Mod: FS | Performed by: INTERNAL MEDICINE

## 2024-11-23 PROCEDURE — 80053 COMPREHEN METABOLIC PANEL: CPT

## 2024-11-23 PROCEDURE — A9270 NON-COVERED ITEM OR SERVICE: HCPCS | Performed by: NURSE PRACTITIONER

## 2024-11-23 PROCEDURE — A9270 NON-COVERED ITEM OR SERVICE: HCPCS | Performed by: STUDENT IN AN ORGANIZED HEALTH CARE EDUCATION/TRAINING PROGRAM

## 2024-11-23 PROCEDURE — 83735 ASSAY OF MAGNESIUM: CPT

## 2024-11-23 PROCEDURE — 85610 PROTHROMBIN TIME: CPT

## 2024-11-23 PROCEDURE — 700101 HCHG RX REV CODE 250: Performed by: INTERNAL MEDICINE

## 2024-11-23 PROCEDURE — 93880 EXTRACRANIAL BILAT STUDY: CPT

## 2024-11-23 PROCEDURE — 770022 HCHG ROOM/CARE - ICU (200)

## 2024-11-23 PROCEDURE — 99291 CRITICAL CARE FIRST HOUR: CPT | Performed by: NURSE PRACTITIONER

## 2024-11-23 PROCEDURE — 700102 HCHG RX REV CODE 250 W/ 637 OVERRIDE(OP): Performed by: STUDENT IN AN ORGANIZED HEALTH CARE EDUCATION/TRAINING PROGRAM

## 2024-11-23 PROCEDURE — 84100 ASSAY OF PHOSPHORUS: CPT

## 2024-11-23 PROCEDURE — 36430 TRANSFUSION BLD/BLD COMPNT: CPT

## 2024-11-23 RX ORDER — POLYETHYLENE GLYCOL 3350 17 G/17G
1 POWDER, FOR SOLUTION ORAL ONCE
Status: COMPLETED | OUTPATIENT
Start: 2024-11-23 | End: 2024-11-23

## 2024-11-23 RX ADMIN — LEVOTHYROXINE SODIUM 50 MCG: 0.05 TABLET ORAL at 05:48

## 2024-11-23 RX ADMIN — POLYETHYLENE GLYCOL 3350 1 PACKET: 17 POWDER, FOR SOLUTION ORAL at 09:31

## 2024-11-23 RX ADMIN — CLOPIDOGREL BISULFATE 75 MG: 75 TABLET ORAL at 05:47

## 2024-11-23 RX ADMIN — ATORVASTATIN CALCIUM 80 MG: 80 TABLET, FILM COATED ORAL at 17:11

## 2024-11-23 RX ADMIN — OXYCODONE 5 MG: 5 TABLET ORAL at 11:47

## 2024-11-23 RX ADMIN — OXYCODONE 5 MG: 5 TABLET ORAL at 03:59

## 2024-11-23 RX ADMIN — ACETAMINOPHEN 650 MG: 325 TABLET ORAL at 09:31

## 2024-11-23 RX ADMIN — SENNOSIDES AND DOCUSATE SODIUM 2 TABLET: 50; 8.6 TABLET ORAL at 17:11

## 2024-11-23 RX ADMIN — LIDOCAINE 1 PATCH: 4 PATCH TOPICAL at 05:47

## 2024-11-23 RX ADMIN — OXYCODONE 5 MG: 5 TABLET ORAL at 21:07

## 2024-11-23 RX ADMIN — ACETAMINOPHEN 650 MG: 325 TABLET ORAL at 19:18

## 2024-11-23 ASSESSMENT — ENCOUNTER SYMPTOMS
WEAKNESS: 1
VOMITING: 0
DOUBLE VISION: 0
MEMORY LOSS: 0
CHILLS: 0
SHORTNESS OF BREATH: 0
DIZZINESS: 1
DIZZINESS: 0
MEMORY LOSS: 1
FEVER: 0
ABDOMINAL PAIN: 0
BACK PAIN: 0
NECK PAIN: 0
NAUSEA: 0
INSOMNIA: 0
COUGH: 0
BLURRED VISION: 0
CONSTIPATION: 1
PALPITATIONS: 0
HEADACHES: 0

## 2024-11-23 ASSESSMENT — PAIN DESCRIPTION - PAIN TYPE
TYPE: ACUTE PAIN

## 2024-11-23 ASSESSMENT — FIBROSIS 4 INDEX: FIB4 SCORE: 9.61

## 2024-11-23 NOTE — CARE PLAN
The patient is Stable - Low risk of patient condition declining or worsening    Shift Goals  Clinical Goals: Stable H/H  Patient Goals: pain control  Family Goals: DHARA    Progress made toward(s) clinical / shift goals:    Problem: Knowledge Deficit - Standard  Goal: Patient and family/care givers will demonstrate understanding of plan of care, disease process/condition, diagnostic tests and medications  Outcome: Progressing     Problem: Fall Risk  Goal: Patient will remain free from falls  Outcome: Progressing     Problem: Pain Management  Goal: Pain level will decrease to patient's comfort goal  Outcome: Progressing     Problem: Respiratory:  Goal: Respiratory status will improve  Outcome: Progressing     Problem: Skin Integrity  Goal: Risk for impaired skin integrity will decrease  Outcome: Progressing

## 2024-11-23 NOTE — PROGRESS NOTES
Cardiology Follow-up Note    Name:   Dilan Calderón     YOB: 1940  Age:   84 y.o.  male   MRN:   [id number]        Attending Provider: Dr Lincoln Rodriguez Jr., D.O.     Chief Complaint: Wound Check, Fatigue (Reports bleeding from stitches on right side eyebrow upon waking up at 3am today, stitches from GLF 2 weeks ago. Patient states feeling fatigue for 2 days. Here recently with same. Hx of GLF, and had stents 2 weeks ago. On blood thinners), and Post-Op Pain (Intermittent pain since surgery (implant) 2 weeks ago)       Reason for cardiology consult: Syncope    Outpatient Cardiologist: None    History of Present Illness  Dilan Calderón is 84 y.o. male with prior medical history significant for recent NSTEMI s/p PCI to left circumflex on 11/12/2024, CAD / MI s/p PCI DOUGIE p-mLAD (05/2024), LV apical thrombus, second-degree AV block, s/p Micra pacemaker, hypertension, dyslipidemia, ICM, HFrEF, GIB requiring transfusions (March 2024), CKD stage III  who was admitted on 11/20/2024 with near syncope and generalized weakness at home.  During the admission he had several episodes of syncope, hypotension and a brief CODE BLUE with brief CPR.  Additionally, found to have significant anemia that required several blood transfusions.   CT scanning of abdomen and pelvis on 11/21/2024 revealed a large right-sided retroperitoneal hemorrhage involving the right psoas and iliopsoas muscles.    Interim Events 11/23/24   :  - Personal Telemetry interpretation: Sinus rhythm 90s to paced rhythm at 80  - Overnight events: No Cardiac events  He reports right upper leg all the way to groin pain  Patient denies chest pain, shortness of breath, edema, dizziness/lightheadedness, or palpitations.   - Vitals: 121/68.  Off pressors  - Labs reviewed: Hemoglobin 7.6 dropping from 7.9  - I/O's: Minimal urinary output, only 300 mL overnight per nursing report  - Weight: 158 pounds      Review of Systems   Constitutional:  Positive  "for malaise/fatigue. Negative for chills and fever.   Respiratory:  Negative for shortness of breath.    Cardiovascular:  Negative for chest pain, palpitations and leg swelling.   Gastrointestinal:  Negative for abdominal pain.   Musculoskeletal:         Right upper leg pain   Neurological:  Positive for weakness. Negative for dizziness and headaches.   Psychiatric/Behavioral:  Positive for memory loss.         Medical History  Past Medical History:   Diagnosis Date    NSTEMI (non-ST elevated myocardial infarction) (HCC)          No family history on file.      Social History     Tobacco Use    Smoking status: Former     Types: Cigarettes    Smokeless tobacco: Never   Vaping Use    Vaping status: Never Used   Substance Use Topics    Alcohol use: Not Currently    Drug use: Never         No Known Allergies      Medications   Scheduled Medications   Medication Dose Frequency    senna-docusate  2 Tablet Q EVENING    lidocaine  1 Patch DAILY    atorvastatin  80 mg Q EVENING    clopidogrel  75 mg DAILY    levothyroxine  50 mcg AM ES    [Held by provider] MD Alert...Warfarin per Pharmacy   PHARMACY TO DOSE           Physical Exam    Body mass index is 24.83 kg/m².     /68   Pulse 82   Temp 36.5 °C (97.7 °F) (Temporal)   Resp 18   Ht 1.702 m (5' 7\")   Wt 71.9 kg (158 lb 8.2 oz)   SpO2 95%      Vitals:    11/23/24 1040 11/23/24 1100 11/23/24 1147 11/23/24 1200   BP: 116/62   121/68   Pulse: 83 94 90 82   Resp: (!) 27 (!) 22 14 18   Temp:       TempSrc:       SpO2: 96% 95% 95% 95%   Weight:       Height:            Oxygen Therapy:  Pulse Oximetry: 95 %, O2 (LPM): 1, O2 Delivery Device: Nasal Cannula      Physical Exam  Constitutional:       Appearance: He is ill-appearing.   HENT:      Mouth/Throat:      Mouth: Mucous membranes are dry.   Cardiovascular:      Rate and Rhythm: Normal rate. Rhythm irregular.      Heart sounds: No murmur heard.     Comments: Sinus rhythm in the 90s/paced rhythm in the 80s  Pulmonary: " "     Breath sounds: Rales present. No wheezing.   Abdominal:      General: There is distension (mild).      Palpations: Abdomen is soft.   Skin:     General: Skin is warm and dry.      Coloration: Skin is pale.      Comments: Bruising, tenderness, small firm nodule to right femoral site post Micra placement   Neurological:      Mental Status: He is alert. He is disoriented.   Psychiatric:         Mood and Affect: Mood normal.               Labs (personally reviewed):     Estimated Creatinine Clearance: 38.9 mL/min (by C-G formula based on SCr of 1.37 mg/dL).    No results found for: \"BNPBTYPENAT\"  Recent Labs     11/21/24  0557 11/22/24  0519 11/23/24  0423   CREATININE 1.46* 1.58* 1.37   BUN 31* 39* 37*   POTASSIUM 4.6 4.5 4.0   SODIUM 140 137 135   CALCIUM 8.6 7.6* 7.9*   MAGNESIUM 2.1 2.1 2.0   CO2 20 19* 19*   ALBUMIN 3.7 3.0* 3.0*     Recent Labs     11/21/24  0557 11/22/24  0519 11/23/24  0423   GLUCOSE 127* 130* 117*     Recent Labs     11/21/24  0557 11/21/24  1104 11/21/24  1208 11/22/24  0519 11/23/24  0423   ASTSGOT 82*  --   --  90* 86*   ALTSGPT 45  --   --  44 47   ALKPHOSPHAT 107*  --   --  75 84   INR  --    < > 4.97* 3.51* 3.27*    < > = values in this interval not displayed.     Recent Labs     11/21/24  0557 11/21/24  1104 11/22/24  0519 11/22/24  1305 11/22/24  2350 11/23/24  0423 11/23/24  0558   WBC 12.8*  --  14.4*  --   --  13.5*  --    HEMOGLOBIN 7.0*   < > 7.7*   < > 7.9* 7.9* 7.6*   PLATELETCT 187  --  105*  --   --  107*  --     < > = values in this interval not displayed.     Recent Labs     11/21/24  0557 11/21/24  1242 11/21/24  1812   TROPONINT 74* 89* 83*     Lab Results   Component Value Date/Time    CHOLSTRLTOT 197 11/11/2024 04:20 AM     (H) 11/11/2024 04:20 AM    HDL 62 11/11/2024 04:20 AM    TRIGLYCERIDE 88 11/11/2024 04:20 AM       Imaging:  US-CAROTID DOPPLER BILAT         CT-ABDOMEN-PELVIS W/O   Final Result      1.  Large right-sided retroperitoneal hemorrhage " involving the right psoas and iliopsoas muscles.   2.  Small right effusion.   3.  Moderate-sized pericardial effusion.   4.  Persistent bilateral nephrograms suggesting renal dysfunction.   5.  Bilateral renal atrophy.   6.  Atherosclerosis.      EC-ECHOCARDIOGRAM LTD W/O CONT   Final Result      DX-FEMUR-2+ RIGHT   Final Result      No acute osseous abnormality.      CT-CTA CHEST PULMONARY ARTERY W/ RECONS   Final Result      1.  No evidence of pulmonary embolism.   2.  Small right-sided pleural effusion with associated compressive atelectasis and or consolidation. Underlying infection is possible.   3.  Perirenal fat stranding, uncertain etiology and significance.   4.  Small pericardial effusion.   5.  Ascending thoracic aortic ectasia measuring 4.1 cm.   6.  Atherosclerosis with coronary artery disease.            CT-HEAD W/O   Final Result      1.  No evidence of acute territorial infarct, intracranial hemorrhage or mass lesion.   2.  Mild diffuse cerebral substance loss.   3.  Mild microangiopathic ischemic change versus demyelination or gliosis.               CT-CSPINE WITHOUT PLUS RECONS   Final Result      Degenerative changes of the cervical spine without acute fracture or malalignment.      DX-CHEST-LIMITED (1 VIEW)   Final Result         1.  No acute cardiopulmonary disease.   2.  Atherosclerosis      DX-CHEST-PORTABLE (1 VIEW)   Final Result         1.  No acute cardiopulmonary disease.   2.  Cardiomegaly   3.  Atherosclerosis          Cardiac Imaging and Procedures Review      Echocardiogram 5/4/2024  Moderately reduced left ventricular systolic function.  The left ventricular ejection fraction is visually estimated to be 35-  40%.  Hypokinesis anteroapical hypokinesis.  The right ventricle is normal in size and systolic function.  No prior study is available for comparison.      Echocardiogram 11/11/2024  Compared to the prior study on 5/4/2024: There is interval development   of LV apical  thrombus  The ascending aorta diameter is 3.8 cm.  Mildly reduced LV systolic function, estimated LVEF 40-45% with apical   akinesis, suggestive of underlying ischemic cardiomyopathy  There is an immobile 1.1 x 1.2 cm LV apical thrombus  Normal RV size and systolic function  No significant valvular abnormalities  Aortic valve sclerosis without stenosis  Trivial pericardial effusion  Normal IVC size  Dr. Stuart notified via [facility] on 11/11/24 at 11:47 AM    Echocardiography Laboratory 11/21/2024  Prior study on 11/11/24, compared to the images of the prior study, the   apical thrombus is still present, however, less is less pronounced.  Mildly reduced left ventricular systolic function.  The left ventricular ejection fraction is visually estimated to be 45-  50%.  Apical akinesis.  Left ventricular thrombus noted.        Cardiac Catheterization 5/2/2024 by Dr. Johns :  IMPRESSION:  1.  Occluded proximal LAD (heavily calcified vessel) status post successful IVUS guided intravascular lithotripsy and PCI deploying overlapping Synergy 3 x 28 mm and 3 x 32 mm DOUGIE, postdilated to ~ 3.5-3.7 mm.  2.  Ischemic cardiomyopathy with estimated LVEF 45% with anterior wall akinesis  3.  Significant elevated resting LVEDP 25 mmHg with no significant transaortic gradient on pullback     RECOMMENDATIONS:  Dual antiplatelet therapy for at least 12 months  Weigh risk versus benefits of further PCI to his remaining severe disease in the circumflex and RCA, especially given recent GI bleed with no clear identifiable source necessitating blood transfusions.  HI statin / PCSK9-I: Target LDL < 55 and TG < 150  TTE with echo enhancing agent  GDMT and lifestyle modifications for secondary ASCVD prevention  Cardiac Rehab referral     Cardiac catheterization 11/12/2024 by Dr. Gould:  IMPRESSION:  Widely patent left anterior descending coronary artery stents.  Distal right coronary now chronically occluded.  Successful percutaneous coronary  intervention to the proximal left circumflex coronary artery with one 3.0 x 24 mm Synergy drug-eluting stent postdilated to 3.5 mm.     RECOMMENDATIONS:  Return to floor with continued care per primary service.  TR band release per protocol.  Restart heparin drip per protocol in 2 hours if access site no bleeding complications.  Strict adherence with dual antiplatelet therapy for at least 3 months if tolerated.  Optimal medical management of residual coronary artery disease with consideration for further intervention depending on patient's symptoms and medical compliance.     Micra PPM placement by  on 11/12/2024:  IMPLANTED DEVICE INFORMATION:  Right ventricular pacemaker is a Medtronic model # [id number] , serial # [id number] ,R wave 5.1 millivolts, threshold 0.25 Volts, pacing impedance 840 Ohms.     DEVICE PROGRAMMING:  VDD      FLUOROSCOPY TIME: 4.1 min     IMPRESSIONS:  1. Successful Micra pacemaker implantation     RECOMMENDATIONS:  1. Bedrest for 6 hours after cath, can resume heparin gtt from my standpoint  2. Device interrogation prior to hospital discharge  3. Followup in device clinic    CT-abdomen-pelvis without 11/21/2024  IMPRESSION:  1.  Large right-sided retroperitoneal hemorrhage involving the right psoas and iliopsoas muscles.  2.  Small right effusion.  3.  Moderate-sized pericardial effusion.  4.  Persistent bilateral nephrograms suggesting renal dysfunction.  5.  Bilateral renal atrophy.  6.  Atherosclerosis.      Principal Problem:    Syncope (POA: Yes)  Active Problems:    Chronic systolic heart failure (HCC) (Chronic) (POA: Yes)    LV (left ventricular) mural thrombus (Chronic) (POA: Yes)    Hypothyroidism (Chronic) (POA: Yes)    Closed head injury (POA: Yes)    Cardiac arrest (HCC) (POA: Yes)    Coronary artery disease (Chronic) (POA: Yes)    Acute renal failure (ARF) (HCC) (POA: Yes)    Acute blood loss anemia (POA: Unknown)    Groin pain, right (POA: Unknown)    Laceration of  right eyebrow (POA: Yes)  Resolved Problems:    * No resolved hospital problems. *      Assessment and Medical Decision Making:    #.  Syncope  #.  Anemia requiring blood transfusions  #.  Right-sided retroperitoneal hemorrhage  #.  Complete heart block, s/p Micra PPM in situ November 2024  #.  Coronary artery disease, S/P cardiac stents  #.  LV thrombus  #.  Hypothyroidism  #.  Hypertension  #.  Hyperlipidemia  #.  HF mildly reduced EF, EF 40-45%  #.  Ischemic cardiomyopathy  #.  CKD        Recommendations:  He was seen by EP cardiology on 11/22/2024 when his pacemaker was interrogated showing normal sensing and function.  His baseline rate was increased to 80 bpm and rate response therapy was turned on.  Dr Beckman with EP cardiology adjusted the PPM today 11/23/2024 -the setting is changed to VVI, heart rate lowered to 50 bpm and prior oversensing is adjusted.  -Monitor on telemetry with the new adjustments to the pacemaker.  Please, keep the arterial line in place to see the correlation between blood pressure and drop in paced rhythm.  Will make necessary adjustments as needed.  -Keep hemoglobin above 10 if possible.  Hemoglobin is 7.6 today, may require 2 units of PRBCs.  -Continue holding Coumadin given profound anemia and high INR.  -Continue clopidogrel 75 mg daily given recent stent in prior stents in the spring 2024  -Continue holding GDMT for ischemic cardiomyopathy.  Stable blood pressure off pressors        Future Appointments   Date Time Provider Department Center   11/25/2024  1:00 PM Deja Torres M.D. [facility]   11/27/2024  2:45 PM [facility] EXAM 4 [facility] None   12/3/2024  3:45 PM CAROLINE Gonzalez None        I personally discussed his case with  JAKE Luibn.     Please contact me with any questions.  Thank you for allowing us to participate in the care of Mr. Calderón.      ISABEL Adkins.  [facility]  [phone]    Please  see Dr. Man  attestation for further details and MDM. Marley ROMERO A.P.R.N. performed a portion of the service face-to-face with the same patient on the same date of service independently of Dr. Man FOR 15 minutes preparing to see the patient, reviewing hospital notes and tests, obtaining history from the patient, performing a medically appropriate exam, counseling and educating the patient, ordering medications/tests/procedures/referrals as clinically indicated, and documenting information in the electronic medical record.    Please note this dictation was created using voice recognition software.  I have made every reasonable attempt to correct obvious errors, but there may be errors of grammar and possibly content that I did not discover before finalizing the note.

## 2024-11-23 NOTE — PROGRESS NOTES
Monitor summary:  SR - Vpacing 85 - 101   SBPs labile throughout the day, start of shift SBPs 90s -110s then towards the afternoon and end of day SBPs 120-150s. All day noticing drops in SBP by around 15-20 mmHg when pacer starts working. This happens while patient at rest and sleeping and with exertion

## 2024-11-23 NOTE — CARE PLAN
Problem: Pain - Standard  Goal: Alleviation of pain or a reduction in pain to the patient’s comfort goal  Outcome: Progressing  Note: Pt reports much improved pain. Pain did get worse with movement and turning on right side. Ordered PRNs helped      Problem: Fall Risk  Goal: Patient will remain free from falls  Outcome: Progressing     Problem: Bowel/Gastric:  Goal: Normal bowel function is maintained or improved  Outcome: Progressing  Note: Added bowel protocol    The patient is Watcher - Medium risk of patient condition declining or worsening    Shift Goals  Clinical Goals: monitor for syncope, stable VS, stable H/H  Patient Goals: pain control, get rest tonight  Family Goals: DHARA    Progress made toward(s) clinical / shift goals:     Patient is not progressing towards the following goals:

## 2024-11-23 NOTE — PROGRESS NOTES
Critical Care Progress Note    Date of admission  11/20/2024    Chief Complaint  Syncope    Hospital Course  Dilan Calderón is an 84-year-old male with PMH significant for CAD s/p stent LAD May 2024, Lcx stent 11/12/24 on DAPT, Mobitz type II AV block s/p Micra pacemaker implantation 11/12/24, LV thrombus on chronic AC with Warfarin, HTN, HLD, tobacco abuse, hypothyroidism, and recent admissions for syncope (most recently 11/19 head passed out resulting in a laceration above his right eyebrow requiring stiches) who presented 11/20 with bleeding from his stitches, postoperative pain from his PPM placement in his right groin.  He was initially admitted to the hospitalist service, however a.m. 11/21 he sustained another syncopal event resulting in repeat head strike with fall to the floor and bleeding from his right eyebrow stitches.  He was transferred back to bed and became unresponsive with agonal breathing and loss of pulses.  He received about 90 seconds of CPR before becoming responsive.  He was transferred to ICU where he had another episode of unresponsive with gurgling and bradycardia 50s with associated hypotension.  While setting up for possible intubation and pressors he regained consciousness and began protecting his airway with no recollection of the event.  Head CT unremarkable.  Hgb's were trending and he received total 3 units of RBCs.  CT abdomen pelvis with contrast showed large right-sided retroperitoneal hemorrhage involving the right psoas and hip iliopsoas muscles.  Cardiology was consulted and increased his pacemaker rate to 80.  11/22 - SBP 80's when Vpaced, patient symptomatic.     Interval Problem Update  Reviewed last 24 hour events:  Pacer rate decreased back to 50.  Afebrile  Oxycodone for RLE pain  SR 80-90's.  SBP 's. No drips  No sedation. RASS 0  Neuro: no focal deficits. Oriented x 4. Moving all.   Cardiac Diet. BM PTA - Miralax  PIV, radial artline - stay in ICU today for  "continuous artline monitoring while adjusting pacemaker.  I/O: 1700/800  Mobility 3A    Review of Systems  Review of Systems   Constitutional:  Negative for chills and fever.   HENT: Negative.     Eyes:  Negative for blurred vision and double vision.   Respiratory:  Negative for cough and shortness of breath.    Cardiovascular:  Positive for chest pain (\"better with the lidocaine patch\".).   Gastrointestinal:  Positive for constipation. Negative for abdominal pain, nausea and vomiting.   Musculoskeletal:  Negative for back pain and neck pain.   Neurological:  Positive for dizziness and weakness.   Psychiatric/Behavioral:  Negative for memory loss. The patient does not have insomnia.    All other systems reviewed and are negative.       Vital Signs for last 24 hours   Temp:  [36.4 °C (97.6 °F)-36.7 °C (98 °F)] 36.5 °C (97.7 °F)  Pulse:  [] 83  Resp:  [14-27] 27  BP: ()/(56-84) 116/62  SpO2:  [89 %-97 %] 96 %    Hemodynamic parameters for last 24 hours       Respiratory Information for the last 24 hours       Physical Exam   Physical Exam  Vitals and nursing note reviewed.   Constitutional:       General: He is not in acute distress.     Appearance: Normal appearance. He is ill-appearing. He is not toxic-appearing.   HENT:      Head: Normocephalic.      Right Ear: Hearing normal.      Left Ear: Hearing normal.      Nose: Nose normal.      Mouth/Throat:      Lips: Pink.      Mouth: Mucous membranes are moist.   Eyes:      Pupils: Pupils are equal, round, and reactive to light.   Cardiovascular:      Rate and Rhythm: Normal rate and regular rhythm.      Pulses: Normal pulses.      Heart sounds: Normal heart sounds.   Pulmonary:      Effort: Pulmonary effort is normal.      Breath sounds: Normal breath sounds. No wheezing.   Abdominal:      General: Bowel sounds are normal.      Palpations: Abdomen is soft.      Tenderness: There is no abdominal tenderness. There is no guarding or rebound.   Musculoskeletal: "      Right lower leg: No edema.      Left lower leg: No edema.      Comments: Moving all   Skin:     General: Skin is warm and dry.      Capillary Refill: Capillary refill takes less than 2 seconds.      Coloration: Skin is pale.   Neurological:      Mental Status: He is alert. He is disoriented.      Cranial Nerves: Cranial nerves 2-12 are intact.      Sensory: Sensation is intact.      Motor: Motor function is intact.   Psychiatric:         Mood and Affect: Mood normal.         Speech: Speech normal.         Behavior: Behavior is cooperative.         Cognition and Memory: He exhibits impaired recent memory.         Judgment: Judgment normal.         Medications  Current Facility-Administered Medications   Medication Dose Route Frequency Provider Last Rate Last Admin    senna-docusate (Pericolace Or Senokot S) 8.6-50 MG per tablet 2 Tablet  2 Tablet Oral Q EVENING Alanis L. Latona   2 Tablet at 11/22/24 1732    And    polyethylene glycol/lytes (Miralax) Packet 1 Packet  1 Packet Oral QDAY PRN Alanis DASILVA Latona        lidocaine (Asperflex) 4 % patch 1 Patch  1 Patch Transdermal DAILY Jeremy M Gonda, M.D.   1 Patch at 11/23/24 0547    acetaminophen (Tylenol) tablet 650 mg  650 mg Oral Q6HRS PRN Kerri Elizabeth M.D.   650 mg at 11/23/24 0931    atorvastatin (Lipitor) tablet 80 mg  80 mg Oral Q EVENING Kerri Elizabeth M.D.   80 mg at 11/22/24 1733    clopidogrel (Plavix) tablet 75 mg  75 mg Oral DAILY Kerri Elizabeth M.D.   75 mg at 11/23/24 0547    levothyroxine (Synthroid) tablet 50 mcg  50 mcg Oral AM ES Kerri Elizabeth M.D.   50 mcg at 11/23/24 0548    [Held by provider] MD Alert...Warfarin per Pharmacy   Other PHARMACY TO DOSE Kerri Elizabeth M.D.        oxyCODONE immediate-release (Roxicodone) tablet 5 mg  5 mg Oral Q4HRS PRN Kerri Elizabeth M.D.   5 mg at 11/23/24 0359       Fluids    Intake/Output Summary (Last 24 hours) at 11/23/2024 1128  Last data filed at 11/23/2024 1000  Gross per 24 hour   Intake  1803.42 ml   Output 800 ml   Net 1003.42 ml       Laboratory          Recent Labs     11/21/24  0557 11/22/24  0519 11/23/24  0423   SODIUM 140 137 135   POTASSIUM 4.6 4.5 4.0   CHLORIDE 106 109 108   CO2 20 19* 19*   BUN 31* 39* 37*   CREATININE 1.46* 1.58* 1.37   MAGNESIUM 2.1 2.1 2.0   PHOSPHORUS 3.8 3.4 3.0   CALCIUM 8.6 7.6* 7.9*     Recent Labs     11/21/24  0557 11/22/24  0519 11/23/24  0423   ALTSGPT 45 44 47   ASTSGOT 82* 90* 86*   ALKPHOSPHAT 107* 75 84   TBILIRUBIN 0.4 0.6 0.6   GLUCOSE 127* 130* 117*     Recent Labs     11/21/24  0557 11/22/24  0519 11/23/24 0423   WBC 12.8* 14.4* 13.5*   NEUTSPOLYS  --  71.40 73.70*   LYMPHOCYTES  --  16.70* 16.20*   MONOCYTES  --  9.90 8.90   EOSINOPHILS  --  0.00 0.10   BASOPHILS  --  0.10 0.10   ASTSGOT 82* 90* 86*   ALTSGPT 45 44 47   ALKPHOSPHAT 107* 75 84   TBILIRUBIN 0.4 0.6 0.6     Recent Labs     11/21/24  0557 11/21/24  1104 11/21/24  1208 11/21/24  1527 11/22/24  0519 11/22/24  1305 11/22/24  2350 11/23/24  0423 11/23/24  0558   RBC 2.09*  --   --   --  2.45*  --   --  2.52*  --    HEMOGLOBIN 7.0*   < >  --    < > 7.7*   < > 7.9* 7.9* 7.6*   HEMATOCRIT 20.8*   < >  --    < > 22.2*   < > 23.1* 22.8* 23.1*   PLATELETCT 187  --   --   --  105*  --   --  107*  --    PROTHROMBTM  --    < > 46.7*  --  35.4*  --   --  33.5*  --    INR  --    < > 4.97*  --  3.51*  --   --  3.27*  --     < > = values in this interval not displayed.       Imaging  Ultrasound:  Reviewed    Assessment/Plan  * Syncope- (present on admission)  Assessment & Plan  -Patient has recently had multiple syncopal events.  Currently multifactorial due to to anemia, bradycardia, hypotension  -Cardiology following  -ECHO yesterday showed stable EF 45-50%, Apical Akinesis, known LV thrombus  -Carotid Duplex unremarkable  -Fall Precautions    Acute blood loss anemia  Assessment & Plan  -due to retroperitoneal hematoma  -transfuse for Hgb < 7 or < 8 for cardiac ischemia  -monitor for bleeding  -follow  CBC    Laceration of right eyebrow- (present on admission)  Assessment & Plan  -s/p suturing  -wound care    Groin pain, right  Assessment & Plan  -CT showing retroperitoneal bleed due to procedure access while on AC  -multimodal pain management    Acute renal failure (ARF) (HCC)- (present on admission)  Assessment & Plan  -Secondary to ATN  -Additional fluids this morning for hypotension  -Avoid nephrotoxins  -Monitor creatinine, urine output, electrolytes closely    Coronary artery disease- (present on admission)  Assessment & Plan  -DAPT and statin    Cardiac arrest (HCC)- (present on admission)  Assessment & Plan  -After syncopal event and head trauma the morning of 11/21 -potentially not a true loss of pulses s/p CPR x 90 seconds  -Monitor cardiopulmonary function closely  -ICU status    Closed head injury- (present on admission)  Assessment & Plan  -Initial fall with eyebrow laceration status postrepair and head CT in the ED 11/20  -Repeat fall with recurrent right eyebrow laceration 11/21  -Repeat head CT, CT C-spine without acute findings  -Fall precautions  -PT/OT    Hypothyroidism- (present on admission)  Assessment & Plan  -home Levothyroxine    LV (left ventricular) mural thrombus- (present on admission)  Assessment & Plan  -known. Previuosly on Warfarin, holding for anemia  -repeat ECHO this admission shows decrease in size from prior ECHO  -resume AC when clinically able    Chronic systolic heart failure (HCC)- (present on admission)  Assessment & Plan  -Without acute decompensation, EF 45% from echo 11/11  -Holding GDMT in the setting of hypotension  -daily weights  -I&O  -Cardiology follow up         VTE:  Contraindicated  Ulcer: Not Indicated  Lines: None    I have performed a physical exam and reviewed and updated ROS and Plan today (11/23/2024). In review of yesterday's note (11/22/2024), there are no changes except as documented above.     Discussed patient condition and risk of morbidity and/or  mortality with RN, RT, Pharmacy, Patient, cardiology and nurse practitioner, and my attending Dr. Rodriguez  The patient remains critically ill. Critical care time = 55 minutes in directly providing and coordinating critical care and extensive data review.  No time overlap and excludes procedures.    Please note that this dictation was created using voice recognition software. I have made every reasonable attempt to correct obvious errors, but there may be errors of grammar and possibly content that I did not discover before finalizing the note.    ISABEL Brothers.

## 2024-11-23 NOTE — PROGRESS NOTES
0930: JAKE King to bedside. Notified of urine output of 300 overnight. No new orders at this time.

## 2024-11-23 NOTE — WOUND TEAM
New wound consult placed for patient. This RN spoke with JAKE Vega. Wound team assessed patient on 11/21. Per APRN, disregard new consult. Will round with bedside RN for additional wound care needs. Will complete consult.

## 2024-11-24 PROBLEM — D68.318 CIRCULATING ANTICOAGULANTS (HCC): Status: ACTIVE | Noted: 2024-11-24

## 2024-11-24 LAB
ALBUMIN SERPL BCP-MCNC: 2.8 G/DL (ref 3.2–4.9)
ALBUMIN/GLOB SERPL: 1.3 G/DL
ALP SERPL-CCNC: 84 U/L (ref 30–99)
ALT SERPL-CCNC: 40 U/L (ref 2–50)
ANION GAP SERPL CALC-SCNC: 8 MMOL/L (ref 7–16)
AST SERPL-CCNC: 92 U/L (ref 12–45)
BASOPHILS # BLD AUTO: 0.2 % (ref 0–1.8)
BASOPHILS # BLD: 0.02 K/UL (ref 0–0.12)
BILIRUB SERPL-MCNC: 1.1 MG/DL (ref 0.1–1.5)
BUN SERPL-MCNC: 28 MG/DL (ref 8–22)
CALCIUM ALBUM COR SERPL-MCNC: 8.3 MG/DL (ref 8.5–10.5)
CALCIUM SERPL-MCNC: 7.3 MG/DL (ref 8.5–10.5)
CHLORIDE SERPL-SCNC: 110 MMOL/L (ref 96–112)
CO2 SERPL-SCNC: 19 MMOL/L (ref 20–33)
CREAT SERPL-MCNC: 1.06 MG/DL (ref 0.5–1.4)
EOSINOPHIL # BLD AUTO: 0.04 K/UL (ref 0–0.51)
EOSINOPHIL NFR BLD: 0.3 % (ref 0–6.9)
ERYTHROCYTE [DISTWIDTH] IN BLOOD BY AUTOMATED COUNT: 50.8 FL (ref 35.9–50)
GFR SERPLBLD CREATININE-BSD FMLA CKD-EPI: 70 ML/MIN/1.73 M 2
GLOBULIN SER CALC-MCNC: 2.2 G/DL (ref 1.9–3.5)
GLUCOSE SERPL-MCNC: 120 MG/DL (ref 65–99)
HCT VFR BLD AUTO: 24.1 % (ref 42–52)
HGB BLD-MCNC: 8 G/DL (ref 14–18)
HGB BLD-MCNC: 8.3 G/DL (ref 14–18)
HGB BLD-MCNC: 8.5 G/DL (ref 14–18)
IMM GRANULOCYTES # BLD AUTO: 0.15 K/UL (ref 0–0.11)
IMM GRANULOCYTES NFR BLD AUTO: 1.2 % (ref 0–0.9)
INR PPP: 2.54 (ref 0.87–1.13)
LYMPHOCYTES # BLD AUTO: 1.54 K/UL (ref 1–4.8)
LYMPHOCYTES NFR BLD: 12.6 % (ref 22–41)
MAGNESIUM SERPL-MCNC: 1.9 MG/DL (ref 1.5–2.5)
MCH RBC QN AUTO: 31 PG (ref 27–33)
MCHC RBC AUTO-ENTMCNC: 34.4 G/DL (ref 32.3–36.5)
MCV RBC AUTO: 89.9 FL (ref 81.4–97.8)
MONOCYTES # BLD AUTO: 0.98 K/UL (ref 0–0.85)
MONOCYTES NFR BLD AUTO: 8 % (ref 0–13.4)
NEUTROPHILS # BLD AUTO: 9.54 K/UL (ref 1.82–7.42)
NEUTROPHILS NFR BLD: 77.7 % (ref 44–72)
NRBC # BLD AUTO: 0.14 K/UL
NRBC BLD-RTO: 1.1 /100 WBC (ref 0–0.2)
PHOSPHATE SERPL-MCNC: 2.4 MG/DL (ref 2.5–4.5)
PLATELET # BLD AUTO: 116 K/UL (ref 164–446)
PMV BLD AUTO: 11.6 FL (ref 9–12.9)
POTASSIUM SERPL-SCNC: 3.8 MMOL/L (ref 3.6–5.5)
PROT SERPL-MCNC: 5 G/DL (ref 6–8.2)
PROTHROMBIN TIME: 27.5 SEC (ref 12–14.6)
RBC # BLD AUTO: 2.68 M/UL (ref 4.7–6.1)
SODIUM SERPL-SCNC: 137 MMOL/L (ref 135–145)
WBC # BLD AUTO: 12.3 K/UL (ref 4.8–10.8)

## 2024-11-24 PROCEDURE — 80053 COMPREHEN METABOLIC PANEL: CPT

## 2024-11-24 PROCEDURE — A9270 NON-COVERED ITEM OR SERVICE: HCPCS | Performed by: STUDENT IN AN ORGANIZED HEALTH CARE EDUCATION/TRAINING PROGRAM

## 2024-11-24 PROCEDURE — 99233 SBSQ HOSP IP/OBS HIGH 50: CPT | Performed by: NURSE PRACTITIONER

## 2024-11-24 PROCEDURE — 83735 ASSAY OF MAGNESIUM: CPT

## 2024-11-24 PROCEDURE — 85025 COMPLETE CBC W/AUTO DIFF WBC: CPT

## 2024-11-24 PROCEDURE — A9270 NON-COVERED ITEM OR SERVICE: HCPCS | Performed by: NURSE PRACTITIONER

## 2024-11-24 PROCEDURE — 99233 SBSQ HOSP IP/OBS HIGH 50: CPT | Performed by: HOSPITALIST

## 2024-11-24 PROCEDURE — 36415 COLL VENOUS BLD VENIPUNCTURE: CPT

## 2024-11-24 PROCEDURE — 700102 HCHG RX REV CODE 250 W/ 637 OVERRIDE(OP): Performed by: HOSPITALIST

## 2024-11-24 PROCEDURE — 85018 HEMOGLOBIN: CPT | Mod: 91

## 2024-11-24 PROCEDURE — 700101 HCHG RX REV CODE 250: Performed by: INTERNAL MEDICINE

## 2024-11-24 PROCEDURE — 700102 HCHG RX REV CODE 250 W/ 637 OVERRIDE(OP): Performed by: NURSE PRACTITIONER

## 2024-11-24 PROCEDURE — 85610 PROTHROMBIN TIME: CPT

## 2024-11-24 PROCEDURE — 700105 HCHG RX REV CODE 258: Performed by: NURSE PRACTITIONER

## 2024-11-24 PROCEDURE — 84100 ASSAY OF PHOSPHORUS: CPT

## 2024-11-24 PROCEDURE — 700102 HCHG RX REV CODE 250 W/ 637 OVERRIDE(OP): Performed by: STUDENT IN AN ORGANIZED HEALTH CARE EDUCATION/TRAINING PROGRAM

## 2024-11-24 PROCEDURE — 770020 HCHG ROOM/CARE - TELE (206)

## 2024-11-24 PROCEDURE — A9270 NON-COVERED ITEM OR SERVICE: HCPCS | Performed by: HOSPITALIST

## 2024-11-24 RX ORDER — POLYETHYLENE GLYCOL 3350 17 G/17G
1 POWDER, FOR SOLUTION ORAL ONCE
Status: COMPLETED | OUTPATIENT
Start: 2024-11-24 | End: 2024-11-24

## 2024-11-24 RX ORDER — SODIUM CHLORIDE 9 MG/ML
1000 INJECTION, SOLUTION INTRAVENOUS ONCE
Status: COMPLETED | OUTPATIENT
Start: 2024-11-24 | End: 2024-11-24

## 2024-11-24 RX ORDER — FLUDROCORTISONE ACETATE 0.1 MG/1
0.1 TABLET ORAL EVERY MORNING
Status: DISCONTINUED | OUTPATIENT
Start: 2024-11-24 | End: 2024-11-27 | Stop reason: HOSPADM

## 2024-11-24 RX ADMIN — LIDOCAINE 1 PATCH: 4 PATCH TOPICAL at 04:23

## 2024-11-24 RX ADMIN — SENNOSIDES AND DOCUSATE SODIUM 2 TABLET: 50; 8.6 TABLET ORAL at 16:53

## 2024-11-24 RX ADMIN — OXYCODONE 5 MG: 5 TABLET ORAL at 04:22

## 2024-11-24 RX ADMIN — POLYETHYLENE GLYCOL 3350 1 PACKET: 17 POWDER, FOR SOLUTION ORAL at 12:13

## 2024-11-24 RX ADMIN — ACETAMINOPHEN 650 MG: 325 TABLET ORAL at 20:12

## 2024-11-24 RX ADMIN — OXYCODONE 5 MG: 5 TABLET ORAL at 17:29

## 2024-11-24 RX ADMIN — FLUDROCORTISONE ACETATE 0.1 MG: 0.1 TABLET ORAL at 14:28

## 2024-11-24 RX ADMIN — POLYETHYLENE GLYCOL 3350 1 PACKET: 17 POWDER, FOR SOLUTION ORAL at 20:14

## 2024-11-24 RX ADMIN — ATORVASTATIN CALCIUM 80 MG: 80 TABLET, FILM COATED ORAL at 16:53

## 2024-11-24 RX ADMIN — CLOPIDOGREL BISULFATE 75 MG: 75 TABLET ORAL at 04:23

## 2024-11-24 RX ADMIN — SODIUM CHLORIDE 1000 ML: 9 INJECTION, SOLUTION INTRAVENOUS at 10:53

## 2024-11-24 RX ADMIN — OXYCODONE 5 MG: 5 TABLET ORAL at 10:49

## 2024-11-24 RX ADMIN — LEVOTHYROXINE SODIUM 50 MCG: 0.05 TABLET ORAL at 04:23

## 2024-11-24 ASSESSMENT — ENCOUNTER SYMPTOMS
GASTROINTESTINAL NEGATIVE: 1
CHILLS: 0
DIZZINESS: 0
NERVOUS/ANXIOUS: 1
LOSS OF CONSCIOUSNESS: 1
FOCAL WEAKNESS: 0
ABDOMINAL PAIN: 0
EYES NEGATIVE: 1
FEVER: 0
BLURRED VISION: 0
SHORTNESS OF BREATH: 0
BACK PAIN: 0
DOUBLE VISION: 0
SHORTNESS OF BREATH: 1
PALPITATIONS: 0
NECK PAIN: 0
DIZZINESS: 1
CONSTIPATION: 1
INSOMNIA: 0
HEADACHES: 0
NAUSEA: 0
COUGH: 0
MYALGIAS: 1
VOMITING: 0
MEMORY LOSS: 1
WEAKNESS: 1
MEMORY LOSS: 0

## 2024-11-24 ASSESSMENT — PAIN DESCRIPTION - PAIN TYPE
TYPE: ACUTE PAIN
TYPE: ACUTE PAIN;SURGICAL PAIN
TYPE: ACUTE PAIN
TYPE: ACUTE PAIN

## 2024-11-24 ASSESSMENT — FIBROSIS 4 INDEX: FIB4 SCORE: 10.28

## 2024-11-24 NOTE — PROGRESS NOTES
Critical Care Progress Note    Date of admission  11/20/2024    Chief Complaint  Syncope    Hospital Course  Dilan Calderón is an 84-year-old male with PMH significant for CAD s/p stent LAD May 2024, Lcx stent 11/12/24 on DAPT, Mobitz type II AV block s/p Micra pacemaker implantation 11/12/24, LV thrombus on chronic AC with Warfarin, HTN, HLD, tobacco abuse, hypothyroidism, and recent admissions for syncope (most recently 11/19 head passed out resulting in a laceration above his right eyebrow requiring stiches) who presented 11/20 with bleeding from his stitches, postoperative pain from his PPM placement in his right groin.  He was initially admitted to the hospitalist service, however a.m. 11/21 he sustained another syncopal event resulting in repeat head strike with fall to the floor and bleeding from his right eyebrow stitches.  He was transferred back to bed and became unresponsive with agonal breathing and loss of pulses.  He received about 90 seconds of CPR before becoming responsive.  He was transferred to ICU where he had another episode of unresponsive with gurgling and bradycardia 50s with associated hypotension.  While setting up for possible intubation and pressors he regained consciousness and began protecting his airway with no recollection of the event.  Head CT unremarkable.  Hgb's were trending and he received total 3 units of RBCs.  CT abdomen pelvis with contrast showed large right-sided retroperitoneal hemorrhage involving the right psoas and hip iliopsoas muscles.  Cardiology was consulted and increased his pacemaker rate to 80.  11/22 - SBP 80's when Vpaced, patient symptomatic.  11/23 - PPM interrogated again, sensing adjusted and rate decreased back to 50.     Blood Products this admission: RBC x 5, FFP x 1    Interval Problem Update  Reviewed last 24 hour events:  Tmax 98.5  SR/ST 's  SBP's 120-140's  Room Air  Neuro: alert, oriented x 4. Moving all, following. No focal  deficits.  Cardiac Diet. BM PTA - dose of Miralax today. MOM PRN.  Right radial artline - DC prior to transfer.  Voiding, PIV  Plavix, Holding Warfarin. INR 2.5 today. Goal INR 2-3.  I/O: 850/600  Mobility 3A    No longer requires ICU care.  Transfer to Telemetry floor.  Discussed with Hospitalist Team, who will assume care.  Critical care service is available for any questions or concerns.    Review of Systems  Review of Systems   Constitutional:  Negative for chills and fever.   HENT: Negative.     Eyes:  Negative for blurred vision and double vision.   Respiratory:  Negative for cough and shortness of breath.    Cardiovascular:  Negative for chest pain.   Gastrointestinal:  Positive for constipation. Negative for abdominal pain, nausea and vomiting.   Musculoskeletal:  Negative for back pain and neck pain.   Neurological:  Positive for dizziness. Negative for focal weakness.   Psychiatric/Behavioral:  Negative for memory loss. The patient does not have insomnia.    All other systems reviewed and are negative.       Vital Signs for last 24 hours   Temp:  [36.2 °C (97.1 °F)-36.9 °C (98.5 °F)] 36.2 °C (97.1 °F)  Pulse:  [] 103  Resp:  [14-40] 20  BP: (107-147)/(49-92) 123/61  SpO2:  [88 %-95 %] 91 %    Hemodynamic parameters for last 24 hours       Respiratory Information for the last 24 hours       Physical Exam   Physical Exam  Vitals and nursing note reviewed.   Constitutional:       General: He is not in acute distress.     Appearance: Normal appearance. He is ill-appearing. He is not toxic-appearing.   HENT:      Head: Normocephalic.      Right Ear: Hearing normal.      Left Ear: Hearing normal.      Nose: Nose normal.      Mouth/Throat:      Lips: Pink.      Mouth: Mucous membranes are moist.   Eyes:      Pupils: Pupils are equal, round, and reactive to light.   Cardiovascular:      Rate and Rhythm: Normal rate and regular rhythm.      Pulses: Normal pulses.      Heart sounds: Normal heart sounds.    Pulmonary:      Effort: Pulmonary effort is normal.      Breath sounds: Normal breath sounds. No wheezing.   Abdominal:      General: Bowel sounds are normal.      Palpations: Abdomen is soft.      Tenderness: There is no abdominal tenderness. There is no guarding or rebound.   Musculoskeletal:      Right lower leg: No edema.      Left lower leg: No edema.      Comments: Moving all   Skin:     General: Skin is warm and dry.      Capillary Refill: Capillary refill takes less than 2 seconds.      Coloration: Skin is not pale.   Neurological:      Mental Status: He is alert and oriented to person, place, and time.      GCS: GCS eye subscore is 4. GCS verbal subscore is 5. GCS motor subscore is 6.      Cranial Nerves: Cranial nerves 2-12 are intact.      Sensory: Sensation is intact.      Motor: Motor function is intact.   Psychiatric:         Mood and Affect: Mood normal.         Speech: Speech normal.         Behavior: Behavior is cooperative.         Cognition and Memory: He exhibits impaired recent memory.         Judgment: Judgment normal.         Medications  Current Facility-Administered Medications   Medication Dose Route Frequency Provider Last Rate Last Admin    NS (Bolus) 0.9 % infusion 1,000 mL  1,000 mL Intravenous Once Pinky Vega, A.P.R.N. 250 mL/hr at 11/24/24 1053 1,000 mL at 11/24/24 1053    polyethylene glycol/lytes (Miralax) Packet 1 Packet  1 Packet Oral Once Pinky Vega, A.P.R.N.        magnesium hydroxide (Milk Of Magnesia) suspension 30 mL  30 mL Oral QDAY PRN Pinky Vega A.P.R.N.        senna-docusate (Pericolace Or Senokot S) 8.6-50 MG per tablet 2 Tablet  2 Tablet Oral Q EVENING Alanis L. Latona   2 Tablet at 11/23/24 1711    And    polyethylene glycol/lytes (Miralax) Packet 1 Packet  1 Packet Oral QDAY PRN Alanis L. Latona        lidocaine (Asperflex) 4 % patch 1 Patch  1 Patch Transdermal DAILY Jeremy M Gonda, M.D.   1 Patch at 11/24/24 0423    acetaminophen (Tylenol) tablet 650 mg   650 mg Oral Q6HRS PRN Kerri Elizabeth M.D.   650 mg at 11/23/24 1918    atorvastatin (Lipitor) tablet 80 mg  80 mg Oral Q EVENING Kerri Elizabeth M.D.   80 mg at 11/23/24 1711    clopidogrel (Plavix) tablet 75 mg  75 mg Oral DAILY Kerri Elizabeth M.D.   75 mg at 11/24/24 0423    levothyroxine (Synthroid) tablet 50 mcg  50 mcg Oral AM ES Kerri Elizabeth M.D.   50 mcg at 11/24/24 0423    [Held by provider] MD Alert...Warfarin per Pharmacy   Other PHARMACY TO DOSE Kerri Elizabeth M.D.        oxyCODONE immediate-release (Roxicodone) tablet 5 mg  5 mg Oral Q4HRS PRN Kerri Elizabeth M.D.   5 mg at 11/24/24 1049       Fluids    Intake/Output Summary (Last 24 hours) at 11/24/2024 1125  Last data filed at 11/24/2024 0800  Gross per 24 hour   Intake 750 ml   Output 710 ml   Net 40 ml       Laboratory          Recent Labs     11/22/24  0519 11/23/24 0423 11/24/24  0304   SODIUM 137 135 137   POTASSIUM 4.5 4.0 3.8   CHLORIDE 109 108 110   CO2 19* 19* 19*   BUN 39* 37* 28*   CREATININE 1.58* 1.37 1.06   MAGNESIUM 2.1 2.0 1.9   PHOSPHORUS 3.4 3.0 2.4*   CALCIUM 7.6* 7.9* 7.3*     Recent Labs     11/22/24  0519 11/23/24 0423 11/24/24  0304   ALTSGPT 44 47 40   ASTSGOT 90* 86* 92*   ALKPHOSPHAT 75 84 84   TBILIRUBIN 0.6 0.6 1.1   GLUCOSE 130* 117* 120*     Recent Labs     11/22/24 0519 11/23/24 0423 11/24/24  0304   WBC 14.4* 13.5* 12.3*   NEUTSPOLYS 71.40 73.70* 77.70*   LYMPHOCYTES 16.70* 16.20* 12.60*   MONOCYTES 9.90 8.90 8.00   EOSINOPHILS 0.00 0.10 0.30   BASOPHILS 0.10 0.10 0.20   ASTSGOT 90* 86* 92*   ALTSGPT 44 47 40   ALKPHOSPHAT 75 84 84   TBILIRUBIN 0.6 0.6 1.1     Recent Labs     11/22/24  0519 11/22/24  1305 11/23/24  0423 11/23/24  0558 11/23/24  1547 11/24/24  0304 11/24/24  0940   RBC 2.45*  --  2.52*  --   --  2.68*  --    HEMOGLOBIN 7.7*   < > 7.9* 7.6* 7.5* 8.3* 8.5*   HEMATOCRIT 22.2*   < > 22.8* 23.1*  --  24.1*  --    PLATELETCT 105*  --  107*  --   --  116*  --    PROTHROMBTM 35.4*  --  33.5*  --    --  27.5*  --    INR 3.51*  --  3.27*  --   --  2.54*  --     < > = values in this interval not displayed.       Imaging  No new imaging today    Assessment/Plan  * Syncope- (present on admission)  Assessment & Plan  -Patient has recently had multiple syncopal events.  Currently multifactorial due to to anemia, bradycardia, hypotension  -Cardiology following  -ECHO yesterday showed stable EF 45-50%, Apical Akinesis, known LV thrombus  -Carotid Duplex unremarkable  -Fall Precautions    Acute blood loss anemia  Assessment & Plan  -due to retroperitoneal hematoma  -transfuse for Hgb < 7 or < 8 for cardiac ischemia  -monitor for bleeding  -follow CBC    Circulating anticoagulants (HCC)- (present on admission)  Assessment & Plan  -on Warfarin for LV thrombus, currently on hold for anemia  -resume warfarin as able  -monitor for bleeding    Laceration of right eyebrow- (present on admission)  Assessment & Plan  -s/p suturing  -wound care    Groin pain, right  Assessment & Plan  -CT showing retroperitoneal bleed due to procedure access while on AC  -multimodal pain management    Acute renal failure (ARF) (HCC)- (present on admission)  Assessment & Plan  -Secondary to ATN  -resolved    Coronary artery disease- (present on admission)  Assessment & Plan  -Plavix and statin - no ASA while on Warfarin    Cardiac arrest (HCC)- (present on admission)  Assessment & Plan  -After syncopal event and head trauma the morning of 11/21 -potentially not a true loss of pulses s/p CPR x 90 seconds    Closed head injury- (present on admission)  Assessment & Plan  -Initial fall with eyebrow laceration status postrepair and head CT in the ED 11/20  -Repeat fall with recurrent right eyebrow laceration 11/21  -Repeat head CT, CT C-spine without acute findings  -Fall precautions  -PT/OT    Hypothyroidism- (present on admission)  Assessment & Plan  -home Levothyroxine    LV (left ventricular) mural thrombus- (present on admission)  Assessment &  Plan  -known. Previuosly on Warfarin, holding for anemia  -repeat ECHO this admission shows decrease in size from prior ECHO  -resume AC when clinically able    Chronic systolic heart failure (HCC)- (present on admission)  Assessment & Plan  -Without acute decompensation, EF 45% from echo 11/11  -Holding GDMT in the setting of hypotension  -daily weights  -I&O  -Cardiology follow up         VTE:  Contraindicated  Ulcer: Not Indicated  Lines: None    I have performed a physical exam and reviewed and updated ROS and Plan today (11/24/2024). In review of yesterday's note (11/23/2024), there are no changes except as documented above.     Discussed patient condition and risk of morbidity and/or mortality with Hospitalist, RN, RT, Pharmacy, Patient, cardiology and nurse practitioner, and my attending Dr. Rodriguez    Please note that this dictation was created using voice recognition software. I have made every reasonable attempt to correct obvious errors, but there may be errors of grammar and possibly content that I did not discover before finalizing the note.    ISABEL Brothers.

## 2024-11-24 NOTE — CARE PLAN
The patient is Watcher - Medium risk of patient condition declining or worsening    Shift Goals  Clinical Goals: hemodynamic stability  Patient Goals: pain management  Family Goals: DHARA      Problem: Fall Risk  Goal: Patient will remain free from falls  Outcome: Progressing  Note: Fall risk precautions in place     Problem: Skin Integrity  Goal: Risk for impaired skin integrity will decrease  Outcome: Progressing  Note: Wound care completed    Problem: Pain - Standard  Goal: Alleviation of pain or a reduction in pain to the patient’s comfort goal  Outcome: Progressing  Note: Pain managed with oxycodone and tylenol

## 2024-11-24 NOTE — PROGRESS NOTES
1204: JAKE Vega notified of patient having orthostatic hypotension while mobilizing to edge of bed. Q12 hemoglobin checks ordered with next draw at 1600.

## 2024-11-24 NOTE — PROGRESS NOTES
Patient had 11 beats PSVT where SBP dropped from 130s to the 70s. Rhythm resolved, SBP back to baseline. APRN Vega notified at 1657. Also notified of Hgb 7.5. New orders to transfuse 1 unit RBCs.

## 2024-11-24 NOTE — CARE PLAN
Problem: Pain - Standard  Goal: Alleviation of pain or a reduction in pain to the patient’s comfort goal  Outcome: Progressing  Note: POC regarding pain management discussed with pt.  Pt will be medicated for pain per MAR       Problem: Knowledge Deficit - Standard  Goal: Patient and family/care givers will demonstrate understanding of plan of care, disease process/condition, diagnostic tests and medications  Outcome: Progressing  Note: Discussed POC and medications with patient.  Patient verbalized understanding.     The patient is Stable - Low risk of patient condition declining or worsening    Shift Goals  Clinical Goals: hemodynamic stability  Patient Goals: pain management  Family Goals: DHARA

## 2024-11-24 NOTE — PROGRESS NOTES
Cardiology Follow-up Note    Name:   Dilan Calderón     YOB: 1940  Age:   84 y.o.  male   MRN:   [id number]        Attending Provider: Dr Lincoln Rodriguez Jr., D.O.     Chief Complaint: Wound Check, Fatigue (Reports bleeding from stitches on right side eyebrow upon waking up at 3am today, stitches from GLF 2 weeks ago. Patient states feeling fatigue for 2 days. Here recently with same. Hx of GLF, and had stents 2 weeks ago. On blood thinners), and Post-Op Pain (Intermittent pain since surgery (implant) 2 weeks ago)       Reason for cardiology consult: Syncope    Outpatient Cardiologist: None    History of Present Illness  Dilan Calderón is 84 y.o. male with prior medical history significant for recent NSTEMI s/p PCI to left circumflex on 11/12/2024, CAD / MI s/p PCI DOUGIE p-mLAD (05/2024), LV apical thrombus, second-degree AV block, s/p Micra pacemaker, hypertension, dyslipidemia, ICM, HFrEF, GIB requiring transfusions (March 2024), CKD stage III  who was admitted on 11/20/2024 with near syncope and generalized weakness at home.  During the admission he had several episodes of syncope, hypotension and a brief CODE BLUE with brief CPR.  Additionally, found to have significant anemia that required several blood transfusions.   CT scanning of abdomen and pelvis on 11/21/2024 revealed a large right-sided retroperitoneal hemorrhage involving the right psoas and iliopsoas muscles.    Interim Events 11/23/24   :  - Personal Telemetry interpretation: Sinus rhythm 90s to paced rhythm at 80  - Overnight events: No Cardiac events  He reports right upper leg all the way to groin pain  Patient denies chest pain, shortness of breath, edema, dizziness/lightheadedness, or palpitations.   - Vitals: 121/68.  Off pressors  - Labs reviewed: Hemoglobin 7.6 dropping from 7.9  - I/O's: Minimal urinary output, only 300 mL overnight per nursing report  - Weight: 158 pounds    Interim Events 11/24/24   :  - Personal  "Telemetry interpretation: Sinus rhythm in the 90s today with some atrial ectopic beats.  - Overnight events: Short run of PSVT yesterday with a drop of systolic blood pressure.  Self resolved.  Patient feels much better after he received a unit of RBCs yesterday.  Reports right leg and right flank pain but is much improved.  Patient denies chest pain, shortness of breath, dizziness/lightheadedness, or palpitations.   - Vitals: 123/61, room air  - Labs reviewed: Repeat hemoglobin 8.5 after a unit of RBCs        Review of Systems   Constitutional:  Positive for malaise/fatigue. Negative for chills and fever.   Respiratory:  Negative for shortness of breath.    Cardiovascular:  Negative for chest pain, palpitations and leg swelling.   Gastrointestinal:  Positive for constipation. Negative for abdominal pain.   Musculoskeletal:         Right upper leg pain   Neurological:  Positive for weakness. Negative for dizziness and headaches.   Psychiatric/Behavioral:  Positive for memory loss.         Medical History  Past Medical History:   Diagnosis Date    NSTEMI (non-ST elevated myocardial infarction) (HCC)          No family history on file.      Social History     Tobacco Use    Smoking status: Former     Types: Cigarettes    Smokeless tobacco: Never   Vaping Use    Vaping status: Never Used   Substance Use Topics    Alcohol use: Not Currently    Drug use: Never         No Known Allergies      Medications   Scheduled Medications   Medication Dose Frequency    NS  1,000 mL Once    senna-docusate  2 Tablet Q EVENING    lidocaine  1 Patch DAILY    atorvastatin  80 mg Q EVENING    clopidogrel  75 mg DAILY    levothyroxine  50 mcg AM ES    [Held by provider] MD Alert...Warfarin per Pharmacy   PHARMACY TO DOSE           Physical Exam    Body mass index is 26.97 kg/m².     /61   Pulse (!) 103   Temp 36.2 °C (97.1 °F) (Temporal)   Resp 20   Ht 1.702 m (5' 7\")   Wt 78.1 kg (172 lb 2.9 oz)   SpO2 91%      Vitals:    " "11/24/24 0700 11/24/24 0800 11/24/24 0900 11/24/24 1000   BP: 121/73 (!) 147/72 132/64 123/61   Pulse: 69 77 (!) 103 (!) 103   Resp: (!) 28 15 17 20   Temp:  36.2 °C (97.1 °F)     TempSrc:  Temporal     SpO2: 92% 91% 91% 91%   Weight:       Height:            Oxygen Therapy:  Pulse Oximetry: 91 %, O2 Delivery Device: Room air w/o2 available      Physical Exam  Constitutional:       Appearance: He is ill-appearing.   HENT:      Mouth/Throat:      Mouth: Mucous membranes are dry.   Cardiovascular:      Rate and Rhythm: Normal rate. Rhythm irregular.      Heart sounds: No murmur heard.     Comments: Sinus rhythm in the 90s, intermittent pacer spikes  Pulmonary:      Breath sounds: Rales present. No wheezing.   Abdominal:      General: There is distension (mild).      Palpations: Abdomen is soft.   Skin:     General: Skin is warm and dry.      Coloration: Skin is pale.      Comments: Bruising, tenderness,  firm nodule to right femoral site post Micra placement   Neurological:      Mental Status: He is alert and oriented to person, place, and time.   Psychiatric:         Mood and Affect: Mood normal.               Labs (personally reviewed):     Estimated Creatinine Clearance: 50.2 mL/min (by C-G formula based on SCr of 1.06 mg/dL).    No results found for: \"BNPBTYPENAT\"  Recent Labs     11/22/24 0519 11/23/24 0423 11/24/24  0304   CREATININE 1.58* 1.37 1.06   BUN 39* 37* 28*   POTASSIUM 4.5 4.0 3.8   SODIUM 137 135 137   CALCIUM 7.6* 7.9* 7.3*   MAGNESIUM 2.1 2.0 1.9   CO2 19* 19* 19*   ALBUMIN 3.0* 3.0* 2.8*     Recent Labs     11/22/24  0519 11/23/24 0423 11/24/24  0304   GLUCOSE 130* 117* 120*     Recent Labs     11/22/24  0519 11/23/24 0423 11/24/24  0304   ASTSGOT 90* 86* 92*   ALTSGPT 44 47 40   ALKPHOSPHAT 75 84 84   INR 3.51* 3.27* 2.54*     Recent Labs     11/22/24  0519 11/22/24  1305 11/23/24  0423 11/23/24  0558 11/23/24  1547 11/24/24  0304 11/24/24  0940   WBC 14.4*  --  13.5*  --   --  12.3*  --  "   HEMOGLOBIN 7.7*   < > 7.9*   < > 7.5* 8.3* 8.5*   PLATELETCT 105*  --  107*  --   --  116*  --     < > = values in this interval not displayed.     Recent Labs     11/21/24  1242 11/21/24  1812   TROPONINT 89* 83*     Lab Results   Component Value Date/Time    CHOLSTRLTOT 197 11/11/2024 04:20 AM     (H) 11/11/2024 04:20 AM    HDL 62 11/11/2024 04:20 AM    TRIGLYCERIDE 88 11/11/2024 04:20 AM       Imaging:  US-CAROTID DOPPLER BILAT   Final Result      CT-ABDOMEN-PELVIS W/O   Final Result      1.  Large right-sided retroperitoneal hemorrhage involving the right psoas and iliopsoas muscles.   2.  Small right effusion.   3.  Moderate-sized pericardial effusion.   4.  Persistent bilateral nephrograms suggesting renal dysfunction.   5.  Bilateral renal atrophy.   6.  Atherosclerosis.      EC-ECHOCARDIOGRAM LTD W/O CONT   Final Result      DX-FEMUR-2+ RIGHT   Final Result      No acute osseous abnormality.      CT-CTA CHEST PULMONARY ARTERY W/ RECONS   Final Result      1.  No evidence of pulmonary embolism.   2.  Small right-sided pleural effusion with associated compressive atelectasis and or consolidation. Underlying infection is possible.   3.  Perirenal fat stranding, uncertain etiology and significance.   4.  Small pericardial effusion.   5.  Ascending thoracic aortic ectasia measuring 4.1 cm.   6.  Atherosclerosis with coronary artery disease.            CT-HEAD W/O   Final Result      1.  No evidence of acute territorial infarct, intracranial hemorrhage or mass lesion.   2.  Mild diffuse cerebral substance loss.   3.  Mild microangiopathic ischemic change versus demyelination or gliosis.               CT-CSPINE WITHOUT PLUS RECONS   Final Result      Degenerative changes of the cervical spine without acute fracture or malalignment.      DX-CHEST-LIMITED (1 VIEW)   Final Result         1.  No acute cardiopulmonary disease.   2.  Atherosclerosis      DX-CHEST-PORTABLE (1 VIEW)   Final Result         1.  No  acute cardiopulmonary disease.   2.  Cardiomegaly   3.  Atherosclerosis          Cardiac Imaging and Procedures Review      Echocardiogram 5/4/2024  Moderately reduced left ventricular systolic function.  The left ventricular ejection fraction is visually estimated to be 35-  40%.  Hypokinesis anteroapical hypokinesis.  The right ventricle is normal in size and systolic function.  No prior study is available for comparison.      Echocardiogram 11/11/2024  Compared to the prior study on 5/4/2024: There is interval development   of LV apical thrombus  The ascending aorta diameter is 3.8 cm.  Mildly reduced LV systolic function, estimated LVEF 40-45% with apical   akinesis, suggestive of underlying ischemic cardiomyopathy  There is an immobile 1.1 x 1.2 cm LV apical thrombus  Normal RV size and systolic function  No significant valvular abnormalities  Aortic valve sclerosis without stenosis  Trivial pericardial effusion  Normal IVC size  Dr. Stuart notified via [facility] on 11/11/24 at 11:47 AM    Echocardiography Laboratory 11/21/2024  Prior study on 11/11/24, compared to the images of the prior study, the   apical thrombus is still present, however, less is less pronounced.  Mildly reduced left ventricular systolic function.  The left ventricular ejection fraction is visually estimated to be 45-  50%.  Apical akinesis.  Left ventricular thrombus noted.        Cardiac Catheterization 5/2/2024 by Dr. Johns :  IMPRESSION:  1.  Occluded proximal LAD (heavily calcified vessel) status post successful IVUS guided intravascular lithotripsy and PCI deploying overlapping Synergy 3 x 28 mm and 3 x 32 mm DOUGIE, postdilated to ~ 3.5-3.7 mm.  2.  Ischemic cardiomyopathy with estimated LVEF 45% with anterior wall akinesis  3.  Significant elevated resting LVEDP 25 mmHg with no significant transaortic gradient on pullback     RECOMMENDATIONS:  Dual antiplatelet therapy for at least 12 months  Weigh risk versus benefits of further PCI to  his remaining severe disease in the circumflex and RCA, especially given recent GI bleed with no clear identifiable source necessitating blood transfusions.  HI statin / PCSK9-I: Target LDL < 55 and TG < 150  TTE with echo enhancing agent  GDMT and lifestyle modifications for secondary ASCVD prevention  Cardiac Rehab referral     Cardiac catheterization 11/12/2024 by Dr. Gould:  IMPRESSION:  Widely patent left anterior descending coronary artery stents.  Distal right coronary now chronically occluded.  Successful percutaneous coronary intervention to the proximal left circumflex coronary artery with one 3.0 x 24 mm Synergy drug-eluting stent postdilated to 3.5 mm.     RECOMMENDATIONS:  Return to floor with continued care per primary service.  TR band release per protocol.  Restart heparin drip per protocol in 2 hours if access site no bleeding complications.  Strict adherence with dual antiplatelet therapy for at least 3 months if tolerated.  Optimal medical management of residual coronary artery disease with consideration for further intervention depending on patient's symptoms and medical compliance.     Micra PPM placement by  on 11/12/2024:  IMPLANTED DEVICE INFORMATION:  Right ventricular pacemaker is a [employer] model # [id number] , serial # [id number] ,R wave 5.1 millivolts, threshold 0.25 Volts, pacing impedance 840 Ohms.     DEVICE PROGRAMMING:  VDD      FLUOROSCOPY TIME: 4.1 min     IMPRESSIONS:  1. Successful Micra pacemaker implantation     RECOMMENDATIONS:  1. Bedrest for 6 hours after cath, can resume heparin gtt from my standpoint  2. Device interrogation prior to hospital discharge  3. Followup in device clinic    CT-abdomen-pelvis without 11/21/2024  IMPRESSION:  1.  Large right-sided retroperitoneal hemorrhage involving the right psoas and iliopsoas muscles.  2.  Small right effusion.  3.  Moderate-sized pericardial effusion.  4.  Persistent bilateral nephrograms suggesting renal  dysfunction.  5.  Bilateral renal atrophy.  6.  Atherosclerosis.      Principal Problem:    Syncope (POA: Yes)  Active Problems:    Chronic systolic heart failure (HCC) (Chronic) (POA: Yes)    LV (left ventricular) mural thrombus (Chronic) (POA: Yes)    Hypothyroidism (Chronic) (POA: Yes)    Closed head injury (POA: Yes)    Cardiac arrest (HCC) (POA: Yes)    Coronary artery disease (Chronic) (POA: Yes)    Acute renal failure (ARF) (HCC) (POA: Yes)    Acute blood loss anemia (POA: Unknown)    Groin pain, right (POA: Unknown)    Laceration of right eyebrow (POA: Yes)  Resolved Problems:    * No resolved hospital problems. *      Assessment and Medical Decision Making:    #.  Syncope  #.  Anemia requiring blood transfusions  #.  Right-sided retroperitoneal hemorrhage  #.  Complete heart block, s/p Micra PPM in situ November 2024  #.  Coronary artery disease, S/P cardiac stents  #.  LV thrombus  #.  Hypothyroidism  #.  Hypertension  #.  Hyperlipidemia  #.  HF mildly reduced EF, EF 40-45%  #.  Ischemic cardiomyopathy  #.  CKD        Recommendations:  Multiple syncopal episodes.  Likely related to anemia due to acute blood loss in the setting of retroperitoneal hematoma post Micra PPM placement and hypotension.   Noted to have dips in blood pressure with pacing rhythm.  He was seen by EP cardiology on 11/22/2024 when his pacemaker was interrogated showing normal sensing and function.  His baseline rate was increased to 80 bpm and rate response therapy was turned on.  Dr Beckman with EP cardiology adjusted the PPM today 11/23/2024 -the setting is changed to VVI, heart rate lowered to 50 bpm and prior oversensing is adjusted.  -Monitor on telemetry with the new adjustments to the pacemaker.    -Can remove the arterial line.  Blood pressure has been stable for the most part with the new adjustment of the PPM.  -Give IV fluids today.  -Short episode of PSVT run yesterday.  Consider adding a beta-blocker when the  bleeding/hematoma is stable.  -Keep hemoglobin above 10, if possible.  Has received a unit of PRBCs on 11/23/2024.  -Continue holding Coumadin given profound anemia and high INR.  -Continue clopidogrel 75 mg daily given recent stent in prior stents in the spring 2024  -Continue holding GDMT for ischemic cardiomyopathy.  Stable blood pressure off pressors.        Future Appointments   Date Time Provider Department Center   11/25/2024  1:00 PM Deja Torres M.D. [facility]   11/27/2024  2:45 PM [facility] EXAM 4 [facility] None   12/3/2024  3:45 PM CAROLINE Gonzalez [facility] None        I personally discussed his case with  JAKE Lubin.     Please contact me with any questions.  Thank you for allowing us to participate in the care of Mr. Calderón.      CAROLINE Adkins  [facility]  [phone]    Please see Dr. Man  attestation for further details and MDM. I, CAROLINE Adkins performed a portion of the service face-to-face with the same patient on the same date of service independently of Dr. Man FOR 15 minutes preparing to see the patient, reviewing hospital notes and tests, obtaining history from the patient, performing a medically appropriate exam, counseling and educating the patient, ordering medications/tests/procedures/referrals as clinically indicated, and documenting information in the electronic medical record.    Please note this dictation was created using voice recognition software.  I have made every reasonable attempt to correct obvious errors, but there may be errors of grammar and possibly content that I did not discover before finalizing the note.

## 2024-11-24 NOTE — ASSESSMENT & PLAN NOTE
-on Warfarin for LV thrombus, currently on hold for anemia  -resume warfarin as able  -monitor for bleeding

## 2024-11-25 LAB
ABO GROUP BLD: NORMAL
ALBUMIN SERPL BCP-MCNC: 2.9 G/DL (ref 3.2–4.9)
ALBUMIN/GLOB SERPL: 1.2 G/DL
ALP SERPL-CCNC: 102 U/L (ref 30–99)
ALT SERPL-CCNC: 39 U/L (ref 2–50)
ANION GAP SERPL CALC-SCNC: 9 MMOL/L (ref 7–16)
AST SERPL-CCNC: 68 U/L (ref 12–45)
BARCODED ABORH UBTYP: 5100
BARCODED PRD CODE UBPRD: NORMAL
BARCODED UNIT NUM UBUNT: NORMAL
BASOPHILS # BLD AUTO: 0.2 % (ref 0–1.8)
BASOPHILS # BLD: 0.02 K/UL (ref 0–0.12)
BILIRUB SERPL-MCNC: 1.1 MG/DL (ref 0.1–1.5)
BLD GP AB SCN SERPL QL: NORMAL
BUN SERPL-MCNC: 27 MG/DL (ref 8–22)
CALCIUM ALBUM COR SERPL-MCNC: 9 MG/DL (ref 8.5–10.5)
CALCIUM SERPL-MCNC: 8.1 MG/DL (ref 8.5–10.5)
CHLORIDE SERPL-SCNC: 105 MMOL/L (ref 96–112)
CO2 SERPL-SCNC: 21 MMOL/L (ref 20–33)
COMPONENT R 8504R: NORMAL
CREAT SERPL-MCNC: 1.05 MG/DL (ref 0.5–1.4)
EOSINOPHIL # BLD AUTO: 0.13 K/UL (ref 0–0.51)
EOSINOPHIL NFR BLD: 1.2 % (ref 0–6.9)
ERYTHROCYTE [DISTWIDTH] IN BLOOD BY AUTOMATED COUNT: 53.7 FL (ref 35.9–50)
GFR SERPLBLD CREATININE-BSD FMLA CKD-EPI: 71 ML/MIN/1.73 M 2
GLOBULIN SER CALC-MCNC: 2.5 G/DL (ref 1.9–3.5)
GLUCOSE SERPL-MCNC: 113 MG/DL (ref 65–99)
HCT VFR BLD AUTO: 21.8 % (ref 42–52)
HGB BLD-MCNC: 7.3 G/DL (ref 14–18)
HGB BLD-MCNC: 8 G/DL (ref 14–18)
HGB BLD-MCNC: 8.3 G/DL (ref 14–18)
IMM GRANULOCYTES # BLD AUTO: 0.12 K/UL (ref 0–0.11)
IMM GRANULOCYTES NFR BLD AUTO: 1.1 % (ref 0–0.9)
INR PPP: 1.72 (ref 0.87–1.13)
LYMPHOCYTES # BLD AUTO: 1.46 K/UL (ref 1–4.8)
LYMPHOCYTES NFR BLD: 13.5 % (ref 22–41)
MAGNESIUM SERPL-MCNC: 2.3 MG/DL (ref 1.5–2.5)
MCH RBC QN AUTO: 30.7 PG (ref 27–33)
MCHC RBC AUTO-ENTMCNC: 33.5 G/DL (ref 32.3–36.5)
MCV RBC AUTO: 91.6 FL (ref 81.4–97.8)
MONOCYTES # BLD AUTO: 0.91 K/UL (ref 0–0.85)
MONOCYTES NFR BLD AUTO: 8.4 % (ref 0–13.4)
NEUTROPHILS # BLD AUTO: 8.16 K/UL (ref 1.82–7.42)
NEUTROPHILS NFR BLD: 75.6 % (ref 44–72)
NRBC # BLD AUTO: 0.04 K/UL
NRBC BLD-RTO: 0.4 /100 WBC (ref 0–0.2)
NT-PROBNP SERPL IA-MCNC: 4743 PG/ML (ref 0–125)
PHOSPHATE SERPL-MCNC: 2.7 MG/DL (ref 2.5–4.5)
PLATELET # BLD AUTO: 135 K/UL (ref 164–446)
PMV BLD AUTO: 11.6 FL (ref 9–12.9)
POTASSIUM SERPL-SCNC: 4.2 MMOL/L (ref 3.6–5.5)
PRODUCT TYPE UPROD: NORMAL
PROT SERPL-MCNC: 5.4 G/DL (ref 6–8.2)
PROTHROMBIN TIME: 20.3 SEC (ref 12–14.6)
RBC # BLD AUTO: 2.38 M/UL (ref 4.7–6.1)
RH BLD: NORMAL
SODIUM SERPL-SCNC: 135 MMOL/L (ref 135–145)
UNIT STATUS USTAT: NORMAL
WBC # BLD AUTO: 10.8 K/UL (ref 4.8–10.8)

## 2024-11-25 PROCEDURE — A9270 NON-COVERED ITEM OR SERVICE: HCPCS | Performed by: HOSPITALIST

## 2024-11-25 PROCEDURE — A9270 NON-COVERED ITEM OR SERVICE: HCPCS | Performed by: STUDENT IN AN ORGANIZED HEALTH CARE EDUCATION/TRAINING PROGRAM

## 2024-11-25 PROCEDURE — 700101 HCHG RX REV CODE 250: Performed by: INTERNAL MEDICINE

## 2024-11-25 PROCEDURE — 36430 TRANSFUSION BLD/BLD COMPNT: CPT

## 2024-11-25 PROCEDURE — 700102 HCHG RX REV CODE 250 W/ 637 OVERRIDE(OP): Performed by: NURSE PRACTITIONER

## 2024-11-25 PROCEDURE — 85018 HEMOGLOBIN: CPT

## 2024-11-25 PROCEDURE — 80053 COMPREHEN METABOLIC PANEL: CPT

## 2024-11-25 PROCEDURE — 99232 SBSQ HOSP IP/OBS MODERATE 35: CPT | Performed by: INTERNAL MEDICINE

## 2024-11-25 PROCEDURE — 99233 SBSQ HOSP IP/OBS HIGH 50: CPT | Performed by: STUDENT IN AN ORGANIZED HEALTH CARE EDUCATION/TRAINING PROGRAM

## 2024-11-25 PROCEDURE — 83735 ASSAY OF MAGNESIUM: CPT

## 2024-11-25 PROCEDURE — A9270 NON-COVERED ITEM OR SERVICE: HCPCS | Performed by: INTERNAL MEDICINE

## 2024-11-25 PROCEDURE — 36415 COLL VENOUS BLD VENIPUNCTURE: CPT

## 2024-11-25 PROCEDURE — 86850 RBC ANTIBODY SCREEN: CPT

## 2024-11-25 PROCEDURE — 86923 COMPATIBILITY TEST ELECTRIC: CPT

## 2024-11-25 PROCEDURE — 700102 HCHG RX REV CODE 250 W/ 637 OVERRIDE(OP): Performed by: INTERNAL MEDICINE

## 2024-11-25 PROCEDURE — P9016 RBC LEUKOCYTES REDUCED: HCPCS

## 2024-11-25 PROCEDURE — 83880 ASSAY OF NATRIURETIC PEPTIDE: CPT

## 2024-11-25 PROCEDURE — 700102 HCHG RX REV CODE 250 W/ 637 OVERRIDE(OP): Performed by: HOSPITALIST

## 2024-11-25 PROCEDURE — 86900 BLOOD TYPING SEROLOGIC ABO: CPT

## 2024-11-25 PROCEDURE — 84100 ASSAY OF PHOSPHORUS: CPT

## 2024-11-25 PROCEDURE — 700102 HCHG RX REV CODE 250 W/ 637 OVERRIDE(OP): Performed by: STUDENT IN AN ORGANIZED HEALTH CARE EDUCATION/TRAINING PROGRAM

## 2024-11-25 PROCEDURE — 770020 HCHG ROOM/CARE - TELE (206)

## 2024-11-25 PROCEDURE — 86901 BLOOD TYPING SEROLOGIC RH(D): CPT

## 2024-11-25 PROCEDURE — 85025 COMPLETE CBC W/AUTO DIFF WBC: CPT

## 2024-11-25 PROCEDURE — A9270 NON-COVERED ITEM OR SERVICE: HCPCS | Performed by: NURSE PRACTITIONER

## 2024-11-25 PROCEDURE — 85610 PROTHROMBIN TIME: CPT

## 2024-11-25 RX ORDER — BISOPROLOL FUMARATE 5 MG/1
5 TABLET, FILM COATED ORAL
Status: DISCONTINUED | OUTPATIENT
Start: 2024-11-25 | End: 2024-11-27 | Stop reason: HOSPADM

## 2024-11-25 RX ADMIN — LEVOTHYROXINE SODIUM 50 MCG: 0.05 TABLET ORAL at 04:48

## 2024-11-25 RX ADMIN — ATORVASTATIN CALCIUM 80 MG: 80 TABLET, FILM COATED ORAL at 18:16

## 2024-11-25 RX ADMIN — SENNOSIDES AND DOCUSATE SODIUM 2 TABLET: 50; 8.6 TABLET ORAL at 18:16

## 2024-11-25 RX ADMIN — LIDOCAINE 1 PATCH: 4 PATCH TOPICAL at 04:48

## 2024-11-25 RX ADMIN — ACETAMINOPHEN 650 MG: 325 TABLET ORAL at 20:06

## 2024-11-25 RX ADMIN — FLUDROCORTISONE ACETATE 0.1 MG: 0.1 TABLET ORAL at 04:48

## 2024-11-25 RX ADMIN — CLOPIDOGREL BISULFATE 75 MG: 75 TABLET ORAL at 04:48

## 2024-11-25 RX ADMIN — BISOPROLOL FUMARATE 5 MG: 5 TABLET ORAL at 15:25

## 2024-11-25 ASSESSMENT — ENCOUNTER SYMPTOMS
DIZZINESS: 1
LOSS OF CONSCIOUSNESS: 0
DIZZINESS: 0
WEAKNESS: 1
SHORTNESS OF BREATH: 0
PALPITATIONS: 0
HEADACHES: 0
VOMITING: 0
CONSTIPATION: 0
NAUSEA: 0
COUGH: 0
CHILLS: 0
FEVER: 0
ABDOMINAL PAIN: 0
DIARRHEA: 0

## 2024-11-25 ASSESSMENT — FIBROSIS 4 INDEX: FIB4 SCORE: 10.28

## 2024-11-25 ASSESSMENT — PAIN DESCRIPTION - PAIN TYPE
TYPE: ACUTE PAIN
TYPE: ACUTE PAIN

## 2024-11-25 NOTE — PROGRESS NOTES
Bedside shift report received from charge RN. Patient A&Ox4, in bed resting comfortably. Bed in lowest, locked position with call light and belongings within reach. Fall precautions reviewed with patient. Patient updated on POC.

## 2024-11-25 NOTE — HOSPITAL COURSE
Dilan Calderón is an 84-year-old male who has been hospitalized multiple times over the past month most recently for syncopal event, where he experienced just a head laceration above his right eyebrow requiring stitches.  Initially admitted to the hospital service on 11/19 for syncopal workup.  On 11/21 he sustained an additional syncopal event in the hospital where he fell with head strike he was transferred back to bed became unresponsive with agonal breathing and loss of pulses.  He received 90 seconds of CPR and ROSC was achieved.  Patient was then transferred to the ICU where he had another episode of unresponsiveness and bradycardia into the 50s with associated hypotension.  While setting up for intubation and starting pressors he regained consciousness with no recollection of the syncopal event.  Head CT was obtained and was unremarkable.  Patient was noted to have significant drop in hemoglobin and was transfused 3 total units of RBCs.  CT abdomen pelvis obtained showed large right-sided retroperitoneal hemorrhage involving right psoas and hip iliopsoas muscles.  Most likely a complication of his permanent pacemaker placement through right groin.  Cardiology was consulted who increased his pacemaker rate to 80.  On 1123 his permanent pacemaker was reintegrated again and is sensing adjust to a rate of 50.  In total patient received 5 transfusions of packed red blood cells and 1 transfusion of fresh frozen plasma.  He was deemed stable for transfer back to the floor.      Cardiology continue to follow, telemetry did not show any significant runs of SVT or drops in his rate.  Most likely his recurrent syncope was due to the blood loss anemia from his surgical procedure.  Recommendations of maintaining his hemoglobin above 10.  On the floor his hemoglobin was seen to be 7.3 and he was transfused an additional unit of packed red blood cells. Hemoglobin stabilized around 8.8. He was restarted on anticoagulation  with apixiban given his history of GI bleed on warfarin. He was deemed medically stable for discharge to skilled nursing facility.

## 2024-11-25 NOTE — PROGRESS NOTES
Checked his R groin site. The site is clean with minimal pain unless you press directly onto the site of puncture.

## 2024-11-25 NOTE — PROGRESS NOTES
2 RN Skin Assessment Completed by Isabella RN and Meera RN.    Head: scab and bruising, stiches on the right eyebrow   Ears: redness and blanching  Nose: redness and blanching, where the glasses sit, discussed removing as not needed to give bridge of nose rest   Mouth: WDL  Neck: WDL  Breasts/Chest: bruising  Shoulder Blades: WDL  Spine: redness and blanching  (R) Arm/Elbow/hand: bruising  (L) Arm/Elbow/hand: bruising  Abdomen:bruising  Groin: WDL  Sacrum/Coccyx/Buttocks: scrotum is swollen   (R) Leg: abrasion on the knee and below knee has a blood blister   (L) Leg: bruising  (R) Heel/Foot/Toe: redness, area of discoloration not blanching   (L) Heel/Foot/Toe: redness and blanching          Devices in place: Tele Box and Pulse Ox    Interventions in place: Heel Mepilex, Sacral Mepilex, Pillows, Q2 turns, and Heels floated with pillows    Possible skin injury found: No, already staged on the heel     Pictures uploaded into Epic: Yes  Wound Consult Placed: N/A

## 2024-11-25 NOTE — DISCHARGE PLANNING
Case Management Discharge Planning    Admission Date: 11/20/2024  GMLOS: 2.3  ALOS: 4    6-Clicks ADL Score: 19  6-Clicks Mobility Score: 10  PT and/or OT Eval ordered: Yes  Post-acute Referrals Ordered: Yes  Post-acute Choice Obtained: Yes  Has referral(s) been sent to post-acute provider:  Yes      Anticipated Discharge Dispo: Discharge Disposition: D/T to SNF with Medicare cert in anticipation of skilled care (03)    DME Needed: No    Action(s) Taken: Chart reviewed, assessment completed, pt discussed in iDT rounds. He is not medically cleared but PT/OT recommending post acute placement. Patient signed snf choice for [facility], [facility], [facility]. Faxed to [facility].    Escalations Completed: None    Medically Clear: No    Next Steps: F/u for bed availability at snf once medically cleared    Barriers to Discharge: Medical clearance and Pending Placement    Is the patient up for discharge tomorrow: No

## 2024-11-25 NOTE — PROGRESS NOTES
[facility] Internal Medicine Daily Progress Note    Date of Service  11/25/2024    [facility] Team: [facility]   Attending: Fay Patel M.d.  Senior Resident: Dr. Ortega  Intern:  Dr. Pulliam  Contact Number: [phone]    Chief Complaint  Dilan Calderón is a 84 y.o. male admitted 11/20/2024 with syncopal events after PPM procedure with retroperitoneal hemmorhage.    Hospital Course  Dilan Calderón is an 84-year-old male who has been hospitalized multiple times over the past month most recently for syncopal event, where he experienced just a head laceration above his right eyebrow requiring stitches.  Initially admitted to the hospital service on 11/19 for syncopal workup.  On 11/21 he sustained an additional syncopal event in the hospital where he fell with head strike he was transferred back to bed became unresponsive with agonal breathing and loss of pulses.  He received 90 seconds of CPR and ROSC was achieved.  Patient was then transferred to the ICU where he had another episode of unresponsiveness and bradycardia into the 50s with associated hypotension.  While setting up for intubation and starting pressors he regained consciousness with no recollection of the syncopal event.  Head CT was obtained and was unremarkable.  Patient was noted to have significant drop in hemoglobin and was transfused 3 total units of RBCs.  CT abdomen pelvis obtained showed large right-sided retroperitoneal hemorrhage involving right psoas and hip iliopsoas muscles.  Most likely a complication of his permanent pacemaker placement through right groin.  Cardiology was consulted who increased his pacemaker rate to 80.  On 1123 his permanent pacemaker was reintegrated again and is sensing adjust to a rate of 50.  In total patient received 5 transfusions of packed red blood cells and 1 transfusion of fresh frozen plasma.  He was deemed stable for transfer back to the floor.      Cardiology continue to follow, telemetry did not show any  significant runs of SVT or drops in his rate.  Most likely his recurrent syncope was due to the blood loss anemia from his surgical procedure.  Recommendations of maintaining his hemoglobin above 10.  On the floor his hemoglobin was seen to be 7.3 and he was transfused an additional unit of packed red blood cells.    Interval Problem Update  Patient was seen and examined at bedside.  Was transferred to the floor overnight.  Denies having any chest pain, shortness of breath, abdominal pain, changes in bowel or bladder habits.  States it has been a while since his last bowel movement but is taking MiraLAX to help.  Patient states that he is nervous about being discharged as he does not want to leave too soon back in the hospital with more complications.  We discussed with him the need to be evaluated by physical therapy and Occupational Therapy today which she agreed with.  Patient is on board with current plans of therapy and is okay with receiving an additional unit of packed red blood cells today.    I have discussed this patient's plan of care and discharge plan at IDT rounds today with Case Management, Nursing, Nursing leadership, and other members of the IDT team.    Consultants/Specialty  cardiology    Code Status  Full Code    Disposition  Medically Cleared  I have placed the appropriate orders for post-discharge needs.    Review of Systems  Review of Systems   Constitutional:  Negative for chills and fever.   Respiratory:  Negative for cough and shortness of breath.    Cardiovascular:  Negative for chest pain.   Gastrointestinal:  Negative for abdominal pain, constipation, diarrhea, nausea and vomiting.   Musculoskeletal:  Positive for joint pain (Right hip pain).   Neurological:  Positive for dizziness and weakness. Negative for loss of consciousness and headaches.        Physical Exam  Temp:  [36.4 °C (97.5 °F)-36.8 °C (98.2 °F)] 36.8 °C (98.2 °F)  Pulse:  [] 108  Resp:  [16-25] 16  BP:  (113-156)/(68-75) 127/72  SpO2:  [91 %-97 %] 91 %    Physical Exam  Constitutional:       Appearance: Normal appearance.   HENT:      Head: Normocephalic.      Comments: Closed laceration above right eyebrow with steri strips covering     Nose: Nose normal.      Mouth/Throat:      Mouth: Mucous membranes are moist.      Pharynx: Oropharynx is clear.   Eyes:      Extraocular Movements: Extraocular movements intact.      Conjunctiva/sclera: Conjunctivae normal.      Pupils: Pupils are equal, round, and reactive to light.   Cardiovascular:      Rate and Rhythm: Normal rate and regular rhythm.      Pulses: Normal pulses.      Heart sounds: No murmur heard.     No gallop.      Comments: +2 Pulses in all 4 extremities  Pulmonary:      Effort: Pulmonary effort is normal. No respiratory distress.      Breath sounds: Normal breath sounds. No wheezing or rales.   Abdominal:      General: Abdomen is flat. There is no distension.      Palpations: Abdomen is soft.      Tenderness: There is no abdominal tenderness. There is no guarding.   Musculoskeletal:         General: No swelling or deformity.      Right lower leg: No edema.      Left lower leg: No edema.      Comments: Right inner thigh slightly indurated and tender to palpation. No redness or signs of infection,   Skin:     General: Skin is warm and dry.      Capillary Refill: Capillary refill takes less than 2 seconds.      Coloration: Skin is not pale.      Findings: No erythema or rash.   Neurological:      Mental Status: He is alert. Mental status is at baseline. He is disoriented.      Sensory: No sensory deficit (normal sensation in LE BL).      Motor: No weakness.      Coordination: Coordination normal.   Psychiatric:         Mood and Affect: Mood normal.         Behavior: Behavior normal.         Fluids    Intake/Output Summary (Last 24 hours) at 11/25/2024 1333  Last data filed at 11/25/2024 1159  Gross per 24 hour   Intake 1000 ml   Output 900 ml   Net 100 ml        Laboratory  Recent Labs     11/23/24 0423 11/23/24  0558 11/23/24  1547 11/24/24  0304 11/24/24  0940 11/24/24  2100 11/25/24  0430 11/25/24  0903   WBC 13.5*  --   --  12.3*  --   --  10.8  --    RBC 2.52*  --   --  2.68*  --   --  2.38*  --    HEMOGLOBIN 7.9* 7.6*   < > 8.3*   < > 8.0* 7.3* 8.0*   HEMATOCRIT 22.8* 23.1*  --  24.1*  --   --  21.8*  --    MCV 90.5  --   --  89.9  --   --  91.6  --    MCH 31.3  --   --  31.0  --   --  30.7  --    MCHC 34.6  --   --  34.4  --   --  33.5  --    RDW 55.5*  --   --  50.8*  --   --  53.7*  --    PLATELETCT 107*  --   --  116*  --   --  135*  --    MPV 12.2  --   --  11.6  --   --  11.6  --     < > = values in this interval not displayed.     Recent Labs     11/23/24 0423 11/24/24 0304 11/25/24  0430   SODIUM 135 137 135   POTASSIUM 4.0 3.8 4.2   CHLORIDE 108 110 105   CO2 19* 19* 21   GLUCOSE 117* 120* 113*   BUN 37* 28* 27*   CREATININE 1.37 1.06 1.05   CALCIUM 7.9* 7.3* 8.1*     Recent Labs     11/23/24 0423 11/24/24  0304 11/25/24  0430   INR 3.27* 2.54* 1.72*               Imaging  US-CAROTID DOPPLER BILAT   Final Result      CT-ABDOMEN-PELVIS W/O   Final Result      1.  Large right-sided retroperitoneal hemorrhage involving the right psoas and iliopsoas muscles.   2.  Small right effusion.   3.  Moderate-sized pericardial effusion.   4.  Persistent bilateral nephrograms suggesting renal dysfunction.   5.  Bilateral renal atrophy.   6.  Atherosclerosis.      EC-ECHOCARDIOGRAM LTD W/O CONT   Final Result      DX-FEMUR-2+ RIGHT   Final Result      No acute osseous abnormality.      CT-CTA CHEST PULMONARY ARTERY W/ RECONS   Final Result      1.  No evidence of pulmonary embolism.   2.  Small right-sided pleural effusion with associated compressive atelectasis and or consolidation. Underlying infection is possible.   3.  Perirenal fat stranding, uncertain etiology and significance.   4.  Small pericardial effusion.   5.  Ascending thoracic aortic ectasia  measuring 4.1 cm.   6.  Atherosclerosis with coronary artery disease.            CT-HEAD W/O   Final Result      1.  No evidence of acute territorial infarct, intracranial hemorrhage or mass lesion.   2.  Mild diffuse cerebral substance loss.   3.  Mild microangiopathic ischemic change versus demyelination or gliosis.               CT-CSPINE WITHOUT PLUS RECONS   Final Result      Degenerative changes of the cervical spine without acute fracture or malalignment.      DX-CHEST-LIMITED (1 VIEW)   Final Result         1.  No acute cardiopulmonary disease.   2.  Atherosclerosis      DX-CHEST-PORTABLE (1 VIEW)   Final Result         1.  No acute cardiopulmonary disease.   2.  Cardiomegaly   3.  Atherosclerosis           Assessment/Plan  Problem Representation:    * Syncope- (present on admission)  Assessment & Plan  -Patient has recently had multiple syncopal events.  Currently multifactorial due to to anemia, bradycardia, hypotension  -Cardiology following  -ECHO yesterday showed stable EF 45-50%, Apical Akinesis, known LV thrombus  -Carotid Duplex unremarkable  -Fall Precautions  Bilateral DEBI hose  Trial Florinef, monitor orthostatics    Circulating anticoagulants (HCC)- (present on admission)  Assessment & Plan  -on Warfarin for LV thrombus, currently on hold for anemia  -resume warfarin as able  -monitor for bleeding    Laceration of right eyebrow- (present on admission)  Assessment & Plan  -s/p suturing  -wound care    Groin pain, right  Assessment & Plan  -CT showing retroperitoneal bleed due to procedure access while on AC  -multimodal pain management    Acute blood loss anemia- (present on admission)  Assessment & Plan  Secondary to large retroperitoneal hematoma  Monitor closely for additional transfusion needs  -Transfused additional unit of PRBC 11/25    Acute renal failure (ARF) (HCC)- (present on admission)  Assessment & Plan  -Secondary to ATN  -resolved    Coronary artery disease- (present on  admission)  Assessment & Plan  -Plavix and statin - no ASA while on Warfarin    Pre-syncope  Assessment & Plan  Patient was recently discharge from hospital after normal workup up for presyncope on 11/19  When he went home he reports feeling tired, fatigue, and feel like he is going to faint  Denies chest pain   No LOC  No Fall   Most recent CT head with no acute pathology   His pacemaker was recently interrogated with no no issue   Looks slightly dehydrated on exam     Check orthostatics (modified with just laying and sitting until cleared by PT/OT)  Judicious IV fluids use in given hx of CHF  Will obtain PT/OT  SNF evaluation       Cardiac arrest (HCC)- (present on admission)  Assessment & Plan  -After syncopal event and head trauma the morning of 11/21 -potentially not a true loss of pulses s/p CPR x 90 seconds    Closed head injury- (present on admission)  Assessment & Plan  -Initial fall with eyebrow laceration status postrepair and head CT in the ED 11/20  -Repeat fall with recurrent right eyebrow laceration 11/21  -Repeat head CT, CT C-spine without acute findings  -Fall precautions  -PT/OT    Hypothyroidism- (present on admission)  Assessment & Plan  -home Levothyroxine    LV (left ventricular) mural thrombus- (present on admission)  Assessment & Plan  -known. Previuosly on Warfarin, holding for anemia  -repeat ECHO this admission shows decrease in size from prior ECHO  -resume AC when clinically able    Chronic systolic heart failure (HCC)- (present on admission)  Assessment & Plan  -Without acute decompensation, EF 45% from echo 11/11  -Holding GDMT in the setting of hypotension  -daily weights  -I&O  -Cardiology follow up           VTE prophylaxis: SCDs/TEDs    I have performed a physical exam and reviewed and updated ROS and Plan today (11/25/2024). In review of yesterday's note (11/24/2024), there are no changes except as documented above.

## 2024-11-25 NOTE — PROGRESS NOTES
Hospital Medicine Daily Progress Note    Date of Service  11/24/2024    Chief Complaint  Dilan Calderón is a 84 y.o. 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  The patient was initially admitted to the hospitalist service, on 11/21 the patient sustained another syncopal event, resulting in repeat head strike with fall on the floor and bleeding from his right eyebrow stitches, the patient was transferred to the ICU level of care, he became unresponsive with agonal breathing at some point with loss of pulses, at that time he received about 90 seconds of CPR before becoming responsive, reportedly in ICU had another unresponsive episode with gurgling breath sounds and bradycardia in the 50s with associated hypotension, the patient had an unremarkable head CT, the patient had blood loss anemia and received 3 units of PRBCs during hospitalization, CT of the abdomen showed a large right-sided retroperitoneal hemorrhage involving the right psoas, hip and iliopsoas muscle, cardiology was consulted to interrogate and adjust the patient's Micra pacemaker which apparently was not capturing adequately, his rate was increased to 80, the patient was continued to be monitored, had been found to have hypotension/orthostatic hypotension, and appears to be  correlating with the patient's ventricular paced rhythm at those times, a PPM was then again interrogated and sensing adjusted.  During this admission the patient received 5 packed red cell transfusions as well as 1 FFP transfusion  On 11/24 the patient felt overall improved to transfer out of the ICU level of care, the patient is afebrile, heart rate in the 70s to 1 teens, sinus tach, blood pressure 120s to 140s, patient is on room air, he is overall nonfocal, the patient complains of significant right leg pain still, he is currently constipated and receiving self-management,  The patient's anticoagulation up to INR of 2.5, goal is 2-3,  The patient currently denies dizziness, he continues to have pain in the right lower extremity  Echocardiogram found ejection fraction of 45-50 akinesis, left ventricular thrombus is still noted    Interval Problem Update  Patient seen and examined today.  Data, Medication data reviewed.  Case discussed with nursing as available.  Plan of Care reviewed with patient and notified of changes.  11/24 the patient with right lower extremity pain, currently no lightheadedness, dizziness, no further syncopal episodes,  The patient might be suffering from orthostatic hypotension, cardiology felt that the patient's syncopal episodes are primarily related to anemia and acute blood loss, patient was evaluated by EP cardiology and pacemaker had been adjusted.    I have discussed this patient's plan of care and discharge plan at IDT rounds today with Case Management, Nursing, Nursing leadership, and other members of the IDT team.    Consultants/Specialty  cardiology and critical care    Code Status  Full Code    Disposition  The patient is not medically cleared for discharge to home or a post-acute facility.  Anticipate discharge to: home with close outpatient follow-up    I have placed the appropriate orders for post-discharge needs.    Review of Systems  Review of Systems   Constitutional:   Positive for malaise/fatigue. Negative for chills and fever.   HENT: Negative.     Eyes: Negative.    Respiratory:  Positive for shortness of breath.    Cardiovascular:  Positive for leg swelling.   Gastrointestinal: Negative.    Musculoskeletal:  Positive for joint pain and myalgias.   Skin: Negative.    Neurological:  Positive for loss of consciousness.   Endo/Heme/Allergies: Negative.    Psychiatric/Behavioral:  The patient is nervous/anxious.    All other systems reviewed and are negative.       Physical Exam  Temp:  [36.2 °C (97.1 °F)-36.9 °C (98.5 °F)] 36.2 °C (97.1 °F)  Pulse:  [] 98  Resp:  [15-40] 24  BP: (114-147)/(49-92) 141/65  SpO2:  [88 %-94 %] 92 %    Physical Exam  Vitals and nursing note reviewed.   Constitutional:       Appearance: He is well-developed. He is ill-appearing.      Comments: Pt seen and examined.   HENT:      Head: Normocephalic and atraumatic.      Comments: Right facial hematoma, suture  Eyes:      Pupils: Pupils are equal, round, and reactive to light.   Cardiovascular:      Rate and Rhythm: Normal rate and regular rhythm.      Heart sounds: Normal heart sounds.   Pulmonary:      Effort: Pulmonary effort is normal.      Breath sounds: Rhonchi present.   Abdominal:      General: Bowel sounds are normal.      Palpations: Abdomen is soft.   Musculoskeletal:         General: Swelling present. Normal range of motion.      Cervical back: Normal range of motion and neck supple.   Skin:     General: Skin is warm and dry.      Coloration: Skin is pale.      Findings: Bruising present.   Neurological:      General: No focal deficit present.      Mental Status: He is alert and oriented to person, place, and time.   Psychiatric:         Behavior: Behavior normal.         Fluids    Intake/Output Summary (Last 24 hours) at 11/24/2024 1617  Last data filed at 11/24/2024 1200  Gross per 24 hour   Intake 650 ml   Output 455 ml   Net 195 ml        Laboratory  Recent Labs     11/22/24  2783  11/22/24  1305 11/23/24  0423 11/23/24  0558 11/23/24  1547 11/24/24  0304 11/24/24  0940   WBC 14.4*  --  13.5*  --   --  12.3*  --    RBC 2.45*  --  2.52*  --   --  2.68*  --    HEMOGLOBIN 7.7*   < > 7.9* 7.6* 7.5* 8.3* 8.5*   HEMATOCRIT 22.2*   < > 22.8* 23.1*  --  24.1*  --    MCV 90.6  --  90.5  --   --  89.9  --    MCH 31.4  --  31.3  --   --  31.0  --    MCHC 34.7  --  34.6  --   --  34.4  --    RDW 54.7*  --  55.5*  --   --  50.8*  --    PLATELETCT 105*  --  107*  --   --  116*  --    MPV 11.7  --  12.2  --   --  11.6  --     < > = values in this interval not displayed.     Recent Labs     11/22/24  0519 11/23/24  0423 11/24/24  0304   SODIUM 137 135 137   POTASSIUM 4.5 4.0 3.8   CHLORIDE 109 108 110   CO2 19* 19* 19*   GLUCOSE 130* 117* 120*   BUN 39* 37* 28*   CREATININE 1.58* 1.37 1.06   CALCIUM 7.6* 7.9* 7.3*     Recent Labs     11/22/24  0519 11/23/24  0423 11/24/24  0304   INR 3.51* 3.27* 2.54*               Imaging  US-CAROTID DOPPLER BILAT   Final Result      CT-ABDOMEN-PELVIS W/O   Final Result      1.  Large right-sided retroperitoneal hemorrhage involving the right psoas and iliopsoas muscles.   2.  Small right effusion.   3.  Moderate-sized pericardial effusion.   4.  Persistent bilateral nephrograms suggesting renal dysfunction.   5.  Bilateral renal atrophy.   6.  Atherosclerosis.      EC-ECHOCARDIOGRAM LTD W/O CONT   Final Result      DX-FEMUR-2+ RIGHT   Final Result      No acute osseous abnormality.      CT-CTA CHEST PULMONARY ARTERY W/ RECONS   Final Result      1.  No evidence of pulmonary embolism.   2.  Small right-sided pleural effusion with associated compressive atelectasis and or consolidation. Underlying infection is possible.   3.  Perirenal fat stranding, uncertain etiology and significance.   4.  Small pericardial effusion.   5.  Ascending thoracic aortic ectasia measuring 4.1 cm.   6.  Atherosclerosis with coronary artery disease.            CT-HEAD W/O   Final Result      1.   No evidence of acute territorial infarct, intracranial hemorrhage or mass lesion.   2.  Mild diffuse cerebral substance loss.   3.  Mild microangiopathic ischemic change versus demyelination or gliosis.               CT-CSPINE WITHOUT PLUS RECONS   Final Result      Degenerative changes of the cervical spine without acute fracture or malalignment.      DX-CHEST-LIMITED (1 VIEW)   Final Result         1.  No acute cardiopulmonary disease.   2.  Atherosclerosis      DX-CHEST-PORTABLE (1 VIEW)   Final Result         1.  No acute cardiopulmonary disease.   2.  Cardiomegaly   3.  Atherosclerosis           Assessment/Plan  * Syncope- (present on admission)  Assessment & Plan  -Patient has recently had multiple syncopal events.  Currently multifactorial due to to anemia, bradycardia, hypotension  -Cardiology following  -ECHO yesterday showed stable EF 45-50%, Apical Akinesis, known LV thrombus  -Carotid Duplex unremarkable  -Fall Precautions  Bilateral DEBI hose  Trial Florinef, monitor orthostatics    Acute blood loss anemia- (present on admission)  Assessment & Plan  Secondary to large retroperitoneal hematoma  Monitor closely for additional transfusion needs    LV (left ventricular) mural thrombus- (present on admission)  Assessment & Plan  -known. Previuosly on Warfarin, holding for anemia  -repeat ECHO this admission shows decrease in size from prior ECHO  -resume AC when clinically able    Chronic systolic heart failure (HCC)- (present on admission)  Assessment & Plan  -Without acute decompensation, EF 45% from echo 11/11  -Holding GDMT in the setting of hypotension  -daily weights  -I&O  -Cardiology follow up      Circulating anticoagulants (HCC)- (present on admission)  Assessment & Plan  -on Warfarin for LV thrombus, currently on hold for anemia  -resume warfarin as able  -monitor for bleeding    Laceration of right eyebrow- (present on admission)  Assessment & Plan  -s/p suturing  -wound care    Groin pain,  right  Assessment & Plan  -CT showing retroperitoneal bleed due to procedure access while on AC  -multimodal pain management    Acute renal failure (ARF) (HCC)- (present on admission)  Assessment & Plan  -Secondary to ATN  -resolved    Coronary artery disease- (present on admission)  Assessment & Plan  -Plavix and statin - no ASA while on Warfarin    Pre-syncope  Assessment & Plan  Patient was recently discharge from hospital after normal workup up for presyncope on 11/19  When he went home he reports feeling tired, fatigue, and feel like he is going to faint  Denies chest pain   No LOC  No Fall   Most recent CT head with no acute pathology   His pacemaker was recently interrogated with no no issue   Looks slightly dehydrated on exam     Check orthostatics  Judicious IV fluids use in given hx of CHF  Will obtain PT/OT  SNF evaluation       Cardiac arrest (HCC)- (present on admission)  Assessment & Plan  -After syncopal event and head trauma the morning of 11/21 -potentially not a true loss of pulses s/p CPR x 90 seconds    Closed head injury- (present on admission)  Assessment & Plan  -Initial fall with eyebrow laceration status postrepair and head CT in the ED 11/20  -Repeat fall with recurrent right eyebrow laceration 11/21  -Repeat head CT, CT C-spine without acute findings  -Fall precautions  -PT/OT    Hypothyroidism- (present on admission)  Assessment & Plan  -home Levothyroxine    Plan  11/24  The patient able to transfer out of the ICU level of care  Continue close hemodynamic monitoring, orthostatics  Monitor anticoagulation status and anemia levels  Restarting anticoagulation once patient further stabilizes, no further transfusions needed  Close telemonitoring  See orders  Patient is has a high medical complexity, complex decision making and is at high risk for complication, morbidity, and mortality.  I spent 52 minutes, reviewing the chart, obtaining and/or reviewing separately obtained history. Performing  a medically appropriate examination and evaluation.  Counseling and educating the patient. Ordering and reviewing medications, tests, or procedures.   Documenting clinical information in EPIC. Independently interpreting results and communicating results to patient. Discussing future disposition of care with patient, RN and case management.        VTE prophylaxis:    therapeutic anticoagulation with coumadin dosing per pharmacy, adjust PRN      I have performed a physical exam and reviewed and updated ROS and Plan today (11/24/2024). In review of yesterday's note (11/23/2024), there are no changes except as documented above.      Please note that this dictation was created using voice recognition software. I have made every reasonable attempt to correct obvious errors, but I expect that there are errors of grammar and possibly context that I did not discover before finalizing the note.

## 2024-11-25 NOTE — CARE PLAN
The patient is Stable - Low risk of patient condition declining or worsening    Shift Goals  Clinical Goals: Monitor HR, VS, safety, BM  Patient Goals: Rest, feel better  Family Goals: DHARA        Problem: Pain - Standard  Goal: Alleviation of pain or a reduction in pain to the patient’s comfort goal  Description: Target End Date:  Prior to discharge or change in level of care    Document on Vitals flowsheet    1.  Document pain using the appropriate pain scale per order or unit policy  2.  Educate and implement non-pharmacologic comfort measures (i.e. relaxation, distraction, massage, cold/heat therapy, etc.)  3.  Pain management medications as ordered  4.  Reassess pain after pain med administration per policy  5.  If opiods administered assess patient's response to pain medication is appropriate per POSS sedation scale  6.  Follow pain management plan developed in collaboration with patient and interdisciplinary team (including palliative care or pain specialists if applicable)  Outcome: Progressing     Problem: Knowledge Deficit - Standard  Goal: Patient and family/care givers will demonstrate understanding of plan of care, disease process/condition, diagnostic tests and medications  Description: Target End Date:  1-3 days or as soon as patient condition allows    Document in Patient Education    1.  Patient and family/caregiver oriented to unit, equipment, visitation policy and means for communicating concern  2.  Complete/review Learning Assessment  3.  Assess knowledge level of disease process/condition, treatment plan, diagnostic tests and medications  4.  Explain disease process/condition, treatment plan, diagnostic tests and medications  Outcome: Progressing     Problem: Fall Risk  Goal: Patient will remain free from falls  Description: Target End Date:  Prior to discharge or change in level of care    Document interventions on the [employer] Fall Risk Assessment    1.  Assess for fall risk factors  2.   Implement fall precautions  Outcome: Progressing     Problem: Safety  Goal: Will remain free from injury  Outcome: Progressing     Problem: Safety  Goal: Will remain free from falls  Outcome: Progressing     Problem: Pain Management  Goal: Pain level will decrease to patient's comfort goal  Outcome: Progressing     Problem: Infection  Goal: Will remain free from infection  Outcome: Progressing     Problem: Venous Thromboembolism (VTW)/Deep Vein Thrombosis (DVT) Prevention:  Goal: Patient will participate in Venous Thrombosis (VTE)/Deep Vein Thrombosis (DVT)Prevention Measures  Outcome: Progressing     Problem: Fluid Volume:  Goal: Will maintain balanced intake and output  Outcome: Progressing     Problem: Respiratory:  Goal: Respiratory status will improve  Outcome: Progressing     Problem: Bowel/Gastric:  Goal: Normal bowel function is maintained or improved  Outcome: Progressing     Problem: Skin Integrity  Goal: Skin integrity is maintained or improved  Description: Target End Date:  Prior to discharge or change in level of care    Document interventions on Skin Risk/Robinson flowsheet groups and corresponding LDA    1.  Assess and monitor skin integrity, appearance and/or temperature  2.  Assess risk factors for impaired skin integrity and/or pressures ulcers  3.  Implement precautions to protect skin integrity in collaboration with interdisciplinary team  4.  Implement pressure ulcer prevention protocol if at risk for skin breakdown  5.  Confirm wound care consult if at risk for skin breakdown  6.  Ensure patient use of pressure relieving devices  (Low air loss bed, waffle overlay, heel protectors, ROHO cushion, etc)  Outcome: Progressing     Problem: Discharge Barriers/Planning  Goal: Patient's continuum of care needs will be met  Outcome: Progressing

## 2024-11-25 NOTE — DISCHARGE PLANNING
Care Transition Team Assessment    RN KELLEE met with patient at bedside for assessment. He verified information on facesheet. Patient lives alone in a [facility] in [place] and was independent with ADL's and drives self. He uses no DME at baseline. He had a recent admit and was discharged home 11/21.  He has never been to SNF or had HH. He has a brother in [place] but they are not close. He has no other local family. He would add his neighbor, eLno, as a contact but he does not have his number because patient does not have a cell phone, only home phone.He was set up with a PCP to establish after last admit but was unable to make his appointment due to readmission, he is unsure of who it was with. He uses [facility] on [address]. He makes at least 6 figures a year from [employer] and [employer] and denies any financial issues. He denies MD or SA.    Information Source  Orientation Level: Oriented X4  Information Given By: Patient  Who is responsible for making decisions for patient? : Patient    Readmission Evaluation  Is this a readmission?: Yes - unplanned readmission    Elopement Risk  Legal Hold: No  Ambulatory or Self Mobile in Wheelchair: Yes  Disoriented: No  Psychiatric Symptoms: None  History of Wandering: No  Elopement this Admit: No  Vocalizing Wanting to Leave: No  Displays Behaviors, Body Language Wanting to Leave: No-Not at Risk for Elopement  Elopement Risk: Not at Risk for Elopement    Interdisciplinary Discharge Planning  Lives with - Patient's Self Care Capacity: Alone and Able to Care For Self  Patient or legal guardian wants to designate a caregiver: No  Support Systems: None  Housing / Facility: 1 Story House  Mobility Issues: No  Prior Services: None  Patient Prefers to be Discharged to:: home    Discharge Preparedness  What is your plan after discharge?: Uncertain - pending medical team collaboration  What are your discharge supports?: Other (comment) (none)  Prior Functional Level: Ambulatory, Drives Self,  Independent with Activities of Daily Living    Functional Assesment  Prior Functional Level: Ambulatory, Drives Self, Independent with Activities of Daily Living    Finances  Financial Barriers to Discharge: No  Prescription Coverage: Yes    Vision / Hearing Impairment  Right Eye Vision: Impaired, Wears Glasses  Left Eye Vision: Impaired, Wears Glasses     Advance Directive  Advance Directive?: None    Domestic Abuse  Have you ever been the victim of abuse or violence?: No  Possible Abuse/Neglect Reported to:: Not Applicable    Psychological Assessment  History of Substance Abuse: None  History of Psychiatric Problems: No    Discharge Risks or Barriers  Discharge risks or barriers?: No PCP, Lives alone, no community support  Patient risk factors: Lack of outside supports, No PCP, Readmission    Anticipated Discharge Information  Discharge Disposition: D/T to SNF with Medicare cert in anticipation of skilled care (03)

## 2024-11-25 NOTE — ASSESSMENT & PLAN NOTE
-Patient has recently had multiple syncopal events.  Currently multifactorial due to to anemia, bradycardia, hypotension  -Cardiology following  -ECHO yesterday showed stable EF 45-50%, Apical Akinesis, known LV thrombus  -Carotid Duplex unremarkable  -Fall Precautions  Bilateral DEBI hose  Trial Florinef, monitor orthostatics

## 2024-11-25 NOTE — CARE PLAN
The patient is Stable - Low risk of patient condition declining or worsening    Shift Goals  Clinical Goals: monitor VS, safety, BM  Patient Goals: rest, sleep  Family Goals: DHARA    Progress made toward(s) clinical / shift goals:    Problem: Hemodynamics  Goal: Patient's hemodynamics, fluid balance and neurologic status will be stable or improve  Description: Target End Date:  Prior to discharge or change in level of care    Document on Assessment and I/O flowsheet templates    1.  Monitor vital signs, pulse oximetry and cardiac monitor per provider order and/or policy  2.  Maintain blood pressure per provider order  3.  Hemodynamic monitoring per provider order  4.  Manage IV fluids and IV infusions  5.  Monitor intake and output  6.  Daily weights per unit policy or provider order  7.  Assess peripheral pulses and capillary refill  8.  Assess color and body temperature  9.  Position patient for maximum circulation/cardiac output  10. Monitor for signs/symptoms of excessive bleeding  11. Assess mental status, restlessness and changes in level of consciousness  12. Monitor temperature and report fever or hypothermia to provider immediately. Consideration of targeted temperature management.  Outcome: Progressing  Note: Pt was hypotensive during day and had received fluid bolus. During the night, pt was hemodynamically stable, HR was noted to be between 65- 92 bpm.        Patient is not progressing towards the following goals:    Problem: Bowel/Gastric:  Goal: Normal bowel function is maintained or improved  Outcome: Not Progressing  Note: Last BM was 7-10 days ago. Pt received senna and milk of mag yesterday, and miralax 2 days ago and yesterday evening. Pt still had no BM. Bowel sounds hypoactive, abdomen slightly distended, pt reports no discomfort.

## 2024-11-25 NOTE — PROGRESS NOTES
4 Eyes Skin Assessment Completed by ALBERT Abraham and ALBERT Salvador.    Head Scab, Bruising, and Redness  Ears WDL  Nose WDL  Mouth WDL  Neck WDL  Breast/Chest Bruising  Shoulder Blades WDL  Spine WDL  (R) Arm/Elbow/Hand Redness, Bruising, and Scab  (L) Arm/Elbow/Hand Redness, Bruising, and Scab  Abdomen Bruising  Groin WDL  Scrotum/Coccyx/Buttocks Redness, swelling  (R) Leg Redness, Bruising, and Abrasion  (L) Leg Bruising  (R) Heel/Foot/Toe heel: Redness and Blanching  (L) Heel/Foot/Toe heel: Redness, Blanching, and Ulcer(s) on left later foot          Devices In Places Tele Box and Pulse Ox      Interventions In Place Heel Mepilex, Sacral Mepilex, and Heel Float Boots    Possible Skin Injury Yes    Pictures Uploaded Into Epic Yes  Wound Consult Placed Yes  RN Wound Prevention Protocol Ordered Yes                                                   7

## 2024-11-25 NOTE — PROGRESS NOTES
Cardiology Follow-up Note    Name:   Dilan Calderón     YOB: 1940  Age:   84 y.o.  male   MRN:   [id number]        Attending Provider: Dr Lincoln Rodriguez Jr., D.O.     Chief Complaint: Wound Check, Fatigue (Reports bleeding from stitches on right side eyebrow upon waking up at 3am today, stitches from GLF 2 weeks ago. Patient states feeling fatigue for 2 days. Here recently with same. Hx of GLF, and had stents 2 weeks ago. On blood thinners), and Post-Op Pain (Intermittent pain since surgery (implant) 2 weeks ago)       Reason for cardiology consult: Syncope    Outpatient Cardiologist: None    History of Present Illness  Dilan Calderón is 84 y.o. male with prior medical history significant for recent NSTEMI s/p PCI to left circumflex on 11/12/2024, CAD / MI s/p PCI DOUGIE p-mLAD (05/2024), LV apical thrombus, second-degree AV block, s/p Micra pacemaker, hypertension, dyslipidemia, ICM, HFrEF, GIB requiring transfusions (March 2024), CKD stage III  who was admitted on 11/20/2024 with near syncope and generalized weakness at home.  During the admission he had several episodes of syncope, hypotension and a brief CODE BLUE with brief CPR.  Additionally, found to have significant anemia that required several blood transfusions.   CT scanning of abdomen and pelvis on 11/21/2024 revealed a large right-sided retroperitoneal hemorrhage involving the right psoas and iliopsoas muscles.    Interim Events:  11/25/2024: No overnight cardiac events. Tele monitoring personally interpreted by me shows SR with PVCs. VSS; RA; Daily weight 78.9 kg bedscale; - 930 ml UOP documented. Labs reviewed, notable for Hgb 8.0, Hct 21.8. Groin site with bruising, no swelling or erythema. BP improved, but remains anemic and weak.   Disposition: Resume GDMT in OP follow-up once anemia resolves.  Cardiology will sign off    Future Appointments   Date Time Provider Department Center   11/25/2024  1:00 PM Deja Torres M.D. [facility]  "[facility] [place]   11/27/2024  2:45 PM [facility] EXAM 4 [facility] None   12/3/2024  3:45 PM CAROLINE Gonzalez None   1/3/2025 10:30 AM GURJIT Pineda None       Review of Systems   Constitutional:  Negative for chills and fever.   Respiratory:  Negative for shortness of breath.    Cardiovascular:  Negative for chest pain, palpitations and leg swelling.   Gastrointestinal:  Negative for abdominal pain.   Musculoskeletal:         Right upper leg pain   Neurological:  Positive for weakness. Negative for dizziness and headaches.        Medical History  Past Medical History:   Diagnosis Date    NSTEMI (non-ST elevated myocardial infarction) (HCC)          No family history on file.      Social History     Tobacco Use    Smoking status: Former     Types: Cigarettes    Smokeless tobacco: Never   Vaping Use    Vaping status: Never Used   Substance Use Topics    Alcohol use: Not Currently    Drug use: Never         No Known Allergies      Medications   Scheduled Medications   Medication Dose Frequency    fludrocortisone  0.1 mg QAM    senna-docusate  2 Tablet Q EVENING    lidocaine  1 Patch DAILY    atorvastatin  80 mg Q EVENING    clopidogrel  75 mg DAILY    levothyroxine  50 mcg AM ES    [Held by provider] MD Alert...Warfarin per Pharmacy   PHARMACY TO DOSE           Physical Exam    Body mass index is 27.24 kg/m².     /69   Pulse 90   Temp 36.6 °C (97.9 °F) (Temporal)   Resp 18   Ht 1.702 m (5' 7.01\")   Wt 78.9 kg (173 lb 15.1 oz)   SpO2 93%      Vitals:    11/24/24 2000 11/24/24 2334 11/25/24 0400 11/25/24 0714   BP: (!) 156/75 113/69 137/68 127/69   Pulse: 94 92 77 90   Resp: 18 18 18 18   Temp: 36.6 °C (97.9 °F) 36.6 °C (97.9 °F) 36.4 °C (97.5 °F) 36.6 °C (97.9 °F)   TempSrc: Temporal Temporal Temporal Temporal   SpO2: 95% 97% 94% 93%   Weight:   78.9 kg (173 lb 15.1 oz)    Height:   1.702 m (5' 7.01\")         Oxygen Therapy:  Pulse Oximetry: 93 %, O2 (LPM): 0, O2 Delivery Device: None - " "Room Air      Physical Exam  Constitutional:       Appearance: He is ill-appearing.   HENT:      Mouth/Throat:      Mouth: Mucous membranes are dry.   Cardiovascular:      Rate and Rhythm: Normal rate and regular rhythm.      Pulses: Normal pulses.      Heart sounds: Normal heart sounds. No murmur heard.     Comments: Sinus rhythm in the 90s/paced rhythm in the 80s  Pulmonary:      Effort: Pulmonary effort is normal.   Abdominal:      General: There is no distension.      Palpations: Abdomen is soft.   Musculoskeletal:      Right lower leg: No edema.      Left lower leg: No edema.   Skin:     General: Skin is warm and dry.      Coloration: Skin is jaundiced and pale.      Comments: Bruising at right femoral site post Micra placement   Neurological:      General: No focal deficit present.      Mental Status: He is alert and oriented to person, place, and time.   Psychiatric:         Mood and Affect: Mood normal.               Labs (personally reviewed):     Estimated Creatinine Clearance: 50.7 mL/min (by C-G formula based on SCr of 1.05 mg/dL).    No results found for: \"BNPBTYPENAT\"  Recent Labs     11/23/24 0423 11/24/24  0304 11/25/24  0430   CREATININE 1.37 1.06 1.05   BUN 37* 28* 27*   POTASSIUM 4.0 3.8 4.2   SODIUM 135 137 135   CALCIUM 7.9* 7.3* 8.1*   MAGNESIUM 2.0 1.9 2.3   CO2 19* 19* 21   ALBUMIN 3.0* 2.8* 2.9*     Recent Labs     11/23/24 0423 11/24/24  0304 11/25/24  0430   GLUCOSE 117* 120* 113*     Recent Labs     11/23/24 0423 11/24/24  0304 11/25/24  0430   ASTSGOT 86* 92* 68*   ALTSGPT 47 40 39   ALKPHOSPHAT 84 84 102*   INR 3.27* 2.54* 1.72*     Recent Labs     11/23/24 0423 11/23/24  0558 11/24/24  0304 11/24/24  0940 11/24/24  2100 11/25/24  0430 11/25/24  0903   WBC 13.5*  --  12.3*  --   --  10.8  --    HEMOGLOBIN 7.9*   < > 8.3*   < > 8.0* 7.3* 8.0*   PLATELETCT 107*  --  116*  --   --  135*  --     < > = values in this interval not displayed.     Recent Labs     11/25/24  0430   NTPROBNP " 4743*     Lab Results   Component Value Date/Time    CHOLSTRLTOT 197 11/11/2024 04:20 AM     (H) 11/11/2024 04:20 AM    HDL 62 11/11/2024 04:20 AM    TRIGLYCERIDE 88 11/11/2024 04:20 AM       Imaging:  US-CAROTID DOPPLER BILAT   Final Result      CT-ABDOMEN-PELVIS W/O   Final Result      1.  Large right-sided retroperitoneal hemorrhage involving the right psoas and iliopsoas muscles.   2.  Small right effusion.   3.  Moderate-sized pericardial effusion.   4.  Persistent bilateral nephrograms suggesting renal dysfunction.   5.  Bilateral renal atrophy.   6.  Atherosclerosis.      EC-ECHOCARDIOGRAM LTD W/O CONT   Final Result      DX-FEMUR-2+ RIGHT   Final Result      No acute osseous abnormality.      CT-CTA CHEST PULMONARY ARTERY W/ RECONS   Final Result      1.  No evidence of pulmonary embolism.   2.  Small right-sided pleural effusion with associated compressive atelectasis and or consolidation. Underlying infection is possible.   3.  Perirenal fat stranding, uncertain etiology and significance.   4.  Small pericardial effusion.   5.  Ascending thoracic aortic ectasia measuring 4.1 cm.   6.  Atherosclerosis with coronary artery disease.            CT-HEAD W/O   Final Result      1.  No evidence of acute territorial infarct, intracranial hemorrhage or mass lesion.   2.  Mild diffuse cerebral substance loss.   3.  Mild microangiopathic ischemic change versus demyelination or gliosis.               CT-CSPINE WITHOUT PLUS RECONS   Final Result      Degenerative changes of the cervical spine without acute fracture or malalignment.      DX-CHEST-LIMITED (1 VIEW)   Final Result         1.  No acute cardiopulmonary disease.   2.  Atherosclerosis      DX-CHEST-PORTABLE (1 VIEW)   Final Result         1.  No acute cardiopulmonary disease.   2.  Cardiomegaly   3.  Atherosclerosis          Cardiac Imaging and Procedures Review      Echocardiogram 5/4/2024  Moderately reduced left ventricular systolic function.  The  left ventricular ejection fraction is visually estimated to be 35-  40%.  Hypokinesis anteroapical hypokinesis.  The right ventricle is normal in size and systolic function.  No prior study is available for comparison.      Echocardiogram 11/11/2024  Compared to the prior study on 5/4/2024: There is interval development   of LV apical thrombus  The ascending aorta diameter is 3.8 cm.  Mildly reduced LV systolic function, estimated LVEF 40-45% with apical   akinesis, suggestive of underlying ischemic cardiomyopathy  There is an immobile 1.1 x 1.2 cm LV apical thrombus  Normal RV size and systolic function  No significant valvular abnormalities  Aortic valve sclerosis without stenosis  Trivial pericardial effusion  Normal IVC size  Dr. Stuart notified via [facility] on 11/11/24 at 11:47 AM    Echocardiography Laboratory 11/21/2024  Prior study on 11/11/24, compared to the images of the prior study, the   apical thrombus is still present, however, less is less pronounced.  Mildly reduced left ventricular systolic function.  The left ventricular ejection fraction is visually estimated to be 45-  50%.  Apical akinesis.  Left ventricular thrombus noted.        Cardiac Catheterization 5/2/2024 by Dr. Johns :  IMPRESSION:  1.  Occluded proximal LAD (heavily calcified vessel) status post successful IVUS guided intravascular lithotripsy and PCI deploying overlapping Synergy 3 x 28 mm and 3 x 32 mm DOUGIE, postdilated to ~ 3.5-3.7 mm.  2.  Ischemic cardiomyopathy with estimated LVEF 45% with anterior wall akinesis  3.  Significant elevated resting LVEDP 25 mmHg with no significant transaortic gradient on pullback     RECOMMENDATIONS:  Dual antiplatelet therapy for at least 12 months  Weigh risk versus benefits of further PCI to his remaining severe disease in the circumflex and RCA, especially given recent GI bleed with no clear identifiable source necessitating blood transfusions.  HI statin / PCSK9-I: Target LDL < 55 and TG <  150  TTE with echo enhancing agent  GDMT and lifestyle modifications for secondary ASCVD prevention  Cardiac Rehab referral     Cardiac catheterization 11/12/2024 by Dr. Gould:  IMPRESSION:  Widely patent left anterior descending coronary artery stents.  Distal right coronary now chronically occluded.  Successful percutaneous coronary intervention to the proximal left circumflex coronary artery with one 3.0 x 24 mm Synergy drug-eluting stent postdilated to 3.5 mm.     RECOMMENDATIONS:  Return to floor with continued care per primary service.  TR band release per protocol.  Restart heparin drip per protocol in 2 hours if access site no bleeding complications.  Strict adherence with dual antiplatelet therapy for at least 3 months if tolerated.  Optimal medical management of residual coronary artery disease with consideration for further intervention depending on patient's symptoms and medical compliance.     Micra PPM placement by  on 11/12/2024:  IMPLANTED DEVICE INFORMATION:  Right ventricular pacemaker is a Medtronic model # KB4OFS3 , serial # [id number] ,R wave 5.1 millivolts, threshold 0.25 Volts, pacing impedance 840 Ohms.     DEVICE PROGRAMMING:  VDD      FLUOROSCOPY TIME: 4.1 min     IMPRESSIONS:  1. Successful Micra pacemaker implantation     RECOMMENDATIONS:  1. Bedrest for 6 hours after cath, can resume heparin gtt from my standpoint  2. Device interrogation prior to hospital discharge  3. Followup in device clinic    CT-abdomen-pelvis without 11/21/2024  IMPRESSION:  1.  Large right-sided retroperitoneal hemorrhage involving the right psoas and iliopsoas muscles.  2.  Small right effusion.  3.  Moderate-sized pericardial effusion.  4.  Persistent bilateral nephrograms suggesting renal dysfunction.  5.  Bilateral renal atrophy.  6.  Atherosclerosis.      Principal Problem:    Syncope (POA: Yes)  Active Problems:    Chronic systolic heart failure (HCC) (Chronic) (POA: Yes)    LV (left  ventricular) mural thrombus (Chronic) (POA: Yes)    Hypothyroidism (Chronic) (POA: Yes)    Closed head injury (POA: Yes)    Cardiac arrest (HCC) (POA: Yes)    Coronary artery disease (Chronic) (POA: Yes)    Acute renal failure (ARF) (HCC) (POA: Yes)    Acute blood loss anemia (POA: Yes)    Groin pain, right (POA: Unknown)    Laceration of right eyebrow (POA: Yes)    Circulating anticoagulants (HCC) (POA: Yes)  Resolved Problems:    * No resolved hospital problems. *      Assessment and Medical Decision Making:    #.  Syncope  #.  Anemia requiring blood transfusions  #.  Right-sided retroperitoneal hemorrhage  #.  Complete heart block, s/p Micra PPM in situ November 2024  #.  Coronary artery disease, S/P cardiac stents  #.  LV thrombus  #.  Hypothyroidism  #.  Hypertension  #.  Hyperlipidemia  #.  HF mildly reduced EF, EF 40-45%  #.  Ischemic cardiomyopathy  #.  CKD        Recommendations:  He was seen by EP cardiology on 11/22/2024 when his pacemaker was interrogated showing normal sensing and function.  His baseline rate was increased to 80 bpm and rate response therapy was turned on.  Dr Beckman with EP cardiology adjusted the PPM today 11/23/2024 -the setting is changed to VVI, heart rate lowered to 50 bpm and prior oversensing is adjusted.    -SR on tele. Low threshold to resume BB. Currently on florinef for orthostatic hypotension  -[place] optimization per primary team  -Resume coumadin when anemia resolved  -Continue clopidogrel 75 mg daily as tolerated d/t recent stent in prior stents in the spring 2024  -Close OP follow-up to resume GDMT once anemia stabilizes    Future Appointments   Date Time Provider Department Center   11/25/2024  1:00 PM Deja Torres M.D. [facility] [place]   11/27/2024  2:45 PM [facility] EXAM 4 [facility] None   12/3/2024  3:45 PM CAROLINE Gonzalez None   1/3/2025 10:30 AM GURJIT Pineda None         Please contact me with any questions. Discussed with   Joradn  Thank you for allowing us to participate in the care of Mr. Calderón.      Please see Dr. March's attestation for further details and MDM.     I personally spent a total of 15 minutes which includes face-to-face time and non-face-to-face time spent on preparing to see the patient, reviewing hospital notes and tests, obtaining history from the patient, performing a medically appropriate exam, counseling and educating the patient, ordering medications/tests/procedures/referrals as clinically indicated, and documenting information in the electronic medical record.    ISABEL Razo, HF-CERT   [facility]  [phone]

## 2024-11-26 LAB
ANION GAP SERPL CALC-SCNC: 10 MMOL/L (ref 7–16)
APTT PPP: 33.1 SEC (ref 24.7–36)
BUN SERPL-MCNC: 32 MG/DL (ref 8–22)
CALCIUM SERPL-MCNC: 7.7 MG/DL (ref 8.5–10.5)
CHLORIDE SERPL-SCNC: 104 MMOL/L (ref 96–112)
CO2 SERPL-SCNC: 22 MMOL/L (ref 20–33)
CREAT SERPL-MCNC: 1.05 MG/DL (ref 0.5–1.4)
ERYTHROCYTE [DISTWIDTH] IN BLOOD BY AUTOMATED COUNT: 54.9 FL (ref 35.9–50)
GFR SERPLBLD CREATININE-BSD FMLA CKD-EPI: 71 ML/MIN/1.73 M 2
GLUCOSE SERPL-MCNC: 101 MG/DL (ref 65–99)
HCT VFR BLD AUTO: 24.8 % (ref 42–52)
HGB BLD-MCNC: 8.3 G/DL (ref 14–18)
HGB BLD-MCNC: 8.8 G/DL (ref 14–18)
HGB BLD-MCNC: 8.8 G/DL (ref 14–18)
INR PPP: 1.38 (ref 0.87–1.13)
INR PPP: 1.47 (ref 0.87–1.13)
MCH RBC QN AUTO: 30.9 PG (ref 27–33)
MCHC RBC AUTO-ENTMCNC: 33.5 G/DL (ref 32.3–36.5)
MCV RBC AUTO: 92.2 FL (ref 81.4–97.8)
PLATELET # BLD AUTO: 145 K/UL (ref 164–446)
PMV BLD AUTO: 11.4 FL (ref 9–12.9)
POTASSIUM SERPL-SCNC: 4.3 MMOL/L (ref 3.6–5.5)
PROTHROMBIN TIME: 17 SEC (ref 12–14.6)
PROTHROMBIN TIME: 17.9 SEC (ref 12–14.6)
RBC # BLD AUTO: 2.69 M/UL (ref 4.7–6.1)
SODIUM SERPL-SCNC: 136 MMOL/L (ref 135–145)
UFH PPP CHRO-ACNC: 0.63 IU/ML
UFH PPP CHRO-ACNC: <0.1 IU/ML
WBC # BLD AUTO: 11 K/UL (ref 4.8–10.8)

## 2024-11-26 PROCEDURE — A9270 NON-COVERED ITEM OR SERVICE: HCPCS | Performed by: NURSE PRACTITIONER

## 2024-11-26 PROCEDURE — 85520 HEPARIN ASSAY: CPT

## 2024-11-26 PROCEDURE — 85610 PROTHROMBIN TIME: CPT

## 2024-11-26 PROCEDURE — 36415 COLL VENOUS BLD VENIPUNCTURE: CPT

## 2024-11-26 PROCEDURE — 770020 HCHG ROOM/CARE - TELE (206)

## 2024-11-26 PROCEDURE — 700111 HCHG RX REV CODE 636 W/ 250 OVERRIDE (IP): Mod: JZ

## 2024-11-26 PROCEDURE — 99233 SBSQ HOSP IP/OBS HIGH 50: CPT | Performed by: STUDENT IN AN ORGANIZED HEALTH CARE EDUCATION/TRAINING PROGRAM

## 2024-11-26 PROCEDURE — A9270 NON-COVERED ITEM OR SERVICE: HCPCS | Performed by: INTERNAL MEDICINE

## 2024-11-26 PROCEDURE — 700102 HCHG RX REV CODE 250 W/ 637 OVERRIDE(OP)

## 2024-11-26 PROCEDURE — A9270 NON-COVERED ITEM OR SERVICE: HCPCS

## 2024-11-26 PROCEDURE — A9270 NON-COVERED ITEM OR SERVICE: HCPCS | Performed by: STUDENT IN AN ORGANIZED HEALTH CARE EDUCATION/TRAINING PROGRAM

## 2024-11-26 PROCEDURE — A9270 NON-COVERED ITEM OR SERVICE: HCPCS | Performed by: HOSPITALIST

## 2024-11-26 PROCEDURE — 80048 BASIC METABOLIC PNL TOTAL CA: CPT

## 2024-11-26 PROCEDURE — 700102 HCHG RX REV CODE 250 W/ 637 OVERRIDE(OP): Performed by: NURSE PRACTITIONER

## 2024-11-26 PROCEDURE — 700102 HCHG RX REV CODE 250 W/ 637 OVERRIDE(OP): Performed by: INTERNAL MEDICINE

## 2024-11-26 PROCEDURE — 85018 HEMOGLOBIN: CPT

## 2024-11-26 PROCEDURE — 700102 HCHG RX REV CODE 250 W/ 637 OVERRIDE(OP): Performed by: HOSPITALIST

## 2024-11-26 PROCEDURE — 700102 HCHG RX REV CODE 250 W/ 637 OVERRIDE(OP): Performed by: STUDENT IN AN ORGANIZED HEALTH CARE EDUCATION/TRAINING PROGRAM

## 2024-11-26 PROCEDURE — 85027 COMPLETE CBC AUTOMATED: CPT

## 2024-11-26 PROCEDURE — 97535 SELF CARE MNGMENT TRAINING: CPT

## 2024-11-26 PROCEDURE — 700101 HCHG RX REV CODE 250: Performed by: INTERNAL MEDICINE

## 2024-11-26 PROCEDURE — 85730 THROMBOPLASTIN TIME PARTIAL: CPT

## 2024-11-26 RX ORDER — WARFARIN SODIUM 2.5 MG/1
2.5 TABLET ORAL DAILY
Status: DISCONTINUED | OUTPATIENT
Start: 2024-11-26 | End: 2024-11-27

## 2024-11-26 RX ORDER — BISACODYL 10 MG
10 SUPPOSITORY, RECTAL RECTAL ONCE
Status: COMPLETED | OUTPATIENT
Start: 2024-11-26 | End: 2024-11-26

## 2024-11-26 RX ORDER — HEPARIN SODIUM 5000 [USP'U]/100ML
0-30 INJECTION, SOLUTION INTRAVENOUS CONTINUOUS
Status: DISCONTINUED | OUTPATIENT
Start: 2024-11-26 | End: 2024-11-27

## 2024-11-26 RX ORDER — WARFARIN SODIUM 5 MG/1
5 TABLET ORAL DAILY
Status: DISCONTINUED | OUTPATIENT
Start: 2024-11-26 | End: 2024-11-26

## 2024-11-26 RX ADMIN — LEVOTHYROXINE SODIUM 50 MCG: 0.05 TABLET ORAL at 05:12

## 2024-11-26 RX ADMIN — LIDOCAINE 1 PATCH: 4 PATCH TOPICAL at 05:12

## 2024-11-26 RX ADMIN — BISOPROLOL FUMARATE 5 MG: 5 TABLET ORAL at 05:11

## 2024-11-26 RX ADMIN — CLOPIDOGREL BISULFATE 75 MG: 75 TABLET ORAL at 05:12

## 2024-11-26 RX ADMIN — WARFARIN SODIUM 2.5 MG: 2.5 TABLET ORAL at 18:14

## 2024-11-26 RX ADMIN — MAGNESIUM HYDROXIDE 30 ML: 1200 LIQUID ORAL at 18:14

## 2024-11-26 RX ADMIN — ATORVASTATIN CALCIUM 80 MG: 80 TABLET, FILM COATED ORAL at 18:14

## 2024-11-26 RX ADMIN — SENNOSIDES AND DOCUSATE SODIUM 2 TABLET: 50; 8.6 TABLET ORAL at 18:14

## 2024-11-26 RX ADMIN — FLUDROCORTISONE ACETATE 0.1 MG: 0.1 TABLET ORAL at 05:12

## 2024-11-26 RX ADMIN — HEPARIN SODIUM 18 UNITS/KG/HR: 5000 INJECTION, SOLUTION INTRAVENOUS at 15:12

## 2024-11-26 RX ADMIN — BISACODYL 10 MG: 10 SUPPOSITORY RECTAL at 15:05

## 2024-11-26 ASSESSMENT — ENCOUNTER SYMPTOMS
PALPITATIONS: 0
DIZZINESS: 1
BLURRED VISION: 0
SHORTNESS OF BREATH: 0
COUGH: 0
DIARRHEA: 0
WEAKNESS: 1
CHILLS: 0
CONSTIPATION: 0
HEADACHES: 0
VOMITING: 0
NAUSEA: 0
FEVER: 0
ABDOMINAL PAIN: 0
LOSS OF CONSCIOUSNESS: 0

## 2024-11-26 ASSESSMENT — FIBROSIS 4 INDEX: FIB4 SCORE: 6.16

## 2024-11-26 ASSESSMENT — COGNITIVE AND FUNCTIONAL STATUS - GENERAL
DRESSING REGULAR UPPER BODY CLOTHING: A LITTLE
TOILETING: A LITTLE
DRESSING REGULAR LOWER BODY CLOTHING: A LOT
SUGGESTED CMS G CODE MODIFIER DAILY ACTIVITY: CK
PERSONAL GROOMING: A LITTLE
HELP NEEDED FOR BATHING: A LITTLE
DAILY ACTIVITIY SCORE: 18

## 2024-11-26 NOTE — DIETARY
Nutrition Update: Follow-up for PO intake  Day 5 of admit.  Dilan Calderón is a 84 y.o. male with admitting DX of Postural dizziness with presyncope [R42, R55].    Current Diet: Cardiac, Ensure Plus TID    PO intake per ADLs since initial RD assessment on 11/22 has been 0% x 1 meal, <25% x 4 meals, 25-50% x 1 meal, 50-75% x 2 meals. Supplement intake has been <25% x 1, % x 3.    Last BM PTA, receiving daily BM meds per MAR    Problem: Nutritional:  Goal: Achieve adequate nutritional intake  Description: Patient will consume >50% of meals and supplements  Outcome: progressing    RD following

## 2024-11-26 NOTE — PROGRESS NOTES
4 Eyes Skin Assessment Completed by Sonali HUNG RN and Cassandra HUNG RN.    Head Scab  Ears Redness  Nose WDL  Mouth WDL  Neck WDL  Breast/Chest WDL  Shoulder Blades WDL  Spine WDL  (R) Arm/Elbow/Hand Bruising  (L) Arm/Elbow/Hand Scab  Abdomen WDL  Groin WDL  Scrotum/Coccyx/Buttocks Redness  (R) Leg Abrasion  (L) Leg Abrasion  (R) Heel/Foot/Toe WDL  (L) Heel/Foot/Toe Ulcer(s)          Devices In Places     Interventions In Place Heel Mepilex, Sacral Mepilex, Pillows, Q2 Turns, Barrier Cream, and Heels Loaded W/Pillows    Possible Skin Injury No    Pictures Uploaded Into Epic N/A  Wound Consult Placed Yes  RN Wound Prevention Protocol Ordered Yes

## 2024-11-26 NOTE — PROGRESS NOTES
SR 60-69 bpm (monitor room called: partially paced for a short time, around 52 bpm)  PAC, PVC  .15/.07/.41

## 2024-11-26 NOTE — PROGRESS NOTES
4 Eyes Skin Assessment Completed by ALBERT Gilliland and ALBERT Abraham.    Head Scab and Bruising  Right eye brow stiches/hematoma,   Ears Redness, blanching both ears  Nose WDL  Mouth WDL  Neck WDL  Breast/Chest Bruising  Shoulder Blades WDL  Spine WDL  (R) Arm/Elbow/Hand Bruising, scabs  (L) Arm/Elbow/Hand Bruising and Scab  Abdomen Bruising  Groin Bruising and Swelling  Scrotum/Coccyx/Buttocks Redness, bruising, swelling  (R) Leg Bruising and Abrasion, blood blister  (L) Leg Scab and Bruising  (R) Heel/Foot/Toe Redness and Blanching  (L) Heel/Foot/Toe partial thickness L lateral foot          Devices In Places Tele Box and Pulse Ox      Interventions In Place Heel Mepilex, Sacral Mepilex, Heel Float Boots, Q2 Turns, and Barrier Cream    Possible Skin Injury Yes    Pictures Uploaded Into Epic Yes  Wound Consult Placed Yes  RN Wound Prevention Protocol Ordered Yes

## 2024-11-26 NOTE — CARE PLAN
The patient is Stable - Low risk of patient condition declining or worsening    Shift Goals  Clinical Goals: monitor VS, Hgb, safety, BM  Patient Goals: rest  Family Goals: DHARA    Progress made toward(s) clinical / shift goals:    Problem: Fall Risk  Goal: Patient will remain free from falls  Description: Target End Date:  Prior to discharge or change in level of care    Document interventions on the [facility] Fall Risk Assessment    1.  Assess for fall risk factors  2.  Implement fall precautions  Outcome: Progressing  Note: Pt scored moderate when assessed for fall risk. Bed alarm on. Bed locked and in lowest position. Call light and personal belongings within reach. Pt remained free from falls, pt uses call light appropriately.     Problem: Hemodynamics  Goal: Patient's hemodynamics, fluid balance and neurologic status will be stable or improve  Description: Target End Date:  Prior to discharge or change in level of care    Document on Assessment and I/O flowsheet templates    1.  Monitor vital signs, pulse oximetry and cardiac monitor per provider order and/or policy  2.  Maintain blood pressure per provider order  3.  Hemodynamic monitoring per provider order  4.  Manage IV fluids and IV infusions  5.  Monitor intake and output  6.  Daily weights per unit policy or provider order  7.  Assess peripheral pulses and capillary refill  8.  Assess color and body temperature  9.  Position patient for maximum circulation/cardiac output  10. Monitor for signs/symptoms of excessive bleeding  11. Assess mental status, restlessness and changes in level of consciousness  12. Monitor temperature and report fever or hypothermia to provider immediately. Consideration of targeted temperature management.  Outcome: Progressing  Note: VS stable, HR above 58 bpm, blood transfusion finished at 8 pm, Hbg 8.3. No chest pain or SOB.

## 2024-11-27 ENCOUNTER — APPOINTMENT (OUTPATIENT)
Dept: VASCULAR LAB | Facility: MEDICAL CENTER | Age: 84
End: 2024-11-27
Attending: INTERNAL MEDICINE
Payer: MEDICARE

## 2024-11-27 VITALS
HEART RATE: 63 BPM | DIASTOLIC BLOOD PRESSURE: 80 MMHG | BODY MASS INDEX: 24.91 KG/M2 | WEIGHT: 158.73 LBS | OXYGEN SATURATION: 94 % | TEMPERATURE: 97.5 F | RESPIRATION RATE: 18 BRPM | SYSTOLIC BLOOD PRESSURE: 134 MMHG | HEIGHT: 67 IN

## 2024-11-27 PROBLEM — N17.9 ACUTE RENAL FAILURE (ARF) (HCC): Status: RESOLVED | Noted: 2024-11-20 | Resolved: 2024-11-27

## 2024-11-27 PROBLEM — I46.9 CARDIAC ARREST (HCC): Status: RESOLVED | Noted: 2024-11-13 | Resolved: 2024-11-27

## 2024-11-27 LAB
ANION GAP SERPL CALC-SCNC: 10 MMOL/L (ref 7–16)
BUN SERPL-MCNC: 35 MG/DL (ref 8–22)
CALCIUM SERPL-MCNC: 7.6 MG/DL (ref 8.5–10.5)
CHLORIDE SERPL-SCNC: 104 MMOL/L (ref 96–112)
CO2 SERPL-SCNC: 21 MMOL/L (ref 20–33)
CREAT SERPL-MCNC: 1 MG/DL (ref 0.5–1.4)
GFR SERPLBLD CREATININE-BSD FMLA CKD-EPI: 75 ML/MIN/1.73 M 2
GLUCOSE SERPL-MCNC: 103 MG/DL (ref 65–99)
HGB BLD-MCNC: 8.6 G/DL (ref 14–18)
INR PPP: 1.45 (ref 0.87–1.13)
POTASSIUM SERPL-SCNC: 4.1 MMOL/L (ref 3.6–5.5)
PROTHROMBIN TIME: 17.7 SEC (ref 12–14.6)
SODIUM SERPL-SCNC: 135 MMOL/L (ref 135–145)
UFH PPP CHRO-ACNC: 0.86 IU/ML
UFH PPP CHRO-ACNC: 0.91 IU/ML

## 2024-11-27 PROCEDURE — A9270 NON-COVERED ITEM OR SERVICE: HCPCS | Performed by: STUDENT IN AN ORGANIZED HEALTH CARE EDUCATION/TRAINING PROGRAM

## 2024-11-27 PROCEDURE — 700102 HCHG RX REV CODE 250 W/ 637 OVERRIDE(OP): Performed by: STUDENT IN AN ORGANIZED HEALTH CARE EDUCATION/TRAINING PROGRAM

## 2024-11-27 PROCEDURE — 700101 HCHG RX REV CODE 250: Performed by: INTERNAL MEDICINE

## 2024-11-27 PROCEDURE — A9270 NON-COVERED ITEM OR SERVICE: HCPCS

## 2024-11-27 PROCEDURE — A9270 NON-COVERED ITEM OR SERVICE: HCPCS | Performed by: HOSPITALIST

## 2024-11-27 PROCEDURE — 85520 HEPARIN ASSAY: CPT | Mod: 91

## 2024-11-27 PROCEDURE — 700111 HCHG RX REV CODE 636 W/ 250 OVERRIDE (IP): Mod: JZ

## 2024-11-27 PROCEDURE — 700102 HCHG RX REV CODE 250 W/ 637 OVERRIDE(OP)

## 2024-11-27 PROCEDURE — 700102 HCHG RX REV CODE 250 W/ 637 OVERRIDE(OP): Performed by: NURSE PRACTITIONER

## 2024-11-27 PROCEDURE — 85610 PROTHROMBIN TIME: CPT

## 2024-11-27 PROCEDURE — 97116 GAIT TRAINING THERAPY: CPT | Mod: CQ

## 2024-11-27 PROCEDURE — 99239 HOSP IP/OBS DSCHRG MGMT >30: CPT | Performed by: STUDENT IN AN ORGANIZED HEALTH CARE EDUCATION/TRAINING PROGRAM

## 2024-11-27 PROCEDURE — A9270 NON-COVERED ITEM OR SERVICE: HCPCS | Performed by: INTERNAL MEDICINE

## 2024-11-27 PROCEDURE — 80048 BASIC METABOLIC PNL TOTAL CA: CPT

## 2024-11-27 PROCEDURE — 36415 COLL VENOUS BLD VENIPUNCTURE: CPT

## 2024-11-27 PROCEDURE — 700102 HCHG RX REV CODE 250 W/ 637 OVERRIDE(OP): Performed by: HOSPITALIST

## 2024-11-27 PROCEDURE — 700102 HCHG RX REV CODE 250 W/ 637 OVERRIDE(OP): Performed by: INTERNAL MEDICINE

## 2024-11-27 PROCEDURE — 85018 HEMOGLOBIN: CPT

## 2024-11-27 PROCEDURE — 97530 THERAPEUTIC ACTIVITIES: CPT | Mod: CQ

## 2024-11-27 PROCEDURE — A9270 NON-COVERED ITEM OR SERVICE: HCPCS | Performed by: NURSE PRACTITIONER

## 2024-11-27 RX ORDER — FLUDROCORTISONE ACETATE 0.1 MG/1
0.1 TABLET ORAL EVERY MORNING
Qty: 30 TABLET | Refills: 0 | Status: SHIPPED | OUTPATIENT
Start: 2024-11-28 | End: 2024-12-28

## 2024-11-27 RX ORDER — BISOPROLOL FUMARATE 5 MG/1
5 TABLET, FILM COATED ORAL DAILY
Qty: 30 TABLET | Refills: 0 | Status: SHIPPED | OUTPATIENT
Start: 2024-11-28 | End: 2024-12-28

## 2024-11-27 RX ADMIN — BISOPROLOL FUMARATE 5 MG: 5 TABLET ORAL at 04:36

## 2024-11-27 RX ADMIN — HEPARIN SODIUM 16 UNITS/KG/HR: 5000 INJECTION, SOLUTION INTRAVENOUS at 03:56

## 2024-11-27 RX ADMIN — CLOPIDOGREL BISULFATE 75 MG: 75 TABLET ORAL at 04:36

## 2024-11-27 RX ADMIN — FLUDROCORTISONE ACETATE 0.1 MG: 0.1 TABLET ORAL at 04:36

## 2024-11-27 RX ADMIN — APIXABAN 5 MG: 5 TABLET, FILM COATED ORAL at 10:45

## 2024-11-27 RX ADMIN — LIDOCAINE 1 PATCH: 4 PATCH TOPICAL at 04:36

## 2024-11-27 RX ADMIN — LEVOTHYROXINE SODIUM 50 MCG: 0.05 TABLET ORAL at 04:36

## 2024-11-27 ASSESSMENT — COGNITIVE AND FUNCTIONAL STATUS - GENERAL
MOBILITY SCORE: 17
TOILETING: A LITTLE
STANDING UP FROM CHAIR USING ARMS: A LITTLE
MOVING TO AND FROM BED TO CHAIR: A LOT
WALKING IN HOSPITAL ROOM: A LITTLE
SUGGESTED CMS G CODE MODIFIER MOBILITY: CK
HELP NEEDED FOR BATHING: A LITTLE
DRESSING REGULAR LOWER BODY CLOTHING: A LOT
TURNING FROM BACK TO SIDE WHILE IN FLAT BAD: A LITTLE
WALKING IN HOSPITAL ROOM: A LOT
MOBILITY SCORE: 13
CLIMB 3 TO 5 STEPS WITH RAILING: A LOT
SUGGESTED CMS G CODE MODIFIER MOBILITY: CL
MOVING FROM LYING ON BACK TO SITTING ON SIDE OF FLAT BED: A LOT
MOVING TO AND FROM BED TO CHAIR: A LITTLE
DAILY ACTIVITIY SCORE: 19
SUGGESTED CMS G CODE MODIFIER DAILY ACTIVITY: CK
TURNING FROM BACK TO SIDE WHILE IN FLAT BAD: A LITTLE
DRESSING REGULAR UPPER BODY CLOTHING: A LITTLE
MOVING FROM LYING ON BACK TO SITTING ON SIDE OF FLAT BED: A LITTLE
STANDING UP FROM CHAIR USING ARMS: A LOT
CLIMB 3 TO 5 STEPS WITH RAILING: A LOT

## 2024-11-27 ASSESSMENT — GAIT ASSESSMENTS
ASSISTIVE DEVICE: FRONT WHEEL WALKER
DISTANCE (FEET): 80
GAIT LEVEL OF ASSIST: CONTACT GUARD ASSIST

## 2024-11-27 ASSESSMENT — FIBROSIS 4 INDEX: FIB4 SCORE: 6.16

## 2024-11-27 NOTE — PROGRESS NOTES
4 Eyes Skin Assessment Completed by ALBERT Abraham and ALBERT Mustafa.    Head Bruising, Redness, and Edema, stiches right eyebrow, yellow, green hematoma  Ears WDL  Nose WDL  Mouth WDL  Neck WDL  Breast/Chest Bruising  Shoulder Blades WDL  Spine WDL  (R) Arm/Elbow/Hand Bruising and Scabs  (L) Arm/Elbow/Hand Bruising and Scabs  Abdomen Bruising  Groin Redness and Swelling  Scrotum swelling, purple, redness. Sacrum red, blanching  (R) Leg Scab, Bruising, and Abrasion, bloody blister right knee, scab right shin  (L) Leg Bruising  (R) Heel: Redness and Blanching  (L) Heel: Redness and Blanching, abrasion outer foot metatarsal      Devices In Places Tele Box and Pulse Ox      Interventions In Place Heel Mepilex, Sacral Mepilex, Heel Float Boots, and Q2 Turns, barrier paste, pt refusing heel float boots, pillows instead    Possible Skin Injury Yes    Pictures Uploaded Into Epic Yes  Wound Consult Placed Yes  RN Wound Prevention Protocol Ordered Yes

## 2024-11-27 NOTE — THERAPY
Physical Therapy   Daily Treatment     Patient Name: Dilan Calderón  Age:  84 y.o., Sex:  male  Medical Record #: [id number]  Today's Date: 11/27/2024     Precautions  Precautions: Fall Risk  Comments: monitor BP    Assessment    The pt willing to participate w/PT, needing encouragement to stay seated in the chair. Functionally the pt is now SBA<->CGA for bed mobility, STS/transfers w/FWW, and hallway amb w/FWW. The pt w/decrease of 20 pts SBP from supine to post ambulation w/increase in WOB/RR. Nrsg notified.  The pt is not @ a level to d/c home alone, recommend post acute placement.  PT will cont to follow.     Plan    Treatment Plan Status: Continue Current Treatment Plan  Type of Treatment: Bed Mobility, Equipment, Family / Caregiver Training, Gait Training, Manual Therapy, Neuro Re-Education / Balance, Self Care / Home Evaluation, Therapeutic Activities, Therapeutic Exercise, Stair Training  Treatment Frequency: 3 Times per Week  Treatment Duration: Until Therapy Goals Met    DC Equipment Recommendations: Unable to determine at this time  Discharge Recommendations: Recommend post-acute placement for additional physical therapy services prior to discharge home     Objective       11/27/24 1150   Time In/Time Out   Therapy Start Time 1125   Therapy End Time 1150   Total Therapy Time 25   Charge Group   PT Gait Training (Units) 1   PT Therapeutic Activities (Units) 1   Total Time Spent   PT Gait Training Time Spent (Mins) 10   PT Therapeutic Activities Time Spent (Mins) 15   PT Total Time Spent (Calculated) 25   Precautions   Precautions Fall Risk   Comments monitor BP   Pain 0 - 10 Group   Location Sternum   Location Orientation Mid   Pain Rating Scale (NPRS) 2   Therapist Pain Assessment Post Activity  (pain from chest compressions.)   Other Treatments   Other Treatments Provided BP during PT session: Supine 137/70, M 92, HR 57. Seated 124/66 HR 64, M 85, Standing 124/60, Post ambulation 114/66 HR 54. The pt  demonstrating some SOB following his amb efforts. Assited pt w/simple ADL set up once seated in the chair.   Balance   Sitting Balance (Static) Fair   Sitting Balance (Dynamic) Fair   Standing Balance (Static) Fair -   Standing Balance (Dynamic) Fair -   Weight Shift Sitting Fair   Weight Shift Standing Fair   Skilled Intervention Verbal Cuing   Comments standing w/FWW   Bed Mobility    Supine to Sit Contact Guard Assist  (HOB partially elevated)   Sit to Supine   (pt left seatedin the chair)   Scooting Standby Assist  (seated)   Rolling Standby Assist   Gait Analysis   Gait Level Of Assist Contact Guard Assist   Assistive Device Front Wheel Walker   Distance (Feet) 80   # of Times Distance was Traveled 1   Skilled Intervention Verbal Cuing   Comments Initiated amb w/FWW, the pt demonstrates slow gait speed w/WOB noticed following his efforts. BP response @ above, no c/o dizziness. Nrsg notified of BP and increase in RR.   Functional Mobility   Sit to Stand Contact Guard Assist  (from EOB->FWW)   Bed, Chair, Wheelchair Transfer Minimal Assist  (w/FWW)   Skilled Intervention Verbal Cuing   6 Clicks Assessment - How much HELP from from another person do you currently need... (If the patient hasn't done an activity recently, how much help from another person do you think he/she would need if he/she tried?)   Turning from your back to your side while in a flat bed without using bedrails? 3   Moving from lying on your back to sitting on the side of a flat bed without using bedrails? 3   Moving to and from a bed to a chair (including a wheelchair)? 3   Standing up from a chair using your arms (e.g., wheelchair, or bedside chair)? 3   Walking in hospital room? 3   Climbing 3-5 steps with a railing? 2   6 clicks Mobility Score 17   Short Term Goals    Short Term Goal # 1 Pt will perform supine < > sit independently with bed flat, no rail in 6 visits in order to set up for upright mobility   Goal Outcome # 1 goal not met    Short Term Goal # 2 Pt will perform sit < > stand CGA in 6 visits in order to prepare for ambulation   Goal Outcome # 2 Goal met   Short Term Goal # 3 Pt will ambulate 50' CGA with FWW in 6 visits in order to progress household ambulation   Goal Outcome # 3 Goal met   Short Term Goal # 4 Pt will ascend/ descend 4 steps with Ilan in 6 visits in order to access his home   Goal Outcome # 4 Goal not met   Education Group   Role of Physical Therapist Patient Response Patient;Acceptance;Explanation;Action Demonstration   Physical Therapy Treatment Plan   Physical Therapy Treatment Plan Continue Current Treatment Plan   Anticipated Discharge Equipment and Recommendations   DC Equipment Recommendations Unable to determine at this time   Discharge Recommendations Recommend post-acute placement for additional physical therapy services prior to discharge home   Interdisciplinary Plan of Care Collaboration   IDT Collaboration with  Nursing   Patient Position at End of Therapy Seated;Call Light within Reach;Tray Table within Reach;Phone within Reach   Collaboration Comments Nrsg notified of pts tx efforts   Session Information   Date / Session Number  11/27--2 (2/3, 11/28) PTA/1 Needs updated gait goals   Supervising Physical Therapist (PTA Treatments Only)   Supervising Physical Therapist Bev Merida

## 2024-11-27 NOTE — DISCHARGE PLANNING
DC Transport Scheduled    Transport Company Scheduled:  [employer] [facility]  Spoke with Jose C at [employer] [facility] to schedule transport.  Trip #: [id number]    Scheduled Date: 11/27/2024  Scheduled Time: 1500    Transport Type: Wheelchair  Destination: [facility]   Destination address: [place] [id number]    Notified care team of scheduled transport via [employer].     If there are any changes needed to the DC transportation scheduled, please contact [facility] at ext. [phone] between the hours of 6548-3292 Mon-Fri. If outside those hours, contact the ED Case Manager at ext. [phone].

## 2024-11-27 NOTE — DOCUMENTATION QUERY
[facility]                                                                       Query Response Note      PATIENT:               AMY KERR  ACCT #:                  [id number]  MRN:                     [id number]  :                      1940  ADMIT DATE:       2024 4:47 AM  DISCH DATE:          RESPONDING  PROVIDER #:        [id number]           QUERY TEXT:    Patient s/p L heart catheterization on  and on anticoagulant admitted  for syncope and found to have a retroperitoneal hematoma involving R psoas/iliopsoas muscles and ABLA requiring blood transfusions.      Please clarify the relationship between the L heart catheterization, anticoagulation, and retroperitoneal hematoma.    Note: If you agree with a diagnosis listed, please remember to include it in your concurrent daily documentation and onto the Discharge Summary.    Documentation indicators that raised basis for question:    CTAP - Large R retroperitoneal hemorrhage involving R psoas and iliopsoas muscles    ED provider note - with bleeding from R eyebrow from a fall a few weeks ago...on anticoagulation and difficulty getting bleeding to stop   PN - Dx ABLA - due to retroperitoneal hematoma   Cardiology PN (Akinapelli) - RLE all the way to groin pain   Cardiology PN -  multiple syncopal episodes.  Likely related to ABLA in setting of retroperitoneal hematoma post Micra PPM placement   PN - Dx Circulating anticoagulants    Treatment - Hold Warfarin for anemia; 5 PRBC transfusions/1 FFP transfusion     Risk factors - s/p L heart cath on  with stent to L circumflex; on anticoagulation; retroperitoneal hematoma involving R psoas and iliopsoas muscles    Thank you,  Nanci MCMANUS  Clinical   Connect via [employer]  Options provided:   -- Condition retroperitoneal hematoma is unexpected and a  complication of the L heart catheterization performed 11/12 and circulating anticoagulants   -- Condition retroperitoneal hematoma is not a complication due to or associated with the procedure, it is related to circulating anticoagulant   -- Condition retroperitoneal hematoma is related to another etiology, (please document underlying etiology)   -- Unable to determine      Query created by: Nanci Meléndez on 11/25/2024 9:57 AM    RESPONSE TEXT:    Unable to determine          Electronically signed by:  SHONDA ALBRIGHT 11/27/2024 8:37 AM               08-May-2024 00:01

## 2024-11-27 NOTE — PROGRESS NOTES
Inpatient Anticoagulation Service Note for 11/26/2024    Reason for Anticoagulation: Other (Comments) (LV Thrombus)      Hemoglobin Value: (!) 8.8  Hematocrit Value: (!) 24.8  Lab Platelet Value: (!) 145  Target INR: 2.0 to 3.0    INR from last 7 days        Date/Time INR Value    11/26/24 1409 1.38     11/26/24 0300 1.47     11/25/24 0430 1.72     11/24/24 0304 2.54     11/23/24 0423 3.27     11/22/24 0519 3.51     11/21/24 1208 4.97     11/21/24 1104 5.79     11/20/24 0923 3.88     11/20/24 0508 4                   Dose from last 7 days        Date/Time Dose (mg)    11/26/24 1557 2.5                   Average Dose (mg): 2.5  Significant Interactions: Antiplatelet Medications (clopidogrel for recent stent placement)  Bridge Therapy: Yes -- heparin for rapid reversibility   Bridge Therapy Start Date: 11/26/24  Days of Overlap Therapy: 0    Reversal Agent Administered: Not Applicable  Comments: Patient presented with retroperotineal bleed following a fall. He was on OP warfarin + lovenox bridge (not seen by our anticoagulation clinic -- no TTR available), on 5mg daily. on admission, INR was 5. Additionally patient was on DAPT outpatient for recent stent-in-prior-stent placement in May of this year. DAPT has been decreased to clopidogrel monotherapy and warfarin has been resumed with stablization of his anemia. MD not concerned for active bleed. We are currently bridging with heparin therapy for more rapid reversal in case of rebleed.     Given patient has multiple bleed risks (recent bleeding/anemia, clopidogrel for stent, and fludrocortisone for orthostatic hypotension), and his supratherapeutic INR on admission, we will start with a conservative approach to anticoagulation.     Plan:   - Warfarin 2.5mg PO daily. Obtain repeat INR tomorrow (although this will not reflect today's dose, but to assess current trend for future dosing)     Education Material Provided?: No (previous warfarin patient)    Pharmacist  suggested discharge dosing: uncertain pending INR trends. Likely 2.5mg daily with close follow-up with anticoagulation clinic 48-72 after discharge.      Jess Cazares, PharmD

## 2024-11-27 NOTE — PROGRESS NOTES
[facility] Internal Medicine Daily Progress Note    Date of Service  11/26/2024    [facility] Team: [facility] Team   Attending: Fay Patel M.d.  Senior Resident: Dr. Ortega  Intern:  Dr. Pulliam  Contact Number: [phone]    Chief Complaint  Dilan Calderón is a 84 y.o. male admitted 11/20/2024 with syncopal events after PPM procedure with retroperitoneal hemmorhage.    Hospital Course  Dilan Calderón is an 84-year-old male who has been hospitalized multiple times over the past month most recently for syncopal event, where he experienced just a head laceration above his right eyebrow requiring stitches.  Initially admitted to the hospital service on 11/19 for syncopal workup.  On 11/21 he sustained an additional syncopal event in the hospital where he fell with head strike he was transferred back to bed became unresponsive with agonal breathing and loss of pulses.  He received 90 seconds of CPR and ROSC was achieved.  Patient was then transferred to the ICU where he had another episode of unresponsiveness and bradycardia into the 50s with associated hypotension.  While setting up for intubation and starting pressors he regained consciousness with no recollection of the syncopal event.  Head CT was obtained and was unremarkable.  Patient was noted to have significant drop in hemoglobin and was transfused 3 total units of RBCs.  CT abdomen pelvis obtained showed large right-sided retroperitoneal hemorrhage involving right psoas and hip iliopsoas muscles.  Most likely a complication of his permanent pacemaker placement through right groin.  Cardiology was consulted who increased his pacemaker rate to 80.  On 1123 his permanent pacemaker was reintegrated again and is sensing adjust to a rate of 50.  In total patient received 5 transfusions of packed red blood cells and 1 transfusion of fresh frozen plasma.  He was deemed stable for transfer back to the floor.      Cardiology continue to follow, telemetry did not show any  significant runs of SVT or drops in his rate.  Most likely his recurrent syncope was due to the blood loss anemia from his surgical procedure.  Recommendations of maintaining his hemoglobin above 10.  On the floor his hemoglobin was seen to be 7.3 and he was transfused an additional unit of packed red blood cells.    Interval Problem Update  Overnight, no events. States he feels about the same as yesterday, but no frequent dizziness or intensity of dizziness like he did before. Tolerated RBC transfusion.    Hemoglobin appears stable. Will restart warfarin with heparin bridge, INR goal of 2-3.  Will monitor overnight for bleeding, and if no bleed will be medically cleared once INR therapeutic.    I have discussed this patient's plan of care and discharge plan at IDT rounds today with Case Management, Nursing, Nursing leadership, and other members of the IDT team.    Consultants/Specialty  cardiology    Code Status  Full Code    Disposition  The patient is not medically cleared for discharge to home or a post-acute facility.      I have placed the appropriate orders for post-discharge needs.    Review of Systems  Review of Systems   Constitutional:  Negative for chills and fever.   HENT:  Negative for hearing loss.    Eyes:  Negative for blurred vision.   Respiratory:  Negative for cough and shortness of breath.    Cardiovascular:  Negative for chest pain and palpitations.   Gastrointestinal:  Negative for abdominal pain, constipation, diarrhea, nausea and vomiting.   Genitourinary:  Negative for dysuria.   Musculoskeletal:  Positive for joint pain (Right hip pain).   Neurological:  Positive for dizziness (milder than before) and weakness (improving). Negative for loss of consciousness and headaches.        Physical Exam  Temp:  [36.3 °C (97.3 °F)-36.7 °C (98.1 °F)] 36.6 °C (97.8 °F)  Pulse:  [57-68] 67  Resp:  [17-20] 17  BP: ()/(49-66) 136/66  SpO2:  [92 %-96 %] 95 %    Physical Exam  Constitutional:        Appearance: Normal appearance.   HENT:      Head: Normocephalic.      Comments: Closed laceration above right eyebrow with steri strips covering     Nose: Nose normal.      Mouth/Throat:      Mouth: Mucous membranes are moist.   Eyes:      Extraocular Movements: Extraocular movements intact.      Conjunctiva/sclera: Conjunctivae normal.      Pupils: Pupils are equal, round, and reactive to light.   Cardiovascular:      Rate and Rhythm: Normal rate and regular rhythm.      Pulses: Normal pulses.      Heart sounds: No murmur heard.     No gallop.      Comments: +2 Pulses in all 4 extremities  Pulmonary:      Effort: Pulmonary effort is normal. No respiratory distress.      Breath sounds: Normal breath sounds. No wheezing or rales.   Abdominal:      General: Abdomen is flat. There is no distension.      Palpations: Abdomen is soft.      Tenderness: There is no abdominal tenderness.   Musculoskeletal:         General: No swelling or deformity.      Cervical back: Normal range of motion.      Right lower leg: No edema.      Left lower leg: No edema.      Comments: Right inner thigh slightly indurated and tender to palpation. No redness or signs of infection,   Skin:     General: Skin is warm and dry.      Capillary Refill: Capillary refill takes less than 2 seconds.      Coloration: Skin is not pale.      Findings: No erythema.   Neurological:      Mental Status: He is alert. Mental status is at baseline. He is disoriented.      Sensory: No sensory deficit (normal sensation in LE BL).      Motor: No weakness.      Coordination: Coordination normal.   Psychiatric:         Mood and Affect: Mood normal.         Behavior: Behavior normal.         Fluids    Intake/Output Summary (Last 24 hours) at 11/26/2024 1647  Last data filed at 11/26/2024 1446  Gross per 24 hour   Intake 754 ml   Output 850 ml   Net -96 ml       Laboratory  Recent Labs     11/24/24  0304 11/24/24  0940 11/25/24  0430 11/25/24  0903 11/25/24  2041  11/26/24  0300 11/26/24  0908   WBC 12.3*  --  10.8  --   --  11.0*  --    RBC 2.68*  --  2.38*  --   --  2.69*  --    HEMOGLOBIN 8.3*   < > 7.3*   < > 8.3* 8.3* 8.8*   HEMATOCRIT 24.1*  --  21.8*  --   --  24.8*  --    MCV 89.9  --  91.6  --   --  92.2  --    MCH 31.0  --  30.7  --   --  30.9  --    MCHC 34.4  --  33.5  --   --  33.5  --    RDW 50.8*  --  53.7*  --   --  54.9*  --    PLATELETCT 116*  --  135*  --   --  145*  --    MPV 11.6  --  11.6  --   --  11.4  --     < > = values in this interval not displayed.     Recent Labs     11/24/24  0304 11/25/24  0430 11/26/24  0300   SODIUM 137 135 136   POTASSIUM 3.8 4.2 4.3   CHLORIDE 110 105 104   CO2 19* 21 22   GLUCOSE 120* 113* 101*   BUN 28* 27* 32*   CREATININE 1.06 1.05 1.05   CALCIUM 7.3* 8.1* 7.7*     Recent Labs     11/25/24  0430 11/26/24  0300 11/26/24  1409   APTT  --   --  33.1   INR 1.72* 1.47* 1.38*               Imaging  US-CAROTID DOPPLER BILAT   Final Result      CT-ABDOMEN-PELVIS W/O   Final Result      1.  Large right-sided retroperitoneal hemorrhage involving the right psoas and iliopsoas muscles.   2.  Small right effusion.   3.  Moderate-sized pericardial effusion.   4.  Persistent bilateral nephrograms suggesting renal dysfunction.   5.  Bilateral renal atrophy.   6.  Atherosclerosis.      EC-ECHOCARDIOGRAM LTD W/O CONT   Final Result      DX-FEMUR-2+ RIGHT   Final Result      No acute osseous abnormality.      CT-CTA CHEST PULMONARY ARTERY W/ RECONS   Final Result      1.  No evidence of pulmonary embolism.   2.  Small right-sided pleural effusion with associated compressive atelectasis and or consolidation. Underlying infection is possible.   3.  Perirenal fat stranding, uncertain etiology and significance.   4.  Small pericardial effusion.   5.  Ascending thoracic aortic ectasia measuring 4.1 cm.   6.  Atherosclerosis with coronary artery disease.            CT-HEAD W/O   Final Result      1.  No evidence of acute territorial infarct,  intracranial hemorrhage or mass lesion.   2.  Mild diffuse cerebral substance loss.   3.  Mild microangiopathic ischemic change versus demyelination or gliosis.               CT-CSPINE WITHOUT PLUS RECONS   Final Result      Degenerative changes of the cervical spine without acute fracture or malalignment.      DX-CHEST-LIMITED (1 VIEW)   Final Result         1.  No acute cardiopulmonary disease.   2.  Atherosclerosis      DX-CHEST-PORTABLE (1 VIEW)   Final Result         1.  No acute cardiopulmonary disease.   2.  Cardiomegaly   3.  Atherosclerosis           Assessment/Plan  Problem Representation:    * Syncope- (present on admission)  Assessment & Plan  -Patient has recently had multiple syncopal events.  Currently multifactorial due to to anemia, bradycardia, hypotension  -Cardiology following  -ECHO yesterday showed stable EF 45-50%, Apical Akinesis, known LV thrombus  -Carotid Duplex unremarkable  -Fall Precautions  Bilateral DEBI hose  Trial Florinef, monitor orthostatics    Circulating anticoagulants (HCC)- (present on admission)  Assessment & Plan  -on Warfarin for LV thrombus, currently on hold for anemia  -resume warfarin as able  -monitor for bleeding    Laceration of right eyebrow- (present on admission)  Assessment & Plan  -s/p suturing  -wound care    Groin pain, right  Assessment & Plan  -CT showing retroperitoneal bleed due to procedure access while on AC  -multimodal pain management    Acute blood loss anemia- (present on admission)  Assessment & Plan  Secondary to large retroperitoneal hematoma  Monitor closely for additional transfusion needs  -Transfused additional unit of PRBC 11/25    Acute renal failure (ARF) (HCC)- (present on admission)  Assessment & Plan  -Secondary to ATN  -resolved    Coronary artery disease- (present on admission)  Assessment & Plan  -Plavix and statin - no ASA while on Warfarin    Pre-syncope  Assessment & Plan  Patient was recently discharge from hospital after normal  workup up for presyncope on 11/19  When he went home he reports feeling tired, fatigue, and feel like he is going to faint  Denies chest pain   No LOC  No Fall   Most recent CT head with no acute pathology   His pacemaker was recently interrogated with no no issue   Looks slightly dehydrated on exam     Check orthostatics (modified with just laying and sitting until cleared by PT/OT)  Judicious IV fluids use in given hx of CHF  Will obtain PT/OT  SNF evaluation       Cardiac arrest (HCC)- (present on admission)  Assessment & Plan  -After syncopal event and head trauma the morning of 11/21 -potentially not a true loss of pulses s/p CPR x 90 seconds    Closed head injury- (present on admission)  Assessment & Plan  -Initial fall with eyebrow laceration status postrepair and head CT in the ED 11/20  -Repeat fall with recurrent right eyebrow laceration 11/21  -Repeat head CT, CT C-spine without acute findings  -Fall precautions  -PT/OT - skilled nursing facility accepted    Hypothyroidism- (present on admission)  Assessment & Plan  -home Levothyroxine    LV (left ventricular) mural thrombus- (present on admission)  Assessment & Plan  -known.   - restarting warfarin with heparin bridge - as hemoglobin is stable.  - INR therapeutic range 2-3  -monitor for bleeding  -repeat ECHO this admission shows decrease in size from prior ECHO      Chronic systolic heart failure (HCC)- (present on admission)  Assessment & Plan  -Without acute decompensation, EF 45% from echo 11/11  -Holding GDMT in the setting of hypotension  -daily weights  -I&O  -Cardiology follow up - signed off 11/25/2024    LVEF of 45% with apical akinesis. No significant valvular disease. I have independently interpreted and reviewed echocardiogram's actual images.      Will start Bisoprolol 5 mg daily for LV dysfunction.  Not indicated for ARNI, Spironolactone nor SGLT2i as patient did not present with HF exacerbation.  No indication for ICD placement.            VTE prophylaxis: SCDs/TEDs    I have performed a physical exam and reviewed and updated ROS and Plan today (11/26/2024). In review of yesterday's note (11/25/2024), there are no changes except as documented above.

## 2024-11-27 NOTE — CARE PLAN
The patient is Stable - Low risk of patient condition declining or worsening    Shift Goals  Clinical Goals: monitor labs and vs, safety bm  Patient Goals: sit up  Family Goals: DHARA    Progress made toward(s) clinical / shift goals:  Monitor labs and vs, wound care, suppository for bm given    Patient is not progressing towards the following goals:no bm yet

## 2024-11-27 NOTE — DISCHARGE PLANNING
Case Management Discharge Planning    Admission Date: 11/20/2024  GMLOS: 5.3  ALOS: 6    6-Clicks ADL Score: 19  6-Clicks Mobility Score: 13  PT and/or OT Eval ordered: Yes  Post-acute Referrals Ordered: Yes  Post-acute Choice Obtained: Yes  Has referral(s) been sent to post-acute provider:  Yes      Anticipated Discharge Dispo: Discharge Disposition: D/T to SNF with Medicare cert in anticipation of skilled care (03)    DME Needed: No    Action(s) Taken: Updated Provider/Nurse on Discharge Plan Patient discussed during IDT rounds with medical team. Spoke with patient, agrees to go to [facility] at 1500 today, states he has some bills he needs to pay at his house, states he will have to wait to pay them.    Escalations Completed: Transportation Vendor    Medically Clear: Yes    Next Steps: Case Management will continue to follow for discharge planning needs.    Barriers to Discharge: None    Is the patient up for discharge tomorrow: No

## 2024-11-27 NOTE — THERAPY
Occupational Therapy  Daily Treatment     Patient Name: Dilan Calderón  Age:  84 y.o., Sex:  male  Medical Record #: [id number]  Today's Date: 11/26/2024     Precautions: Fall Risk  Comments: labile BP    Assessment    Pt seen for OT tx. Pt demo progress towards OT goals, but is currently limited by decreased activity tolerance, decreased functional mobility, balance deficits, and generalized weakness. He required CGA to mobilize to the EOB with min-mod A to complete ADLs, functional mobility, and txfs using FWW. Pt reported an increase in fatigue with activity (RPE 7/10). No c/o dizziness throughout session and BP was stable throughout. Pt was provided education on importance of EOB/OOB ADLs to reduce risk of deconditioning. Recommend that pt mobilize to chair at least 3x/day with staff. Will continue to follow for ongoing acute OT services.     Plan    Treatment Plan Status: Continue Current Treatment Plan  Type of Treatment: Self Care / Activities of Daily Living, Adaptive Equipment, Community Re-Integration, Manual Therapy Techniques, Neuro Re-Education / Balance, Therapeutic Exercises, Therapeutic Activity  Treatment Frequency: 3 Times per Week  Treatment Duration: Until Therapy Goals Met    DC Equipment Recommendations: Unable to determine at this time  Discharge Recommendations: Recommend post-acute placement for additional occupational therapy services prior to discharge home     Objective    Vitals   O2 Delivery Device None - Room Air   Vitals Comments BP in Spann's 136/66, BP in Spann's after activity 139/72. No c/o dizziness throughout session   Pain 0 - 10 Group   Therapist Pain Assessment Post Activity Pain Same as Prior to Activity;Nurse Notified  (Not rated, reported an increase in generalized pain with activity)   Cognition    Level of Consciousness Alert   Comments Pleasant and cooperative; requires increased encouragement to participate in session.   Balance   Sitting Balance (Static) Fair    Sitting Balance (Dynamic) Fair   Standing Balance (Static) Fair -   Standing Balance (Dynamic) Fair -   Weight Shift Sitting Fair   Weight Shift Standing Fair   Skilled Intervention Verbal Cuing   Comments w/FWW   Bed Mobility    Supine to Sit Contact Guard Assist   Sit to Supine Contact Guard Assist   Scooting Contact Guard Assist   Skilled Intervention Verbal Cuing   Comments HOB slightly elevated   Activities of Daily Living   Eating Supervision   Grooming Minimal Assist;Standing   Lower Body Dressing Maximal Assist   Toileting Minimal Assist  (Assist with balance while completing pericare after attempted BM on standard toilet)   Skilled Intervention Verbal Cuing;Tactile Cuing;Sequencing   How much help from another person does the patient currently need...   6 Clicks Daily Activity Score 18   Functional Mobility   Sit to Stand Minimal Assist   Bed, Chair, Wheelchair Transfer Minimal Assist   Toilet Transfers Moderate Assist   Mobility EOB <> STS; w/FWW   Activity Tolerance   Sitting in Chair 5 min  (Toilet)   Sitting Edge of Bed 2 min   Standing 4 min   Comments Limited by generalized weakness   Patient / Family Goals   Patient / Family Goal #1 to figure out whats happening w/me   Goal #1 Outcome Progressing as expected   Short Term Goals   Short Term Goal # 1 pt will complete txf to BSC w/spv   Goal Outcome # 1 Progressing as expected   Short Term Goal # 2 pt will complete LB dressing w/spv   Goal Outcome # 2 Progressing as expected   Short Term Goal # 3 pt will complete grooming seated w/set up   Goal Outcome # 3 Progressing as expected   Education Group   Education Provided Role of Occupational Therapist;Activities of Daily Living;Pathology of bedrest   Role of Occupational Therapist Patient Response Patient;Acceptance;Explanation;Verbal Demonstration   ADL Patient Response Patient;Acceptance;Explanation;Verbal Demonstration;Action Demonstration;Reinforcement Needed   Pathology of Bedrest Patient Response  Patient;Acceptance;Explanation;Reinforcement Needed

## 2024-11-27 NOTE — DISCHARGE SUMMARY
[facility] Discharge Summary    Attending: Fay Patel M.d.  Senior Resident: Dr. Ortega  Intern:  Dr. Pulliam  Contact Number: [phone]    CHIEF COMPLAINT ON ADMISSION  Chief Complaint   Patient presents with    Wound Check    Fatigue     Reports bleeding from stitches on right side eyebrow upon waking up at 3am today, stitches from GLF 2 weeks ago. Patient states feeling fatigue for 2 days. Here recently with same. Hx of GLF, and had stents 2 weeks ago. On blood thinners    Post-Op Pain     Intermittent pain since surgery (implant) 2 weeks ago       Reason for Admission  Wound check     Admission Date  11/20/2024    CODE STATUS  Full Code    HPI & HOSPITAL COURSE   Dilan Calderón is an 84-year-old male who has been hospitalized multiple times over the past month most recently for syncopal event, where he experienced just a head laceration above his right eyebrow requiring stitches.  Initially admitted to the hospital service on 11/19 for syncopal workup.  On 11/21 he sustained an additional syncopal event in the hospital where he fell with head strike he was transferred back to bed became unresponsive with agonal breathing and loss of pulses.  He received 90 seconds of CPR and ROSC was achieved.  Patient was then transferred to the ICU where he had another episode of unresponsiveness and bradycardia into the 50s with associated hypotension.  While setting up for intubation and starting pressors he regained consciousness with no recollection of the syncopal event.  Head CT was obtained and was unremarkable.  Patient was noted to have significant drop in hemoglobin and was transfused 3 total units of RBCs.  CT abdomen pelvis obtained showed large right-sided retroperitoneal hemorrhage involving right psoas and hip iliopsoas muscles.  Most likely a complication of his permanent pacemaker placement through right groin.  Cardiology was consulted who increased his pacemaker rate to 80.  On 1123 his  permanent pacemaker was reintegrated again and is sensing adjust to a rate of 50.  In total patient received 5 transfusions of packed red blood cells and 1 transfusion of fresh frozen plasma.  He was deemed stable for transfer back to the floor.      Cardiology continue to follow, telemetry did not show any significant runs of SVT or drops in his rate.  Most likely his recurrent syncope was due to the blood loss anemia from his surgical procedure.  Recommendations of maintaining his hemoglobin above 10.  On the floor his hemoglobin was seen to be 7.3 and he was transfused an additional unit of packed red blood cells. Hemoglobin stabilized around 8.8. He was restarted on anticoagulation with apixiban given his history of GI bleed on warfarin. He was deemed medically stable for discharge to skilled nursing facility.    Therefore, he is discharged in fair and stable condition to skilled nursing facility.    The patient met 2-midnight criteria for an inpatient stay at the time of discharge.    Discharge Date  11/27/2024    Physical Exam on Day of Discharge  Physical Exam  Constitutional:       General: He is not in acute distress.     Appearance: Normal appearance. He is not ill-appearing or toxic-appearing.   HENT:      Head: Normocephalic.      Comments: Laceration to right Eyebrow, well healing no sings of edema, erythema, or warmth.     Nose: Nose normal.      Mouth/Throat:      Mouth: Mucous membranes are moist.      Pharynx: Oropharynx is clear.   Cardiovascular:      Rate and Rhythm: Normal rate and regular rhythm.      Pulses: Normal pulses.      Heart sounds: Normal heart sounds.   Pulmonary:      Effort: Pulmonary effort is normal.      Breath sounds: Normal breath sounds.   Abdominal:      General: Abdomen is flat. Bowel sounds are normal. There is no distension.      Palpations: Abdomen is soft.      Tenderness: There is no abdominal tenderness. There is no guarding.   Musculoskeletal:         General:  Tenderness (Right thigh, indurated, no erythema, mild tenderness to touch) present. No swelling.      Right lower leg: Edema present.      Left lower leg: No edema.   Skin:     General: Skin is warm.      Capillary Refill: Capillary refill takes less than 2 seconds.      Findings: Bruising (Right side of abdomen, no pain, well healing) present.   Neurological:      General: No focal deficit present.      Mental Status: He is alert and oriented to person, place, and time. Mental status is at baseline.   Psychiatric:         Mood and Affect: Mood normal.         Behavior: Behavior normal.         Thought Content: Thought content normal.         Judgment: Judgment normal.         FOLLOW UP ITEMS POST DISCHARGE  Please be sure to follow up with your cardiologist  within 30 days after discharge from the hospital.    We have made a few changes to your medications:  -STOP taking Metoprolol. START taking Bisoprolol as prescribed.  -STOP taking warfarin. START taking apixaban (Eliquis) for clot prevention    Please be sure to follow up with your nearest emergency room if you start seeing signs of bleeding (blood in urine, stool, vomiting blood, nosebleed that does not stop)  If you develop worsening back pain, leg pain, fatigue, or you become pale please seek out our nearest emergency room.      DISCHARGE DIAGNOSES  Principal Problem:    Syncope (POA: Yes)  Active Problems:    Chronic systolic heart failure (HCC) (Chronic) (POA: Yes)    LV (left ventricular) mural thrombus (Chronic) (POA: Yes)    Hypothyroidism (Chronic) (POA: Yes)    Closed head injury (POA: Yes)    Coronary artery disease (Chronic) (POA: Yes)    Acute blood loss anemia (POA: Yes)    Groin pain, right (POA: Unknown)    Laceration of right eyebrow (POA: Yes)    Circulating anticoagulants (HCC) (POA: Yes)  Resolved Problems:    Cardiac arrest (HCC) (POA: Yes)    Acute renal failure (ARF) (HCC) (POA: Yes)      FOLLOW UP  Future Appointments   Date Time  Provider Department [place]   12/3/2024  3:45 PM CAROLINE Gonzalez None   1/3/2025 10:30 AM GURJIT Pineda None     No follow-up provider specified.    MEDICATIONS ON DISCHARGE     Medication List        START taking these medications        Instructions   apixaban 5mg Tabs  Commonly known as: Eliquis   Take 1 Tablet by mouth 2 times a day for 30 days. Indications: Thromboembolism secondary to Atrial Fibrillation  Dose: 5 mg     bisoprolol 5 MG Tabs  Start taking on: November 28, 2024  Commonly known as: Zebeta   Take 1 Tablet by mouth every day for 30 days.  Dose: 5 mg     fludrocortisone 0.1 MG Tabs  Start taking on: November 28, 2024  Commonly known as: Florinef   Take 1 Tablet by mouth every morning for 30 days.  Dose: 0.1 mg            CONTINUE taking these medications        Instructions   acetaminophen 500 MG Tabs  Commonly known as: Tylenol   Take 500-1,000 mg by mouth 2 times a day as needed.  Dose: 500-1,000 mg     Aspirin Low Dose 81 MG Chew chewable tablet  Generic drug: aspirin   Chew 1 Tablet every day. Stop once INR therapeutic and off lovenox injections  Dose: 81 mg     atorvastatin 80 MG tablet  Commonly known as: Lipitor   Take 1 Tablet by mouth every evening.  Dose: 80 mg     clopidogrel 75 MG Tabs  Commonly known as: Plavix   Take 1 Tablet by mouth every day.  Dose: 75 mg     levothyroxine 50 MCG Tabs  Commonly known as: Synthroid   Take 1 Tablet by mouth every morning on an empty stomach.  Dose: 50 mcg     multivitamin Tabs   Take 1 Tablet by mouth every evening.  Dose: 1 Tablet     NON SPECIFIED   Take 1 Tablet by mouth every day. OTC allergy med  Dose: 1 Tablet            STOP taking these medications      enoxaparin 60 MG/0.6ML Sosy inj  Commonly known as: Lovenox     metoprolol SR 25 MG Tb24  Commonly known as: Toprol XL     warfarin 5 MG Tabs  Commonly known as: Coumadin              Allergies  No Known Allergies    DIET  Orders Placed This Encounter    Procedures    Diet Order Diet: Cardiac     Standing Status:   Standing     Number of Occurrences:   1     Order Specific Question:   Diet:     Answer:   Cardiac [6]       ACTIVITY  As tolerated and directed by skilled nursing.  Weight bearing as tolerated    CONSULTATIONS  Cardiology    PROCEDURES  CPR for cardiac arrest  Micra PPM placement      LABORATORY  Lab Results   Component Value Date    SODIUM 135 11/27/2024    POTASSIUM 4.1 11/27/2024    CHLORIDE 104 11/27/2024    CO2 21 11/27/2024    GLUCOSE 103 (H) 11/27/2024    BUN 35 (H) 11/27/2024    CREATININE 1.00 11/27/2024    CREATININE 1.2 02/11/2008        Lab Results   Component Value Date    WBC 11.0 (H) 11/26/2024    HEMOGLOBIN 8.6 (L) 11/27/2024    HEMATOCRIT 24.8 (L) 11/26/2024    PLATELETCT 145 (L) 11/26/2024        Total time of the discharge process exceeds 50 minutes.

## 2024-11-27 NOTE — DISCHARGE PLANNING
1144  Agency/Facility Name: [facility]  Spoke To: Tala  Outcome: DPA inquired bed availability, per Tala has bed for 1500 today.CA advised RN KELLEE via [employer]   Declines

## 2024-11-27 NOTE — CARE PLAN
The patient is Stable - Low risk of patient condition declining or worsening    Shift Goals  Clinical Goals: manage heparin drip, monitor for bleeding  Patient Goals: sleep  Family Goals: DHARA    Progress made toward(s) clinical / shift goals:    Problem: Fall Risk  Goal: Patient will remain free from falls  Description: Target End Date:  Prior to discharge or change in level of care    Document interventions on the [place] [facility] Fall Risk Assessment    1.  Assess for fall risk factors  2.  Implement fall precautions  Outcome: Progressing  Note: Pt ambulated to the bathroom with x2 assist. Pt remained free from falls, bed in low and locked position, bed alarm on, call light, glasses, water, and cell phone within reach.      Problem: Respiratory:  Goal: Respiratory status will improve  Outcome: Progressing  Note: Incentive spirometry given again to patient, pt practices and is self motivated.

## 2024-11-28 NOTE — PROGRESS NOTES
IV removed. Discharge paperwork discussed. All questions answered. Follow up appointment discussed. Patient discharged to [facility] via medical transportation with staff. Patient has all personal belongings.

## 2024-12-02 ENCOUNTER — TELEPHONE (OUTPATIENT)
Dept: VASCULAR LAB | Facility: MEDICAL CENTER | Age: 84
End: 2024-12-02
Payer: MEDICARE

## 2024-12-02 NOTE — TELEPHONE ENCOUNTER
[facility]     Received anticoagulation referral from Dr. Oro on 11/13/24.     Called [facility] ([phone]) and confirmed that pt is admitted to SNF with no anticipated d/c date. Will f/u periodically.    1st attempt     Insurance: Medicare  PCP: None  Locations to be seen: [facility]    If no response 1 month post SNF d/c OR 2 no shows/cancellations, will remove from referral list    Fan ChenD  [facility]  Phone [phone]

## 2024-12-03 ENCOUNTER — OFFICE VISIT (OUTPATIENT)
Dept: CARDIOLOGY | Facility: MEDICAL CENTER | Age: 84
End: 2024-12-03
Payer: MEDICARE

## 2024-12-03 VITALS
SYSTOLIC BLOOD PRESSURE: 112 MMHG | OXYGEN SATURATION: 96 % | HEART RATE: 58 BPM | HEIGHT: 66 IN | RESPIRATION RATE: 16 BRPM | WEIGHT: 134 LBS | DIASTOLIC BLOOD PRESSURE: 72 MMHG | BODY MASS INDEX: 21.53 KG/M2

## 2024-12-03 DIAGNOSIS — R55 SYNCOPE, UNSPECIFIED SYNCOPE TYPE: ICD-10-CM

## 2024-12-03 DIAGNOSIS — R55 POSTURAL DIZZINESS WITH PRESYNCOPE: ICD-10-CM

## 2024-12-03 DIAGNOSIS — I25.9 ISCHEMIC HEART DISEASE DUE TO CORONARY ARTERY OBSTRUCTION (HCC): ICD-10-CM

## 2024-12-03 DIAGNOSIS — E78.5 DYSLIPIDEMIA: ICD-10-CM

## 2024-12-03 DIAGNOSIS — I50.20 HFREF (HEART FAILURE WITH REDUCED EJECTION FRACTION) (HCC): ICD-10-CM

## 2024-12-03 DIAGNOSIS — I25.5 ISCHEMIC CARDIOMYOPATHY: ICD-10-CM

## 2024-12-03 DIAGNOSIS — I24.0 ISCHEMIC HEART DISEASE DUE TO CORONARY ARTERY OBSTRUCTION (HCC): ICD-10-CM

## 2024-12-03 DIAGNOSIS — I51.3 LV (LEFT VENTRICULAR) MURAL THROMBUS: Chronic | ICD-10-CM

## 2024-12-03 DIAGNOSIS — D68.318 CIRCULATING ANTICOAGULANTS (HCC): ICD-10-CM

## 2024-12-03 DIAGNOSIS — Z95.0 CARDIAC PACEMAKER IN SITU: ICD-10-CM

## 2024-12-03 DIAGNOSIS — R42 POSTURAL DIZZINESS WITH PRESYNCOPE: ICD-10-CM

## 2024-12-03 DIAGNOSIS — I25.10 CORONARY ARTERY DISEASE INVOLVING NATIVE CORONARY ARTERY OF NATIVE HEART WITHOUT ANGINA PECTORIS: ICD-10-CM

## 2024-12-03 PROCEDURE — 99214 OFFICE O/P EST MOD 30 MIN: CPT

## 2024-12-03 PROCEDURE — 99212 OFFICE O/P EST SF 10 MIN: CPT

## 2024-12-03 PROCEDURE — 3078F DIAST BP <80 MM HG: CPT

## 2024-12-03 PROCEDURE — 3074F SYST BP LT 130 MM HG: CPT

## 2024-12-03 RX ORDER — SPIRONOLACTONE 25 MG/1
12.5 TABLET ORAL DAILY
Qty: 45 TABLET | Refills: 3 | Status: SHIPPED | OUTPATIENT
Start: 2024-12-03

## 2024-12-03 ASSESSMENT — ENCOUNTER SYMPTOMS
DYSPNEA ON EXERTION: 0
SHORTNESS OF BREATH: 0
HEADACHES: 0
NEAR-SYNCOPE: 0
PALPITATIONS: 0
PND: 0
LIGHT-HEADEDNESS: 1
SYNCOPE: 0
DIZZINESS: 1
ORTHOPNEA: 0

## 2024-12-03 ASSESSMENT — FIBROSIS 4 INDEX: FIB4 SCORE: 6.16

## 2024-12-03 NOTE — PROGRESS NOTES
"Chief Complaint   Patient presents with    Congestive Heart Failure     F/V Dx:   Chronic systolic heart failure (HCC)    Other     F/V Dx: ST elevation myocardial infarction involving left anterior descending (LAD) coronary artery (HCC)          Subjective:   Dilan Calderón is a 84 y.o. male who presents today for hospital follow-up.    Initially seen in the hospital by Dr. Man.    Patient was hospitalizaed from 11/10/2024-11/14/2024 after presenting to the ED with syncope and weakness.   Patient was symptomatic with lightheadedness and dizziness prior to syncope.  Then he woke up on the floor in a small pool of blood due to suffering trauma to right eyebrow.  Patient reports muscle tension like discomfort across both shoulders radiating down to both arms for about 3 to 4 days prior to admission, relieved mostly by deep breathing exercises.  This was associated with dizziness.  Last admission in May 2024 for NSTEMI when he received a cardiac stent to mid LAD.  Patient reports taking prescribed medications for 30 days only after which he decided to stop all his prescribed medications since he \"felt good.\" No follow-ups with cardiology since.    Cardiology recommended to proceed with repeat LHC. While in the ICU patient had another episode of syncope with 7.5 second high grade block so EP was consulted for possible pacemaker. After admission, echo revealed EF improvement to 45%, however with LV thrombis. Dr. Olivia recommendation for Micra in setting of recurrent syncope, paroxysmal high grade av block.    Patient was hospitalized from 11/19/2024-11/27/2024 after presenting to the ED for another syncopal episode and weakness. He continued to have episodes of syncope in the hospital related to bradycardia and anemia. THough he had the apical LV thrombus due to ongoing bleeding the have to stop the coumadin which was found to be a retroperitoneal bleed.     Other medical problems include GIB, CAD / MI s/p PCI DOUGIE " p-mLAD, ICM, HFmrEF, CKD.    Interval history:  Prior left heart cath on May 2, 2024 showed occluded proximal LAD, s/p Synergy 3 x 28 mm and 3 x 32 mm DOUGIE     Today's visit:  Patient has been at [facility] since discharging from his last hospitalization. He says that he is still feeling fatigued and weak but denies any chest pain, palpitations, shortness of breath, orthopnea, PND, or syncope. He is still experiencing dizziness at times with moving from sitting to standing but it resolves quickly. He does have lower extremity edema. He says that he has not required any further blood transfusion since discharging the hospital and [facility] is continuing to monitor his blood levels. His blood pressure has been monitor at [facility] and has ranged from 96-110s with some in the 120s/50-70. He is continuing to rehab and do exercises. He is not sure how long he will be in SNF.       Cardiovascular Risk Factors:  1. Smoking status: Former smoker  2. Type II Diabetes Mellitus: No   Lab Results   Component Value Date/Time    HBA1C 5.8 (H) 05/03/2024 04:00 AM     3. Hypertension: No  4. Dyslipidemia: Yes   Cholesterol,Tot   Date Value Ref Range Status   11/11/2024 197 100 - 199 mg/dL Final     LDL   Date Value Ref Range Status   11/11/2024 117 (H) <100 mg/dL Final     HDL   Date Value Ref Range Status   11/11/2024 62 >=40 mg/dL Final     Triglycerides   Date Value Ref Range Status   11/11/2024 88 0 - 149 mg/dL Final       Past Medical History:   Diagnosis Date    NSTEMI (non-ST elevated myocardial infarction) (HCC)          History reviewed. No pertinent family history.      Social History     Tobacco Use    Smoking status: Former     Types: Cigarettes    Smokeless tobacco: Never   Vaping Use    Vaping status: Never Used   Substance Use Topics    Alcohol use: Not Currently    Drug use: Never         No Known Allergies      Current Outpatient Medications   Medication Sig    magnesium hydroxide (MILK OF MAGNESIA) 400 MG/5ML  "Suspension Take 30 mL by mouth 1 time a day as needed.    spironolactone (ALDACTONE) 25 MG Tab Take 0.5 Tablets by mouth every day.    apixaban (ELIQUIS) 5mg Tab Take 1 Tablet by mouth 2 times a day for 30 days. Indications: Thromboembolism secondary to Atrial Fibrillation    bisoprolol (ZEBETA) 5 MG Tab Take 1 Tablet by mouth every day for 30 days.    fludrocortisone (FLORINEF) 0.1 MG Tab Take 1 Tablet by mouth every morning for 30 days.    multivitamin Tab Take 1 Tablet by mouth every evening.    atorvastatin (LIPITOR) 80 MG tablet Take 1 Tablet by mouth every evening.    clopidogrel (PLAVIX) 75 MG Tab Take 1 Tablet by mouth every day.    levothyroxine (SYNTHROID) 50 MCG Tab Take 1 Tablet by mouth every morning on an empty stomach.    acetaminophen (TYLENOL) 500 MG Tab Take 500-1,000 mg by mouth 2 times a day as needed.         Review of Systems   Constitutional: Positive for malaise/fatigue.   Cardiovascular:  Positive for leg swelling. Negative for chest pain, dyspnea on exertion, near-syncope, orthopnea, palpitations, paroxysmal nocturnal dyspnea and syncope.   Respiratory:  Negative for shortness of breath.    Neurological:  Positive for dizziness and light-headedness. Negative for headaches.           Objective:   /72 (BP Location: Left arm, Patient Position: Sitting, BP Cuff Size: Adult)   Pulse (!) 58   Resp 16   Ht 1.676 m (5' 6\")   Wt 60.8 kg (134 lb)   SpO2 96%  Body mass index is 21.63 kg/m².    Physical Exam  Constitutional:       General: He is not in acute distress.  HENT:      Head: Normocephalic and atraumatic.   Cardiovascular:      Rate and Rhythm: Normal rate and regular rhythm.      Pulses: Normal pulses.      Heart sounds: Murmur heard.   Pulmonary:      Effort: Pulmonary effort is normal. No respiratory distress.      Breath sounds: Normal breath sounds.   Musculoskeletal:      Right lower le+ Edema present.      Left lower le+ Edema present.   Neurological:      Mental " Status: He is alert and oriented to person, place, and time.      Gait: Gait normal.   Psychiatric:         Behavior: Behavior normal.             Lab Results   Component Value Date/Time    SODIUM 135 11/27/2024 03:15 AM    POTASSIUM 4.1 11/27/2024 03:15 AM    CHLORIDE 104 11/27/2024 03:15 AM    CO2 21 11/27/2024 03:15 AM    GLUCOSE 103 (H) 11/27/2024 03:15 AM    BUN 35 (H) 11/27/2024 03:15 AM    CREATININE 1.00 11/27/2024 03:15 AM    CREATININE 1.2 02/11/2008 09:00 AM      Lab Results   Component Value Date/Time    WBC 11.0 (H) 11/26/2024 03:00 AM    RBC 2.69 (L) 11/26/2024 03:00 AM    HEMOGLOBIN 8.6 (L) 11/27/2024 03:15 AM    HEMATOCRIT 24.8 (L) 11/26/2024 03:00 AM    MCV 92.2 11/26/2024 03:00 AM    MCH 30.9 11/26/2024 03:00 AM    MCHC 33.5 11/26/2024 03:00 AM    MPV 11.4 11/26/2024 03:00 AM    NEUTSPOLYS 75.60 (H) 11/25/2024 04:30 AM    LYMPHOCYTES 13.50 (L) 11/25/2024 04:30 AM    MONOCYTES 8.40 11/25/2024 04:30 AM    EOSINOPHILS 1.20 11/25/2024 04:30 AM    BASOPHILS 0.20 11/25/2024 04:30 AM    HYPOCHROMIA 1+ 05/02/2024 08:42 AM    ANISOCYTOSIS 1+ 05/02/2024 08:42 AM      Lab Results   Component Value Date/Time    CHOLSTRLTOT 197 11/11/2024 04:20 AM     (H) 11/11/2024 04:20 AM    HDL 62 11/11/2024 04:20 AM    TRIGLYCERIDE 88 11/11/2024 04:20 AM       Lab Results   Component Value Date/Time    TROPONINT 83 (H) 11/21/2024 1812     Lab Results   Component Value Date/Time    NTPROBNP 4743 (H) 11/25/2024 0430   Coronary Angiogram 11/12/2024  HEMODYNAMICS:   Aortic pressure: 121/51 mmHg     CORONARY ANGIOGRAPHY:  The left main coronary artery is a short vessel with luminal disease that bifurcates into the left anterior descending and left circumflex coronary arteries.  The left anterior descending coronary artery supplies several small diagonal branches and has a widely patent proximal to mid vessel stent followed by distal luminal irregularities.  The left circumflex coronary artery is a large, dominant vessel  with proximal 80% disease, mid luminal irregularities, and distal 70% disease after the takeoff of the third obtuse marginal branch.  It supplies a small first obtuse marginal branch, a large and immediately bifurcating second obtuse marginal branch with 70% ostial disease in both limbs, a large third obtuse marginal branch with proximal 70% disease, and several small posterolateral branches  The right coronary artery is a moderate, nondominant vessel with proximal 50% disease, mid 70% disease, and severe diffuse distal disease prior to a distal chronic total occlusion.  The distal vessel refills by faint collaterals from the left coronary system.     PERCUTANEOUS CORONARY INTERVENTION:  Lesion location: Proximal left circumflex coronary  Lesion type: A  Lesion length: 15 mm  Pre-intervention GAMAL flow: 3  Post-intervention GAMAL flow: 3  Pre-intervention stenosis: 80%  Post-intervention stenosis: 0%  Stent: 3.0 x 24 mm Synergy drug-eluting stent     IMPRESSION:  Widely patent left anterior descending coronary artery stents.  Distal right coronary now chronically occluded.  Successful percutaneous coronary intervention to the proximal left circumflex coronary artery with one 3.0 x 24 mm Synergy drug-eluting stent postdilated to 3.5 mm.     RECOMMENDATIONS:  Return to floor with continued care per primary service.  TR band release per protocol.  Restart heparin drip per protocol in 2 hours if access site no bleeding complications.  Strict adherence with dual antiplatelet therapy for at least 3 months if tolerated.  Optimal medical management of residual coronary artery disease with consideration for further intervention depending on patient's symptoms and medical compliance.    Cardiac Catheterization 5/2/2024 by Dr. Johns :  IMPRESSION:  1.  Occluded proximal LAD (heavily calcified vessel) status post successful IVUS guided intravascular lithotripsy and PCI deploying overlapping Synergy 3 x 28 mm and 3 x 32 mm  DOUGIE, postdilated to ~ 3.5-3.7 mm.  2.  Ischemic cardiomyopathy with estimated LVEF 45% with anterior wall akinesis  3.  Significant elevated resting LVEDP 25 mmHg with no significant transaortic gradient on pullback     RECOMMENDATIONS:  Dual antiplatelet therapy for at least 12 months  Weigh risk versus benefits of further PCI to his remaining severe disease in the circumflex and RCA, especially given recent GI bleed with no clear identifiable source necessitating blood transfusions.  HI statin / PCSK9-I: Target LDL < 55 and TG < 150  TTE with echo enhancing agent  GDMT and lifestyle modifications for secondary ASCVD prevention  Cardiac Rehab referral  Assessment:   1. HFrEF (heart failure with reduced ejection fraction) (HCC)  - EC-ECHOCARDIOGRAM COMPLETE W/O CONT; Future  - spironolactone (ALDACTONE) 25 MG Tab; Take 0.5 Tablets by mouth every day.  Dispense: 45 Tablet; Refill: 3    2. LV (left ventricular) mural thrombus    3. Circulating anticoagulants (HCC)    4. Coronary artery disease involving native coronary artery of native heart without angina pectoris    5. Ischemic cardiomyopathy    6. 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 Postural dizziness with presyncope    8. Syncope, unspecified syncope type    9. Cardiac pacemaker in situ    10. Dyslipidemia    Other orders  - magnesium hydroxide (MILK OF MAGNESIA) 400 MG/5ML Suspension; Take 30 mL by mouth 1 time a day as needed.       Medical Decision Making:  Today's Assessment / Plan:    HFrEF, Stage C, Class III, LVEF 45%: bilateral lower extremity swelling right > left, no ascites, no JVD, and lungs clear on auscultation  -Heart failure due to ischemic cardiomyopathy  -ACE-I/ARB/ARNI: Not started due to low blood pressure and syncope due to hypotension. Consider at follow up pending blood pressure control  -Evidence Based Beta-blocker: Continue bisoprolol 5 mg daily  -Aldosterone Antagonist: Start spironolactone 12.5 mg daily.  Continue to monitor blood pressure and for increased dizziness/lightheadedness.  -SGLT2i: Consider at follow up pending blood pressure control  -Labs: CMP in 1 week  -Continue Daily weights; daily BP readings; daily symptom tracking.    LV mural thrombus  Circulating anticoagulants  -Continue eliquis 5 mg twice a day  -Repeat echocardiogram in 3 months to monitor thrombus and can discuss stopping anticoagulants pending results  -No signs of bleeding, continue to monitor hemoglobin and hematocrit    CAD s/p PCI DOUGIE   Ischemic cardiomyopathy  -Continue plavix and eliquis  -Stop asa due to anticoagulant, does not need triple therapy  -Continue atorvastatin 80 mg daily  -Continue bisoprolol 5 mg daily  -Continue exercise and therapies as recommended at SNF and increase activity as tolerated    Syncope  Cardiac pacemaker  -No reoccurrence of syncope  -Continue to monitor blood pressure and anemia as multiple factors related to syncope in hospital.   -PPM setting changed during last admission and pacemaker functioning properly  -Continue to follow with device clinic    Hyperlipidemia  -Most recent   -Continue atorvastatin 80 mg daily  -Goal of less than 70  -Check lipid panel in 6 months    Return in about 4 weeks (around 12/31/2024) for Pippa JOSEPH.    CAROLINE Gonzalez

## 2024-12-06 ENCOUNTER — TELEPHONE (OUTPATIENT)
Dept: VASCULAR LAB | Facility: MEDICAL CENTER | Age: 84
End: 2024-12-06
Payer: MEDICARE

## 2024-12-06 NOTE — TELEPHONE ENCOUNTER
[facility]     Received anticoagulation referral from Dr. Oro on 11/13/24.     Called [facility] [place] ([phone]) and confirmed that pt is admitted to SNF with no anticipated d/c date. Will f/u periodically.     2nd attempt     Insurance: Medicare  PCP: None  Locations to be seen: [facility]     If no response 1 month post SNF d/c OR 2 no shows/cancellations, will remove from referral list    Fan StanleyD

## 2024-12-11 ENCOUNTER — TELEPHONE (OUTPATIENT)
Dept: VASCULAR LAB | Facility: MEDICAL CENTER | Age: 84
End: 2024-12-11
Payer: MEDICARE

## 2024-12-11 NOTE — TELEPHONE ENCOUNTER
[facility]     Received anticoagulation referral from Dr. Oro on 11/13/24.     Called [facility] [place] ([phone]) and confirmed that pt is admitted to SNF with no anticipated d/c date. Will f/u periodically.     3rd attempt     Insurance: Medicare  PCP: None  Locations to be seen: [facility]     If no response 1 month post SNF d/c OR 2 no shows/cancellations, will remove from referral list     Fan StanleyD

## 2024-12-19 ENCOUNTER — TELEPHONE (OUTPATIENT)
Dept: VASCULAR LAB | Facility: MEDICAL CENTER | Age: 84
End: 2024-12-19
Payer: MEDICARE

## 2024-12-19 NOTE — TELEPHONE ENCOUNTER
[facility]     Received anticoagulation referral from Dr. Oro on 11/13/24.     Called [facility] [place] [facility] ([phone]) and confirmed that pt is admitted to SNF and [facility] for discharge planner requesting call-back regarding any anticipated d/c date. Will f/u periodically.     4th attempt     Insurance: Medicare  PCP: None  Locations to be seen: [facility]    If no response by 1 month post SNF discharge OR 2 no shows/cancellations, will remove from referral list    Hernan Alarcon, PharmD  [facility]ticoagulation/Pharmacotherapy [facility]  Phone [phone]

## 2025-01-02 ENCOUNTER — TELEPHONE (OUTPATIENT)
Dept: CARDIOLOGY | Facility: MEDICAL CENTER | Age: 85
End: 2025-01-02
Payer: MEDICARE

## 2025-01-02 ENCOUNTER — OFFICE VISIT (OUTPATIENT)
Dept: CARDIOLOGY | Facility: MEDICAL CENTER | Age: 85
End: 2025-01-02
Payer: MEDICARE

## 2025-01-02 VITALS
WEIGHT: 129 LBS | HEIGHT: 67 IN | SYSTOLIC BLOOD PRESSURE: 112 MMHG | RESPIRATION RATE: 16 BRPM | BODY MASS INDEX: 20.25 KG/M2 | OXYGEN SATURATION: 96 % | HEART RATE: 60 BPM | DIASTOLIC BLOOD PRESSURE: 60 MMHG

## 2025-01-02 DIAGNOSIS — I50.20 HFREF (HEART FAILURE WITH REDUCED EJECTION FRACTION) (HCC): ICD-10-CM

## 2025-01-02 PROCEDURE — 99214 OFFICE O/P EST MOD 30 MIN: CPT

## 2025-01-02 PROCEDURE — 99212 OFFICE O/P EST SF 10 MIN: CPT

## 2025-01-02 PROCEDURE — 3074F SYST BP LT 130 MM HG: CPT

## 2025-01-02 PROCEDURE — 3078F DIAST BP <80 MM HG: CPT

## 2025-01-02 RX ORDER — BISOPROLOL FUMARATE 5 MG/1
5 TABLET, FILM COATED ORAL DAILY
Qty: 90 TABLET | Refills: 3 | Status: SHIPPED | OUTPATIENT
Start: 2025-01-02

## 2025-01-02 RX ORDER — LOSARTAN POTASSIUM 25 MG/1
12 TABLET ORAL DAILY
Qty: 45 TABLET | Refills: 3 | Status: SHIPPED | OUTPATIENT
Start: 2025-01-02

## 2025-01-02 ASSESSMENT — ENCOUNTER SYMPTOMS
ORTHOPNEA: 0
DIZZINESS: 0
NEAR-SYNCOPE: 0
PALPITATIONS: 0
SYNCOPE: 0
LIGHT-HEADEDNESS: 0
PND: 0
DYSPNEA ON EXERTION: 0
SHORTNESS OF BREATH: 0
HEADACHES: 0

## 2025-01-02 ASSESSMENT — FIBROSIS 4 INDEX: FIB4 SCORE: 6.16

## 2025-01-02 NOTE — TELEPHONE ENCOUNTER
[facility]     Received anticoagulation referral from Dr. Oro on 11/13/24.     Called [facility] [place] ([phone]) and confirmed that pt is admitted to SNF and [facility] for discharge planner requesting call-back regarding any anticipated d/c date. Will f/u periodically.      5th call     Insurance: Medicare  PCP: None  Locations to be seen: [facility]     If no response by 1 month post SNF discharge OR 2 no shows/cancellations, will remove from referral list     Giovanni Davis, PharmD, BCACP   [facility] Anticoagulation/Pharmacotherapy [facility]  Phone [phone]

## 2025-01-03 ENCOUNTER — APPOINTMENT (OUTPATIENT)
Dept: CARDIOLOGY | Facility: MEDICAL CENTER | Age: 85
End: 2025-01-03
Attending: PHYSICIAN ASSISTANT
Payer: MEDICARE

## 2025-01-07 NOTE — TELEPHONE ENCOUNTER
Caller: [facility] - [facility] [place]     Topic/issue: Update: Pt has still not been discharged.     Callback Number: [phone]    Thank you,   Gemma DICKINSON

## 2025-01-16 ENCOUNTER — TELEPHONE (OUTPATIENT)
Dept: VASCULAR LAB | Facility: MEDICAL CENTER | Age: 85
End: 2025-01-16
Payer: MEDICARE

## 2025-01-16 NOTE — TELEPHONE ENCOUNTER
[facility]     Received anticoagulation referral from Dr. Oro on 11/13/24.     Called [facility] [place] ([phone]) and confirmed that pt is admitted to SNF. Spoke with  Representative and confirmed that patient is currently anticipated to discharge on Wednesday 1/22/25.     6th call     Insurance: Medicare  PCP: None  Locations to be seen: [facility]     If no response by 1 month post SNF discharge OR 2 no shows/cancellations, will remove from referral list           Hernan Alarcon PharmD  [facility] Anticoagulation/Pharmacotherapy Clinic  Phone: [phone], Fax [phone]  Wed

## 2025-01-23 ENCOUNTER — TELEPHONE (OUTPATIENT)
Dept: CARDIOLOGY | Facility: MEDICAL CENTER | Age: 85
End: 2025-01-23
Payer: MEDICARE

## 2025-01-23 NOTE — TELEPHONE ENCOUNTER
[facility]     Received anticoagulation referral from Dr. Oro on 11/13/24.     Called [facility] [place] ([phone]) and confirmed that pt has been discharged.    Called pt to schedule initial visit to establish care - no answer. LVM.     7th call     Insurance: Medicare  PCP: None  Locations to be seen: [facility]     If no response by 1 month post SNF discharge (2/22/25) OR 2 no shows/cancellations, will remove from referral list           Giovanni Davis, PharmD, BCACP   [facility] Anticoagulation/Pharmacotherapy Clinic  Phone: [phone], Fax [phone]

## 2025-01-27 ENCOUNTER — TELEPHONE (OUTPATIENT)
Dept: VASCULAR LAB | Facility: MEDICAL CENTER | Age: 85
End: 2025-01-27
Payer: MEDICARE

## 2025-01-27 NOTE — TELEPHONE ENCOUNTER
[facility]     Received anticoagulation referral from Dr. Oro on 11/13/24.     Called pt to schedule initial visit to establish care - no answer. LVM.     8th call     Insurance: Medicare  PCP: None  Locations to be seen: [facility]     If no response by 1 month post SNF discharge (2/22/25) OR 2 no shows/cancellations, will remove from referral list           Fan YungD, BCACP  [facility] Anticoagulation/Pharmacotherapy Clinic  Phone: [phone], Fax [phone]

## 2025-02-03 ENCOUNTER — TELEPHONE (OUTPATIENT)
Dept: VASCULAR LAB | Facility: MEDICAL CENTER | Age: 85
End: 2025-02-03
Payer: MEDICARE

## 2025-02-03 ENCOUNTER — APPOINTMENT (OUTPATIENT)
Dept: URBAN - METROPOLITAN AREA CLINIC 15 | Facility: CLINIC | Age: 85
Setting detail: DERMATOLOGY
End: 2025-02-03

## 2025-02-03 NOTE — TELEPHONE ENCOUNTER
[facility]     Received anticoagulation referral from Dr. Oro on 11/13/24.     Called patient to schedule an appt to establish care. No answer. LVM.    9th attempt     Insurance: Medicare  PCP: None  Locations to be seen: [facility]    If no response by 2/22/25 (1 month from SNF d/c) OR 2 no shows/cancellations, will remove from referral list    Ghazala Myers, PharmD  [facility]  Phone [phone]

## 2025-02-10 ENCOUNTER — TELEPHONE (OUTPATIENT)
Dept: VASCULAR LAB | Facility: MEDICAL CENTER | Age: 85
End: 2025-02-10
Payer: MEDICARE

## 2025-02-10 NOTE — LETTER
February 10, 2025        Dilan Calderón  [address] [place] [address]            Dear Dilan Calderón ,    We have been unsuccessful in our attempts to contact you regarding your Anticoagulation Service referral.  Anticoagulants are medications that can cause potential harmful side effects when not monitored properly. Without proper monitoring there is potential for serious, sometimes life-threatening bleeding problems or life-threatening blood clots or stroke could result.    Please contact our clinic so we may assist you.  We are open Monday-Friday 8 am until 5 pm.  You may reach our Service at [phone].    To monitor your anticoagulant effectively, we need to be able to communicate with you.  This is a requirement to be followed by our Service.  Please contact the clinic as soon as possible to establish care and provide your current contact information.  Thank you.        Sincerely,      Juliet Okeefe, PharmD, BCACP  Pharmacy Clinical Supervisor  [facility]  Outpatient Anticoagulation Service

## 2025-02-10 NOTE — TELEPHONE ENCOUNTER
[facility]     Received anticoagulation referral from Dr. Oro on 11/13/24.     Called patient to schedule an appt to establish care. No answer. LVM.  There is no emergency contact on file  Sent letter    10th attempt     Insurance: Medicare  PCP: None  Locations to be seen: [facility]    If no response by 2/22/25 (1 month from SNF d/c) OR 2 no shows/cancellations, will remove from referral list    Juliet Okeefe, PharmD  [facility] Anticoagulation/Pharmacotherapy Clinic  Phone [phone]

## 2025-02-24 ENCOUNTER — DOCUMENTATION (OUTPATIENT)
Dept: VASCULAR LAB | Facility: MEDICAL CENTER | Age: 85
End: 2025-02-24
Payer: MEDICARE

## 2025-02-24 NOTE — PROGRESS NOTES
Received anticoagulation referral.    We are unable to reach the patient after multiple attempts.    Will 'close' referral.    Juliet Okeefe, PharmD

## 2025-06-18 ENCOUNTER — TELEPHONE (OUTPATIENT)
Dept: HEALTH INFORMATION MANAGEMENT | Facility: OTHER | Age: 85
End: 2025-06-18
Payer: MEDICARE

## (undated) DEVICE — SYRINGE ALLIANCE INFLATION (5EA/BX)

## (undated) DEVICE — PORT AUXILLARY WATER (50EA/BX)

## (undated) DEVICE — NEPTUNE 4 PORT MANIFOLD - (20/PK)

## (undated) DEVICE — BALLOON CRE WG 18-20MM 240CM 5.5 F G

## (undated) DEVICE — SET LEADWIRE 5 LEAD BEDSIDE DISPOSABLE ECG (1SET OF 5/EA)

## (undated) DEVICE — CANISTER SUCTION RIGID RED 1500CC (40EA/CA)

## (undated) DEVICE — KIT PROCEDURE DOUBLE ENDO ONLY (5/CA)

## (undated) DEVICE — MASK PANORAMIC OXYGEN PRO2 (30EA/CA)

## (undated) DEVICE — TUBING CLEARLINK DUO-VENT - C-FLO (48EA/CA)

## (undated) DEVICE — SODIUM CHL IRRIGATION 0.9% 1000ML (12EA/CA)

## (undated) DEVICE — FILM CASSETTE ENDO

## (undated) DEVICE — SET EXTENSION WITH 2 PORTS (48EA/CA) ***PART #2C8610 IS A SUBSTITUTE*****

## (undated) DEVICE — FORCEP RADIAL JAW 4 STANDARD CAPACITY W/NEEDLE 240CM (40EA/BX)

## (undated) DEVICE — MASK WITH FACE SHIELD (25/BX 4BX/CA)

## (undated) DEVICE — BUTTON ENDOSCOPY DISPOSABLE

## (undated) DEVICE — TUBE CONNECTING SUCTION - CLEAR PLASTIC STERILE 72 IN (50EA/CA)

## (undated) DEVICE — BITE BLOCK, DISP.

## (undated) DEVICE — LACTATED RINGERS INJ 1000 ML - (14EA/CA 60CA/PF)

## (undated) DEVICE — SENSOR OXIMETER ADULT SPO2 RD SET (20EA/BX)

## (undated) DEVICE — CONTAINER, SPECIMEN, STERILE

## (undated) DEVICE — WATER IRRIGATION STERILE 1000ML (12EA/CA)